# Patient Record
Sex: MALE | Race: WHITE | NOT HISPANIC OR LATINO
[De-identification: names, ages, dates, MRNs, and addresses within clinical notes are randomized per-mention and may not be internally consistent; named-entity substitution may affect disease eponyms.]

---

## 2017-04-28 ENCOUNTER — TRANSCRIPTION ENCOUNTER (OUTPATIENT)
Age: 77
End: 2017-04-28

## 2017-04-28 PROBLEM — Z00.00 ENCOUNTER FOR PREVENTIVE HEALTH EXAMINATION: Status: ACTIVE | Noted: 2017-04-28

## 2017-05-02 ENCOUNTER — APPOINTMENT (OUTPATIENT)
Dept: HEART AND VASCULAR | Facility: CLINIC | Age: 77
End: 2017-05-02

## 2017-05-02 VITALS
WEIGHT: 170 LBS | OXYGEN SATURATION: 94 % | TEMPERATURE: 98.6 F | SYSTOLIC BLOOD PRESSURE: 128 MMHG | RESPIRATION RATE: 15 BRPM | HEIGHT: 70 IN | BODY MASS INDEX: 24.34 KG/M2 | HEART RATE: 114 BPM | DIASTOLIC BLOOD PRESSURE: 70 MMHG

## 2017-05-02 DIAGNOSIS — I44.60 UNSPECIFIED FASCICULAR BLOCK: ICD-10-CM

## 2017-05-02 DIAGNOSIS — Z86.39 PERSONAL HISTORY OF OTHER ENDOCRINE, NUTRITIONAL AND METABOLIC DISEASE: ICD-10-CM

## 2017-05-02 DIAGNOSIS — N40.0 BENIGN PROSTATIC HYPERPLASIA WITHOUT LOWER URINARY TRACT SYMPMS: ICD-10-CM

## 2017-05-02 DIAGNOSIS — I44.4 LEFT ANTERIOR FASCICULAR BLOCK: ICD-10-CM

## 2017-05-02 DIAGNOSIS — E78.5 HYPERLIPIDEMIA, UNSPECIFIED: ICD-10-CM

## 2017-05-02 DIAGNOSIS — Z82.49 FAMILY HISTORY OF ISCHEMIC HEART DISEASE AND OTHER DISEASES OF THE CIRCULATORY SYSTEM: ICD-10-CM

## 2017-05-02 DIAGNOSIS — R94.31 ABNORMAL ELECTROCARDIOGRAM [ECG] [EKG]: ICD-10-CM

## 2017-05-02 DIAGNOSIS — R60.0 LOCALIZED EDEMA: ICD-10-CM

## 2017-05-02 RX ORDER — TERAZOSIN 2 MG/1
2 CAPSULE ORAL
Refills: 0 | Status: ACTIVE | COMMUNITY

## 2017-05-02 RX ORDER — ZOLPIDEM TARTRATE 10 MG/1
10 TABLET, FILM COATED ORAL
Refills: 0 | Status: DISCONTINUED | COMMUNITY

## 2017-05-02 RX ORDER — TRIAZOLAM 0.12 MG/1
0.12 TABLET ORAL
Qty: 30 | Refills: 0 | Status: ACTIVE | COMMUNITY
Start: 2017-05-02 | End: 1900-01-01

## 2017-05-02 RX ORDER — FLURAZEPAM HYDROCHLORIDE 15 MG/1
15 CAPSULE ORAL
Refills: 0 | Status: DISCONTINUED | COMMUNITY

## 2017-05-07 PROBLEM — Z82.49 FAMILY HISTORY OF PREMATURE CAD: Status: ACTIVE | Noted: 2017-05-07

## 2017-05-07 PROBLEM — Z82.49 FAMILY HISTORY OF EARLY CAD: Status: ACTIVE | Noted: 2017-05-07

## 2017-05-07 PROBLEM — I44.60 BUNDLE BRANCH BLOCK, LEFT HEMIBLOCK: Status: ACTIVE | Noted: 2017-05-07

## 2017-05-07 PROBLEM — Z86.39 HISTORY OF HYPERLIPIDEMIA: Status: RESOLVED | Noted: 2017-05-07 | Resolved: 2017-05-07

## 2017-05-07 PROBLEM — E78.5 DYSLIPIDEMIA: Status: ACTIVE | Noted: 2017-05-07

## 2017-05-07 PROBLEM — N40.0 BPH (BENIGN PROSTATIC HYPERTROPHY): Status: ACTIVE | Noted: 2017-05-07

## 2017-05-07 PROBLEM — R60.0 BILATERAL LEG EDEMA: Status: ACTIVE | Noted: 2017-05-07

## 2017-05-07 RX ORDER — TRIAZOLAM 0.25 MG/1
0.25 TABLET ORAL
Qty: 7 | Refills: 0 | Status: DISCONTINUED | COMMUNITY
Start: 2017-04-04

## 2017-05-07 RX ORDER — NAPROXEN 500 MG/1
500 TABLET ORAL
Qty: 28 | Refills: 0 | Status: DISCONTINUED | COMMUNITY
Start: 2016-12-12

## 2017-05-07 RX ORDER — OXYCODONE AND ACETAMINOPHEN 5; 325 MG/1; MG/1
5-325 TABLET ORAL
Qty: 10 | Refills: 0 | Status: DISCONTINUED | COMMUNITY
Start: 2016-12-12

## 2017-05-07 RX ORDER — ALPRAZOLAM 2 MG/1
2 TABLET ORAL
Refills: 0 | Status: DISCONTINUED | COMMUNITY
End: 2017-05-07

## 2017-05-07 RX ORDER — SULFAMETHOXAZOLE AND TRIMETHOPRIM 800; 160 MG/1; MG/1
800-160 TABLET ORAL
Qty: 10 | Refills: 0 | Status: DISCONTINUED | COMMUNITY
Start: 2017-03-01

## 2017-05-20 ENCOUNTER — INPATIENT (INPATIENT)
Facility: HOSPITAL | Age: 77
LOS: 5 days | Discharge: NURSING, REHAB OR LONG-TERM CARE | DRG: 470 | End: 2017-05-26
Attending: ORTHOPAEDIC SURGERY | Admitting: ORTHOPAEDIC SURGERY
Payer: MEDICARE

## 2017-05-20 VITALS
OXYGEN SATURATION: 91 % | HEART RATE: 81 BPM | SYSTOLIC BLOOD PRESSURE: 196 MMHG | DIASTOLIC BLOOD PRESSURE: 82 MMHG | TEMPERATURE: 98 F | RESPIRATION RATE: 16 BRPM

## 2017-05-20 LAB
ANION GAP SERPL CALC-SCNC: 8 MMOL/L — SIGNIFICANT CHANGE UP (ref 5–17)
APPEARANCE UR: CLEAR — SIGNIFICANT CHANGE UP
APTT BLD: 24.1 SEC — LOW (ref 27.5–37.4)
BASOPHILS NFR BLD AUTO: 0.2 % — SIGNIFICANT CHANGE UP (ref 0–2)
BILIRUB UR-MCNC: NEGATIVE — SIGNIFICANT CHANGE UP
BUN SERPL-MCNC: 23 MG/DL — SIGNIFICANT CHANGE UP (ref 7–23)
CALCIUM SERPL-MCNC: 9 MG/DL — SIGNIFICANT CHANGE UP (ref 8.4–10.5)
CHLORIDE SERPL-SCNC: 97 MMOL/L — SIGNIFICANT CHANGE UP (ref 96–108)
CO2 SERPL-SCNC: 28 MMOL/L — SIGNIFICANT CHANGE UP (ref 22–31)
COLOR SPEC: YELLOW — SIGNIFICANT CHANGE UP
CREAT SERPL-MCNC: 1.1 MG/DL — SIGNIFICANT CHANGE UP (ref 0.5–1.3)
DIFF PNL FLD: NEGATIVE — SIGNIFICANT CHANGE UP
EOSINOPHIL NFR BLD AUTO: 0.4 % — SIGNIFICANT CHANGE UP (ref 0–6)
GLUCOSE SERPL-MCNC: 99 MG/DL — SIGNIFICANT CHANGE UP (ref 70–99)
GLUCOSE UR QL: NEGATIVE — SIGNIFICANT CHANGE UP
HCT VFR BLD CALC: 41 % — SIGNIFICANT CHANGE UP (ref 39–50)
HGB BLD-MCNC: 13.2 G/DL — SIGNIFICANT CHANGE UP (ref 13–17)
INR BLD: 1.03 — SIGNIFICANT CHANGE UP (ref 0.88–1.16)
KETONES UR-MCNC: NEGATIVE — SIGNIFICANT CHANGE UP
LEUKOCYTE ESTERASE UR-ACNC: NEGATIVE — SIGNIFICANT CHANGE UP
LYMPHOCYTES # BLD AUTO: 73.2 % — HIGH (ref 13–44)
MCHC RBC-ENTMCNC: 30.4 PG — SIGNIFICANT CHANGE UP (ref 27–34)
MCHC RBC-ENTMCNC: 32.2 G/DL — SIGNIFICANT CHANGE UP (ref 32–36)
MCV RBC AUTO: 94.5 FL — SIGNIFICANT CHANGE UP (ref 80–100)
MONOCYTES NFR BLD AUTO: 2.4 % — SIGNIFICANT CHANGE UP (ref 2–14)
NEUTROPHILS NFR BLD AUTO: 23.8 % — LOW (ref 43–77)
NITRITE UR-MCNC: NEGATIVE — SIGNIFICANT CHANGE UP
PH UR: 6.5 — SIGNIFICANT CHANGE UP (ref 5–8)
PLATELET # BLD AUTO: 117 K/UL — LOW (ref 150–400)
POTASSIUM SERPL-MCNC: 4.3 MMOL/L — SIGNIFICANT CHANGE UP (ref 3.5–5.3)
POTASSIUM SERPL-SCNC: 4.3 MMOL/L — SIGNIFICANT CHANGE UP (ref 3.5–5.3)
PROT UR-MCNC: NEGATIVE MG/DL — SIGNIFICANT CHANGE UP
PROTHROM AB SERPL-ACNC: 11.4 SEC — SIGNIFICANT CHANGE UP (ref 9.8–12.7)
RBC # BLD: 4.34 M/UL — SIGNIFICANT CHANGE UP (ref 4.2–5.8)
RBC # FLD: 13.5 % — SIGNIFICANT CHANGE UP (ref 10.3–16.9)
SODIUM SERPL-SCNC: 133 MMOL/L — LOW (ref 135–145)
SP GR SPEC: 1.01 — SIGNIFICANT CHANGE UP (ref 1–1.03)
UROBILINOGEN FLD QL: 0.2 E.U./DL — SIGNIFICANT CHANGE UP
WBC # BLD: 24.8 K/UL — HIGH (ref 3.8–10.5)
WBC # FLD AUTO: 24.8 K/UL — HIGH (ref 3.8–10.5)

## 2017-05-20 PROCEDURE — 99285 EMERGENCY DEPT VISIT HI MDM: CPT | Mod: 25

## 2017-05-20 PROCEDURE — 71010: CPT | Mod: 26

## 2017-05-20 PROCEDURE — 73502 X-RAY EXAM HIP UNI 2-3 VIEWS: CPT | Mod: 26,RT

## 2017-05-20 PROCEDURE — 99221 1ST HOSP IP/OBS SF/LOW 40: CPT | Mod: GC

## 2017-05-20 PROCEDURE — 72170 X-RAY EXAM OF PELVIS: CPT | Mod: 26

## 2017-05-20 PROCEDURE — 93010 ELECTROCARDIOGRAM REPORT: CPT

## 2017-05-20 RX ORDER — MORPHINE SULFATE 50 MG/1
4 CAPSULE, EXTENDED RELEASE ORAL ONCE
Qty: 0 | Refills: 0 | Status: DISCONTINUED | OUTPATIENT
Start: 2017-05-20 | End: 2017-05-20

## 2017-05-20 RX ADMIN — MORPHINE SULFATE 4 MILLIGRAM(S): 50 CAPSULE, EXTENDED RELEASE ORAL at 20:39

## 2017-05-20 NOTE — CONSULT NOTE ADULT - PROBLEM SELECTOR RECOMMENDATION 6
Per outside documentation, pt w/ hx of insomnia and takes triazolam 0.125 qhs. Pt however, requesting ambien and xanax for sleep. Would advice against these medications as both sedating and pt is >73 yoa.  --can c/w triazolam PRN Per outside documentation, pt w/ hx of insomnia and takes triazolam 0.125 qhs. However other records show pt recieves 2 mg xanax (120 per month since september 2016) and zolpidem 10 mg for the past 2 months. Pt, requesting ambien and xanax for sleep ("I need 2 of each at 3 am for sleep"). Would advise against these medications as both sedating and pt is >73 yoa. However, as is home medication, can give xanax 1 mg for sleep tonight. If pt showing any signs of withdrawal likely 2/2 to benzodiazepine withdrawal  --xanax 1 mg tonight for sleep  --monitor for any signs of withdrawal (likely 2/2 to benzos)
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
## 2017-05-20 NOTE — ED PROVIDER NOTE - MEDICAL DECISION MAKING DETAILS
Pt presents s/p mechanical fall w/ hip pain. Deformity c/w hip fx. No other signs of trauma or pain. Analgesia, XR, pre-op w/u. Anticipate ortho consult and admission
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
## 2017-05-20 NOTE — ED PROVIDER NOTE - MUSCULOSKELETAL, MLM
No c-spine ttp. FROM w/o midline pain. Pelvis stable. + ttp R hip. R leg externally rotated and shortened. Unable to range the leg 2/2 significant pain. NVI distally.
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
## 2017-05-20 NOTE — CONSULT NOTE ADULT - PROBLEM SELECTOR RECOMMENDATION 9
Pt w/ intermediate risk orthopedic surgery w/ 1 cardiac risk factor equivalent. Echocardiogram done at Dr. Herber Goldberg's office earlier this month w/ normal LVEF (75%), n/l wall motion, but abnormal LV diastolic dysfxn, as well as trace MR and TR. Pt exercise tolerance <4 METS. Pt not complaining of any current CP or cardiac sx. No arrythmias, signs of active CHF, or murmurs on exam. Pt low risk for intermediate risk surgery (RCRI score of 1). As surgery is emergent, pt is medically optimized and sx should not be postponed for any further testing. Pt w/ intermediate risk orthopedic surgery w/ 1 cardiac risk factor equivalent. Echocardiogram done at Dr. Herber Goldberg's office earlier this month w/ normal LVEF (75%), n/l wall motion, but abnormal LV diastolic dysfxn, as well as trace MR and TR. Pt exercise tolerance METS >4. Pt not complaining of any current CP or cardiac sx. No arrythmias, signs of active CHF, or murmurs on exam. Pt low risk for intermediate risk surgery (RCRI score of 1). As surgery is emergent, pt is medically optimized and sx should not be postponed for any further testing. Pt w/ intermediate risk orthopedic surgery w/ 1 cardiac risk factor equivalent. Echocardiogram done at Dr. Herber Goldberg's office earlier this month w/ normal LVEF (75%), n/l wall motion, but abnormal LV diastolic dysfxn, as well as trace MR and TR. Pt exercise tolerance METS >4. Pt not complaining of any current CP or cardiac sx. No arrythmias, signs of active CHF, or murmurs on exam. Pt low risk for intermediate risk surgery (RCRI score of 1). As surgery is emergent, pt is medically optimized and sx should not be postponed for any further testing.    ATTENDING ADDENDUM: see note below, pt febrile after transfer to [facility] , given tylenol, monitoring for further fevers, if persisting then hold off surgery.

## 2017-05-20 NOTE — CONSULT NOTE ADULT - ATTENDING COMMENTS
pt seen and examined; reviewed vs, labs, ekg, cxr; pt a/f right femoral neck fracture after mechanical fall; pt at bedside insisting of getting xanax pills which he takes 2 tabs of 2mg per night along w/ 2 tabs of ambien. Pt stated that he has been taking xanax for the last 6 months. Pt given sonata by ortho team.   pt denies any smoking or drinking  pt denies cp/ dyspnea/ cough/ urinary sxs / abdominal pain/ n/v/anxiety/ tremors/ hallucinlations/ anxiety    Agree w/ above PE findings ; limited ROM of RLExt 2/2 right hip pain on raising the right leg; in addition, no hand tremors noted, no tongue fasciculations    a/p:   1. right hip fx/ preop evaluation: pt had a fever Tmax of 101.9  no obvious source of infection; possible combination from BZD withdrawl and/or recent fracture; given IV tylenol;  recommend 2mg of xanax tonight ; recheck Vitals, obtain blood ctxs ; if continues to be febrile will need to hold surgery pending further evalutaion. If afebrile then medically maximized for orthopedic procedure.    2. BZD use: possible withdrawl , pt reports taking 4mg of xanax at night for the last 6 months, will give 2mg to avoid over-sedation; monitor for worsening sxs ; c/w xanax 2mg post op unless withdrawl sxs require higher dosing.    3. CLL: monitor WBC    4. Hyponatremia: trend BMP if worsening, check serum osm and urine sodium, urine osm. pt seen and examined; reviewed vs, labs, ekg, cxr; pt a/f right femoral neck fracture after mechanical fall; pt at bedside insisting of getting xanax pills which he takes 2 tabs of 2mg per night along w/ 2 tabs of ambien. Pt stated that he has been taking xanax for the last 6 months. Pt given sonata by ortho team.   pt denies any smoking or drinking  pt denies cp/ dyspnea/ cough/ urinary sxs / abdominal pain/ n/v/anxiety/ tremors/ hallucinlations/ anxiety    Agree w/ above PE findings ; limited ROM of RLExt 2/2 right hip pain on raising the right leg; in addition, no hand tremors noted, no tongue fasciculations    a/p:   1. right hip fx/ preop evaluation: pt had a fever Tmax of 101.9  no obvious source of infection; possible combination from BZD withdrawl and/or recent fracture; given IV tylenol;  recommend 2mg of xanax tonight ; recheck Vitals, obtain blood ctxs ; if continues to be febrile will need to hold surgery pending further evaluation. If afebrile then medically maximized for orthopedic procedure.    2. BZD use: possible withdrawl , pt reports taking 4mg of xanax at night for the last 6 months, will give 2mg to avoid over-sedation; monitor for worsening sxs ; c/w xanax 2mg post op unless withdrawl sxs require higher dosing.    3. CLL: monitor WBC    4. Hyponatremia: trend BMP if worsening, check serum osm and urine sodium, urine osm.    5. elevated BP: improved on repeat vitals, occurring in setting of pain; monitor overnight , if persisting elevation would start low dose norvasc and adjust accordingly pt seen and examined; reviewed vs, labs, ekg, cxr; pt a/f right femoral neck fracture after mechanical fall; pt at bedside insisting on getting xanax pills which he takes 2 tabs of 2mg per night along w/ 2 tabs of ambien. Pt stated that he has been taking xanax for the last 6 months. Pt given sonata by ortho team.   pt denies any smoking or drinking  pt denies cp/ dyspnea/ cough/ urinary sxs / abdominal pain/ n/v/anxiety/ tremors/ hallucinlations/ anxiety    Agree w/ above PE findings ; limited ROM of RLExt 2/2 right hip pain on raising the right leg; in addition, no hand tremors noted, no tongue fasciculations    a/p:   1. right hip fx/ preop evaluation: pt had a fever Tmax of 101.9  no obvious source of infection; possible combination from BZD withdrawl and/or recent fracture; given IV tylenol;  recommend 2mg of xanax tonight ; recheck Vitals, obtain blood ctxs ; if continues to be febrile will need to hold surgery pending further evaluation. If afebrile then medically maximized for orthopedic procedure.    2. BZD use: possible withdrawl , pt reports taking 4mg of xanax at night for the last 6 months, will give 2mg to avoid over-sedation; monitor for worsening sxs ; c/w xanax 2mg post op unless withdrawl sxs require higher dosing.    3. CLL: monitor WBC    4. Hyponatremia: trend BMP if worsening, check serum osm and urine sodium, urine osm.    5. elevated BP: improved on repeat vitals, occurring in setting of pain; monitor overnight , if persisting elevation would start low dose norvasc and adjust accordingly

## 2017-05-20 NOTE — CONSULT NOTE ADULT - ASSESSMENT
Pt is a 77 y/o M w/ above pmh who presents to [facility] w/ R femoral neck fracture in need of R hip igor-arthroplasty pending medical clearance for OR.

## 2017-05-20 NOTE — ED PROVIDER NOTE - DIAGNOSTIC INTERPRETATION
ER Physician: Anel Sanders DO  CHEST XRAY INTERPRETATION: lungs clear, heart shadow normal, bony structures intact   ER Physician: Anel Sanders DO  Pelvis / hip INTERPRETATION:  + acute fracture R hip; no soft tissue swelling noted; normal bony alignment.

## 2017-05-20 NOTE — ED PROVIDER NOTE - OBJECTIVE STATEMENT
Pt with PMHx insomnia presents s/p mechanical fall. Pt reports children were playing in the hallway of his building and he tripped, falling onto his R side. No head injury or LOC. Pt was unable to get up. Pt BIBA. No hx hip fx / surgery. Pt c/o pain in the R hip only. Denies syncope, near syncope, CP, SOB, palpitations, diaphoresis, dizziness preceding. Also denies HA, blurred vision, dizziness, n/v since falling Pt with PMHx CLL (chronic WBC low 20s, not getting tx), insomnia presents s/p mechanical fall. Pt reports children were playing in the hallway of his building and he tripped, falling onto his R side. No head injury or LOC. Pt was unable to get up. Pt BIBA. No hx hip fx / surgery. Pt c/o pain in the R hip only. Denies syncope, near syncope, CP, SOB, palpitations, diaphoresis, dizziness preceding. Also denies HA, blurred vision, dizziness, n/v since falling. Pt denies other injuries or complaints.

## 2017-05-21 DIAGNOSIS — S72.001A FRACTURE OF UNSPECIFIED PART OF NECK OF RIGHT FEMUR, INITIAL ENCOUNTER FOR CLOSED FRACTURE: ICD-10-CM

## 2017-05-21 DIAGNOSIS — C91.10 CHRONIC LYMPHOCYTIC LEUKEMIA OF B-CELL TYPE NOT HAVING ACHIEVED REMISSION: ICD-10-CM

## 2017-05-21 DIAGNOSIS — G47.00 INSOMNIA, UNSPECIFIED: ICD-10-CM

## 2017-05-21 DIAGNOSIS — F13.10 SEDATIVE, HYPNOTIC OR ANXIOLYTIC ABUSE, UNCOMPLICATED: ICD-10-CM

## 2017-05-21 DIAGNOSIS — E87.1 HYPO-OSMOLALITY AND HYPONATREMIA: ICD-10-CM

## 2017-05-21 DIAGNOSIS — E78.5 HYPERLIPIDEMIA, UNSPECIFIED: ICD-10-CM

## 2017-05-21 DIAGNOSIS — N40.0 BENIGN PROSTATIC HYPERPLASIA WITHOUT LOWER URINARY TRACT SYMPTOMS: ICD-10-CM

## 2017-05-21 LAB
ANION GAP SERPL CALC-SCNC: 8 MMOL/L — SIGNIFICANT CHANGE UP (ref 5–17)
BASOPHILS NFR BLD AUTO: 0.1 % — SIGNIFICANT CHANGE UP (ref 0–2)
BUN SERPL-MCNC: 22 MG/DL — SIGNIFICANT CHANGE UP (ref 7–23)
CALCIUM SERPL-MCNC: 8.5 MG/DL — SIGNIFICANT CHANGE UP (ref 8.4–10.5)
CHLORIDE SERPL-SCNC: 97 MMOL/L — SIGNIFICANT CHANGE UP (ref 96–108)
CO2 SERPL-SCNC: 28 MMOL/L — SIGNIFICANT CHANGE UP (ref 22–31)
CREAT SERPL-MCNC: 1.1 MG/DL — SIGNIFICANT CHANGE UP (ref 0.5–1.3)
EOSINOPHIL NFR BLD AUTO: 0.5 % — SIGNIFICANT CHANGE UP (ref 0–6)
GLUCOSE SERPL-MCNC: 93 MG/DL — SIGNIFICANT CHANGE UP (ref 70–99)
HCT VFR BLD CALC: 37.9 % — LOW (ref 39–50)
HGB BLD-MCNC: 12.4 G/DL — LOW (ref 13–17)
LYMPHOCYTES # BLD AUTO: 64 % — HIGH (ref 13–44)
MCHC RBC-ENTMCNC: 30.8 PG — SIGNIFICANT CHANGE UP (ref 27–34)
MCHC RBC-ENTMCNC: 32.7 G/DL — SIGNIFICANT CHANGE UP (ref 32–36)
MCV RBC AUTO: 94 FL — SIGNIFICANT CHANGE UP (ref 80–100)
MONOCYTES NFR BLD AUTO: 3.4 % — SIGNIFICANT CHANGE UP (ref 2–14)
MRSA PCR RESULT.: NEGATIVE — SIGNIFICANT CHANGE UP
NEUTROPHILS NFR BLD AUTO: 32 % — LOW (ref 43–77)
PLATELET # BLD AUTO: 121 K/UL — LOW (ref 150–400)
POTASSIUM SERPL-MCNC: 4.5 MMOL/L — SIGNIFICANT CHANGE UP (ref 3.5–5.3)
POTASSIUM SERPL-SCNC: 4.5 MMOL/L — SIGNIFICANT CHANGE UP (ref 3.5–5.3)
RBC # BLD: 4.03 M/UL — LOW (ref 4.2–5.8)
RBC # FLD: 13.8 % — SIGNIFICANT CHANGE UP (ref 10.3–16.9)
S AUREUS DNA NOSE QL NAA+PROBE: NEGATIVE — SIGNIFICANT CHANGE UP
SODIUM SERPL-SCNC: 133 MMOL/L — LOW (ref 135–145)
WBC # BLD: 27.5 K/UL — HIGH (ref 3.8–10.5)
WBC # FLD AUTO: 27.5 K/UL — HIGH (ref 3.8–10.5)

## 2017-05-21 PROCEDURE — 93010 ELECTROCARDIOGRAM REPORT: CPT

## 2017-05-21 PROCEDURE — 72170 X-RAY EXAM OF PELVIS: CPT | Mod: 26

## 2017-05-21 PROCEDURE — 99233 SBSQ HOSP IP/OBS HIGH 50: CPT

## 2017-05-21 PROCEDURE — 90791 PSYCH DIAGNOSTIC EVALUATION: CPT

## 2017-05-21 RX ORDER — MORPHINE SULFATE 50 MG/1
4 CAPSULE, EXTENDED RELEASE ORAL EVERY 4 HOURS
Qty: 0 | Refills: 0 | Status: DISCONTINUED | OUTPATIENT
Start: 2017-05-20 | End: 2017-05-22

## 2017-05-21 RX ORDER — SODIUM CHLORIDE 9 MG/ML
1000 INJECTION, SOLUTION INTRAVENOUS
Qty: 0 | Refills: 0 | Status: DISCONTINUED | OUTPATIENT
Start: 2017-05-20 | End: 2017-05-22

## 2017-05-21 RX ORDER — DOXAZOSIN MESYLATE 4 MG
2 TABLET ORAL AT BEDTIME
Qty: 0 | Refills: 0 | Status: DISCONTINUED | OUTPATIENT
Start: 2017-05-21 | End: 2017-05-22

## 2017-05-21 RX ORDER — ALPRAZOLAM 0.25 MG
2 TABLET ORAL ONCE
Qty: 0 | Refills: 0 | Status: DISCONTINUED | OUTPATIENT
Start: 2017-05-21 | End: 2017-05-21

## 2017-05-21 RX ORDER — OXYCODONE HYDROCHLORIDE 5 MG/1
5 TABLET ORAL EVERY 4 HOURS
Qty: 0 | Refills: 0 | Status: DISCONTINUED | OUTPATIENT
Start: 2017-05-20 | End: 2017-05-22

## 2017-05-21 RX ORDER — ACETAMINOPHEN 500 MG
650 TABLET ORAL EVERY 6 HOURS
Qty: 0 | Refills: 0 | Status: DISCONTINUED | OUTPATIENT
Start: 2017-05-20 | End: 2017-05-22

## 2017-05-21 RX ORDER — ALPRAZOLAM 0.25 MG
2 TABLET ORAL AT BEDTIME
Qty: 0 | Refills: 0 | Status: DISCONTINUED | OUTPATIENT
Start: 2017-05-21 | End: 2017-05-22

## 2017-05-21 RX ORDER — ZALEPLON 10 MG
5 CAPSULE ORAL AT BEDTIME
Qty: 0 | Refills: 0 | Status: DISCONTINUED | OUTPATIENT
Start: 2017-05-21 | End: 2017-05-22

## 2017-05-21 RX ORDER — OXYCODONE HYDROCHLORIDE 5 MG/1
10 TABLET ORAL EVERY 4 HOURS
Qty: 0 | Refills: 0 | Status: DISCONTINUED | OUTPATIENT
Start: 2017-05-20 | End: 2017-05-22

## 2017-05-21 RX ORDER — ACETAMINOPHEN 500 MG
1000 TABLET ORAL ONCE
Qty: 0 | Refills: 0 | Status: COMPLETED | OUTPATIENT
Start: 2017-05-21 | End: 2017-05-21

## 2017-05-21 RX ADMIN — MORPHINE SULFATE 4 MILLIGRAM(S): 50 CAPSULE, EXTENDED RELEASE ORAL at 03:25

## 2017-05-21 RX ADMIN — OXYCODONE HYDROCHLORIDE 10 MILLIGRAM(S): 5 TABLET ORAL at 07:58

## 2017-05-21 RX ADMIN — Medication 2 MILLIGRAM(S): at 02:08

## 2017-05-21 RX ADMIN — OXYCODONE HYDROCHLORIDE 10 MILLIGRAM(S): 5 TABLET ORAL at 08:30

## 2017-05-21 RX ADMIN — OXYCODONE HYDROCHLORIDE 10 MILLIGRAM(S): 5 TABLET ORAL at 04:25

## 2017-05-21 RX ADMIN — OXYCODONE HYDROCHLORIDE 10 MILLIGRAM(S): 5 TABLET ORAL at 05:00

## 2017-05-21 RX ADMIN — MORPHINE SULFATE 4 MILLIGRAM(S): 50 CAPSULE, EXTENDED RELEASE ORAL at 04:00

## 2017-05-21 RX ADMIN — Medication 5 MILLIGRAM(S): at 01:24

## 2017-05-21 RX ADMIN — Medication 400 MILLIGRAM(S): at 02:15

## 2017-05-21 NOTE — PROGRESS NOTE ADULT - ASSESSMENT
Pt is a 75 y/o M w/ pmh CLL (chronic WBC low 20s, not getting tx), insomnia who presents to [facility] w/ R femoral neck fracture in need of R hip igor-arthroplasty with Ortho

## 2017-05-21 NOTE — PROGRESS NOTE ADULT - SUBJECTIVE AND OBJECTIVE BOX
INTERVAL HPI/OVERNIGHT EVENTS: No fever or tachycardia after receiving Xanax. Looks well, not toxic appearing. Says he has pain in his right hip, no other complaints. Has not had a BM.    ROS: no CP, SOB, Cough, palpitations, constipation, diarrhea, nausea    MEDICATIONS  (STANDING):  lactated ringers. 1000milliLiter(s) IV Continuous <Continuous>  ALPRAZolam 2milliGRAM(s) Oral at bedtime    MEDICATIONS  (PRN):  acetaminophen   Tablet 650milliGRAM(s) Oral every 6 hours PRN For Temp greater than 38 C (100.4 F)  oxyCODONE IR 10milliGRAM(s) Oral every 4 hours PRN Moderate Pain  oxyCODONE IR 5milliGRAM(s) Oral every 4 hours PRN Mild Pain  morphine  - Injectable 4milliGRAM(s) IV Push every 4 hours PRN Severe Pain  zaleplon 5milliGRAM(s) Oral at bedtime PRN Insomnia      Allergies    No Known Allergies    Intolerances            Vital Signs Last 24 Hrs  T(F): 97.7, Max: 101.9 ( @ 02:00)  HR: 76 (70 - 105)  BP: 129/67 (129/67 - 196/82)  RR: 16 (16 - 18)  SpO2: 93% (91% - 96%)    Physical Exam  GEN: NAD, AAOx3  HEENT:   Normal oral mucosa, PERRL, EOMI	  Neck: Supple,  - JVD   Cardiovascular: Normal S1 S2,  No murmurs,   Respiratory: Lungs clear to auscultation/No Rales, Rhonchi, Wheezing	  Gastrointestinal:  Soft,obese,  Non-tender, + BS	  Skin: No rashes, No ecchymoses, No cyanosis  Extremities: No clubbing, cyanosis, 1+ edema b/l  Neurologic: Non-focal  Psychiatry: A & O x 3, Mood & affect appropriate      LABS:                        12.4   27.5  )-----------( 121      ( 21 May 2017 05:33 )             37.9         133<L>  |  97  |  22  ----------------------------<  93  4.5   |  28  |  1.10    Ca    8.5      21 May 2017 05:34      PT/INR - ( 20 May 2017 20:54 )   PT: 11.4 sec;   INR: 1.03          PTT - ( 20 May 2017 20:54 )  PTT:24.1 sec  Urinalysis Basic - ( 20 May 2017 20:54 )    Color: Yellow / Appearance: Clear / S.015 / pH: x  Gluc: x / Ketone: NEGATIVE  / Bili: NEGATIVE / Urobili: 0.2 E.U./dL   Blood: x / Protein: NEGATIVE mg/dL / Nitrite: NEGATIVE   Leuk Esterase: NEGATIVE / RBC: x / WBC x   Sq Epi: x / Non Sq Epi: x / Bacteria: x        RADIOLOGY & ADDITIONAL TESTS:  EXAM:  XR PELVIS-1 OR 2 VIEWS                          PROCEDURE DATE:  2017  IMPRESSION:  Acute minimally displaced fracture of the right femoral neck.

## 2017-05-21 NOTE — PROGRESS NOTE ADULT - SUBJECTIVE AND OBJECTIVE BOX
SUBJECTIVE: Patient seen and examined. Pain controlled.  Pt did well o/n  No c/n/v/cp/sob.  fever possibly from withdrawal of benzos    OBJECTIVE:  NAD  Vital Signs Last 24 Hrs  T(C): 36.9, Max: 38.8 (05-21 @ 02:00)  T(F): 98.4, Max: 101.9 (05-21 @ 02:00)  HR: 85 (70 - 105)  BP: 130/63 (130/63 - 196/82)  BP(mean): --  RR: 16 (16 - 18)  SpO2: 96% (91% - 96%)    Affected extremity:          externally rotated         Sensation: SILT         Motor exam: 5/5 TA/GS/EHL         warm well perfused; capillary refill <3 seconds                         12.4   27.5  )-----------( 121      ( 21 May 2017 05:33 )             37.9     05-21    133<L>  |  97  |  22  ----------------------------<  93  4.5   |  28  |  1.10    Ca    8.5      21 May 2017 05:34      A/P :  Pt is a 76yo Male s/p fall with R FNF  -    Pain control  -    DVT ppx: Held    -    medicine cleared  -    touch base with PCP  -    Weight bearing status: NWB  -   OR this morning

## 2017-05-21 NOTE — PRE-OP CHECKLIST - 1.
Patient has hx of Chronic Lymphocytic Leukemia for the past 20 years as per pt  WBC count is always elevated

## 2017-05-21 NOTE — BEHAVIORAL HEALTH ASSESSMENT NOTE - NSBHCONSULTRECOMMENDOTHER_PSY_A_CORE FT
SW consult to obtain recommended outpt mental health services to continue treatment of subjective insomnia  possible inpatient rehabilitation for BZD  consider possible dementia work up versus BZD abuse 2/2 patient's perseveration, circumstantial process and forgetfulness

## 2017-05-21 NOTE — H&P ADULT - NSHPPHYSICALEXAM_GEN_ALL_CORE
Affected extremity:          ROM  limited at hip to due pain         Sensation: SILT         Motor exam: 5/5 TA/GS/EHL         warm well perfused; capillary refill <3 seconds

## 2017-05-21 NOTE — H&P ADULT - HISTORY OF PRESENT ILLNESS
Pt is 76 yo Mwith PMHx CLL (chronic WBC low 20s, not getting tx), insomnia presents s/p mechanical fall. Pt reports children were playing in the hallway of his building and he tripped, falling onto his R side. No head injury or LOC. Pt was unable to get up.Pt c/o pain in the R hip only. Denies CP, SOB, palpitations, diaphoresis, dizziness preceding. Also denies HA, blurred vision, dizziness, n/v since falling.  Ortho consulted for suspected hip fx.

## 2017-05-21 NOTE — H&P ADULT - ASSESSMENT
A/P  Pt 77yMale  s/p fall with R FNF   Admit to Orthopaedics  NPO at midnight  IVF  Pain control  Hold anticoagulation for OR  Labs  UA  type and screen  CXR   EKG  Medical Clearance with Med consult  MSRA nasal swab  Consented for R igor arthroplasty  d/w attending Dr Moon

## 2017-05-21 NOTE — CONSULT NOTE ADULT - SUBJECTIVE AND OBJECTIVE BOX
CHIEF COMPLAINT:  Fell and broke his hip and now needs medical clearance for surgery    HISTORY OF PRESENT ILLNESS:    PAST MEDICAL & SURGICAL HISTORY:  CLL (chronic lymphocytic leukemia)      [ ] Diabetes   [ ] Hypertension  [ ] Hyperlipidemia  [ ] CAD  [ ] PCI  [ ] CABG    PREVIOUS DIAGNOSTIC TESTING:    [ ] Echocardiogram:  [ ]  Catheterization:  [ ] Stress Test:  	    MEDICATIONS:        acetaminophen   Tablet 650milliGRAM(s) Oral every 6 hours PRN  oxyCODONE IR 10milliGRAM(s) Oral every 4 hours PRN  oxyCODONE IR 5milliGRAM(s) Oral every 4 hours PRN  morphine  - Injectable 4milliGRAM(s) IV Push every 4 hours PRN  zaleplon 5milliGRAM(s) Oral at bedtime PRN  ALPRAZolam 2milliGRAM(s) Oral at bedtime        lactated ringers. 1000milliLiter(s) IV Continuous <Continuous>      FAMILY HISTORY:  Positive for premature CAD      SOCIAL HISTORY:    [ ] Non-smoker  [ ] Smoker  [ ] Alcohol    Allergies    No Known Allergies    Intolerances    	    REVIEW OF SYSTEMS:  CONSTITUTIONAL: No fever, weight loss, or fatigue  EYES: No eye pain, visual disturbances, or discharge  ENMT:  No difficulty hearing, tinnitus, vertigo; No sinus or throat pain  NECK: No pain or stiffness  RESPIRATORY: No cough, wheezing, chills or hemoptysis; No Shortness of Breath  CARDIOVASCULAR: No chest pain, palpitations, passing out, dizziness, or leg swelling  GASTROINTESTINAL: No abdominal or epigastric pain. No nausea, vomiting, or hematemesis; No diarrhea or constipation. No melena or hematochezia.  GENITOURINARY: No dysuria, frequency, hematuria, or incontinence  NEUROLOGICAL: No headaches, memory loss, loss of strength, numbness, or tremors  SKIN: No itching, burning, rashes, or lesions   LYMPH Nodes: No enlarged glands  ENDOCRINE: No heat or cold intolerance; No hair loss  MUSCULOSKELETAL: right hip pain  PSYCHIATRIC: No depression, anxiety, mood swings, or difficulty sleeping  HEME/LYMPH: No easy bruising, or bleeding gums  ALLERY AND IMMUNOLOGIC: No hives or eczema	    [ ] All others negative	  [ ] Unable to obtain    PHYSICAL EXAM:  T(C): 36.5, Max: 38.8 (05-21 @ 02:00)  HR: 76 (70 - 105)  BP: 129/67 (129/67 - 196/82)  RR: 16 (16 - 18)  SpO2: 93% (91% - 96%)  Wt(kg): --  I&O's Summary    I & Os for current day (as of 21 May 2017 10:31)  =============================================  IN: 940 ml / OUT: 600 ml / NET: 340 ml      Appearance: Normal	  HEENT:   Normal oral mucosa, PERRL, EOMI	  Lymphatic: No lymphadenopathy  Cardiovascular: Normal S1 S2, No JVD, No murmurs, No edema  Respiratory: Lungs clear to auscultation	  Psychiatry: A & O x 3, Mood & affect appropriate  Gastrointestinal:  Soft, Non-tender, + BS	  Skin: No rashes, No ecchymoses, No cyanosis	  Neurologic: Non-focal  Extremities: Limited ROM of right hip  Vascular: Peripheral pulses palpable 2+ bilaterally    TELEMETRY: 	    ECG:  	Ordered  RADIOLOGY:  OTHER: 	  	  LABS:	 	    CARDIAC MARKERS:                                  12.4   27.5  )-----------( 121      ( 21 May 2017 05:33 )             37.9     05-21    133<L>  |  97  |  22  ----------------------------<  93  4.5   |  28  |  1.10    Ca    8.5      21 May 2017 05:34      proBNP:   Lipid Profile:   HgA1c:   TSH:     ASSESSMENT/PLAN:

## 2017-05-21 NOTE — BEHAVIORAL HEALTH ASSESSMENT NOTE - SUMMARY
77 year old male s/p fall using both xanax and ambien per self report for the treatment of insomnia however reported different amounts and determined to be an unreliable source of personal information.

## 2017-05-21 NOTE — BEHAVIORAL HEALTH ASSESSMENT NOTE - HPI (INCLUDE ILLNESS QUALITY, SEVERITY, DURATION, TIMING, CONTEXT, MODIFYING FACTORS, ASSOCIATED SIGNS AND SYMPTOMS)
77 year old single never , retired  and domiciled alone male (poor historian, changes answers upon questioning) with no apparent psychiatric history except subjective insomnia receiving Xanax 2mg (reported somewhere between 60 and 120 pills per month) from a PCP and not having contact with a psychiatrist.  He told Dr. Moon he is using Ambien from a friend for sleep disturbance.  [place] is s/p fall reportedly secondary to children in the hallway however it may be related to xanax/ambien misuse.  he sustained a R femoral neck fracture and is scheduled for surgery today.  The ortho team is concerned about withdrawal sxs related to xanax use post surgery.  This writer is recommending Ativan 1mg Q4H standing with frequent vital signs in order to titrate to effective dose and frequency for amount of withdrawal (unknown amount of benzos regularly taken).    Per ortho resident, patient was also taking Ativan as outpt however this may have been misinterpreted for Ambien, nonetheless the patient is unreliable and at risk for withdrawal post surgery.  C/L psych will follow in order to help Ortho treat this patient safely and effectively.

## 2017-05-21 NOTE — CONSULT NOTE ADULT - ATTENDING COMMENTS
71 year old active male with longstanding CLL and hyperlipidemia  with family history of CAD.  He is medically cleared to procced with surgery and general anesthesia pending EKG and Echo (if can be done urgently)

## 2017-05-22 ENCOUNTER — RESULT REVIEW (OUTPATIENT)
Age: 77
End: 2017-05-22

## 2017-05-22 DIAGNOSIS — D72.829 ELEVATED WHITE BLOOD CELL COUNT, UNSPECIFIED: ICD-10-CM

## 2017-05-22 LAB
ANION GAP SERPL CALC-SCNC: 10 MMOL/L — SIGNIFICANT CHANGE UP (ref 5–17)
BASE EXCESS BLDA CALC-SCNC: 0.2 MMOL/L — SIGNIFICANT CHANGE UP (ref -2–3)
BUN SERPL-MCNC: 17 MG/DL — SIGNIFICANT CHANGE UP (ref 7–23)
CA-I BLDA-SCNC: 0.89 MMOL/L — LOW (ref 1.1–1.3)
CALCIUM SERPL-MCNC: 8.1 MG/DL — LOW (ref 8.4–10.5)
CHLORIDE SERPL-SCNC: 96 MMOL/L — SIGNIFICANT CHANGE UP (ref 96–108)
CO2 SERPL-SCNC: 26 MMOL/L — SIGNIFICANT CHANGE UP (ref 22–31)
COHGB MFR BLDA: 0.2 % — SIGNIFICANT CHANGE UP
CREAT SERPL-MCNC: 0.9 MG/DL — SIGNIFICANT CHANGE UP (ref 0.5–1.3)
GAS PNL BLDA: SIGNIFICANT CHANGE UP
GLUCOSE SERPL-MCNC: 96 MG/DL — SIGNIFICANT CHANGE UP (ref 70–99)
HCO3 BLDA-SCNC: 23 MMOL/L — SIGNIFICANT CHANGE UP (ref 21–28)
HCT VFR BLD CALC: 36.7 % — LOW (ref 39–50)
HGB BLD-MCNC: 12 G/DL — LOW (ref 13–17)
HGB BLDA-MCNC: 11.9 G/DL — LOW (ref 13–17)
MCHC RBC-ENTMCNC: 30.2 PG — SIGNIFICANT CHANGE UP (ref 27–34)
MCHC RBC-ENTMCNC: 32.7 G/DL — SIGNIFICANT CHANGE UP (ref 32–36)
MCV RBC AUTO: 92.2 FL — SIGNIFICANT CHANGE UP (ref 80–100)
METHGB MFR BLDA: 0.3 % — SIGNIFICANT CHANGE UP
O2 CT VFR BLDA CALC: SIGNIFICANT CHANGE UP (ref 15–23)
OXYHGB MFR BLDA: 99 % — SIGNIFICANT CHANGE UP (ref 94–100)
PCO2 BLDA: 31 MMHG — LOW (ref 35–48)
PH BLDA: 7.49 — HIGH (ref 7.35–7.45)
PLATELET # BLD AUTO: 101 K/UL — LOW (ref 150–400)
PO2 BLDA: 284 MMHG — HIGH (ref 83–108)
POTASSIUM BLDA-SCNC: 3.6 MMOL/L — SIGNIFICANT CHANGE UP (ref 3.5–4.9)
POTASSIUM SERPL-MCNC: 4.1 MMOL/L — SIGNIFICANT CHANGE UP (ref 3.5–5.3)
POTASSIUM SERPL-SCNC: 4.1 MMOL/L — SIGNIFICANT CHANGE UP (ref 3.5–5.3)
RBC # BLD: 3.98 M/UL — LOW (ref 4.2–5.8)
RBC # FLD: 13.3 % — SIGNIFICANT CHANGE UP (ref 10.3–16.9)
SAO2 % BLDA: 100 % — SIGNIFICANT CHANGE UP (ref 95–100)
SODIUM BLDA-SCNC: 133 MMOL/L — LOW (ref 138–146)
SODIUM SERPL-SCNC: 132 MMOL/L — LOW (ref 135–145)
WBC # BLD: 22.5 K/UL — HIGH (ref 3.8–10.5)
WBC # FLD AUTO: 22.5 K/UL — HIGH (ref 3.8–10.5)

## 2017-05-22 PROCEDURE — 72170 X-RAY EXAM OF PELVIS: CPT | Mod: 26

## 2017-05-22 PROCEDURE — 99233 SBSQ HOSP IP/OBS HIGH 50: CPT

## 2017-05-22 RX ORDER — ASPIRIN/CALCIUM CARB/MAGNESIUM 324 MG
325 TABLET ORAL
Qty: 0 | Refills: 0 | Status: DISCONTINUED | OUTPATIENT
Start: 2017-05-22 | End: 2017-05-24

## 2017-05-22 RX ORDER — ZALEPLON 10 MG
5 CAPSULE ORAL AT BEDTIME
Qty: 0 | Refills: 0 | Status: DISCONTINUED | OUTPATIENT
Start: 2017-05-22 | End: 2017-05-26

## 2017-05-22 RX ORDER — DOCUSATE SODIUM 100 MG
100 CAPSULE ORAL THREE TIMES A DAY
Qty: 0 | Refills: 0 | Status: DISCONTINUED | OUTPATIENT
Start: 2017-05-22 | End: 2017-05-26

## 2017-05-22 RX ORDER — SENNA PLUS 8.6 MG/1
2 TABLET ORAL AT BEDTIME
Qty: 0 | Refills: 0 | Status: DISCONTINUED | OUTPATIENT
Start: 2017-05-22 | End: 2017-05-26

## 2017-05-22 RX ORDER — DOXAZOSIN MESYLATE 4 MG
2 TABLET ORAL AT BEDTIME
Qty: 0 | Refills: 0 | Status: DISCONTINUED | OUTPATIENT
Start: 2017-05-22 | End: 2017-05-26

## 2017-05-22 RX ORDER — HYDROMORPHONE HYDROCHLORIDE 2 MG/ML
0.5 INJECTION INTRAMUSCULAR; INTRAVENOUS; SUBCUTANEOUS
Qty: 0 | Refills: 0 | Status: DISCONTINUED | OUTPATIENT
Start: 2017-05-22 | End: 2017-05-22

## 2017-05-22 RX ORDER — PANTOPRAZOLE SODIUM 20 MG/1
40 TABLET, DELAYED RELEASE ORAL DAILY
Qty: 0 | Refills: 0 | Status: DISCONTINUED | OUTPATIENT
Start: 2017-05-22 | End: 2017-05-26

## 2017-05-22 RX ORDER — ONDANSETRON 8 MG/1
4 TABLET, FILM COATED ORAL EVERY 6 HOURS
Qty: 0 | Refills: 0 | Status: DISCONTINUED | OUTPATIENT
Start: 2017-05-22 | End: 2017-05-26

## 2017-05-22 RX ORDER — BUPIVACAINE 13.3 MG/ML
20 INJECTION, SUSPENSION, LIPOSOMAL INFILTRATION ONCE
Qty: 0 | Refills: 0 | Status: DISCONTINUED | OUTPATIENT
Start: 2017-05-22 | End: 2017-05-26

## 2017-05-22 RX ORDER — ALPRAZOLAM 0.25 MG
2 TABLET ORAL AT BEDTIME
Qty: 0 | Refills: 0 | Status: DISCONTINUED | OUTPATIENT
Start: 2017-05-22 | End: 2017-05-26

## 2017-05-22 RX ORDER — TRAMADOL HYDROCHLORIDE 50 MG/1
50 TABLET ORAL EVERY 4 HOURS
Qty: 0 | Refills: 0 | Status: DISCONTINUED | OUTPATIENT
Start: 2017-05-22 | End: 2017-05-26

## 2017-05-22 RX ORDER — MAGNESIUM HYDROXIDE 400 MG/1
30 TABLET, CHEWABLE ORAL
Qty: 0 | Refills: 0 | Status: DISCONTINUED | OUTPATIENT
Start: 2017-05-22 | End: 2017-05-26

## 2017-05-22 RX ORDER — SODIUM CHLORIDE 9 MG/ML
1000 INJECTION, SOLUTION INTRAVENOUS
Qty: 0 | Refills: 0 | Status: DISCONTINUED | OUTPATIENT
Start: 2017-05-22 | End: 2017-05-22

## 2017-05-22 RX ORDER — CEFAZOLIN SODIUM 1 G
2000 VIAL (EA) INJECTION EVERY 8 HOURS
Qty: 0 | Refills: 0 | Status: COMPLETED | OUTPATIENT
Start: 2017-05-22 | End: 2017-05-22

## 2017-05-22 RX ORDER — ACETAMINOPHEN 500 MG
975 TABLET ORAL EVERY 8 HOURS
Qty: 0 | Refills: 0 | Status: DISCONTINUED | OUTPATIENT
Start: 2017-05-22 | End: 2017-05-26

## 2017-05-22 RX ORDER — SENNA PLUS 8.6 MG/1
2 TABLET ORAL AT BEDTIME
Qty: 0 | Refills: 0 | Status: DISCONTINUED | OUTPATIENT
Start: 2017-05-22 | End: 2017-05-22

## 2017-05-22 RX ORDER — TRAMADOL HYDROCHLORIDE 50 MG/1
25 TABLET ORAL EVERY 4 HOURS
Qty: 0 | Refills: 0 | Status: DISCONTINUED | OUTPATIENT
Start: 2017-05-22 | End: 2017-05-26

## 2017-05-22 RX ORDER — POLYETHYLENE GLYCOL 3350 17 G/17G
17 POWDER, FOR SOLUTION ORAL DAILY
Qty: 0 | Refills: 0 | Status: DISCONTINUED | OUTPATIENT
Start: 2017-05-22 | End: 2017-05-26

## 2017-05-22 RX ORDER — TRAMADOL HYDROCHLORIDE 50 MG/1
50 TABLET ORAL EVERY 4 HOURS
Qty: 0 | Refills: 0 | Status: DISCONTINUED | OUTPATIENT
Start: 2017-05-22 | End: 2017-05-22

## 2017-05-22 RX ORDER — ONDANSETRON 8 MG/1
4 TABLET, FILM COATED ORAL EVERY 4 HOURS
Qty: 0 | Refills: 0 | Status: DISCONTINUED | OUTPATIENT
Start: 2017-05-22 | End: 2017-05-22

## 2017-05-22 RX ORDER — ACETAMINOPHEN 500 MG
650 TABLET ORAL EVERY 6 HOURS
Qty: 0 | Refills: 0 | Status: DISCONTINUED | OUTPATIENT
Start: 2017-05-22 | End: 2017-05-26

## 2017-05-22 RX ADMIN — Medication 100 MILLIGRAM(S): at 21:27

## 2017-05-22 RX ADMIN — Medication 975 MILLIGRAM(S): at 15:08

## 2017-05-22 RX ADMIN — PANTOPRAZOLE SODIUM 40 MILLIGRAM(S): 20 TABLET, DELAYED RELEASE ORAL at 15:08

## 2017-05-22 RX ADMIN — Medication 2 MILLIGRAM(S): at 00:59

## 2017-05-22 RX ADMIN — TRAMADOL HYDROCHLORIDE 50 MILLIGRAM(S): 50 TABLET ORAL at 13:00

## 2017-05-22 RX ADMIN — Medication 975 MILLIGRAM(S): at 16:00

## 2017-05-22 RX ADMIN — OXYCODONE HYDROCHLORIDE 10 MILLIGRAM(S): 5 TABLET ORAL at 01:00

## 2017-05-22 RX ADMIN — Medication 975 MILLIGRAM(S): at 22:17

## 2017-05-22 RX ADMIN — Medication 325 MILLIGRAM(S): at 17:21

## 2017-05-22 RX ADMIN — OXYCODONE HYDROCHLORIDE 10 MILLIGRAM(S): 5 TABLET ORAL at 00:12

## 2017-05-22 RX ADMIN — Medication 100 MILLIGRAM(S): at 15:07

## 2017-05-22 RX ADMIN — POLYETHYLENE GLYCOL 3350 17 GRAM(S): 17 POWDER, FOR SOLUTION ORAL at 15:07

## 2017-05-22 RX ADMIN — Medication 1 MILLIGRAM(S): at 13:30

## 2017-05-22 RX ADMIN — Medication 100 MILLIGRAM(S): at 15:08

## 2017-05-22 RX ADMIN — TRAMADOL HYDROCHLORIDE 50 MILLIGRAM(S): 50 TABLET ORAL at 23:56

## 2017-05-22 RX ADMIN — Medication 975 MILLIGRAM(S): at 21:27

## 2017-05-22 RX ADMIN — Medication 2 MILLIGRAM(S): at 21:33

## 2017-05-22 RX ADMIN — TRAMADOL HYDROCHLORIDE 50 MILLIGRAM(S): 50 TABLET ORAL at 12:13

## 2017-05-22 RX ADMIN — SENNA PLUS 2 TABLET(S): 8.6 TABLET ORAL at 21:27

## 2017-05-22 NOTE — PROGRESS NOTE ADULT - SUBJECTIVE AND OBJECTIVE BOX
ORTHO NOTE    [X ] Pt seen/examined.  [ ] Pt without any complaints/in NAD.    [X ] Pt complains of:  incisional pain is well controlled.  Hasn't done PT yet.          ROS: [ ] Fever  [ ] Chills  [ ] CP [ ] SOB [ ] Dysnea  [ ] Palpitations [ ] Cough [ ] N/V/C/D [ ] Paresthia [ ] Other     [X ] ROS  otherwise negative    .    PHYSICAL EXAM:    Vital Signs Last 24 Hrs  T(C): 36.7, Max: 37.7 (05-21 @ 16:35)  T(F): 98, Max: 99.8 (05-21 @ 16:35)  HR: 59 (59 - 85)  BP: 159/73 (122/67 - 171/74)  BP(mean): --  RR: 16 (16 - 18)  SpO2: 97% (95% - 100%)    I&O's Detail  I & Os for 24h ending 22 May 2017 07:00  =============================================  IN:    lactated ringers.: 1320 ml    lactated ringers.: 140 ml    Total IN: 1460 ml  ---------------------------------------------  OUT:    Indwelling Catheter - Urethral: 2650 ml    Total OUT: 2650 ml  ---------------------------------------------  Total NET: -1190 ml    I & Os for current day (as of 22 May 2017 11:45)  =============================================  IN:    lactated ringers.: 140 ml    Total IN: 140 ml  ---------------------------------------------  OUT:    Indwelling Catheter - Urethral: 100 ml    Total OUT: 100 ml  ---------------------------------------------  Total NET: 40 ml       CAPILLARY BLOOD GLUCOSE                  Neuro:  NAD A and O x 3    Lungs:    CV:    ABD: softly distended non tender +BS    Ext:  R hip dsg c/d/i strength 5/5 silt    Rodriguez    LABS                        12.0   22.5  )-----------( 101      ( 22 May 2017 07:46 )             36.7                              PT/INR - ( 20 May 2017 20:54 )   PT: 11.4 sec;   INR: 1.03          PTT - ( 20 May 2017 20:54 )  PTT:24.1 sec  05-22    132<L>  |  96  |  17  ----------------------------<  96  4.1   |  26  |  0.90    Ca    8.1<L>      22 May 2017 07:46        [ ] Other Labs  [ ] None ordered            Please check or [place] when present:  •  Heart Failure:    [ ] Acute        [ ]  Acute on Chronic        [ ] Chronic         [ ] Diastolic     [ ]  Combined    •  JARED:     [ ] ATN        [ ]  Renal medullary necrosis       [ ]  Renal cortical necrosis                  [ ] Other pathological Lesion:  •  CKD:  [ ] Stage I   [ ] Stage II  [ ] Stage III    [ ]Stage IV   [ ]  CKD V   [ ]  Other/Unspecified:    •  Abdominal Nutritional Status:   [ ] Malnutrition-See Nutrition note    [ ] Cachexia   [ ]  Other        [ ] Supplement ordered:            [ ] Morbid Obesity: BMI >=40         ASSESSMENT/PLAN:      STATUS POST: R hip igor pod 0    CONTINUE:          [ ] PT Wbat    [ ] DVT PPX- ASA BID    [ ] Pain Mgt con't current regimen    [ ] Dispo plan- pending PT    D/c'ed his standing Ativan changed to po Xanax 2 mg po qhs.  Monitor na level, d/c'ed IVF.  Void trial.  Dr Mccray for med.  No need for repeat echo.  Is use.  Bowel regimen.

## 2017-05-22 NOTE — PHYSICAL THERAPY INITIAL EVALUATION ADULT - ADDITIONAL COMMENTS
Pt lives alone in elevator access apartment. Previously independent in all functional mobility and ADLs without use of AD. Does not own a RW or cane.

## 2017-05-22 NOTE — PROGRESS NOTE ADULT - ASSESSMENT
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 Admitted for R femoral neck fracture, now POD-0 of R hip igor-arthroplasty.

## 2017-05-22 NOTE — PHYSICAL THERAPY INITIAL EVALUATION ADULT - ACTIVE RANGE OF MOTION EXAMINATION, REHAB EVAL
RLE AROM NT-limited due to pain and surgical precautions/bilateral upper extremity Active ROM was WFL (within functional limits)/Left LE Active ROM was WFL (within functional limits)

## 2017-05-22 NOTE — DISCHARGE NOTE ADULT - CARE PLAN
Principal Discharge DX:	Closed fracture of right hip, initial encounter  Goal:	improvement after surgery  Instructions for follow-up, activity and diet:	see below

## 2017-05-22 NOTE — PHYSICAL THERAPY INITIAL EVALUATION ADULT - CRITERIA FOR SKILLED THERAPEUTIC INTERVENTIONS
anticipated discharge recommendation/functional limitations in following categories/rehab potential/impairments found

## 2017-05-22 NOTE — PROGRESS NOTE ADULT - SUBJECTIVE AND OBJECTIVE BOX
Ortho Post Op Check    Procedure: R NEHEMIAH  Surgeon: Sarai  Laterality: R     Pt comfortable without complaints, pain controlled  Denies CP, SOB, N/V, numbness/tingling     Vital Signs Last 24 Hrs  T(C): 36.7, Max: 37 (05-22 @ 07:15)  T(F): 98, Max: 98.6 (05-22 @ 07:15)  HR: 59 (59 - 80)  BP: 159/73 (122/67 - 169/65)  BP(mean): --  RR: 16 (16 - 18)  SpO2: 97% (95% - 100%)  Wt(kg): --  AVSS    General: Pt Alert and oriented, NAD  DSG C/D/I  Pulses:  Sensation: SILT   Motor: EHL/FHL/TA/GS  AIN/ PIN/ U / R / A                          12.0   22.5  )-----------( 101      ( 22 May 2017 07:46 )             36.7   22 May 2017 07:46    132    |  96     |  17     ----------------------------<  96     4.1     |  26     |  0.90     Ca    8.1        22 May 2017 07:46        Post-op X-Ray: Implant in good position    A/P: 77yMale POD#0 s/p R NEHEMIAH  - Stable  - Pain Control  - DVT ppx: ASA  - Post op abx: Ancef  - PT, WBS: WBAT    Ortho Pager [phone] Ortho Post Op Check    Procedure: R  Surgeon: Sarai  Laterality: R     Pt comfortable without complaints, pain controlled  Denies CP, SOB, N/V, numbness/tingling     Vital Signs Last 24 Hrs  T(C): 36.7, Max: 37 (05-22 @ 07:15)  T(F): 98, Max: 98.6 (05-22 @ 07:15)  HR: 59 (59 - 80)  BP: 159/73 (122/67 - 169/65)  BP(mean): --  RR: 16 (16 - 18)  SpO2: 97% (95% - 100%)  Wt(kg): --  AVSS    General: Pt Alert and oriented, NAD  DSG C/D/I  Pulses:  Sensation: SILT   Motor: EHL/FHL/TA/GS  AIN/ PIN/ U / R / A                          12.0   22.5  )-----------( 101      ( 22 May 2017 07:46 )             36.7   22 May 2017 07:46    132    |  96     |  17     ----------------------------<  96     4.1     |  26     |  0.90     Ca    8.1        22 May 2017 07:46        Post-op X-Ray: Implant in good position    A/P: 77yMale POD#0 s/p R Matthieu with a jumbo head  - Stable  - Pain Control  - DVT ppx: ASA  - Post op abx: Ancef  - PT, WBS: WBAT    Ortho Pager [phone]

## 2017-05-22 NOTE — DISCHARGE NOTE ADULT - MEDICATION SUMMARY - MEDICATIONS TO TAKE
I will START or STAY ON the medications listed below when I get home from the hospital:    traMADol 50 mg oral tablet  -- 0.5 tab(s) by mouth every 4 hours, As needed, Moderate Pain (4 - 6)  -- Indication: For Pain    traMADol 50 mg oral tablet  -- 1 tab(s) by mouth every 4 hours, As needed, Severe Pain (7 - 10)  -- Indication: For Pain    acetaminophen 325 mg oral tablet  -- 3 tab(s) by mouth every 8 hours  -- Indication: For Pain    doxazosin 2 mg oral tablet  -- 1 tab(s) by mouth once a day (at bedtime)  -- Indication: For Home med    rivaroxaban 15 mg oral tablet  -- 15 mg twice a day for 21 days (started 5/26/17).  Then change to 20 mg daily   for a total of 3 months of treatment with Xarelto.  -- Indication: For Pulmonary emboli    LORazepam 1 mg oral tablet  -- 1 tab(s) by mouth every 6 hours, As needed, Anxiety  -- Indication: For Anxiety    zaleplon 5 mg oral capsule  -- 1 cap(s) by mouth once a day (at bedtime), As needed, Insomnia  -- Indication: For Insomnia    ALPRAZolam 2 mg oral tablet  -- 1 tab(s) by mouth once a day (at bedtime)  -- Indication: For Anxiety    docusate sodium 100 mg oral capsule  -- 1 cap(s) by mouth 3 times a day  -- Indication: For Constipation    senna oral tablet  -- 2 tab(s) by mouth once a day (at bedtime)  -- Indication: For Constipation    polyethylene glycol 3350 oral powder for reconstitution  -- 17 gram(s) by mouth once a day  -- Indication: For Constipation    bisacodyl 10 mg rectal suppository  -- 1 suppository(ies) rectally once a day, As needed  -- Indication: For Constipation    pantoprazole 40 mg oral delayed release tablet  -- 1 tab(s) by mouth once a day  -- Indication: For GERD

## 2017-05-22 NOTE — PHYSICAL THERAPY INITIAL EVALUATION ADULT - DIAGNOSIS, PT EVAL
Impaired Joint Mobility, Motor Function, Muscle Performance, and Range of Motion Associated with Joint Arthroplasty.

## 2017-05-22 NOTE — PHYSICAL THERAPY INITIAL EVALUATION ADULT - PERTINENT HX OF CURRENT PROBLEM, REHAB EVAL
78yo M presenting to [facility] s/p R femoral neck fracture after falling in apartment hallway.

## 2017-05-22 NOTE — DISCHARGE NOTE ADULT - PATIENT PORTAL LINK FT
“You can access the Follow[employer] Patient Portal, offered by [facility], by registering with the following website: [email or web address]”

## 2017-05-22 NOTE — PROGRESS NOTE ADULT - SUBJECTIVE AND OBJECTIVE BOX
CC: No complaints. Pain is under control. No anxiety.    Vital Signs Last 24 Hrs  T(C): 36.7, Max: 37.7 (05-21 @ 16:35)  T(F): 98, Max: 99.8 (05-21 @ 16:35)  HR: 59 (59 - 85)  BP: 159/73 (122/67 - 171/74)  BP(mean): --  RR: 16 (16 - 18)  SpO2: 97% (95% - 100%)    PHYSICAL EXAMINATION  * General: Not in acute distress. Awake and alert. Lying comfortably in bed.  * Head: Normocephalic, atraumatic.  * HEENT: ears no discharge, eyes PERRLA, nose no discharge, throat no exudates, normal tonsils.  * Neck: no JVD, supple.  * Lungs: Clear to auscultation, no rales, no wheezes.  * Cardio: Regular rate and rhythm, no murmurs, no rubs, no gallops. Good peripheral pulses.  * Abdomen: Soft, non-tender, non-distended, tympanic to percussion, no rebound, no guarding, no rigidity. Bowel sounds present. No suprapubic or CVA tenderness.  * : Deferred.  * Extremities: Acyanotic, no edema.  * Skin: Warm and dry.  * Neuro: Alert and oriented x 3. No focal deficits. Motor strength is 5/5 throughout. Sensation intact. Cranial nerves II-XII grossly intact.                           12.0   22.5  )-----------( 101      ( 22 May 2017 07:46 )             36.7   05-22    132<L>  |  96  |  17  ----------------------------<  96  4.1   |  26  |  0.90    Ca    8.1<L>      22 May 2017 07:46    MEDICATIONS  (STANDING):  BUpivacaine liposome 1.3% Injectable (no eMAR) 20milliLiter(s) Local Injection once  aspirin enteric coated 325milliGRAM(s) Oral two times a day  ceFAZolin   IVPB 2000milliGRAM(s) IV Intermittent every 8 hours  pantoprazole    Tablet 40milliGRAM(s) Oral daily  polyethylene glycol 3350 17Gram(s) Oral daily  docusate sodium 100milliGRAM(s) Oral three times a day  doxazosin 2milliGRAM(s) Oral at bedtime  senna 2Tablet(s) Oral at bedtime  acetaminophen   Tablet. 975milliGRAM(s) Oral every 8 hours  ALPRAZolam 2milliGRAM(s) Oral at bedtime  LORazepam     Tablet 1milliGRAM(s) Oral once    MEDICATIONS  (PRN):  acetaminophen   Tablet 650milliGRAM(s) Oral every 6 hours PRN For Temp over 38.3 C (100.94 F)  aluminum hydroxide/magnesium hydroxide/simethicone Suspension 30milliLiter(s) Oral four times a day PRN Indigestion  ondansetron Injectable 4milliGRAM(s) IV Push every 6 hours PRN Nausea and/or Vomiting  zaleplon 5milliGRAM(s) Oral at bedtime PRN Insomnia  magnesium hydroxide Suspension 30milliLiter(s) Oral two times a day PRN Constipation  ondansetron Injectable 4milliGRAM(s) IV Push every 4 hours PRN Nausea and/or Vomiting  traMADol 25milliGRAM(s) Oral every 4 hours PRN Moderate Pain (4 - 6)  traMADol 50milliGRAM(s) Oral every 4 hours PRN Severe Pain (7 - 10)

## 2017-05-22 NOTE — DISCHARGE NOTE ADULT - HOSPITAL COURSE
Admitted  Surgery - R post NEHEMIAH 5/22/17  Perioperative abx  Pain control  DVT ppx  PT consult  Med and cardio consult Admitted  Surgery - R hip igor 5/22/17  Perioperative abx  Pain control  DVT ppx  PT consult  Med and cardio consult Admitted  Surgery - R hip igor 5/22/17  Perioperative abx  Pain control  DVT ppx  PT consult  Med and pulm consult  + PE on CTA 5/24/17

## 2017-05-22 NOTE — PHYSICAL THERAPY INITIAL EVALUATION ADULT - GENERAL OBSERVATIONS, REHAB EVAL
Pt received supine, +R post hip incision bandage C/D/I, +hip abduction pillow, +2L NC O2, Pt received supine, +R post hip incision bandage C/D/I, +hip abduction pillow, +2L NC O2, +IV, +rodriguez catheter, NAD, agreeable to PT.

## 2017-05-22 NOTE — OCCUPATIONAL THERAPY INITIAL EVALUATION ADULT - GENERAL OBSERVATIONS, REHAB EVAL
right hand dominant, chart reviewed, cleared for OT eval by GENI Ross. Received seated at SOB with PT present, NAD, +IV, +Rodriguez

## 2017-05-22 NOTE — OCCUPATIONAL THERAPY INITIAL EVALUATION ADULT - PLANNED THERAPY INTERVENTIONS, OT EVAL
ROM/transfer training/parent/caregiver training.../balance training/strengthening/ADL retraining/bed mobility training/IADL retraining

## 2017-05-22 NOTE — DISCHARGE NOTE ADULT - ADDITIONAL INSTRUCTIONS
No strenuous activity, heavy lifting, driving, tub bathing, or returning to work until cleared by MD.  You may shower--dressing is waterproof.  Remove dressing after post op day 7, then leave incision open to air.  Follow up with Dr. Moon in his office in 2 weeks from surgery.  Any staples/sutures will be removed in his office.  If you don't have a bowel movement by post op day 3, then take Milk of Magnesia (over the counter).  If no bowel movement by at least post op day 5, then use a Dulcolax suppository (over the counter) and/or a Fleets enema--if still no bowel movement, call your MD.  Contact your doctor if you experience: fever greater than 101.5, chills, chest pain, difficulty breathing, bleeding, redness or heat around the incision.    Please follow up with your primary care provider. No strenuous activity, heavy lifting, driving, tub bathing, or returning to work until cleared by MD.  You may shower--dressing is waterproof.  Remove dressing after post op day 7, then leave incision open to air.  Follow up with Dr. Moon in his office in 2 weeks from surgery.  Any staples/sutures will be removed in his office.  If you don't have a bowel movement by post op day 3, then take Milk of Magnesia (over the counter).  If no bowel movement by at least post op day 5, then use a Dulcolax suppository (over the counter) and/or a Fleets enema--if still no bowel movement, call your MD.  Contact your doctor if you experience: fever greater than 101.5, chills, chest pain, difficulty breathing, bleeding, redness or heat around the incision.    Please follow up with your primary care provider.  Please follow up with Dr Messina, pulmonology.  You will need a repeat CT scan of the chest in 6 to 8 weeks.  You will be on anticoagulation for 3 months to treat your pulmonary embolism.  2 to 4 L O2 nasal cannula as needed in rehab.

## 2017-05-22 NOTE — PHYSICAL THERAPY INITIAL EVALUATION ADULT - MANUAL MUSCLE TESTING RESULTS, REHAB EVAL
not tested due to/surgical precautions; demonstrated 3/5 throughout due to functional mobility assessment

## 2017-05-22 NOTE — DISCHARGE NOTE ADULT - CARE PROVIDER_API CALL
Jovanni Moon), Orthopaedic Surgery  [address] [place] [address]  Phone: [phone]  Fax: [phone] Jovanni Moon), Orthopaedic Surgery  [address] [place] [address]  Phone: [phone]  Fax: [phone]    Meli Messina), Critical Care Medicine; Pulmonary Disease  [address] [address]  Phone: [phone]  Fax: [phone]

## 2017-05-22 NOTE — OCCUPATIONAL THERAPY INITIAL EVALUATION ADULT - MD ORDER
Pt is 76 yo M with PMHx CLL (chronic WBC low 20s, not getting tx), insomnia presents s/p mechanical fall. Pt reports children were playing in the hallway of his building and he tripped, falling onto his R side. No head injury or LOC. Pt was unable to get up. Pt c/o pain in the R hip only. Denies CP, SOB, palpitations, diaphoresis, dizziness preceding. Also denies HA, blurred vision, dizziness, n/v since falling.  Ortho consulted for suspected hip fx.

## 2017-05-23 DIAGNOSIS — R33.9 RETENTION OF URINE, UNSPECIFIED: ICD-10-CM

## 2017-05-23 DIAGNOSIS — I95.81 POSTPROCEDURAL HYPOTENSION: ICD-10-CM

## 2017-05-23 LAB
ANION GAP SERPL CALC-SCNC: 7 MMOL/L — SIGNIFICANT CHANGE UP (ref 5–17)
BUN SERPL-MCNC: 21 MG/DL — SIGNIFICANT CHANGE UP (ref 7–23)
CALCIUM SERPL-MCNC: 8.1 MG/DL — LOW (ref 8.4–10.5)
CHLORIDE SERPL-SCNC: 99 MMOL/L — SIGNIFICANT CHANGE UP (ref 96–108)
CO2 SERPL-SCNC: 28 MMOL/L — SIGNIFICANT CHANGE UP (ref 22–31)
CREAT SERPL-MCNC: 1 MG/DL — SIGNIFICANT CHANGE UP (ref 0.5–1.3)
GLUCOSE SERPL-MCNC: 105 MG/DL — HIGH (ref 70–99)
HCT VFR BLD CALC: 34.1 % — LOW (ref 39–50)
HGB BLD-MCNC: 11.4 G/DL — LOW (ref 13–17)
MCHC RBC-ENTMCNC: 31 PG — SIGNIFICANT CHANGE UP (ref 27–34)
MCHC RBC-ENTMCNC: 33.4 G/DL — SIGNIFICANT CHANGE UP (ref 32–36)
MCV RBC AUTO: 92.7 FL — SIGNIFICANT CHANGE UP (ref 80–100)
PLATELET # BLD AUTO: 81 K/UL — LOW (ref 150–400)
POTASSIUM SERPL-MCNC: 4.2 MMOL/L — SIGNIFICANT CHANGE UP (ref 3.5–5.3)
POTASSIUM SERPL-SCNC: 4.2 MMOL/L — SIGNIFICANT CHANGE UP (ref 3.5–5.3)
RBC # BLD: 3.68 M/UL — LOW (ref 4.2–5.8)
RBC # FLD: 13.8 % — SIGNIFICANT CHANGE UP (ref 10.3–16.9)
SODIUM SERPL-SCNC: 134 MMOL/L — LOW (ref 135–145)
WBC # BLD: 20.7 K/UL — HIGH (ref 3.8–10.5)
WBC # FLD AUTO: 20.7 K/UL — HIGH (ref 3.8–10.5)

## 2017-05-23 PROCEDURE — 99233 SBSQ HOSP IP/OBS HIGH 50: CPT

## 2017-05-23 RX ORDER — SODIUM CHLORIDE 9 MG/ML
1000 INJECTION INTRAMUSCULAR; INTRAVENOUS; SUBCUTANEOUS
Qty: 0 | Refills: 0 | Status: DISCONTINUED | OUTPATIENT
Start: 2017-05-23 | End: 2017-05-26

## 2017-05-23 RX ORDER — LACTULOSE 10 G/15ML
20 SOLUTION ORAL ONCE
Qty: 0 | Refills: 0 | Status: COMPLETED | OUTPATIENT
Start: 2017-05-23 | End: 2017-05-23

## 2017-05-23 RX ORDER — SODIUM CHLORIDE 9 MG/ML
250 INJECTION INTRAMUSCULAR; INTRAVENOUS; SUBCUTANEOUS ONCE
Qty: 0 | Refills: 0 | Status: COMPLETED | OUTPATIENT
Start: 2017-05-23 | End: 2017-05-23

## 2017-05-23 RX ORDER — SODIUM CHLORIDE 9 MG/ML
1000 INJECTION INTRAMUSCULAR; INTRAVENOUS; SUBCUTANEOUS
Qty: 0 | Refills: 0 | Status: DISCONTINUED | OUTPATIENT
Start: 2017-05-23 | End: 2017-05-23

## 2017-05-23 RX ADMIN — Medication 975 MILLIGRAM(S): at 14:33

## 2017-05-23 RX ADMIN — Medication 325 MILLIGRAM(S): at 05:35

## 2017-05-23 RX ADMIN — Medication 975 MILLIGRAM(S): at 23:15

## 2017-05-23 RX ADMIN — SODIUM CHLORIDE 250 MILLILITER(S): 9 INJECTION INTRAMUSCULAR; INTRAVENOUS; SUBCUTANEOUS at 13:07

## 2017-05-23 RX ADMIN — Medication 975 MILLIGRAM(S): at 05:35

## 2017-05-23 RX ADMIN — Medication 2 MILLIGRAM(S): at 00:56

## 2017-05-23 RX ADMIN — Medication 100 MILLIGRAM(S): at 05:35

## 2017-05-23 RX ADMIN — TRAMADOL HYDROCHLORIDE 50 MILLIGRAM(S): 50 TABLET ORAL at 12:47

## 2017-05-23 RX ADMIN — PANTOPRAZOLE SODIUM 40 MILLIGRAM(S): 20 TABLET, DELAYED RELEASE ORAL at 11:47

## 2017-05-23 RX ADMIN — Medication 2 MILLIGRAM(S): at 22:15

## 2017-05-23 RX ADMIN — Medication 30 MILLILITER(S): at 18:04

## 2017-05-23 RX ADMIN — Medication 100 MILLIGRAM(S): at 22:15

## 2017-05-23 RX ADMIN — Medication 975 MILLIGRAM(S): at 13:32

## 2017-05-23 RX ADMIN — Medication 975 MILLIGRAM(S): at 06:30

## 2017-05-23 RX ADMIN — Medication 325 MILLIGRAM(S): at 18:04

## 2017-05-23 RX ADMIN — Medication 975 MILLIGRAM(S): at 22:15

## 2017-05-23 RX ADMIN — TRAMADOL HYDROCHLORIDE 50 MILLIGRAM(S): 50 TABLET ORAL at 05:35

## 2017-05-23 RX ADMIN — LACTULOSE 20 GRAM(S): 10 SOLUTION ORAL at 11:47

## 2017-05-23 RX ADMIN — SENNA PLUS 2 TABLET(S): 8.6 TABLET ORAL at 22:15

## 2017-05-23 RX ADMIN — TRAMADOL HYDROCHLORIDE 50 MILLIGRAM(S): 50 TABLET ORAL at 06:30

## 2017-05-23 RX ADMIN — POLYETHYLENE GLYCOL 3350 17 GRAM(S): 17 POWDER, FOR SOLUTION ORAL at 11:47

## 2017-05-23 RX ADMIN — SODIUM CHLORIDE 100 MILLILITER(S): 9 INJECTION INTRAMUSCULAR; INTRAVENOUS; SUBCUTANEOUS at 13:07

## 2017-05-23 RX ADMIN — TRAMADOL HYDROCHLORIDE 50 MILLIGRAM(S): 50 TABLET ORAL at 00:56

## 2017-05-23 RX ADMIN — Medication 100 MILLIGRAM(S): at 13:32

## 2017-05-23 RX ADMIN — TRAMADOL HYDROCHLORIDE 50 MILLIGRAM(S): 50 TABLET ORAL at 11:47

## 2017-05-23 NOTE — PROGRESS NOTE ADULT - SUBJECTIVE AND OBJECTIVE BOX
ORTHO NOTE    [ ] Pt seen/examined.  [ ] Pt without any complaints/in NAD.    [ ] Pt complains of:      ROS: [ ] Fever  [ ] Chills  [ ] CP [ ] SOB [ ] Dysnea  [ ] Palpitations [ ] Cough [ ] N/V/C/D [ ] Paresthia [ ] Other     [ ] ROS  otherwise negative    .    PHYSICAL EXAM:    Vital Signs Last 24 Hrs  T(C): 36.1, Max: 37.6 (05-23 @ 00:53)  T(F): 96.9, Max: 99.7 (05-23 @ 00:53)  HR: 92 (77 - 92)  BP: 93/65 (93/58 - 126/72)  BP(mean): --  RR: 16 (16 - 16)  SpO2: 93% (93% - 95%)    I&O's Detail  I & Os for 24h ending 23 May 2017 07:00  =============================================  IN:    lactated ringers.: 140 ml    Total IN: 140 ml  ---------------------------------------------  OUT:    Indwelling Catheter - Urethral: 1400 ml    Total OUT: 1400 ml  ---------------------------------------------  Total NET: -1260 ml    I & Os for current day (as of 23 May 2017 12:53)  =============================================  IN:    Oral Fluid: 300 ml    Total IN: 300 ml  ---------------------------------------------  OUT:    Total OUT: 0 ml  ---------------------------------------------  Total NET: 300 ml       CAPILLARY BLOOD GLUCOSE                  Neuro:    Lungs:    CV:    ABD:     Ext:    LABS                        11.4   20.7  )-----------( 81       ( 23 May 2017 08:39 )             34.1                                05-23    134<L>  |  99  |  21  ----------------------------<  105<H>  4.2   |  28  |  1.00    Ca    8.1<L>      23 May 2017 08:39        [ ] Other Labs  [ ] None ordered            Please check or [place] when present:  •  Heart Failure:    [ ] Acute        [ ]  Acute on Chronic        [ ] Chronic         [ ] Diastolic     [ ]  Combined    •  JARED:     [ ] ATN        [ ]  Renal medullary necrosis       [ ]  Renal cortical necrosis                  [ ] Other pathological Lesion:  •  CKD:  [ ] Stage I   [ ] Stage II  [ ] Stage III    [ ]Stage IV   [ ]  CKD V   [ ]  Other/Unspecified:    •  Abdominal Nutritional Status:   [ ] Malnutrition-See Nutrition note    [ ] Cachexia   [ ]  Other        [ ] Supplement ordered:            [ ] Morbid Obesity: BMI >=40         ASSESSMENT/PLAN:      STATUS POST:     CONTINUE:          [ ] PT    [ ] DVT PPX-    [ ] Pain Mgt    [ ] Dispo plan- ORTHO NOTE    [X] Pt seen/examined.  [ ] Pt without any complaints/in NAD.    [X ] Pt complains of:  insomnia - the xanax didn't help him sleep. BP has been running on lower end, asymptomatic. He failed his 1st void trial last night.  No BM for a few days, minimal flatus      ROS: [ ] Fever  [ ] Chills  [ ] CP [ ] SOB [ ] Dysnea  [ ] Palpitations [ ] Cough [ ] N/V/C/D [ ] Paresthia [ ] Other     [X ] ROS  otherwise negative    .    PHYSICAL EXAM:    Vital Signs Last 24 Hrs  T(C): 36.1, Max: 37.6 (05-23 @ 00:53)  T(F): 96.9, Max: 99.7 (05-23 @ 00:53)  HR: 92 (77 - 92)  BP: 93/65 (93/58 - 126/72)  BP(mean): --  RR: 16 (16 - 16)  SpO2: 93% (93% - 95%)    I&O's Detail  I & Os for 24h ending 23 May 2017 07:00  =============================================  IN:    lactated ringers.: 140 ml    Total IN: 140 ml  ---------------------------------------------  OUT:    Indwelling Catheter - Urethral: 1400 ml    Total OUT: 1400 ml  ---------------------------------------------  Total NET: -1260 ml    I & Os for current day (as of 23 May 2017 12:53)  =============================================  IN:    Oral Fluid: 300 ml    Total IN: 300 ml  ---------------------------------------------  OUT:    Total OUT: 0 ml  ---------------------------------------------  Total NET: 300 ml       CAPILLARY BLOOD GLUCOSE                  Neuro:  NAD A and O x 3    Lungs:    CV:    ABD: softly distended + BS non tender    Ext:  R hip dsg c/d/i,  TA 5/5 EHL 5/5 GSC 5/5 silt    Rodriguez with dark urine    LABS                        11.4   20.7  )-----------( 81       ( 23 May 2017 08:39 )             34.1                                05-23    134<L>  |  99  |  21  ----------------------------<  105<H>  4.2   |  28  |  1.00    Ca    8.1<L>      23 May 2017 08:39        [ ] Other Labs  [ ] None ordered            Please check or [place] when present:  •  Heart Failure:    [ ] Acute        [ ]  Acute on Chronic        [ ] Chronic         [ ] Diastolic     [ ]  Combined    •  JARED:     [ ] ATN        [ ]  Renal medullary necrosis       [ ]  Renal cortical necrosis                  [ ] Other pathological Lesion:  •  CKD:  [ ] Stage I   [ ] Stage II  [ ] Stage III    [ ]Stage IV   [ ]  CKD V   [ ]  Other/Unspecified:    •  Abdominal Nutritional Status:   [ ] Malnutrition-See Nutrition note    [ ] Cachexia   [ ]  Other        [ ] Supplement ordered:            [ ] Morbid Obesity: BMI >=40         ASSESSMENT/PLAN:      STATUS POST: R hip igor pod 1    CONTINUE:          [ ] PT wbat    [ ] DVT PPX- ASA BID    [ ] Pain Mgmt - con't current regimen    [ ] Dispo plan- VIVI    Bp running low and UO low and concentrated-  cc bolus x 1 and then run  cc/hour until tomorrow AM.  Increased bowel regimen.  Will attempt void trial after he has BM.  Con't benzos.  Dr Mccray for medicine.  IS use.

## 2017-05-23 NOTE — PROGRESS NOTE ADULT - SUBJECTIVE AND OBJECTIVE BOX
SUBJECTIVE: Patient seen and examined.  No issues overnight. Pain well controlled. Denies CP/SOB    OBJECTIVE:     Vital Signs Last 24 Hrs  T(C): 36.1, Max: 37.6 (05-23 @ 00:53)  T(F): 96.9, Max: 99.7 (05-23 @ 00:53)  HR: 77 (77 - 98)  BP: 93/58 (93/58 - 152/68)  BP(mean): --  RR: 16 (16 - 16)  SpO2: 93% (92% - 95%)       RLE         Dressing: clean/dry/intact            Motor exam:  TA 5/5 EHL 5/5 GSC 5/5          Sensation:   T SILT SPH SILT DP SILT         Vascular:  Warm/pink/well perfused             LABS:                        11.4   20.7  )-----------( 81       ( 23 May 2017 08:39 )             34.1     05-23    134<L>  |  99  |  21  ----------------------------<  105<H>  4.2   |  28  |  1.00    Ca    8.1<L>      23 May 2017 08:39            MEDICATIONS:  MEDICATIONS  (STANDING):  BUpivacaine liposome 1.3% Injectable (no eMAR) 20milliLiter(s) Local Injection once  aspirin enteric coated 325milliGRAM(s) Oral two times a day  pantoprazole    Tablet 40milliGRAM(s) Oral daily  polyethylene glycol 3350 17Gram(s) Oral daily  docusate sodium 100milliGRAM(s) Oral three times a day  doxazosin 2milliGRAM(s) Oral at bedtime  senna 2Tablet(s) Oral at bedtime  acetaminophen   Tablet. 975milliGRAM(s) Oral every 8 hours  ALPRAZolam 2milliGRAM(s) Oral at bedtime  lactulose Syrup 20Gram(s) Oral once    MEDICATIONS  (PRN):  acetaminophen   Tablet 650milliGRAM(s) Oral every 6 hours PRN For Temp over 38.3 C (100.94 F)  aluminum hydroxide/magnesium hydroxide/simethicone Suspension 30milliLiter(s) Oral four times a day PRN Indigestion  ondansetron Injectable 4milliGRAM(s) IV Push every 6 hours PRN Nausea and/or Vomiting  zaleplon 5milliGRAM(s) Oral at bedtime PRN Insomnia  magnesium hydroxide Suspension 30milliLiter(s) Oral two times a day PRN Constipation  traMADol 25milliGRAM(s) Oral every 4 hours PRN Moderate Pain (4 - 6)  traMADol 50milliGRAM(s) Oral every 4 hours PRN Severe Pain (7 - 10)  LORazepam     Tablet 1milliGRAM(s) Oral every 6 hours PRN Anxiety      Anticoagulation:  aspirin enteric coated 325milliGRAM(s) Oral two times a day      Antibiotics:       Pain medications:   acetaminophen   Tablet 650milliGRAM(s) Oral every 6 hours PRN  ondansetron Injectable 4milliGRAM(s) IV Push every 6 hours PRN  zaleplon 5milliGRAM(s) Oral at bedtime PRN  acetaminophen   Tablet. 975milliGRAM(s) Oral every 8 hours  traMADol 25milliGRAM(s) Oral every 4 hours PRN  traMADol 50milliGRAM(s) Oral every 4 hours PRN  ALPRAZolam 2milliGRAM(s) Oral at bedtime  LORazepam     Tablet 1milliGRAM(s) Oral every 6 hours PRN        A/P :   s/p   R hip igor for FNF POD #1  -    Pain control  -    DVT ppx: aspirin enteric coated 325milliGRAM(s) Oral two times a day  -    Check AM labs  -    Weight bearing status:  WBAT w posterior hip precautions  -    Resume home meds  -    Physical Therapy  -    Dispo: Rehab

## 2017-05-23 NOTE — PROGRESS NOTE ADULT - ASSESSMENT
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 Admitted for R femoral neck fracture, now POD-1 of R hip igor-arthroplasty.

## 2017-05-23 NOTE — PROGRESS NOTE ADULT - SUBJECTIVE AND OBJECTIVE BOX
CC: Complaining about insomnia. Requesting Oxycodone as sleeping aid. Pain is under control. No anxiety.     Vital Signs Last 24 Hrs  T(C): 36.1, Max: 37.6 (05-23 @ 00:53)  T(F): 96.9, Max: 99.7 (05-23 @ 00:53)  HR: 92 (77 - 92)  BP: 93/65 (93/58 - 126/72)  BP(mean): --  RR: 16 (16 - 16)  SpO2: 93% (93% - 95%)    PHYSICAL EXAMINATION  * General: Not in acute distress. Awake and alert. Lying comfortably in bed.  * Head: Normocephalic, atraumatic.  * HEENT: ears no discharge, eyes PERRLA, nose no discharge, throat no exudates, normal tonsils.  * Neck: no JVD, supple.  * Lungs: Clear to auscultation, no rales, no wheezes.  * Cardio: Regular rate and rhythm, no murmurs, no rubs, no gallops. Good peripheral pulses.  * Abdomen: Soft, non-tender, non-distended, tympanic to percussion, no rebound, no guarding, no rigidity. Bowel sounds present. No suprapubic or CVA tenderness.  * : Deferred.  * Extremities: Acyanotic, no edema.  * Skin: Warm and dry.  * Neuro: Alert and oriented x 3. No focal deficits. Motor strength is 5/5 throughout. Sensation intact. Cranial nerves II-XII grossly intact.                             11.4   20.7  )-----------( 81       ( 23 May 2017 08:39 )             34.1   05-23    134<L>  |  99  |  21  ----------------------------<  105<H>  4.2   |  28  |  1.00    Ca    8.1<L>      23 May 2017 08:39      MEDICATIONS  (STANDING):  BUpivacaine liposome 1.3% Injectable (no eMAR) 20milliLiter(s) Local Injection once  aspirin enteric coated 325milliGRAM(s) Oral two times a day  pantoprazole    Tablet 40milliGRAM(s) Oral daily  polyethylene glycol 3350 17Gram(s) Oral daily  docusate sodium 100milliGRAM(s) Oral three times a day  doxazosin 2milliGRAM(s) Oral at bedtime  senna 2Tablet(s) Oral at bedtime  acetaminophen   Tablet. 975milliGRAM(s) Oral every 8 hours  ALPRAZolam 2milliGRAM(s) Oral at bedtime  sodium chloride 0.9%. 1000milliLiter(s) IV Continuous <Continuous>    MEDICATIONS  (PRN):  acetaminophen   Tablet 650milliGRAM(s) Oral every 6 hours PRN For Temp over 38.3 C (100.94 F)  aluminum hydroxide/magnesium hydroxide/simethicone Suspension 30milliLiter(s) Oral four times a day PRN Indigestion  ondansetron Injectable 4milliGRAM(s) IV Push every 6 hours PRN Nausea and/or Vomiting  zaleplon 5milliGRAM(s) Oral at bedtime PRN Insomnia  magnesium hydroxide Suspension 30milliLiter(s) Oral two times a day PRN Constipation  traMADol 25milliGRAM(s) Oral every 4 hours PRN Moderate Pain (4 - 6)  traMADol 50milliGRAM(s) Oral every 4 hours PRN Severe Pain (7 - 10)  LORazepam     Tablet 1milliGRAM(s) Oral every 6 hours PRN Anxiety

## 2017-05-24 ENCOUNTER — APPOINTMENT (OUTPATIENT)
Dept: INTERNAL MEDICINE | Facility: CLINIC | Age: 77
End: 2017-05-24

## 2017-05-24 DIAGNOSIS — I26.99 OTHER PULMONARY EMBOLISM WITHOUT ACUTE COR PULMONALE: ICD-10-CM

## 2017-05-24 DIAGNOSIS — R09.02 HYPOXEMIA: ICD-10-CM

## 2017-05-24 DIAGNOSIS — J98.11 ATELECTASIS: ICD-10-CM

## 2017-05-24 DIAGNOSIS — J18.9 PNEUMONIA, UNSPECIFIED ORGANISM: ICD-10-CM

## 2017-05-24 LAB
ANION GAP SERPL CALC-SCNC: 8 MMOL/L — SIGNIFICANT CHANGE UP (ref 5–17)
APTT BLD: 28.8 SEC — SIGNIFICANT CHANGE UP (ref 27.5–37.4)
BUN SERPL-MCNC: 22 MG/DL — SIGNIFICANT CHANGE UP (ref 7–23)
CALCIUM SERPL-MCNC: 7.9 MG/DL — LOW (ref 8.4–10.5)
CHLORIDE SERPL-SCNC: 102 MMOL/L — SIGNIFICANT CHANGE UP (ref 96–108)
CK MB CFR SERPL CALC: 2.8 NG/ML — SIGNIFICANT CHANGE UP (ref 0–6.7)
CK SERPL-CCNC: 294 U/L — HIGH (ref 30–200)
CO2 SERPL-SCNC: 28 MMOL/L — SIGNIFICANT CHANGE UP (ref 22–31)
CREAT SERPL-MCNC: 0.9 MG/DL — SIGNIFICANT CHANGE UP (ref 0.5–1.3)
GLUCOSE SERPL-MCNC: 105 MG/DL — HIGH (ref 70–99)
HCT VFR BLD CALC: 30.5 % — LOW (ref 39–50)
HGB BLD-MCNC: 10 G/DL — LOW (ref 13–17)
INR BLD: 1.22 — HIGH (ref 0.88–1.16)
MCHC RBC-ENTMCNC: 30.7 PG — SIGNIFICANT CHANGE UP (ref 27–34)
MCHC RBC-ENTMCNC: 32.8 G/DL — SIGNIFICANT CHANGE UP (ref 32–36)
MCV RBC AUTO: 93.6 FL — SIGNIFICANT CHANGE UP (ref 80–100)
NT-PROBNP SERPL-SCNC: 152 PG/ML — SIGNIFICANT CHANGE UP (ref 0–300)
PLATELET # BLD AUTO: 79 K/UL — LOW (ref 150–400)
POTASSIUM SERPL-MCNC: 4.3 MMOL/L — SIGNIFICANT CHANGE UP (ref 3.5–5.3)
POTASSIUM SERPL-SCNC: 4.3 MMOL/L — SIGNIFICANT CHANGE UP (ref 3.5–5.3)
PROTHROM AB SERPL-ACNC: 13.6 SEC — HIGH (ref 9.8–12.7)
RBC # BLD: 3.26 M/UL — LOW (ref 4.2–5.8)
RBC # FLD: 13.8 % — SIGNIFICANT CHANGE UP (ref 10.3–16.9)
SODIUM SERPL-SCNC: 138 MMOL/L — SIGNIFICANT CHANGE UP (ref 135–145)
TROPONIN T SERPL-MCNC: <0.01 NG/ML — SIGNIFICANT CHANGE UP (ref 0–0.01)
TROPONIN T SERPL-MCNC: <0.01 NG/ML — SIGNIFICANT CHANGE UP (ref 0–0.01)
WBC # BLD: 18.3 K/UL — HIGH (ref 3.8–10.5)
WBC # FLD AUTO: 18.3 K/UL — HIGH (ref 3.8–10.5)

## 2017-05-24 PROCEDURE — 93010 ELECTROCARDIOGRAM REPORT: CPT

## 2017-05-24 PROCEDURE — 71275 CT ANGIOGRAPHY CHEST: CPT | Mod: 26

## 2017-05-24 PROCEDURE — 71010: CPT | Mod: 26

## 2017-05-24 PROCEDURE — 99233 SBSQ HOSP IP/OBS HIGH 50: CPT

## 2017-05-24 PROCEDURE — 73502 X-RAY EXAM HIP UNI 2-3 VIEWS: CPT | Mod: 26,RT

## 2017-05-24 PROCEDURE — 93306 TTE W/DOPPLER COMPLETE: CPT | Mod: 26

## 2017-05-24 PROCEDURE — 99223 1ST HOSP IP/OBS HIGH 75: CPT | Mod: GC

## 2017-05-24 PROCEDURE — 99497 ADVNCD CARE PLAN 30 MIN: CPT

## 2017-05-24 RX ORDER — ENOXAPARIN SODIUM 100 MG/ML
80 INJECTION SUBCUTANEOUS EVERY 12 HOURS
Qty: 0 | Refills: 0 | Status: DISCONTINUED | OUTPATIENT
Start: 2017-05-24 | End: 2017-05-26

## 2017-05-24 RX ORDER — PIPERACILLIN AND TAZOBACTAM 4; .5 G/20ML; G/20ML
3.38 INJECTION, POWDER, LYOPHILIZED, FOR SOLUTION INTRAVENOUS EVERY 8 HOURS
Qty: 0 | Refills: 0 | Status: DISCONTINUED | OUTPATIENT
Start: 2017-05-24 | End: 2017-05-24

## 2017-05-24 RX ORDER — OXYCODONE HYDROCHLORIDE 5 MG/1
5 TABLET ORAL EVERY 4 HOURS
Qty: 0 | Refills: 0 | Status: DISCONTINUED | OUTPATIENT
Start: 2017-05-24 | End: 2017-05-26

## 2017-05-24 RX ORDER — PIPERACILLIN AND TAZOBACTAM 4; .5 G/20ML; G/20ML
3.38 INJECTION, POWDER, LYOPHILIZED, FOR SOLUTION INTRAVENOUS ONCE
Qty: 0 | Refills: 0 | Status: COMPLETED | OUTPATIENT
Start: 2017-05-24 | End: 2017-05-24

## 2017-05-24 RX ORDER — VANCOMYCIN HCL 1 G
VIAL (EA) INTRAVENOUS
Qty: 0 | Refills: 0 | Status: DISCONTINUED | OUTPATIENT
Start: 2017-05-24 | End: 2017-05-24

## 2017-05-24 RX ORDER — VANCOMYCIN HCL 1 G
1500 VIAL (EA) INTRAVENOUS ONCE
Qty: 0 | Refills: 0 | Status: COMPLETED | OUTPATIENT
Start: 2017-05-24 | End: 2017-05-24

## 2017-05-24 RX ORDER — VANCOMYCIN HCL 1 G
1500 VIAL (EA) INTRAVENOUS EVERY 12 HOURS
Qty: 0 | Refills: 0 | Status: DISCONTINUED | OUTPATIENT
Start: 2017-05-24 | End: 2017-05-24

## 2017-05-24 RX ADMIN — MAGNESIUM HYDROXIDE 30 MILLILITER(S): 400 TABLET, CHEWABLE ORAL at 06:24

## 2017-05-24 RX ADMIN — Medication 975 MILLIGRAM(S): at 07:23

## 2017-05-24 RX ADMIN — SENNA PLUS 2 TABLET(S): 8.6 TABLET ORAL at 21:20

## 2017-05-24 RX ADMIN — ENOXAPARIN SODIUM 80 MILLIGRAM(S): 100 INJECTION SUBCUTANEOUS at 16:29

## 2017-05-24 RX ADMIN — Medication 975 MILLIGRAM(S): at 15:00

## 2017-05-24 RX ADMIN — Medication 5 MILLIGRAM(S): at 00:01

## 2017-05-24 RX ADMIN — Medication 975 MILLIGRAM(S): at 06:23

## 2017-05-24 RX ADMIN — Medication 100 MILLIGRAM(S): at 14:59

## 2017-05-24 RX ADMIN — OXYCODONE HYDROCHLORIDE 5 MILLIGRAM(S): 5 TABLET ORAL at 23:44

## 2017-05-24 RX ADMIN — Medication 100 MILLIGRAM(S): at 06:24

## 2017-05-24 RX ADMIN — Medication 100 MILLIGRAM(S): at 21:20

## 2017-05-24 RX ADMIN — Medication 325 MILLIGRAM(S): at 06:24

## 2017-05-24 RX ADMIN — Medication 975 MILLIGRAM(S): at 22:00

## 2017-05-24 RX ADMIN — TRAMADOL HYDROCHLORIDE 50 MILLIGRAM(S): 50 TABLET ORAL at 06:23

## 2017-05-24 RX ADMIN — Medication 2 MILLIGRAM(S): at 01:04

## 2017-05-24 RX ADMIN — PANTOPRAZOLE SODIUM 40 MILLIGRAM(S): 20 TABLET, DELAYED RELEASE ORAL at 14:59

## 2017-05-24 RX ADMIN — Medication 975 MILLIGRAM(S): at 16:00

## 2017-05-24 RX ADMIN — TRAMADOL HYDROCHLORIDE 50 MILLIGRAM(S): 50 TABLET ORAL at 07:23

## 2017-05-24 RX ADMIN — PIPERACILLIN AND TAZOBACTAM 200 GRAM(S): 4; .5 INJECTION, POWDER, LYOPHILIZED, FOR SOLUTION INTRAVENOUS at 11:00

## 2017-05-24 RX ADMIN — TRAMADOL HYDROCHLORIDE 50 MILLIGRAM(S): 50 TABLET ORAL at 22:00

## 2017-05-24 RX ADMIN — Medication 300 MILLIGRAM(S): at 11:34

## 2017-05-24 RX ADMIN — Medication 975 MILLIGRAM(S): at 21:20

## 2017-05-24 RX ADMIN — Medication 2 MILLIGRAM(S): at 23:43

## 2017-05-24 RX ADMIN — POLYETHYLENE GLYCOL 3350 17 GRAM(S): 17 POWDER, FOR SOLUTION ORAL at 15:07

## 2017-05-24 RX ADMIN — TRAMADOL HYDROCHLORIDE 50 MILLIGRAM(S): 50 TABLET ORAL at 21:20

## 2017-05-24 NOTE — CONSULT NOTE ADULT - CONSULT REASON
Medical Clearance for R femoral neck arthroplasty
Medical clearance prior to hip surgery
desaturation

## 2017-05-24 NOTE — CONSULT NOTE ADULT - SUBJECTIVE AND OBJECTIVE BOX
Patient is a 77y old  Male who presents with a chief complaint of right hip pain (22 May 2017 09:56)      HPI:  Pt is 78 yo Mwith PMHx CLL (chronic WBC low 20s, not getting tx), insomnia presents s/p mechanical fall. Pt reports children were playing in the hallway of his building and he tripped, falling onto his R side. No head injury or LOC. Pt was unable to get up.Pt c/o pain in the R hip only. Denies CP, SOB, palpitations, diaphoresis, dizziness preceding. Also denies HA, blurred vision, dizziness, n/v since falling.  Ortho consulted for suspected hip fx. (21 May 2017 00:49)      PAST MEDICAL & SURGICAL HISTORY:  CLL (chronic lymphocytic leukemia)      FAMILY HISTORY:      SOCIAL HISTORY:  Smoking Status: [ ] Current, [ ] Former, [x ] Never  Pack Years:    MEDICATIONS:  Pulmonary:    Antimicrobials:    Anticoagulants:  enoxaparin Injectable 80milliGRAM(s) SubCutaneous every 12 hours    Onc:    GI/:  aluminum hydroxide/magnesium hydroxide/simethicone Suspension 30milliLiter(s) Oral four times a day PRN  pantoprazole    Tablet 40milliGRAM(s) Oral daily  polyethylene glycol 3350 17Gram(s) Oral daily  bisacodyl Suppository 10milliGRAM(s) Rectal daily PRN  magnesium hydroxide Suspension 30milliLiter(s) Oral two times a day PRN  docusate sodium 100milliGRAM(s) Oral three times a day  senna 2Tablet(s) Oral at bedtime    Endocrine:    Cardiac:  doxazosin 2milliGRAM(s) Oral at bedtime    Other Medications:  BUpivacaine liposome 1.3% Injectable (no eMAR) 20milliLiter(s) Local Injection once  acetaminophen   Tablet 650milliGRAM(s) Oral every 6 hours PRN  ondansetron Injectable 4milliGRAM(s) IV Push every 6 hours PRN  zaleplon 5milliGRAM(s) Oral at bedtime PRN  acetaminophen   Tablet. 975milliGRAM(s) Oral every 8 hours  traMADol 25milliGRAM(s) Oral every 4 hours PRN  traMADol 50milliGRAM(s) Oral every 4 hours PRN  ALPRAZolam 2milliGRAM(s) Oral at bedtime  LORazepam     Tablet 1milliGRAM(s) Oral every 6 hours PRN  sodium chloride 0.9%. 1000milliLiter(s) IV Continuous <Continuous>      Allergies    No Known Allergies    Intolerances        Vital Signs Last 24 Hrs  T(C): 36.6, Max: 36.7 (05-24 @ 11:05)  T(F): 97.8, Max: 98.1 (05-24 @ 11:05)  HR: 71 (71 - 108)  BP: 102/57 (102/57 - 146/676)  BP(mean): --  RR: 18 (16 - 24)  SpO2: 95% (55% - 97%)  I & Os for 24h ending 05-24 @ 07:00  =============================================  IN: 2690 ml / OUT: 1350 ml / NET: 1340 ml    I & Os for current day (as of 05-24 @ 17:19)  =============================================  IN: 1900 ml / OUT: 400 ml / NET: 1500 ml        LABS:      CBC Full  -  ( 24 May 2017 06:38 )  WBC Count : 18.3 K/uL  Hemoglobin : 10.0 g/dL  Hematocrit : 30.5 %  Platelet Count - Automated : 79 K/uL  Mean Cell Volume : 93.6 fL  Mean Cell Hemoglobin : 30.7 pg  Mean Cell Hemoglobin Concentration : 32.8 g/dL  Auto Neutrophil # : x  Auto Lymphocyte # : x  Auto Monocyte # : x  Auto Eosinophil # : x  Auto Basophil # : x  Auto Neutrophil % : x  Auto Lymphocyte % : x  Auto Monocyte % : x  Auto Eosinophil % : x  Auto Basophil % : x    05-24    138  |  102  |  22  ----------------------------<  105<H>  4.3   |  28  |  0.90    Ca    7.9<L>      24 May 2017 06:38      PT/INR - ( 24 May 2017 11:02 )   PT: 13.6 sec;   INR: 1.22          PTT - ( 24 May 2017 11:02 )  PTT:28.8 sec          EXAM:  CT ANGIO CHEST PE PROTOCOL IC                          PROCEDURE DATE:  05/24/2017    Quantity of Contrast in Vial in ml: 125 Contrast Used: Optiray 350  Quantity of Contrast Wasted in ml: 10           INTERPRETATION:  CTA (CT angiography) of the CHEST dated 5/24/2017 12:31   PM    INDICATION: /o PE, tachy and desat postop ;   Assess for pulmonary embolism.    TECHNIQUE: CT angiography of the chest was performed during bolus   injection of intravenous contrast.  Post-processing includingthe   production of axial, coronal and sagittal multiplanar reformatted images   and axial and coronal maximum intensity projections (MIPs) was performed.    PRIOR STUDY: None.    FINDINGS: Acute PE seen lobar and segmental branches right middle lobe,   segmental and subsegmental branches right lower lobe and possibly the   lobar branch right upper lobe. The thoracic aorta is normal in   appearance. The heart is normal in size. There appears to be some right   ventricular strain. RV  to  LV ratiomeasures 1.15. No pericardial   effusion is seen. No mediastinal, hilar or axillary lymphadenopathy is   seen. Bovine aortic arch. There appears to be occlusion versus high-grade   stenosis of the distal right subclavian  vein. Extensive venous   collaterals seen around the right shoulder and scapula.    Trace bilateral basal pleural fluid is seen. Consolidation versus   atelectasis bilateral lower lobes basally left greater than right.    Limited evaluation of the upper abdomen demonstrates punctate   calcifications overlying the inferior pancreatic head. Spleen borderline   enlarged.    Evaluation of the osseous structures demonstrates mild degenerative   changes.      IMPRESSION:   1. Acute PE.      EXAM:  ECHOCARDIOGRAM (CARDIOL)                          PROCEDURE DATE:  05/24/2017                        INTERPRETATION:  Patient Height: 178.0 cm  Patient Weight: 80.0 kg  Systolic Pressure: 102 mmHg  Diastolic Pressure: 57 mmHg  BSA: 2.0 m^2  Interpretation Summary  Normal left ventricular size and wall thickness. The left ventricular wall   motion is normal.  The left ventricular ejection fraction is normal. The   left   ventricular ejection fraction is 55%.  The left atrial size is normal.   Right   atrial size is normal.The right ventricle is normal in size and   function.No   evidence for any hemodynamically significant valvular disease.There is no   echocardiographic evidence for pulmonary hypertension. The pulmonary   artery   systolic pressure is estimated to be 25-30 mmHg.  The inferior vena cava   is   normal in size (<2.1 cm) with normal inspiratory collapse (>50%)   consistent   with normal right atrial pressure.  No aortic root dilatation. Normal   size   aortic arch with no hemodynamic evidence for coarctation  There is no   pericardial effusion.      RADIOLOGY & ADDITIONAL STUDIES (The following images were personally reviewed):

## 2017-05-24 NOTE — PROGRESS NOTE ADULT - ASSESSMENT
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 Admitted for R femoral neck fracture, now POD-2 of R hip igor-arthroplasty. Complicated by hypoxia most likely from HAP.

## 2017-05-24 NOTE — PROGRESS NOTE ADULT - SUBJECTIVE AND OBJECTIVE BOX
Patient seen at ~10am.  Per nurse and PA, found to be asymptomatic but hypoxic to ~50s, improved to 80s on NC. 95% on venturi mask.  CC: patient denies cp, sob, dizziness. "I didn't even notice anything wrong".  Asking for food.    Vital Signs Last 24 Hrs  T(C): 36.7, Max: 36.7 (05-24 @ 11:05)  T(F): 98.1, Max: 98.1 (05-24 @ 11:05)  HR: 98 (77 - 108)  BP: 117/58 (93/65 - 146/676)  BP(mean): --  RR: 22 (16 - 24)  SpO2: 97% (55% - 97%)    At bedside, recorded mid to high 80s o2 sat on RA, 91-92% on 4L NC and 95-97% on venturi mask    PHYSICAL EXAMINATION  * General: Not in acute distress. Awake and alert. Lying comfortably in bed. Asymptomatic.  * Head: Normocephalic, atraumatic.  * HEENT: ears no discharge, eyes PERRLA, nose no discharge, throat no exudates, normal tonsils.  * Neck: no JVD, supple.  * Lungs: Minimal crackles on left base. Otherwise no rales, no wheezes.  * Cardio: Regular rate and rhythm, no murmurs, no rubs, no gallops. Good peripheral pulses.  * Abdomen: Soft, non-tender, non-distended, tympanic to percussion, no rebound, no guarding, no rigidity. Bowel sounds present. No suprapubic or CVA tenderness.  * : Deferred.  * Extremities: Acyanotic, no edema.  * Skin: Warm and dry.  * Neuro: Alert and oriented x 3. No focal deficits. Motor strength is 5/5 throughout. Sensation intact. Cranial nerves II-XII grossly intact.                         10.0   18.3  )-----------( 79       ( 24 May 2017 06:38 )             30.5   05-24    138  |  102  |  22  ----------------------------<  105<H>  4.3   |  28  |  0.90    Ca    7.9<L>      24 May 2017 06:38    MEDICATIONS  (STANDING):  BUpivacaine liposome 1.3% Injectable (no eMAR) 20milliLiter(s) Local Injection once  aspirin enteric coated 325milliGRAM(s) Oral two times a day  pantoprazole    Tablet 40milliGRAM(s) Oral daily  polyethylene glycol 3350 17Gram(s) Oral daily  docusate sodium 100milliGRAM(s) Oral three times a day  doxazosin 2milliGRAM(s) Oral at bedtime  senna 2Tablet(s) Oral at bedtime  acetaminophen   Tablet. 975milliGRAM(s) Oral every 8 hours  ALPRAZolam 2milliGRAM(s) Oral at bedtime  sodium chloride 0.9%. 1000milliLiter(s) IV Continuous <Continuous>  vancomycin  IVPB 1500milliGRAM(s) IV Intermittent every 12 hours  piperacillin/tazobactam IVPB. 3.375Gram(s) IV Intermittent every 8 hours  vancomycin  IVPB  IV Intermittent     MEDICATIONS  (PRN):  acetaminophen   Tablet 650milliGRAM(s) Oral every 6 hours PRN For Temp over 38.3 C (100.94 F)  aluminum hydroxide/magnesium hydroxide/simethicone Suspension 30milliLiter(s) Oral four times a day PRN Indigestion  ondansetron Injectable 4milliGRAM(s) IV Push every 6 hours PRN Nausea and/or Vomiting  zaleplon 5milliGRAM(s) Oral at bedtime PRN Insomnia  bisacodyl Suppository 10milliGRAM(s) Rectal daily PRN If no bowel movement by POD#2  magnesium hydroxide Suspension 30milliLiter(s) Oral two times a day PRN Constipation  traMADol 25milliGRAM(s) Oral every 4 hours PRN Moderate Pain (4 - 6)  traMADol 50milliGRAM(s) Oral every 4 hours PRN Severe Pain (7 - 10)  LORazepam     Tablet 1milliGRAM(s) Oral every 6 hours PRN Anxiety

## 2017-05-24 NOTE — PROGRESS NOTE ADULT - SUBJECTIVE AND OBJECTIVE BOX
ORTHO NOTE    [ ] Pt seen/examined.  [ ] Pt without any complaints/in NAD.    [ ] Pt complains of:      ROS: [ ] Fever  [ ] Chills  [ ] CP [ ] SOB [ ] Dysnea  [ ] Palpitations [ ] Cough [ ] N/V/C/D [ ] Paresthia [ ] Other     [ ] ROS  otherwise negative    .    PHYSICAL EXAM:    Vital Signs Last 24 Hrs  T(C): 36.7, Max: 36.7 (05-24 @ 11:05)  T(F): 98.1, Max: 98.1 (05-24 @ 11:05)  HR: 98 (77 - 108)  BP: 117/58 (93/65 - 146/676)  BP(mean): --  RR: 22 (16 - 24)  SpO2: 97% (55% - 97%)    I&O's Detail  I & Os for 24h ending 24 May 2017 07:00  =============================================  IN:    sodium chloride 0.9%.: 1900 ml    Oral Fluid: 540 ml    Sodium Chloride 0.9% IV Bolus: 250 ml    Total IN: 2690 ml  ---------------------------------------------  OUT:    Indwelling Catheter - Urethral: 1350 ml    Total OUT: 1350 ml  ---------------------------------------------  Total NET: 1340 ml    I & Os for current day (as of 24 May 2017 12:38)  =============================================  IN:    IV PiggyBack: 600 ml    sodium chloride 0.9%.: 500 ml    Total IN: 1100 ml  ---------------------------------------------  OUT:    Indwelling Catheter - Urethral: 150 ml    Total OUT: 150 ml  ---------------------------------------------  Total NET: 950 ml       CAPILLARY BLOOD GLUCOSE                  Neuro:    Lungs:    CV:    ABD:     Ext:    LABS                        10.0   18.3  )-----------( 79       ( 24 May 2017 06:38 )             30.5                              PT/INR - ( 24 May 2017 11:02 )   PT: 13.6 sec;   INR: 1.22          PTT - ( 24 May 2017 11:02 )  PTT:28.8 sec  05-24    138  |  102  |  22  ----------------------------<  105<H>  4.3   |  28  |  0.90    Ca    7.9<L>      24 May 2017 06:38        [ ] Other Labs  [ ] None ordered            Please check or [place] when present:  •  Heart Failure:    [ ] Acute        [ ]  Acute on Chronic        [ ] Chronic         [ ] Diastolic     [ ]  Combined    •  JARED:     [ ] ATN        [ ]  Renal medullary necrosis       [ ]  Renal cortical necrosis                  [ ] Other pathological Lesion:  •  CKD:  [ ] Stage I   [ ] Stage II  [ ] Stage III    [ ]Stage IV   [ ]  CKD V   [ ]  Other/Unspecified:    •  Abdominal Nutritional Status:   [ ] Malnutrition-See Nutrition note    [ ] Cachexia   [ ]  Other        [ ] Supplement ordered:            [ ] Morbid Obesity: BMI >=40         ASSESSMENT/PLAN:      STATUS POST: R hip igor pod 2    CONTINUE:          [ ] PT wbat    [ ] DVT PPX- ASA BID    [ ] Pain Mgmt con't current regimen    [ ] Dispo plan- rehab ORTHO NOTE    [X ] Pt seen/examined.  [ ] Pt without any complaints/in NAD.    [X ] Pt complains of:  Was notifed that pt was satting 50% O2 on RA, 80s on n/c, 90s on RA. Pt was completely asymptomatic at the time.  STAT cxr and CT done - + PE.  Still no BM      ROS: [ ] Fever  [ ] Chills  [ ] CP [ ] SOB [ ] Dysnea  [ ] Palpitations [ ] Cough [ ] N/V/C/D [ ] Paresthia [ ] Other     [X ] ROS  otherwise negative    .    PHYSICAL EXAM:    Vital Signs Last 24 Hrs  T(C): 36.7, Max: 36.7 (05-24 @ 11:05)  T(F): 98.1, Max: 98.1 (05-24 @ 11:05)  HR: 98 (77 - 108)  BP: 117/58 (93/65 - 146/676)  BP(mean): --  RR: 22 (16 - 24)  SpO2: 97% (55% - 97%)    I&O's Detail  I & Os for 24h ending 24 May 2017 07:00  =============================================  IN:    sodium chloride 0.9%.: 1900 ml    Oral Fluid: 540 ml    Sodium Chloride 0.9% IV Bolus: 250 ml    Total IN: 2690 ml  ---------------------------------------------  OUT:    Indwelling Catheter - Urethral: 1350 ml    Total OUT: 1350 ml  ---------------------------------------------  Total NET: 1340 ml    I & Os for current day (as of 24 May 2017 12:38)  =============================================  IN:    IV PiggyBack: 600 ml    sodium chloride 0.9%.: 500 ml    Total IN: 1100 ml  ---------------------------------------------  OUT:    Indwelling Catheter - Urethral: 150 ml    Total OUT: 150 ml  ---------------------------------------------  Total NET: 950 ml       CAPILLARY BLOOD GLUCOSE                  Neuro:  NAD A and O x 3    Lungs:    CV:    ABD:     Ext:  R hip dsg c/d/i strength 5/5 silt    LABS                        10.0   18.3  )-----------( 79       ( 24 May 2017 06:38 )             30.5                              PT/INR - ( 24 May 2017 11:02 )   PT: 13.6 sec;   INR: 1.22          PTT - ( 24 May 2017 11:02 )  PTT:28.8 sec  05-24    138  |  102  |  22  ----------------------------<  105<H>  4.3   |  28  |  0.90    Ca    7.9<L>      24 May 2017 06:38        [ ] Other Labs  [ ] None ordered            Please check or [place] when present:  •  Heart Failure:    [ ] Acute        [ ]  Acute on Chronic        [ ] Chronic         [ ] Diastolic     [ ]  Combined    •  JARED:     [ ] ATN        [ ]  Renal medullary necrosis       [ ]  Renal cortical necrosis                  [ ] Other pathological Lesion:  •  CKD:  [ ] Stage I   [ ] Stage II  [ ] Stage III    [ ]Stage IV   [ ]  CKD V   [ ]  Other/Unspecified:    •  Abdominal Nutritional Status:   [ ] Malnutrition-See Nutrition note    [ ] Cachexia   [ ]  Other        [ ] Supplement ordered:            [ ] Morbid Obesity: BMI >=40         ASSESSMENT/PLAN:      STATUS POST: R hip igor pod 2    CONTINUE:          [ ] PT wbat    [ ] DVT PPX- weight based lovenox    [ ] Pain Mgmt con't current regimen    [ ] Dispo plan- rehab    Originally started on IV abx for PNA? on cxr, but no evidence of PNA on CTA.  D/c'ed IV abx.  Dr Messina was consulted for pulm.  Started weight based lovenox.  F/u EKG, cardiac enzymes, BNP, and echo.  Con't tele.  2nd Void trial  F/u R hip XR that was ordered by resident for thigh pain.  IS use.  Bowel regimen.

## 2017-05-24 NOTE — CONSULT NOTE ADULT - PROBLEM SELECTOR RECOMMENDATION 2
For urgent surgery for arthropolasty
Dx 25 years ago per pt. Follows w/ Dr. Chester. Likely cause of leukocytosis (lymphocyte predominate) as well as thrombocytopenia. Not currently receiving any treatment.
a CT scan of the chest revealed left lower lobe consolidation there is no clinical evidence of underlying infectious process and the radiological abnormality most likely presents atelectasis rather than instructions process,  the patient will require. The chest and 6-8 weeks to confirm the resolution of the abnormality. Another possibility is that could be pulmonary infarct though there is no thromboembolic disease in the distribution of the pulmonary arteries supplying this area

## 2017-05-24 NOTE — PROGRESS NOTE ADULT - SUBJECTIVE AND OBJECTIVE BOX
SUBJECTIVE: Patient seen and examined.  No issues overnight. Pain well controlled ib bed. Denies CP/SOB. Unable to walk due to thigh pain    OBJECTIVE:     Vital Signs Last 24 Hrs  T(C): 36.3, Max: 36.3 (05-24 @ 06:19)  T(F): 97.4, Max: 97.4 (05-24 @ 06:19)  HR: 80 (77 - 92)  BP: 118/71 (93/58 - 146/676)  BP(mean): --  RR: 16 (16 - 17)  SpO2: 94% (93% - 95%)       RLE           Dressing: clean/dry/intact            Motor exam:  TA 5/5 EHL 5/5 GSC 5/5          Sensation:   T SILT SPH SILT DP SILT         Vascular:  Warm/pink/well perfused             LABS:                        10.0   18.3  )-----------( 79       ( 24 May 2017 06:38 )             30.5     05-24    138  |  102  |  22  ----------------------------<  105<H>  4.3   |  28  |  0.90    Ca    7.9<L>      24 May 2017 06:38            MEDICATIONS:  MEDICATIONS  (STANDING):  BUpivacaine liposome 1.3% Injectable (no eMAR) 20milliLiter(s) Local Injection once  aspirin enteric coated 325milliGRAM(s) Oral two times a day  pantoprazole    Tablet 40milliGRAM(s) Oral daily  polyethylene glycol 3350 17Gram(s) Oral daily  docusate sodium 100milliGRAM(s) Oral three times a day  doxazosin 2milliGRAM(s) Oral at bedtime  senna 2Tablet(s) Oral at bedtime  acetaminophen   Tablet. 975milliGRAM(s) Oral every 8 hours  ALPRAZolam 2milliGRAM(s) Oral at bedtime  sodium chloride 0.9%. 1000milliLiter(s) IV Continuous <Continuous>    MEDICATIONS  (PRN):  acetaminophen   Tablet 650milliGRAM(s) Oral every 6 hours PRN For Temp over 38.3 C (100.94 F)  aluminum hydroxide/magnesium hydroxide/simethicone Suspension 30milliLiter(s) Oral four times a day PRN Indigestion  ondansetron Injectable 4milliGRAM(s) IV Push every 6 hours PRN Nausea and/or Vomiting  zaleplon 5milliGRAM(s) Oral at bedtime PRN Insomnia  bisacodyl Suppository 10milliGRAM(s) Rectal daily PRN If no bowel movement by POD#2  magnesium hydroxide Suspension 30milliLiter(s) Oral two times a day PRN Constipation  traMADol 25milliGRAM(s) Oral every 4 hours PRN Moderate Pain (4 - 6)  traMADol 50milliGRAM(s) Oral every 4 hours PRN Severe Pain (7 - 10)  LORazepam     Tablet 1milliGRAM(s) Oral every 6 hours PRN Anxiety      Anticoagulation:  aspirin enteric coated 325milliGRAM(s) Oral two times a day      Antibiotics:       Pain medications:   acetaminophen   Tablet 650milliGRAM(s) Oral every 6 hours PRN  ondansetron Injectable 4milliGRAM(s) IV Push every 6 hours PRN  zaleplon 5milliGRAM(s) Oral at bedtime PRN  acetaminophen   Tablet. 975milliGRAM(s) Oral every 8 hours  traMADol 25milliGRAM(s) Oral every 4 hours PRN  traMADol 50milliGRAM(s) Oral every 4 hours PRN  ALPRAZolam 2milliGRAM(s) Oral at bedtime  LORazepam     Tablet 1milliGRAM(s) Oral every 6 hours PRN        A/P :   s/p   R hip hemiarthroplasty for FNF  -    Pain control  -    DVT ppx: aspirin enteric coated 325milliGRAM(s) Oral two times a day  -    Weight bearing status:  WBAT w posterior hip precautions  -    Physical Therapy  -    Dispo: Rehab  -    Thigh pain: Check Xray hip ap and lateral

## 2017-05-24 NOTE — CONSULT NOTE ADULT - PROBLEM SELECTOR RECOMMENDATION 9
the patient has sinus tachycardia with physical therapy. A CT scan of the chest revealed bilateral subsegmental pulmonary emboli. There is no evidence of right ventricular strain on the echocardiogram nor pulmonary hypertension. Good pulmonary emboli is provoked due to his orthopedic surgery. Patient is to be started on anticoagulation. Patient was started on Lovenox therapeutic dose. If the patient is stable within the next 24 hours he can be changed to oral therapy. The duration of therapy is 3 months. There is no evidence of right ventricular strain and no dysfunction.

## 2017-05-25 DIAGNOSIS — I26.99 OTHER PULMONARY EMBOLISM WITHOUT ACUTE COR PULMONALE: ICD-10-CM

## 2017-05-25 LAB
ANION GAP SERPL CALC-SCNC: 9 MMOL/L — SIGNIFICANT CHANGE UP (ref 5–17)
APTT BLD: 31.2 SEC — SIGNIFICANT CHANGE UP (ref 27.5–37.4)
BUN SERPL-MCNC: 21 MG/DL — SIGNIFICANT CHANGE UP (ref 7–23)
CALCIUM SERPL-MCNC: 7.7 MG/DL — LOW (ref 8.4–10.5)
CHLORIDE SERPL-SCNC: 102 MMOL/L — SIGNIFICANT CHANGE UP (ref 96–108)
CO2 SERPL-SCNC: 26 MMOL/L — SIGNIFICANT CHANGE UP (ref 22–31)
CREAT SERPL-MCNC: 1 MG/DL — SIGNIFICANT CHANGE UP (ref 0.5–1.3)
GLUCOSE SERPL-MCNC: 93 MG/DL — SIGNIFICANT CHANGE UP (ref 70–99)
HCT VFR BLD CALC: 29.8 % — LOW (ref 39–50)
HGB BLD-MCNC: 9.7 G/DL — LOW (ref 13–17)
INR BLD: 1.34 — HIGH (ref 0.88–1.16)
MCHC RBC-ENTMCNC: 30.5 PG — SIGNIFICANT CHANGE UP (ref 27–34)
MCHC RBC-ENTMCNC: 32.6 G/DL — SIGNIFICANT CHANGE UP (ref 32–36)
MCV RBC AUTO: 93.7 FL — SIGNIFICANT CHANGE UP (ref 80–100)
PLATELET # BLD AUTO: 100 K/UL — LOW (ref 150–400)
POTASSIUM SERPL-MCNC: 4.7 MMOL/L — SIGNIFICANT CHANGE UP (ref 3.5–5.3)
POTASSIUM SERPL-SCNC: 4.7 MMOL/L — SIGNIFICANT CHANGE UP (ref 3.5–5.3)
PROTHROM AB SERPL-ACNC: 15 SEC — HIGH (ref 9.8–12.7)
RBC # BLD: 3.18 M/UL — LOW (ref 4.2–5.8)
RBC # FLD: 13.8 % — SIGNIFICANT CHANGE UP (ref 10.3–16.9)
SODIUM SERPL-SCNC: 137 MMOL/L — SIGNIFICANT CHANGE UP (ref 135–145)
SURGICAL PATHOLOGY STUDY: SIGNIFICANT CHANGE UP
WBC # BLD: 15.9 K/UL — HIGH (ref 3.8–10.5)
WBC # FLD AUTO: 15.9 K/UL — HIGH (ref 3.8–10.5)

## 2017-05-25 PROCEDURE — 99233 SBSQ HOSP IP/OBS HIGH 50: CPT | Mod: GC

## 2017-05-25 PROCEDURE — 99233 SBSQ HOSP IP/OBS HIGH 50: CPT

## 2017-05-25 RX ADMIN — Medication 975 MILLIGRAM(S): at 22:29

## 2017-05-25 RX ADMIN — TRAMADOL HYDROCHLORIDE 50 MILLIGRAM(S): 50 TABLET ORAL at 05:34

## 2017-05-25 RX ADMIN — Medication 100 MILLIGRAM(S): at 05:34

## 2017-05-25 RX ADMIN — SENNA PLUS 2 TABLET(S): 8.6 TABLET ORAL at 21:35

## 2017-05-25 RX ADMIN — POLYETHYLENE GLYCOL 3350 17 GRAM(S): 17 POWDER, FOR SOLUTION ORAL at 11:43

## 2017-05-25 RX ADMIN — Medication 975 MILLIGRAM(S): at 21:35

## 2017-05-25 RX ADMIN — Medication 100 MILLIGRAM(S): at 13:41

## 2017-05-25 RX ADMIN — OXYCODONE HYDROCHLORIDE 5 MILLIGRAM(S): 5 TABLET ORAL at 00:45

## 2017-05-25 RX ADMIN — ENOXAPARIN SODIUM 80 MILLIGRAM(S): 100 INJECTION SUBCUTANEOUS at 05:34

## 2017-05-25 RX ADMIN — Medication 975 MILLIGRAM(S): at 14:30

## 2017-05-25 RX ADMIN — OXYCODONE HYDROCHLORIDE 5 MILLIGRAM(S): 5 TABLET ORAL at 11:46

## 2017-05-25 RX ADMIN — MAGNESIUM HYDROXIDE 30 MILLILITER(S): 400 TABLET, CHEWABLE ORAL at 21:34

## 2017-05-25 RX ADMIN — TRAMADOL HYDROCHLORIDE 50 MILLIGRAM(S): 50 TABLET ORAL at 06:30

## 2017-05-25 RX ADMIN — OXYCODONE HYDROCHLORIDE 5 MILLIGRAM(S): 5 TABLET ORAL at 12:46

## 2017-05-25 RX ADMIN — TRAMADOL HYDROCHLORIDE 50 MILLIGRAM(S): 50 TABLET ORAL at 22:29

## 2017-05-25 RX ADMIN — ENOXAPARIN SODIUM 80 MILLIGRAM(S): 100 INJECTION SUBCUTANEOUS at 17:30

## 2017-05-25 RX ADMIN — Medication 100 MILLIGRAM(S): at 21:34

## 2017-05-25 RX ADMIN — Medication 975 MILLIGRAM(S): at 05:34

## 2017-05-25 RX ADMIN — TRAMADOL HYDROCHLORIDE 50 MILLIGRAM(S): 50 TABLET ORAL at 21:35

## 2017-05-25 RX ADMIN — Medication 2 MILLIGRAM(S): at 21:35

## 2017-05-25 RX ADMIN — Medication 2 MILLIGRAM(S): at 01:29

## 2017-05-25 RX ADMIN — Medication 975 MILLIGRAM(S): at 06:30

## 2017-05-25 RX ADMIN — PANTOPRAZOLE SODIUM 40 MILLIGRAM(S): 20 TABLET, DELAYED RELEASE ORAL at 11:43

## 2017-05-25 RX ADMIN — Medication 975 MILLIGRAM(S): at 13:41

## 2017-05-25 NOTE — PROGRESS NOTE ADULT - SUBJECTIVE AND OBJECTIVE BOX
Interval Events: reviewed  Patient seen and examined at bedside.    Patient is a 77y old  Male who presents with a chief complaint of right hip pain (22 May 2017 09:56)  Patient is stable. He's not bleeding from the nose nor rounds. Patient in physical therapy. The breathing is okay    PAST MEDICAL & SURGICAL HISTORY:  CLL (chronic lymphocytic leukemia)      MEDICATIONS:  Pulmonary:    Antimicrobials:    Anticoagulants:  enoxaparin Injectable 80milliGRAM(s) SubCutaneous every 12 hours    Cardiac:  doxazosin 2milliGRAM(s) Oral at bedtime      Allergies    No Known Allergies    Intolerances        Vital Signs Last 24 Hrs  T(C): 37.7, Max: 37.7 (05-25 @ 17:29)  T(F): 99.9, Max: 99.9 (05-25 @ 17:29)  HR: 87 (82 - 104)  BP: 149/70 (113/58 - 153/69)  BP(mean): --  RR: 16 (16 - 18)  SpO2: 95% (93% - 96%)  I & Os for 24h ending 05-25 @ 07:00  =============================================  IN: 2440 ml / OUT: 800 ml / NET: 1640 ml    I & Os for current day (as of 05-25 @ 22:37)  =============================================  IN: 0 ml / OUT: 500 ml / NET: -500 ml        LABS:      CBC Full  -  ( 25 May 2017 06:35 )  WBC Count : 15.9 K/uL  Hemoglobin : 9.7 g/dL  Hematocrit : 29.8 %  Platelet Count - Automated : 100 K/uL  Mean Cell Volume : 93.7 fL  Mean Cell Hemoglobin : 30.5 pg  Mean Cell Hemoglobin Concentration : 32.6 g/dL  Auto Neutrophil # : x  Auto Lymphocyte # : x  Auto Monocyte # : x  Auto Eosinophil # : x  Auto Basophil # : x  Auto Neutrophil % : x  Auto Lymphocyte % : x  Auto Monocyte % : x  Auto Eosinophil % : x  Auto Basophil % : x    05-25    137  |  102  |  21  ----------------------------<  93  4.7   |  26  |  1.00    Ca    7.7<L>      25 May 2017 06:35      PT/INR - ( 25 May 2017 06:35 )   PT: 15.0 sec;   INR: 1.34          PTT - ( 25 May 2017 06:35 )  PTT:31.2 sec                    RADIOLOGY & ADDITIONAL STUDIES (The following images were personally reviewed):  Rodriguez:                                  No  Urine output:               Yes          DVT prophylaxis:         Yes        Flattus:                          Yes         Bowel movement:       Yes

## 2017-05-25 NOTE — DIETITIAN INITIAL EVALUATION ADULT. - ENERGY NEEDS
77 kg ABW; 75 kg IBW; 103% IBW; BMI 24. ABW used due to pt between %. Needs assessed secondary to advanced age & s/p surgery.

## 2017-05-25 NOTE — PROGRESS NOTE ADULT - SUBJECTIVE AND OBJECTIVE BOX
ORTHO NOTE    [ ] Pt seen/examined.  [ ] Pt without any complaints/in NAD.    [ ] Pt complains of:      ROS: [ ] Fever  [ ] Chills  [ ] CP [ ] SOB [ ] Dysnea  [ ] Palpitations [ ] Cough [ ] N/V/C/D [ ] Paresthia [ ] Other     [ ] ROS  otherwise negative    .    PHYSICAL EXAM:    Vital Signs Last 24 Hrs  T(C): 36.6, Max: 37.5 (05-25 @ 00:16)  T(F): 97.9, Max: 99.5 (05-25 @ 00:16)  HR: 88 (71 - 94)  BP: 113/58 (102/57 - 136/64)  BP(mean): --  RR: 18 (16 - 20)  SpO2: 96% (93% - 99%)    I&O's Detail    I & Os for current day (as of 25 May 2017 12:17)  =============================================  IN:    sodium chloride 0.9%.: 1300 ml    IV PiggyBack: 600 ml    Oral Fluid: 540 ml    Total IN: 2440 ml  ---------------------------------------------  OUT:    Indwelling Catheter - Urethral: 400 ml    Voided: 400 ml    Total OUT: 800 ml  ---------------------------------------------  Total NET: 1640 ml       CAPILLARY BLOOD GLUCOSE                  Neuro:    Lungs:    CV:    ABD:     Ext:    LABS                        9.7    15.9  )-----------( 100      ( 25 May 2017 06:35 )             29.8                              PT/INR - ( 25 May 2017 06:35 )   PT: 15.0 sec;   INR: 1.34          PTT - ( 25 May 2017 06:35 )  PTT:31.2 sec  05-25    137  |  102  |  21  ----------------------------<  93  4.7   |  26  |  1.00    Ca    7.7<L>      25 May 2017 06:35        [ ] Other Labs  [ ] None ordered            Please check or [place] when present:  •  Heart Failure:    [ ] Acute        [ ]  Acute on Chronic        [ ] Chronic         [ ] Diastolic     [ ]  Combined    •  JARED:     [ ] ATN        [ ]  Renal medullary necrosis       [ ]  Renal cortical necrosis                  [ ] Other pathological Lesion:  •  CKD:  [ ] Stage I   [ ] Stage II  [ ] Stage III    [ ]Stage IV   [ ]  CKD V   [ ]  Other/Unspecified:    •  Abdominal Nutritional Status:   [ ] Malnutrition-See Nutrition note    [ ] Cachexia   [ ]  Other        [ ] Supplement ordered:            [ ] Morbid Obesity: BMI >=40         ASSESSMENT/PLAN:      STATUS POST:     CONTINUE:          [ ] PT    [ ] DVT PPX-    [ ] Pain Mgt    [ ] Dispo plan- ORTHO NOTE    [X ] Pt seen/examined at 9:50 AM  [X ] Pt without any complaints/in NAD.  No respiratory complaints.  Low 90s on 4 L O2 n/c while lying in bed.    [ ] Pt complains of:        ROS: [ ] Fever  [ ] Chills  [ ] CP [ ] SOB [ ] Dysnea  [ ] Palpitations [ ] Cough [ ] N/V/C/D [ ] Paresthia [ ] Other     [X ] ROS  otherwise negative    .    PHYSICAL EXAM:    Vital Signs Last 24 Hrs  T(C): 36.6, Max: 37.5 (05-25 @ 00:16)  T(F): 97.9, Max: 99.5 (05-25 @ 00:16)  HR: 88 (71 - 94)  BP: 113/58 (102/57 - 136/64)  BP(mean): --  RR: 18 (16 - 20)  SpO2: 96% (93% - 99%)    I&O's Detail    I & Os for current day (as of 25 May 2017 12:17)  =============================================  IN:    sodium chloride 0.9%.: 1300 ml    IV PiggyBack: 600 ml    Oral Fluid: 540 ml    Total IN: 2440 ml  ---------------------------------------------  OUT:    Indwelling Catheter - Urethral: 400 ml    Voided: 400 ml    Total OUT: 800 ml  ---------------------------------------------  Total NET: 1640 ml       CAPILLARY BLOOD GLUCOSE                  Neuro:  NAD A and O x 3    Lungs:    CV:    ABD:     Ext: R hip dsg c/d/i strength 5/5 silt     LABS                        9.7    15.9  )-----------( 100      ( 25 May 2017 06:35 )             29.8                              PT/INR - ( 25 May 2017 06:35 )   PT: 15.0 sec;   INR: 1.34          PTT - ( 25 May 2017 06:35 )  PTT:31.2 sec  05-25    137  |  102  |  21  ----------------------------<  93  4.7   |  26  |  1.00    Ca    7.7<L>      25 May 2017 06:35        [ ] Other Labs  [ ] None ordered            Please check or [place] when present:  •  Heart Failure:    [ ] Acute        [ ]  Acute on Chronic        [ ] Chronic         [ ] Diastolic     [ ]  Combined    •  JARED:     [ ] ATN        [ ]  Renal medullary necrosis       [ ]  Renal cortical necrosis                  [ ] Other pathological Lesion:  •  CKD:  [ ] Stage I   [ ] Stage II  [ ] Stage III    [ ]Stage IV   [ ]  CKD V   [ ]  Other/Unspecified:    •  Abdominal Nutritional Status:   [ ] Malnutrition-See Nutrition note    [ ] Cachexia   [ ]  Other        [ ] Supplement ordered:            [ ] Morbid Obesity: BMI >=40         ASSESSMENT/PLAN:      STATUS POST: R hip igor pod 3    CONTINUE:          [ ] PT wbat    [ ] DVT PPX- weight based lovenox    [ ] Pain Mgt con't current regimen    [ ] Dispo plan- rehab    Con't high flow O2 or nc prn.  Encouraged more IS use.  Bowel regimen.  Will assess if we can change him to po a/c tomorrow.  Dr Mccray and Dr Messina for medicine. ORTHO NOTE    [X ] Pt seen/examined at 9:50 AM  [X ] Pt without any complaints/in NAD.  No respiratory complaints.  Low 90s on 4 L O2 n/c while lying in bed.    [ ] Pt complains of:        ROS: [ ] Fever  [ ] Chills  [ ] CP [ ] SOB [ ] Dysnea  [ ] Palpitations [ ] Cough [ ] N/V/C/D [ ] Paresthia [ ] Other     [X ] ROS  otherwise negative    .    PHYSICAL EXAM:    Vital Signs Last 24 Hrs  T(C): 36.6, Max: 37.5 (05-25 @ 00:16)  T(F): 97.9, Max: 99.5 (05-25 @ 00:16)  HR: 88 (71 - 94)  BP: 113/58 (102/57 - 136/64)  BP(mean): --  RR: 18 (16 - 20)  SpO2: 96% (93% - 99%)    I&O's Detail    I & Os for current day (as of 25 May 2017 12:17)  =============================================  IN:    sodium chloride 0.9%.: 1300 ml    IV PiggyBack: 600 ml    Oral Fluid: 540 ml    Total IN: 2440 ml  ---------------------------------------------  OUT:    Indwelling Catheter - Urethral: 400 ml    Voided: 400 ml    Total OUT: 800 ml  ---------------------------------------------  Total NET: 1640 ml       CAPILLARY BLOOD GLUCOSE                  Neuro:  NAD A and O x 3    Lungs:    CV:    ABD:     Ext: R hip dsg c/d/i strength 5/5 silt     LABS                        9.7    15.9  )-----------( 100      ( 25 May 2017 06:35 )             29.8                              PT/INR - ( 25 May 2017 06:35 )   PT: 15.0 sec;   INR: 1.34          PTT - ( 25 May 2017 06:35 )  PTT:31.2 sec  05-25    137  |  102  |  21  ----------------------------<  93  4.7   |  26  |  1.00    Ca    7.7<L>      25 May 2017 06:35        [ ] Other Labs  [ ] None ordered            Please check or [place] when present:  •  Heart Failure:    [ ] Acute        [ ]  Acute on Chronic        [ ] Chronic         [ ] Diastolic     [ ]  Combined    •  JARED:     [ ] ATN        [ ]  Renal medullary necrosis       [ ]  Renal cortical necrosis                  [ ] Other pathological Lesion:  •  CKD:  [ ] Stage I   [ ] Stage II  [ ] Stage III    [ ]Stage IV   [ ]  CKD V   [ ]  Other/Unspecified:    •  Abdominal Nutritional Status:   [ ] Malnutrition-See Nutrition note    [ ] Cachexia   [ ]  Other        [ ] Supplement ordered:            [ ] Morbid Obesity: BMI >=40         ASSESSMENT/PLAN:      STATUS POST: R hip igor pod 3    CONTINUE:          [ ] PT wbat    [ ] DVT PPX- weight based lovenox    [ ] Pain Mgt con't current regimen    [ ] Dispo plan- rehab    Con't high flow O2 or nc prn.  Encouraged more IS use.  Bowel regimen.  Will assess if we can change him to po a/c tomorrow.  Dr Mccray and Dr Messina for medicine.      3 PM - While doing PT his O2 on RA is 88-89%, 2 L n/c in low 90s.  HR around 130s.  Was in a lot of pain and felt a little SOB.  Dr Mccray made aware.  Continue current plan.

## 2017-05-25 NOTE — DIETITIAN INITIAL EVALUATION ADULT. - OTHER INFO
Admitted for R hip fracture. Regular diet appropriate & well tolerated. Reports a good appetite, writer observed pt ate 100% of breakfast this AM. Reports stable wt. States last BM 4 days ago, encouraged fruit/veggie intake to promote BM.

## 2017-05-25 NOTE — PROGRESS NOTE ADULT - SUBJECTIVE AND OBJECTIVE BOX
CC: Currently asymptomatic.  Denies sob, cp, dizziness.   Lying comfortably in bed, reading newspaper.  No overnight events.    Vital Signs Last 24 Hrs  T(C): 36.6, Max: 37.5 (05-25 @ 00:16)  T(F): 97.9, Max: 99.5 (05-25 @ 00:16)  HR: 88 (71 - 94)  BP: 113/58 (102/57 - 136/64)  BP(mean): --  RR: 18 (16 - 20)  SpO2: 96% (93% - 99%)    PHYSICAL EXAMINATION  * General: Not in acute distress. Awake and alert. Lying comfortably in bed.  * Head: Normocephalic, atraumatic.  * HEENT: ears no discharge, eyes PERRLA, nose no discharge, throat no exudates, normal tonsils.  * Neck: no JVD, supple.  * Lungs: Clear to auscultation, no rales, no wheezes.  * Cardio: Regular rate and rhythm, no murmurs, no rubs, no gallops. Good peripheral pulses.  * Abdomen: Soft, non-tender, non-distended, tympanic to percussion, no rebound, no guarding, no rigidity. Bowel sounds present. No suprapubic or CVA tenderness.  * : Deferred.  * Extremities: Acyanotic, no edema.  * Skin: Warm and dry.  * Neuro: Alert and oriented x 3. No focal deficits. Motor strength is 5/5 throughout. Sensation intact. Cranial nerves II-XII grossly intact.                           9.7    15.9  )-----------( 100      ( 25 May 2017 06:35 )             29.8   05-25    137  |  102  |  21  ----------------------------<  93  4.7   |  26  |  1.00    Ca    7.7<L>      25 May 2017 06:35    PT/INR - ( 25 May 2017 06:35 )   PT: 15.0 sec;   INR: 1.34          PTT - ( 25 May 2017 06:35 )  PTT:31.2 sec    Echocardiogram:    Left Ventricle  Normal left ventricular size and wall thickness.  The left ventricular wall motion is normal.  The left ventricular ejection fraction is normal.  The left ventricular ejection fraction is 55%.  Left Atrium  The left atrial size is normal.  RightAtrium  Right atrial size is normal.  Right Ventricle  The right ventricle is normal in size and function.  Aortic Valve  No aortic regurgitation noted.  Mitral Valve  No mitral regurgitation noted.  Tricuspid Valve  There is trace tricuspid regurgitation.  There is no echocardiographic evidence for pulmonary hypertension.  The pulmonary artery systolic pressure is estimated to be 25-30 mmHg.  Pulmonic Valve  No pulmonic regurgitation noted.  Arteries and Venous System  No aortic root dilatation.Normal size aortic arch with no hemodynamic evidence for coarctation  The inferior vena cava is normal in size (<2.1 cm) with normal inspiratory  collapse (>50%) consistent with normal right atrial pressure.  Pericardium / Pleura  There is no pericardial effusion.  Additional Comments  No evidence for any hemodynamically significant valvular disease.    MEDICATIONS  (STANDING):  BUpivacaine liposome 1.3% Injectable (no eMAR) 20milliLiter(s) Local Injection once  pantoprazole    Tablet 40milliGRAM(s) Oral daily  polyethylene glycol 3350 17Gram(s) Oral daily  docusate sodium 100milliGRAM(s) Oral three times a day  doxazosin 2milliGRAM(s) Oral at bedtime  senna 2Tablet(s) Oral at bedtime  acetaminophen   Tablet. 975milliGRAM(s) Oral every 8 hours  ALPRAZolam 2milliGRAM(s) Oral at bedtime  sodium chloride 0.9%. 1000milliLiter(s) IV Continuous <Continuous>  enoxaparin Injectable 80milliGRAM(s) SubCutaneous every 12 hours    MEDICATIONS  (PRN):  acetaminophen   Tablet 650milliGRAM(s) Oral every 6 hours PRN For Temp over 38.3 C (100.94 F)  aluminum hydroxide/magnesium hydroxide/simethicone Suspension 30milliLiter(s) Oral four times a day PRN Indigestion  ondansetron Injectable 4milliGRAM(s) IV Push every 6 hours PRN Nausea and/or Vomiting  zaleplon 5milliGRAM(s) Oral at bedtime PRN Insomnia  bisacodyl Suppository 10milliGRAM(s) Rectal daily PRN If no bowel movement by POD#2  magnesium hydroxide Suspension 30milliLiter(s) Oral two times a day PRN Constipation  traMADol 25milliGRAM(s) Oral every 4 hours PRN Moderate Pain (4 - 6)  traMADol 50milliGRAM(s) Oral every 4 hours PRN Severe Pain (7 - 10)  LORazepam     Tablet 1milliGRAM(s) Oral every 6 hours PRN Anxiety  oxyCODONE IR 5milliGRAM(s) Oral every 4 hours PRN Mild Pain

## 2017-05-25 NOTE — PROGRESS NOTE ADULT - SUBJECTIVE AND OBJECTIVE BOX
SUBJECTIVE: Patient seen and examined.  Events yesterday noted. Pain well controlled. Denies CP/SOB    OBJECTIVE:     Vital Signs Last 24 Hrs  T(C): 36.6, Max: 37.5 (05-25 @ 00:16)  T(F): 97.9, Max: 99.5 (05-25 @ 00:16)  HR: 88 (71 - 108)  BP: 113/58 (102/57 - 136/64)  BP(mean): --  RR: 18 (16 - 24)  SpO2: 96% (55% - 99%)       RLE             Dressing: clean/dry/intact            Motor exam:  TA 5/5 EHL 5/5 GSC 5/5          Sensation:   T SILT SPH SILT DP SILT         Vascular:  Warm/pink/well perfused             LABS:                        9.7    15.9  )-----------( 100      ( 25 May 2017 06:35 )             29.8     05-25    137  |  102  |  21  ----------------------------<  93  4.7   |  26  |  1.00    Ca    7.7<L>      25 May 2017 06:35      PT/INR - ( 25 May 2017 06:35 )   PT: 15.0 sec;   INR: 1.34          PTT - ( 25 May 2017 06:35 )  PTT:31.2 sec      MEDICATIONS:  MEDICATIONS  (STANDING):  BUpivacaine liposome 1.3% Injectable (no eMAR) 20milliLiter(s) Local Injection once  pantoprazole    Tablet 40milliGRAM(s) Oral daily  polyethylene glycol 3350 17Gram(s) Oral daily  docusate sodium 100milliGRAM(s) Oral three times a day  doxazosin 2milliGRAM(s) Oral at bedtime  senna 2Tablet(s) Oral at bedtime  acetaminophen   Tablet. 975milliGRAM(s) Oral every 8 hours  ALPRAZolam 2milliGRAM(s) Oral at bedtime  sodium chloride 0.9%. 1000milliLiter(s) IV Continuous <Continuous>  enoxaparin Injectable 80milliGRAM(s) SubCutaneous every 12 hours    MEDICATIONS  (PRN):  acetaminophen   Tablet 650milliGRAM(s) Oral every 6 hours PRN For Temp over 38.3 C (100.94 F)  aluminum hydroxide/magnesium hydroxide/simethicone Suspension 30milliLiter(s) Oral four times a day PRN Indigestion  ondansetron Injectable 4milliGRAM(s) IV Push every 6 hours PRN Nausea and/or Vomiting  zaleplon 5milliGRAM(s) Oral at bedtime PRN Insomnia  bisacodyl Suppository 10milliGRAM(s) Rectal daily PRN If no bowel movement by POD#2  magnesium hydroxide Suspension 30milliLiter(s) Oral two times a day PRN Constipation  traMADol 25milliGRAM(s) Oral every 4 hours PRN Moderate Pain (4 - 6)  traMADol 50milliGRAM(s) Oral every 4 hours PRN Severe Pain (7 - 10)  LORazepam     Tablet 1milliGRAM(s) Oral every 6 hours PRN Anxiety  oxyCODONE IR 5milliGRAM(s) Oral every 4 hours PRN Mild Pain      Anticoagulation:  enoxaparin Injectable 80milliGRAM(s) SubCutaneous every 12 hours      Antibiotics:       Pain medications:   acetaminophen   Tablet 650milliGRAM(s) Oral every 6 hours PRN  ondansetron Injectable 4milliGRAM(s) IV Push every 6 hours PRN  zaleplon 5milliGRAM(s) Oral at bedtime PRN  acetaminophen   Tablet. 975milliGRAM(s) Oral every 8 hours  traMADol 25milliGRAM(s) Oral every 4 hours PRN  traMADol 50milliGRAM(s) Oral every 4 hours PRN  ALPRAZolam 2milliGRAM(s) Oral at bedtime  LORazepam     Tablet 1milliGRAM(s) Oral every 6 hours PRN  oxyCODONE IR 5milliGRAM(s) Oral every 4 hours PRN        A/P :   s/p   R hip hemiarthroplasty POD #3 complicated by PE  -    Pain control - XR R hip demonstrates intact hardware, no fracture. Continue PT  -    PE: on theraputic lovenox for treatment of PE  -    Check AM labs  -    Weight bearing status:  WBAT  -    Physical Therapy  -    Dispo: TBD

## 2017-05-26 VITALS — DIASTOLIC BLOOD PRESSURE: 70 MMHG | HEART RATE: 104 BPM | SYSTOLIC BLOOD PRESSURE: 154 MMHG

## 2017-05-26 LAB
ANION GAP SERPL CALC-SCNC: 8 MMOL/L — SIGNIFICANT CHANGE UP (ref 5–17)
BUN SERPL-MCNC: 22 MG/DL — SIGNIFICANT CHANGE UP (ref 7–23)
CALCIUM SERPL-MCNC: 8.3 MG/DL — LOW (ref 8.4–10.5)
CHLORIDE SERPL-SCNC: 101 MMOL/L — SIGNIFICANT CHANGE UP (ref 96–108)
CO2 SERPL-SCNC: 31 MMOL/L — SIGNIFICANT CHANGE UP (ref 22–31)
CREAT SERPL-MCNC: 1 MG/DL — SIGNIFICANT CHANGE UP (ref 0.5–1.3)
GLUCOSE SERPL-MCNC: 124 MG/DL — HIGH (ref 70–99)
HCT VFR BLD CALC: 33 % — LOW (ref 39–50)
HGB BLD-MCNC: 10.6 G/DL — LOW (ref 13–17)
MCHC RBC-ENTMCNC: 30.3 PG — SIGNIFICANT CHANGE UP (ref 27–34)
MCHC RBC-ENTMCNC: 32.1 G/DL — SIGNIFICANT CHANGE UP (ref 32–36)
MCV RBC AUTO: 94.3 FL — SIGNIFICANT CHANGE UP (ref 80–100)
PLATELET # BLD AUTO: 125 K/UL — LOW (ref 150–400)
POTASSIUM SERPL-MCNC: 4.3 MMOL/L — SIGNIFICANT CHANGE UP (ref 3.5–5.3)
POTASSIUM SERPL-SCNC: 4.3 MMOL/L — SIGNIFICANT CHANGE UP (ref 3.5–5.3)
RBC # BLD: 3.5 M/UL — LOW (ref 4.2–5.8)
RBC # FLD: 13.8 % — SIGNIFICANT CHANGE UP (ref 10.3–16.9)
SODIUM SERPL-SCNC: 140 MMOL/L — SIGNIFICANT CHANGE UP (ref 135–145)
WBC # BLD: 16.7 K/UL — HIGH (ref 3.8–10.5)
WBC # FLD AUTO: 16.7 K/UL — HIGH (ref 3.8–10.5)

## 2017-05-26 PROCEDURE — 86921 COMPATIBILITY TEST INCUBATE: CPT

## 2017-05-26 PROCEDURE — 85027 COMPLETE CBC AUTOMATED: CPT

## 2017-05-26 PROCEDURE — 97161 PT EVAL LOW COMPLEX 20 MIN: CPT

## 2017-05-26 PROCEDURE — 93005 ELECTROCARDIOGRAM TRACING: CPT

## 2017-05-26 PROCEDURE — 71045 X-RAY EXAM CHEST 1 VIEW: CPT

## 2017-05-26 PROCEDURE — 88305 TISSUE EXAM BY PATHOLOGIST: CPT

## 2017-05-26 PROCEDURE — 80048 BASIC METABOLIC PNL TOTAL CA: CPT

## 2017-05-26 PROCEDURE — 99233 SBSQ HOSP IP/OBS HIGH 50: CPT | Mod: GC

## 2017-05-26 PROCEDURE — 86850 RBC ANTIBODY SCREEN: CPT

## 2017-05-26 PROCEDURE — 85730 THROMBOPLASTIN TIME PARTIAL: CPT

## 2017-05-26 PROCEDURE — 85025 COMPLETE CBC W/AUTO DIFF WBC: CPT

## 2017-05-26 PROCEDURE — 96374 THER/PROPH/DIAG INJ IV PUSH: CPT

## 2017-05-26 PROCEDURE — 99285 EMERGENCY DEPT VISIT HI MDM: CPT | Mod: 25

## 2017-05-26 PROCEDURE — 85610 PROTHROMBIN TIME: CPT

## 2017-05-26 PROCEDURE — 86900 BLOOD TYPING SEROLOGIC ABO: CPT

## 2017-05-26 PROCEDURE — 84132 ASSAY OF SERUM POTASSIUM: CPT

## 2017-05-26 PROCEDURE — 82553 CREATINE MB FRACTION: CPT

## 2017-05-26 PROCEDURE — 83880 ASSAY OF NATRIURETIC PEPTIDE: CPT

## 2017-05-26 PROCEDURE — 93306 TTE W/DOPPLER COMPLETE: CPT

## 2017-05-26 PROCEDURE — 36415 COLL VENOUS BLD VENIPUNCTURE: CPT

## 2017-05-26 PROCEDURE — 73502 X-RAY EXAM HIP UNI 2-3 VIEWS: CPT

## 2017-05-26 PROCEDURE — 87641 MR-STAPH DNA AMP PROBE: CPT

## 2017-05-26 PROCEDURE — 81003 URINALYSIS AUTO W/O SCOPE: CPT

## 2017-05-26 PROCEDURE — 97116 GAIT TRAINING THERAPY: CPT

## 2017-05-26 PROCEDURE — 84295 ASSAY OF SERUM SODIUM: CPT

## 2017-05-26 PROCEDURE — 71275 CT ANGIOGRAPHY CHEST: CPT

## 2017-05-26 PROCEDURE — 82330 ASSAY OF CALCIUM: CPT

## 2017-05-26 PROCEDURE — C1776: CPT

## 2017-05-26 PROCEDURE — 86901 BLOOD TYPING SEROLOGIC RH(D): CPT

## 2017-05-26 PROCEDURE — 72170 X-RAY EXAM OF PELVIS: CPT

## 2017-05-26 PROCEDURE — 82550 ASSAY OF CK (CPK): CPT

## 2017-05-26 PROCEDURE — 99233 SBSQ HOSP IP/OBS HIGH 50: CPT

## 2017-05-26 PROCEDURE — 86922 COMPATIBILITY TEST ANTIGLOB: CPT

## 2017-05-26 PROCEDURE — 85018 HEMOGLOBIN: CPT

## 2017-05-26 PROCEDURE — 88311 DECALCIFY TISSUE: CPT

## 2017-05-26 PROCEDURE — 84484 ASSAY OF TROPONIN QUANT: CPT

## 2017-05-26 RX ORDER — ALPRAZOLAM 0.25 MG
1 TABLET ORAL
Qty: 0 | Refills: 0 | COMMUNITY
Start: 2017-05-26

## 2017-05-26 RX ORDER — POLYETHYLENE GLYCOL 3350 17 G/17G
17 POWDER, FOR SOLUTION ORAL
Qty: 0 | Refills: 0 | COMMUNITY
Start: 2017-05-26

## 2017-05-26 RX ORDER — TRAMADOL HYDROCHLORIDE 50 MG/1
1 TABLET ORAL
Qty: 0 | Refills: 0 | COMMUNITY
Start: 2017-05-26

## 2017-05-26 RX ORDER — RIVAROXABAN 15 MG-20MG
1 KIT ORAL
Qty: 0 | Refills: 0 | COMMUNITY
Start: 2017-05-26

## 2017-05-26 RX ORDER — ACETAMINOPHEN 500 MG
3 TABLET ORAL
Qty: 0 | Refills: 0 | COMMUNITY
Start: 2017-05-26

## 2017-05-26 RX ORDER — PANTOPRAZOLE SODIUM 20 MG/1
1 TABLET, DELAYED RELEASE ORAL
Qty: 0 | Refills: 0 | COMMUNITY
Start: 2017-05-26

## 2017-05-26 RX ORDER — ZALEPLON 10 MG
1 CAPSULE ORAL
Qty: 0 | Refills: 0 | COMMUNITY
Start: 2017-05-26

## 2017-05-26 RX ORDER — TRAMADOL HYDROCHLORIDE 50 MG/1
0.5 TABLET ORAL
Qty: 0 | Refills: 0 | COMMUNITY
Start: 2017-05-26

## 2017-05-26 RX ORDER — SENNA PLUS 8.6 MG/1
2 TABLET ORAL
Qty: 0 | Refills: 0 | COMMUNITY
Start: 2017-05-26

## 2017-05-26 RX ORDER — DOCUSATE SODIUM 100 MG
1 CAPSULE ORAL
Qty: 0 | Refills: 0 | COMMUNITY
Start: 2017-05-26

## 2017-05-26 RX ORDER — DOXAZOSIN MESYLATE 4 MG
1 TABLET ORAL
Qty: 0 | Refills: 0 | COMMUNITY
Start: 2017-05-26

## 2017-05-26 RX ORDER — RIVAROXABAN 15 MG-20MG
15 KIT ORAL
Qty: 0 | Refills: 0 | Status: DISCONTINUED | OUTPATIENT
Start: 2017-05-26 | End: 2017-05-26

## 2017-05-26 RX ADMIN — OXYCODONE HYDROCHLORIDE 5 MILLIGRAM(S): 5 TABLET ORAL at 10:30

## 2017-05-26 RX ADMIN — OXYCODONE HYDROCHLORIDE 5 MILLIGRAM(S): 5 TABLET ORAL at 13:45

## 2017-05-26 RX ADMIN — Medication 2 MILLIGRAM(S): at 01:12

## 2017-05-26 RX ADMIN — Medication 10 MILLIGRAM(S): at 06:25

## 2017-05-26 RX ADMIN — TRAMADOL HYDROCHLORIDE 50 MILLIGRAM(S): 50 TABLET ORAL at 05:15

## 2017-05-26 RX ADMIN — Medication 100 MILLIGRAM(S): at 05:14

## 2017-05-26 RX ADMIN — Medication 975 MILLIGRAM(S): at 05:15

## 2017-05-26 RX ADMIN — TRAMADOL HYDROCHLORIDE 50 MILLIGRAM(S): 50 TABLET ORAL at 06:33

## 2017-05-26 RX ADMIN — Medication 975 MILLIGRAM(S): at 13:45

## 2017-05-26 RX ADMIN — OXYCODONE HYDROCHLORIDE 5 MILLIGRAM(S): 5 TABLET ORAL at 01:14

## 2017-05-26 RX ADMIN — ENOXAPARIN SODIUM 80 MILLIGRAM(S): 100 INJECTION SUBCUTANEOUS at 05:14

## 2017-05-26 RX ADMIN — Medication 975 MILLIGRAM(S): at 06:33

## 2017-05-26 RX ADMIN — OXYCODONE HYDROCHLORIDE 5 MILLIGRAM(S): 5 TABLET ORAL at 09:32

## 2017-05-26 RX ADMIN — OXYCODONE HYDROCHLORIDE 5 MILLIGRAM(S): 5 TABLET ORAL at 14:45

## 2017-05-26 RX ADMIN — Medication 100 MILLIGRAM(S): at 13:45

## 2017-05-26 RX ADMIN — OXYCODONE HYDROCHLORIDE 5 MILLIGRAM(S): 5 TABLET ORAL at 00:24

## 2017-05-26 RX ADMIN — POLYETHYLENE GLYCOL 3350 17 GRAM(S): 17 POWDER, FOR SOLUTION ORAL at 13:45

## 2017-05-26 RX ADMIN — PANTOPRAZOLE SODIUM 40 MILLIGRAM(S): 20 TABLET, DELAYED RELEASE ORAL at 13:45

## 2017-05-26 NOTE — PROGRESS NOTE ADULT - PROBLEM SELECTOR PLAN 2
2/2 acute PE and bilateral atelectasis L>R.  * c/w aggressive IS.  * Oxygen through NC   * PRN high flow vs venti mask.
2/2 acute PE and bilateral atelectasis L>R. Improved.  * c/w aggressive IS.  * Oxygen through NC prn.
New LLL infiltrate.  * Will start on Zosyn and Vancomycin for HAP.  * CTA chest.  * c/w venti mask.
Reports taking 4mg Xanax nightly.   * Please, c/w 2mg of xanax qhs  * Start 1mg ativan po q6h prn for anxiety.
Rodriguez in place now. Likely related to constipation, poor mobility, BPH  * c/w doxazosin.
stable, chronic, not on tx. Cont to monitor CBC
Incentive spirometry an increase activity. Patient will require key CT scan of the chest to confirm the emotional to left lower lobe the abnormality in 6 to 8 weeks
Incentive spirometry an increase activity. Patient will require key CT scan of the chest to confirm the emotional to left lower lobe the abnormality in 6 to 8 weeks

## 2017-05-26 NOTE — PROGRESS NOTE ADULT - SUBJECTIVE AND OBJECTIVE BOX
Interval Events:reviewed  Patient seen and examined at bedside.    Patient is a 77y old  Male who presents with a chief complaint of right hip pain (22 May 2017 09:56)    is doing very well. He did physical therapy. he Is not short of breath  PAST MEDICAL & SURGICAL HISTORY:  CLL (chronic lymphocytic leukemia)      MEDICATIONS:  Pulmonary:    Antimicrobials:    Anticoagulants:  rivaroxaban 15milliGRAM(s) Oral two times a day    Cardiac:  doxazosin 2milliGRAM(s) Oral at bedtime      Allergies    No Known Allergies    Intolerances        Vital Signs Last 24 Hrs  T(C): 37.4, Max: 37.9 (05-26 @ 00:23)  T(F): 99.3, Max: 100.2 (05-26 @ 00:23)  HR: 104 (83 - 113)  BP: 154/70 (149/70 - 171/77)  BP(mean): --  RR: 17 (16 - 17)  SpO2: 99% (93% - 99%)  I & Os for 24h ending 05-26 @ 07:00  =============================================  IN: 320 ml / OUT: 600 ml / NET: -280 ml    I & Os for current day (as of 05-26 @ 18:57)  =============================================  IN: 220 ml / OUT: 1 ml / NET: 219 ml        LABS:      CBC Full  -  ( 26 May 2017 06:22 )  WBC Count : 16.7 K/uL  Hemoglobin : 10.6 g/dL  Hematocrit : 33.0 %  Platelet Count - Automated : 125 K/uL  Mean Cell Volume : 94.3 fL  Mean Cell Hemoglobin : 30.3 pg  Mean Cell Hemoglobin Concentration : 32.1 g/dL  Auto Neutrophil # : x  Auto Lymphocyte # : x  Auto Monocyte # : x  Auto Eosinophil # : x  Auto Basophil # : x  Auto Neutrophil % : x  Auto Lymphocyte % : x  Auto Monocyte % : x  Auto Eosinophil % : x  Auto Basophil % : x    05-26    140  |  101  |  22  ----------------------------<  124<H>  4.3   |  31  |  1.00    Ca    8.3<L>      26 May 2017 06:22      PT/INR - ( 25 May 2017 06:35 )   PT: 15.0 sec;   INR: 1.34          PTT - ( 25 May 2017 06:35 )  PTT:31.2 sec                    RADIOLOGY & ADDITIONAL STUDIES (The following images were personally reviewed):  Rodriguez:                                No  Urine output:               Yes        DVT prophylaxis:         Yes          Flattus:                          Yes          Bowel movement:       Yes

## 2017-05-26 NOTE — PROGRESS NOTE ADULT - PROBLEM SELECTOR PLAN 8
POD-2 R hip igor-arthroplasty  * Care by Ortho.
stable,  * c/w home terazosin
stable,  * c/w home terazosin
cont statin

## 2017-05-26 NOTE — PROGRESS NOTE ADULT - PROBLEM SELECTOR PROBLEM 2
CLL (chronic lymphocytic leukemia)
HAP (hospital-acquired pneumonia)
Hypoxia
Hypoxia
Insomnia
Urinary retention
Atelectasis
Atelectasis

## 2017-05-26 NOTE — PROGRESS NOTE ADULT - SUBJECTIVE AND OBJECTIVE BOX
CC: Currently asymptomatic.  Worked with PT. No complaints.  Denies dizziness, sob, cp, n/v/d/c.    Vital Signs Last 24 Hrs  T(C): 36.3, Max: 37.9 (05-26 @ 00:23)  T(F): 97.4, Max: 100.2 (05-26 @ 00:23)  HR: 112 (83 - 112)  BP: 171/77 (139/60 - 171/77)  BP(mean): --  RR: 17 (16 - 18)  SpO2: 97% (93% - 97%)    PHYSICAL EXAMINATION  * General: Not in acute distress. Awake and alert. Lying comfortably in bed.  * Head: Normocephalic, atraumatic.  * HEENT: ears no discharge, eyes PERRLA, nose no discharge, throat no exudates, normal tonsils.  * Neck: no JVD, supple.  * Lungs: Clear to auscultation, no rales, no wheezes.  * Cardio: Regular rate and rhythm, no murmurs, no rubs, no gallops. Good peripheral pulses.  * Abdomen: Soft, non-tender, non-distended, tympanic to percussion, no rebound, no guarding, no rigidity. Bowel sounds present. No suprapubic or CVA tenderness.  * : Deferred.  * Extremities: Acyanotic, no edema.  * Skin: Warm and dry.  * Neuro: Alert and oriented x 3. No focal deficits. Motor strength is 5/5 throughout. Sensation intact. Cranial nerves II-XII grossly intact.                                      10.6   16.7  )-----------( 125      ( 26 May 2017 06:22 )             33.0   05-26    140  |  101  |  22  ----------------------------<  124<H>  4.3   |  31  |  1.00    Ca    8.3<L>      26 May 2017 06:22    MEDICATIONS  (STANDING):  BUpivacaine liposome 1.3% Injectable (no eMAR) 20milliLiter(s) Local Injection once  pantoprazole    Tablet 40milliGRAM(s) Oral daily  polyethylene glycol 3350 17Gram(s) Oral daily  docusate sodium 100milliGRAM(s) Oral three times a day  doxazosin 2milliGRAM(s) Oral at bedtime  senna 2Tablet(s) Oral at bedtime  acetaminophen   Tablet. 975milliGRAM(s) Oral every 8 hours  ALPRAZolam 2milliGRAM(s) Oral at bedtime  sodium chloride 0.9%. 1000milliLiter(s) IV Continuous <Continuous>  rivaroxaban 15milliGRAM(s) Oral two times a day    MEDICATIONS  (PRN):  acetaminophen   Tablet 650milliGRAM(s) Oral every 6 hours PRN For Temp over 38.3 C (100.94 F)  aluminum hydroxide/magnesium hydroxide/simethicone Suspension 30milliLiter(s) Oral four times a day PRN Indigestion  ondansetron Injectable 4milliGRAM(s) IV Push every 6 hours PRN Nausea and/or Vomiting  zaleplon 5milliGRAM(s) Oral at bedtime PRN Insomnia  bisacodyl Suppository 10milliGRAM(s) Rectal daily PRN If no bowel movement by POD#2  magnesium hydroxide Suspension 30milliLiter(s) Oral two times a day PRN Constipation  traMADol 25milliGRAM(s) Oral every 4 hours PRN Moderate Pain (4 - 6)  traMADol 50milliGRAM(s) Oral every 4 hours PRN Severe Pain (7 - 10)  LORazepam     Tablet 1milliGRAM(s) Oral every 6 hours PRN Anxiety  oxyCODONE IR 5milliGRAM(s) Oral every 4 hours PRN Mild Pain

## 2017-05-26 NOTE — PROGRESS NOTE ADULT - PROBLEM SELECTOR PLAN 4
No SIRS, no signs of infection. Likely related to CLL. Improved WBC.  * Monitor.
No SIRS, no signs of infection. Likely related to CLL. Improved WBC.  * Monitor.
POD-0 R hip igor-arthroplasty  * Care by Ortho.
Reports taking 4mg Xanax nightly.   * Please, c/w 2mg of xanax qhs  * Start 1mg ativan po q6h prn for anxiety.
Rodriguez in place now. Likely related to constipation, poor mobility, BPH  * c/w doxazosin.  * Will do TOV today.
cont statin

## 2017-05-26 NOTE — PROGRESS NOTE ADULT - PROVIDER SPECIALTY LIST ADULT
Hospitalist
Internal Medicine
Orthopedics
Pulmonology
Pulmonology
Internal Medicine

## 2017-05-26 NOTE — PROGRESS NOTE ADULT - PROBLEM SELECTOR PLAN 7
POD-2 R hip igor-arthroplasty  * Care by Ortho.
POD-4 R hip igor-arthroplasty  * Care by Ortho.
stable, chronic.   * Monitor daily BMPs.
stable, chronic.  * Monitor.
stable,  * c/w home terazosin

## 2017-05-26 NOTE — PROGRESS NOTE ADULT - ASSESSMENT
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 Admitted for R femoral neck fracture, now POD-4 of R hip igor-arthroplasty. Complicated by hypoxia 2/2 provoked acute PE.

## 2017-05-26 NOTE — PROGRESS NOTE ADULT - ATTENDING COMMENTS
I discussed the case in detail with orthopedic, hospitalist, and the family
I discussed the case in detail with orthopedic, hospitalist, and the family
Patient is medically stable to go to [facility] with prn oxygen through [place].
ADDENDUM 1.45pm    CTA back: Acute segmental and sub-segmental PE on RML, RLL.  Plan:  * Will start on Lovenox 1mg/kg bid.  * ECHO  * Pulmonology, Dr. Messina consulted.
For VIVI.

## 2017-05-26 NOTE — PROGRESS NOTE ADULT - PROBLEM SELECTOR PROBLEM 6
Insomnia
CLL (chronic lymphocytic leukemia)
CLL (chronic lymphocytic leukemia)
Closed fracture of right hip, initial encounter
HLD (hyperlipidemia)
Insomnia

## 2017-05-26 NOTE — PROGRESS NOTE ADULT - PROBLEM SELECTOR PLAN 3
* c/w doxazosin.  * Monitor for signs of urinary retention.
2/2 hypovolemia and dehydration -improved/resolved after IVF  * Monitor.
No SIRS, no signs of infection. Likely related to CLL.  * Monitor.
Rodriguez removed yesterday  * c/w doxazosin.  * Monitor for signs of urinary retention.
stable, chronic.  * Monitor.
stable, chronic. Trend Na

## 2017-05-26 NOTE — PROGRESS NOTE ADULT - PROBLEM SELECTOR PROBLEM 1
Closed fracture of right hip, initial encounter
Hypoxia
Leukocytosis, unspecified type
Other acute pulmonary embolism without acute cor pulmonale
Other acute pulmonary embolism without acute cor pulmonale
Postprocedural hypotension
Pulmonary emboli
Pulmonary emboli

## 2017-05-26 NOTE — PROGRESS NOTE ADULT - PROBLEM SELECTOR PROBLEM 5
BPH (benign prostatic hyperplasia)
CLL (chronic lymphocytic leukemia)
Hyponatremia
Insomnia
Insomnia
Leukocytosis, unspecified type

## 2017-05-26 NOTE — PROGRESS NOTE ADULT - SUBJECTIVE AND OBJECTIVE BOX
SUBJECTIVE: Patient seen and examined.  No issues overnight. Pain well controlled. Denies CP/SOB    OBJECTIVE:     Vital Signs Last 24 Hrs  T(C): 37, Max: 37.9 (05-26 @ 00:23)  T(F): 98.6, Max: 100.2 (05-26 @ 00:23)  HR: 83 (83 - 104)  BP: 154/67 (113/58 - 167/72)  BP(mean): --  RR: 16 (16 - 18)  SpO2: 93% (93% - 96%)       RLE             Dressing: clean/dry/intact            Motor exam:  TA 5/5 EHL 5/5 GSC 5/5          Sensation:   T SILT SPH SILT DP SILT         Vascular:  Warm/pink/well perfused             LABS:                        10.6   16.7  )-----------( 125      ( 26 May 2017 06:22 )             33.0     05-26    140  |  101  |  22  ----------------------------<  124<H>  4.3   |  31  |  1.00    Ca    8.3<L>      26 May 2017 06:22      PT/INR - ( 25 May 2017 06:35 )   PT: 15.0 sec;   INR: 1.34          PTT - ( 25 May 2017 06:35 )  PTT:31.2 sec      MEDICATIONS:  MEDICATIONS  (STANDING):  BUpivacaine liposome 1.3% Injectable (no eMAR) 20milliLiter(s) Local Injection once  pantoprazole    Tablet 40milliGRAM(s) Oral daily  polyethylene glycol 3350 17Gram(s) Oral daily  docusate sodium 100milliGRAM(s) Oral three times a day  doxazosin 2milliGRAM(s) Oral at bedtime  senna 2Tablet(s) Oral at bedtime  acetaminophen   Tablet. 975milliGRAM(s) Oral every 8 hours  ALPRAZolam 2milliGRAM(s) Oral at bedtime  sodium chloride 0.9%. 1000milliLiter(s) IV Continuous <Continuous>  enoxaparin Injectable 80milliGRAM(s) SubCutaneous every 12 hours    MEDICATIONS  (PRN):  acetaminophen   Tablet 650milliGRAM(s) Oral every 6 hours PRN For Temp over 38.3 C (100.94 F)  aluminum hydroxide/magnesium hydroxide/simethicone Suspension 30milliLiter(s) Oral four times a day PRN Indigestion  ondansetron Injectable 4milliGRAM(s) IV Push every 6 hours PRN Nausea and/or Vomiting  zaleplon 5milliGRAM(s) Oral at bedtime PRN Insomnia  bisacodyl Suppository 10milliGRAM(s) Rectal daily PRN If no bowel movement by POD#2  magnesium hydroxide Suspension 30milliLiter(s) Oral two times a day PRN Constipation  traMADol 25milliGRAM(s) Oral every 4 hours PRN Moderate Pain (4 - 6)  traMADol 50milliGRAM(s) Oral every 4 hours PRN Severe Pain (7 - 10)  LORazepam     Tablet 1milliGRAM(s) Oral every 6 hours PRN Anxiety  oxyCODONE IR 5milliGRAM(s) Oral every 4 hours PRN Mild Pain      Anticoagulation:  enoxaparin Injectable 80milliGRAM(s) SubCutaneous every 12 hours      Antibiotics:       Pain medications:   acetaminophen   Tablet 650milliGRAM(s) Oral every 6 hours PRN  ondansetron Injectable 4milliGRAM(s) IV Push every 6 hours PRN  zaleplon 5milliGRAM(s) Oral at bedtime PRN  acetaminophen   Tablet. 975milliGRAM(s) Oral every 8 hours  traMADol 25milliGRAM(s) Oral every 4 hours PRN  traMADol 50milliGRAM(s) Oral every 4 hours PRN  ALPRAZolam 2milliGRAM(s) Oral at bedtime  LORazepam     Tablet 1milliGRAM(s) Oral every 6 hours PRN  oxyCODONE IR 5milliGRAM(s) Oral every 4 hours PRN        A/P :   s/p   R hip hemiarthoplasty for FNF POD #4 complicated by PE  -    PE treatment: enoxaparin Injectable 80milliGRAM(s) SubCutaneous every 12 hours  -    Weight bearing status:  WBAT w posterior hip precautions  -    Physical Therapy  -    Dispo: rehab

## 2017-05-26 NOTE — PROGRESS NOTE ADULT - PROBLEM SELECTOR PROBLEM 3
Hyponatremia
CLL (chronic lymphocytic leukemia)
Leukocytosis, unspecified type
Postprocedural hypotension
Urinary retention
Urinary retention

## 2017-05-26 NOTE — PROGRESS NOTE ADULT - PROBLEM SELECTOR PLAN 5
No SIRS, no signs of infection. Likely related to CLL. Improved WBC.  * Monitor.
Reports taking 4mg Xanax nightly.   * Please, c/w 2mg of xanax qhs
Reports taking 4mg Xanax nightly.   * Please, c/w 2mg of xanax qhs  * Start 1mg ativan po q6h prn for anxiety.
stable, chronic.   * Monitor daily BMPs.
stable, chronic.  * Monitor.
stable, home med terazosin 2 mg qdaily, would cont so as not to exacerbate when rodriguez removed post procedure

## 2017-05-26 NOTE — PROGRESS NOTE ADULT - PROBLEM SELECTOR PLAN 6
POD-1 R hip igor-arthroplasty  * Care by Ortho.
Reports taking 4mg Xanax nightly.   * Please, c/w 2mg of xanax qhs  * Start 1mg ativan po q6h prn for anxiety.
cont statin
stable, chronic.  * Monitor.
stable, chronic.  * Monitor.
Reports taking 4mg Xanax nightly. Given 2mg Xanax last night, says he slept. No signs of withdrawal. Would cont with 2mg Xanax qhs for now, f/u psych recs

## 2017-05-26 NOTE — PROGRESS NOTE ADULT - PROBLEM SELECTOR PLAN 1
No SIRS, no signs of infection. Likely related to CLL.  * Monitor.
Patient seems "dry" on exam. Poor urine output.  Asymptomatic hypotension, relative to baseline BP.  * Please, give 250 cc NS bolus, and c/w 100cc/h 1 L.  * Reassess BP q4-6h.
Provoked PE. No RHS on Echo.  Echo wnl.  * Pulm recs apprciated.  * c/w Lovenox 1mg/kg bid.  * Will switch to NOACs tomorrow if continues to improve.  * Can to PT with O2.
Provoked PE. No RHS on Echo.  Echo wnl.  * Pulm recs appreciated.  * Can switch to Xarelto for 3 months. Start 15mg bid for 21 days and then 20mg daily for remaining of 3 months.  * Needs outpatient f/u and repeat CT chest in 4-6 weeks, per Pulm recs.
Unclear etiology. Most likely PNA.  * CXR at bedside with new LLL infiltrate.  * Will treat for PNA.  * c/w Venti mask for now.  * Will get a CTA to r/o for PE.  * Transfer to telemetry
Medically cleared for procedure(in medicine consult note). Cardiology request EKG and echo but state not to delay procedure.  NPO for procedure. pain control. bowel regimen
The patient is clinically stable anticoagulation will change to  Apixaban there at 10 mg twice a day for the first 7 days then 5 mg twice a day for a total of 3 months. The duration for the treatment involved pulmonary emboli is three month.  The patient is to continue on current regimen. The baseline oxygen saturation is stable. Patient is allowed to participate in physical therapy with full precautions
The patient is clinically stable. In the Morning change the regimen to oral anticoagulation. The duration for the treatment involved pulmonary emboli is three month.  The patient is to continue on current regimen. The baseline oxygen saturation is stable. Patient is allowed to participate in physical therapy with full precautions

## 2017-05-26 NOTE — PROGRESS NOTE ADULT - PROBLEM SELECTOR PROBLEM 4
HLD (hyperlipidemia)
Closed fracture of right hip, initial encounter
Insomnia
Leukocytosis, unspecified type
Leukocytosis, unspecified type
Urinary retention

## 2017-05-30 DIAGNOSIS — R09.02 HYPOXEMIA: ICD-10-CM

## 2017-05-30 DIAGNOSIS — E86.0 DEHYDRATION: ICD-10-CM

## 2017-05-30 DIAGNOSIS — N40.0 BENIGN PROSTATIC HYPERPLASIA WITHOUT LOWER URINARY TRACT SYMPTOMS: ICD-10-CM

## 2017-05-30 DIAGNOSIS — E78.5 HYPERLIPIDEMIA, UNSPECIFIED: ICD-10-CM

## 2017-05-30 DIAGNOSIS — C91.10 CHRONIC LYMPHOCYTIC LEUKEMIA OF B-CELL TYPE NOT HAVING ACHIEVED REMISSION: ICD-10-CM
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
## 2017-05-30 DIAGNOSIS — J98.11 ATELECTASIS: ICD-10-CM

## 2017-05-30 DIAGNOSIS — S72.001A FRACTURE OF UNSPECIFIED PART OF NECK OF RIGHT FEMUR, INITIAL ENCOUNTER FOR CLOSED FRACTURE: ICD-10-CM
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
## 2017-05-30 DIAGNOSIS — R33.9 RETENTION OF URINE, UNSPECIFIED: ICD-10-CM

## 2017-05-30 DIAGNOSIS — I26.99 OTHER PULMONARY EMBOLISM WITHOUT ACUTE COR PULMONALE: ICD-10-CM

## 2017-05-30 DIAGNOSIS — G47.00 INSOMNIA, UNSPECIFIED: ICD-10-CM

## 2017-05-30 DIAGNOSIS — I95.81 POSTPROCEDURAL HYPOTENSION: ICD-10-CM

## 2017-06-05 ENCOUNTER — INPATIENT (INPATIENT)
Facility: HOSPITAL | Age: 77
LOS: 7 days | Discharge: NURSING, REHAB OR LONG-TERM CARE | DRG: 871 | End: 2017-06-13
Attending: INTERNAL MEDICINE | Admitting: INTERNAL MEDICINE
Payer: MEDICARE

## 2017-06-05 VITALS
RESPIRATION RATE: 20 BRPM | HEART RATE: 130 BPM | OXYGEN SATURATION: 84 % | SYSTOLIC BLOOD PRESSURE: 158 MMHG | TEMPERATURE: 98 F | DIASTOLIC BLOOD PRESSURE: 82 MMHG

## 2017-06-05 DIAGNOSIS — J96.01 ACUTE RESPIRATORY FAILURE WITH HYPOXIA: ICD-10-CM

## 2017-06-05 DIAGNOSIS — J18.9 PNEUMONIA, UNSPECIFIED ORGANISM: ICD-10-CM

## 2017-06-05 DIAGNOSIS — Z96.649 PRESENCE OF UNSPECIFIED ARTIFICIAL HIP JOINT: Chronic | ICD-10-CM

## 2017-06-05 DIAGNOSIS — E87.5 HYPERKALEMIA: ICD-10-CM

## 2017-06-05 DIAGNOSIS — N17.9 ACUTE KIDNEY FAILURE, UNSPECIFIED: ICD-10-CM

## 2017-06-05 DIAGNOSIS — I26.99 OTHER PULMONARY EMBOLISM WITHOUT ACUTE COR PULMONALE: ICD-10-CM

## 2017-06-05 LAB
ALBUMIN SERPL ELPH-MCNC: 3 G/DL — LOW (ref 3.3–5)
ALBUMIN SERPL ELPH-MCNC: 3.1 G/DL — LOW (ref 3.3–5)
ALP SERPL-CCNC: 160 U/L — HIGH (ref 40–120)
ALP SERPL-CCNC: 163 U/L — HIGH (ref 40–120)
ALT FLD-CCNC: 107 U/L — HIGH (ref 10–45)
ALT FLD-CCNC: 110 U/L — HIGH (ref 10–45)
AMPHET UR-MCNC: NEGATIVE — SIGNIFICANT CHANGE UP
ANION GAP SERPL CALC-SCNC: 14 MMOL/L — SIGNIFICANT CHANGE UP (ref 5–17)
ANION GAP SERPL CALC-SCNC: 19 MMOL/L — HIGH (ref 5–17)
ANION GAP SERPL CALC-SCNC: 19 MMOL/L — HIGH (ref 5–17)
APPEARANCE UR: (no result)
APPEARANCE UR: CLEAR — SIGNIFICANT CHANGE UP
APTT BLD: 24.7 SEC — LOW (ref 27.5–37.4)
APTT BLD: 40.7 SEC — HIGH (ref 27.5–37.4)
AST SERPL-CCNC: 36 U/L — SIGNIFICANT CHANGE UP (ref 10–40)
AST SERPL-CCNC: 38 U/L — SIGNIFICANT CHANGE UP (ref 10–40)
BARBITURATES UR SCN-MCNC: NEGATIVE — SIGNIFICANT CHANGE UP
BASE EXCESS BLDV CALC-SCNC: -2.8 MMOL/L — SIGNIFICANT CHANGE UP
BENZODIAZ UR-MCNC: POSITIVE
BILIRUB SERPL-MCNC: 0.7 MG/DL — SIGNIFICANT CHANGE UP (ref 0.2–1.2)
BILIRUB SERPL-MCNC: 0.7 MG/DL — SIGNIFICANT CHANGE UP (ref 0.2–1.2)
BILIRUB UR-MCNC: NEGATIVE — SIGNIFICANT CHANGE UP
BILIRUB UR-MCNC: NEGATIVE — SIGNIFICANT CHANGE UP
BUN SERPL-MCNC: 102 MG/DL — HIGH (ref 7–23)
BUN SERPL-MCNC: 135 MG/DL — HIGH (ref 7–23)
BUN SERPL-MCNC: 161 MG/DL — HIGH (ref 7–23)
CALCIUM SERPL-MCNC: 8.4 MG/DL — SIGNIFICANT CHANGE UP (ref 8.4–10.5)
CALCIUM SERPL-MCNC: 8.7 MG/DL — SIGNIFICANT CHANGE UP (ref 8.4–10.5)
CALCIUM SERPL-MCNC: 9.2 MG/DL — SIGNIFICANT CHANGE UP (ref 8.4–10.5)
CHLORIDE SERPL-SCNC: 100 MMOL/L — SIGNIFICANT CHANGE UP (ref 96–108)
CHLORIDE SERPL-SCNC: 92 MMOL/L — LOW (ref 96–108)
CHLORIDE SERPL-SCNC: 95 MMOL/L — LOW (ref 96–108)
CHLORIDE UR-SCNC: 22 MMOL/L — SIGNIFICANT CHANGE UP
CK MB CFR SERPL CALC: 3.1 NG/ML — SIGNIFICANT CHANGE UP (ref 0–6.7)
CK SERPL-CCNC: 42 U/L — SIGNIFICANT CHANGE UP (ref 30–200)
CO2 SERPL-SCNC: 20 MMOL/L — LOW (ref 22–31)
CO2 SERPL-SCNC: 22 MMOL/L — SIGNIFICANT CHANGE UP (ref 22–31)
CO2 SERPL-SCNC: 23 MMOL/L — SIGNIFICANT CHANGE UP (ref 22–31)
COCAINE METAB.OTHER UR-MCNC: NEGATIVE — SIGNIFICANT CHANGE UP
COLOR SPEC: SIGNIFICANT CHANGE UP
COLOR SPEC: YELLOW — SIGNIFICANT CHANGE UP
CREAT ?TM UR-MCNC: 75 MG/DL — SIGNIFICANT CHANGE UP
CREAT SERPL-MCNC: 3.6 MG/DL — HIGH (ref 0.5–1.3)
CREAT SERPL-MCNC: 6.7 MG/DL — HIGH (ref 0.5–1.3)
CREAT SERPL-MCNC: 8.8 MG/DL — HIGH (ref 0.5–1.3)
DIFF PNL FLD: (no result)
DIFF PNL FLD: (no result)
GAS PNL BLDA: SIGNIFICANT CHANGE UP
GAS PNL BLDV: SIGNIFICANT CHANGE UP
GAS PNL BLDV: SIGNIFICANT CHANGE UP
GLUCOSE SERPL-MCNC: 127 MG/DL — HIGH (ref 70–99)
GLUCOSE SERPL-MCNC: 140 MG/DL — HIGH (ref 70–99)
GLUCOSE SERPL-MCNC: 184 MG/DL — HIGH (ref 70–99)
GLUCOSE UR QL: NEGATIVE — SIGNIFICANT CHANGE UP
GLUCOSE UR QL: NEGATIVE — SIGNIFICANT CHANGE UP
HCO3 BLDV-SCNC: 24 MMOL/L — SIGNIFICANT CHANGE UP (ref 20–27)
HCT VFR BLD CALC: 33.1 % — LOW (ref 39–50)
HCT VFR BLD CALC: 35.5 % — LOW (ref 39–50)
HGB BLD-MCNC: 11.1 G/DL — LOW (ref 13–17)
HGB BLD-MCNC: 11.5 G/DL — LOW (ref 13–17)
INR BLD: 1.41 — HIGH (ref 0.88–1.16)
KETONES UR-MCNC: NEGATIVE — SIGNIFICANT CHANGE UP
KETONES UR-MCNC: NEGATIVE — SIGNIFICANT CHANGE UP
LACTATE SERPL-SCNC: 1.6 MMOL/L — SIGNIFICANT CHANGE UP (ref 0.5–2)
LACTATE SERPL-SCNC: 1.9 MMOL/L — SIGNIFICANT CHANGE UP (ref 0.5–2)
LACTATE SERPL-SCNC: 2.4 MMOL/L — HIGH (ref 0.5–2)
LEUKOCYTE ESTERASE UR-ACNC: (no result)
LEUKOCYTE ESTERASE UR-ACNC: NEGATIVE — SIGNIFICANT CHANGE UP
LYMPHOCYTES # BLD AUTO: 75 % — HIGH (ref 13–44)
MAGNESIUM SERPL-MCNC: 2.7 MG/DL — HIGH (ref 1.6–2.6)
MAGNESIUM SERPL-MCNC: 3.3 MG/DL — HIGH (ref 1.6–2.6)
MCHC RBC-ENTMCNC: 29.8 PG — SIGNIFICANT CHANGE UP (ref 27–34)
MCHC RBC-ENTMCNC: 30.8 PG — SIGNIFICANT CHANGE UP (ref 27–34)
MCHC RBC-ENTMCNC: 32.4 G/DL — SIGNIFICANT CHANGE UP (ref 32–36)
MCHC RBC-ENTMCNC: 33.5 G/DL — SIGNIFICANT CHANGE UP (ref 32–36)
MCV RBC AUTO: 91.9 FL — SIGNIFICANT CHANGE UP (ref 80–100)
MCV RBC AUTO: 92 FL — SIGNIFICANT CHANGE UP (ref 80–100)
METHADONE UR-MCNC: NEGATIVE — SIGNIFICANT CHANGE UP
NEUTROPHILS NFR BLD AUTO: 23 % — LOW (ref 43–77)
NITRITE UR-MCNC: NEGATIVE — SIGNIFICANT CHANGE UP
NITRITE UR-MCNC: NEGATIVE — SIGNIFICANT CHANGE UP
OPIATES UR-MCNC: NEGATIVE — SIGNIFICANT CHANGE UP
OSMOLALITY UR: 532 MOSMOL/KG — SIGNIFICANT CHANGE UP (ref 100–650)
PCO2 BLDV: 49 MMHG — SIGNIFICANT CHANGE UP (ref 41–51)
PCP SPEC-MCNC: SIGNIFICANT CHANGE UP
PCP UR-MCNC: NEGATIVE — SIGNIFICANT CHANGE UP
PH BLDV: 7.3 — LOW (ref 7.32–7.43)
PH UR: 5 — SIGNIFICANT CHANGE UP (ref 5–8)
PH UR: 5.5 — SIGNIFICANT CHANGE UP (ref 5–8)
PHOSPHATE SERPL-MCNC: 4.9 MG/DL — HIGH (ref 2.5–4.5)
PHOSPHATE SERPL-MCNC: 6.4 MG/DL — HIGH (ref 2.5–4.5)
PLATELET # BLD AUTO: 477 K/UL — HIGH (ref 150–400)
PLATELET # BLD AUTO: 586 K/UL — HIGH (ref 150–400)
PO2 BLDV: 25 MMHG — SIGNIFICANT CHANGE UP
POTASSIUM SERPL-MCNC: 4.6 MMOL/L — SIGNIFICANT CHANGE UP (ref 3.5–5.3)
POTASSIUM SERPL-MCNC: 6.4 MMOL/L — CRITICAL HIGH (ref 3.5–5.3)
POTASSIUM SERPL-MCNC: 9 MMOL/L — CRITICAL HIGH (ref 3.5–5.3)
POTASSIUM SERPL-SCNC: 4.6 MMOL/L — SIGNIFICANT CHANGE UP (ref 3.5–5.3)
POTASSIUM SERPL-SCNC: 6.4 MMOL/L — CRITICAL HIGH (ref 3.5–5.3)
POTASSIUM SERPL-SCNC: 9 MMOL/L — CRITICAL HIGH (ref 3.5–5.3)
POTASSIUM UR-SCNC: 57 MMOL/L — SIGNIFICANT CHANGE UP
PROT SERPL-MCNC: 6.4 G/DL — SIGNIFICANT CHANGE UP (ref 6–8.3)
PROT SERPL-MCNC: 6.4 G/DL — SIGNIFICANT CHANGE UP (ref 6–8.3)
PROT UR-MCNC: 100 MG/DL
PROT UR-MCNC: NEGATIVE MG/DL — SIGNIFICANT CHANGE UP
PROTHROM AB SERPL-ACNC: 15.8 SEC — HIGH (ref 9.8–12.7)
RAPID RVP RESULT: SIGNIFICANT CHANGE UP
RBC # BLD: 3.6 M/UL — LOW (ref 4.2–5.8)
RBC # BLD: 3.86 M/UL — LOW (ref 4.2–5.8)
RBC # FLD: 14.3 % — SIGNIFICANT CHANGE UP (ref 10.3–16.9)
RBC # FLD: 14.5 % — SIGNIFICANT CHANGE UP (ref 10.3–16.9)
SAO2 % BLDV: 27 % — SIGNIFICANT CHANGE UP
SODIUM SERPL-SCNC: 131 MMOL/L — LOW (ref 135–145)
SODIUM SERPL-SCNC: 136 MMOL/L — SIGNIFICANT CHANGE UP (ref 135–145)
SODIUM SERPL-SCNC: 137 MMOL/L — SIGNIFICANT CHANGE UP (ref 135–145)
SODIUM UR-SCNC: 32 MMOL/L — SIGNIFICANT CHANGE UP
SP GR SPEC: 1.01 — SIGNIFICANT CHANGE UP (ref 1–1.03)
SP GR SPEC: 1.02 — SIGNIFICANT CHANGE UP (ref 1–1.03)
THC UR QL: NEGATIVE — SIGNIFICANT CHANGE UP
TROPONIN T SERPL-MCNC: 0.02 NG/ML — HIGH (ref 0–0.01)
TROPONIN T SERPL-MCNC: 0.04 NG/ML — HIGH (ref 0–0.01)
UROBILINOGEN FLD QL: 0.2 E.U./DL — SIGNIFICANT CHANGE UP
UROBILINOGEN FLD QL: 0.2 E.U./DL — SIGNIFICANT CHANGE UP
UUN UR-MCNC: 964 MG/DL — SIGNIFICANT CHANGE UP
WBC # BLD: 67.4 K/UL — CRITICAL HIGH (ref 3.8–10.5)
WBC # BLD: 87.8 K/UL — CRITICAL HIGH (ref 3.8–10.5)
WBC # FLD AUTO: 67.4 K/UL — CRITICAL HIGH (ref 3.8–10.5)
WBC # FLD AUTO: 87.8 K/UL — CRITICAL HIGH (ref 3.8–10.5)

## 2017-06-05 PROCEDURE — 71010: CPT | Mod: 26,76

## 2017-06-05 PROCEDURE — 99291 CRITICAL CARE FIRST HOUR: CPT | Mod: 25,GC

## 2017-06-05 PROCEDURE — 71250 CT THORAX DX C-: CPT | Mod: 26

## 2017-06-05 PROCEDURE — 99291 CRITICAL CARE FIRST HOUR: CPT

## 2017-06-05 PROCEDURE — 74176 CT ABD & PELVIS W/O CONTRAST: CPT | Mod: 26

## 2017-06-05 PROCEDURE — 31500 INSERT EMERGENCY AIRWAY: CPT

## 2017-06-05 PROCEDURE — 93970 EXTREMITY STUDY: CPT | Mod: 26

## 2017-06-05 PROCEDURE — 93306 TTE W/DOPPLER COMPLETE: CPT | Mod: 26

## 2017-06-05 PROCEDURE — 93010 ELECTROCARDIOGRAM REPORT: CPT | Mod: 59

## 2017-06-05 PROCEDURE — 76770 US EXAM ABDO BACK WALL COMP: CPT | Mod: 26

## 2017-06-05 PROCEDURE — 99292 CRITICAL CARE ADDL 30 MIN: CPT | Mod: 25,GC

## 2017-06-05 RX ORDER — SODIUM CHLORIDE 9 MG/ML
1000 INJECTION INTRAMUSCULAR; INTRAVENOUS; SUBCUTANEOUS ONCE
Qty: 0 | Refills: 0 | Status: COMPLETED | OUTPATIENT
Start: 2017-06-05 | End: 2017-06-05

## 2017-06-05 RX ORDER — ALBUTEROL 90 UG/1
2.5 AEROSOL, METERED ORAL ONCE
Qty: 0 | Refills: 0 | Status: COMPLETED | OUTPATIENT
Start: 2017-06-05 | End: 2017-06-05

## 2017-06-05 RX ORDER — SODIUM BICARBONATE 1 MEQ/ML
50 SYRINGE (ML) INTRAVENOUS ONCE
Qty: 0 | Refills: 0 | Status: COMPLETED | OUTPATIENT
Start: 2017-06-05 | End: 2017-06-05

## 2017-06-05 RX ORDER — SODIUM POLYSTYRENE SULFONATE 4.1 MEQ/G
30 POWDER, FOR SUSPENSION ORAL ONCE
Qty: 0 | Refills: 0 | Status: COMPLETED | OUTPATIENT
Start: 2017-06-05 | End: 2017-06-05

## 2017-06-05 RX ORDER — HEPARIN SODIUM 5000 [USP'U]/ML
1600 INJECTION INTRAVENOUS; SUBCUTANEOUS
Qty: 25000 | Refills: 0 | Status: DISCONTINUED | OUTPATIENT
Start: 2017-06-05 | End: 2017-06-06

## 2017-06-05 RX ORDER — ETOMIDATE 2 MG/ML
20 INJECTION INTRAVENOUS ONCE
Qty: 0 | Refills: 0 | Status: COMPLETED | OUTPATIENT
Start: 2017-06-05 | End: 2017-06-05

## 2017-06-05 RX ORDER — PANTOPRAZOLE SODIUM 20 MG/1
40 TABLET, DELAYED RELEASE ORAL DAILY
Qty: 0 | Refills: 0 | Status: DISCONTINUED | OUTPATIENT
Start: 2017-06-05 | End: 2017-06-08

## 2017-06-05 RX ORDER — ROCURONIUM BROMIDE 10 MG/ML
100 VIAL (ML) INTRAVENOUS ONCE
Qty: 0 | Refills: 0 | Status: COMPLETED | OUTPATIENT
Start: 2017-06-05 | End: 2017-06-05

## 2017-06-05 RX ORDER — PIPERACILLIN AND TAZOBACTAM 4; .5 G/20ML; G/20ML
2.25 INJECTION, POWDER, LYOPHILIZED, FOR SOLUTION INTRAVENOUS EVERY 6 HOURS
Qty: 0 | Refills: 0 | Status: DISCONTINUED | OUTPATIENT
Start: 2017-06-05 | End: 2017-06-07

## 2017-06-05 RX ORDER — CALCIUM GLUCONATE 100 MG/ML
1 VIAL (ML) INTRAVENOUS ONCE
Qty: 0 | Refills: 0 | Status: COMPLETED | OUTPATIENT
Start: 2017-06-05 | End: 2017-06-05

## 2017-06-05 RX ORDER — HEPARIN SODIUM 5000 [USP'U]/ML
1500 INJECTION INTRAVENOUS; SUBCUTANEOUS
Qty: 25000 | Refills: 0 | Status: DISCONTINUED | OUTPATIENT
Start: 2017-06-05 | End: 2017-06-05

## 2017-06-05 RX ORDER — DEXTROSE 50 % IN WATER 50 %
50 SYRINGE (ML) INTRAVENOUS ONCE
Qty: 0 | Refills: 0 | Status: COMPLETED | OUTPATIENT
Start: 2017-06-05 | End: 2017-06-05

## 2017-06-05 RX ORDER — CHLORHEXIDINE GLUCONATE 213 G/1000ML
15 SOLUTION TOPICAL
Qty: 0 | Refills: 0 | Status: DISCONTINUED | OUTPATIENT
Start: 2017-06-05 | End: 2017-06-08

## 2017-06-05 RX ORDER — MIDAZOLAM HYDROCHLORIDE 1 MG/ML
2 INJECTION, SOLUTION INTRAMUSCULAR; INTRAVENOUS
Qty: 100 | Refills: 0 | Status: DISCONTINUED | OUTPATIENT
Start: 2017-06-05 | End: 2017-06-06

## 2017-06-05 RX ORDER — DOXAZOSIN MESYLATE 4 MG
8 TABLET ORAL AT BEDTIME
Qty: 0 | Refills: 0 | Status: DISCONTINUED | OUTPATIENT
Start: 2017-06-05 | End: 2017-06-07

## 2017-06-05 RX ORDER — SODIUM CHLORIDE 9 MG/ML
1000 INJECTION, SOLUTION INTRAVENOUS
Qty: 0 | Refills: 0 | Status: DISCONTINUED | OUTPATIENT
Start: 2017-06-05 | End: 2017-06-06

## 2017-06-05 RX ORDER — PIPERACILLIN AND TAZOBACTAM 4; .5 G/20ML; G/20ML
3.38 INJECTION, POWDER, LYOPHILIZED, FOR SOLUTION INTRAVENOUS EVERY 6 HOURS
Qty: 0 | Refills: 0 | Status: DISCONTINUED | OUTPATIENT
Start: 2017-06-05 | End: 2017-06-05

## 2017-06-05 RX ORDER — PIPERACILLIN AND TAZOBACTAM 4; .5 G/20ML; G/20ML
3.38 INJECTION, POWDER, LYOPHILIZED, FOR SOLUTION INTRAVENOUS ONCE
Qty: 0 | Refills: 0 | Status: COMPLETED | OUTPATIENT
Start: 2017-06-05 | End: 2017-06-05

## 2017-06-05 RX ORDER — VANCOMYCIN HCL 1 G
1000 VIAL (EA) INTRAVENOUS ONCE
Qty: 0 | Refills: 0 | Status: COMPLETED | OUTPATIENT
Start: 2017-06-05 | End: 2017-06-05

## 2017-06-05 RX ORDER — INSULIN HUMAN 100 [IU]/ML
10 INJECTION, SOLUTION SUBCUTANEOUS ONCE
Qty: 0 | Refills: 0 | Status: COMPLETED | OUTPATIENT
Start: 2017-06-05 | End: 2017-06-05

## 2017-06-05 RX ORDER — PROPOFOL 10 MG/ML
5 INJECTION, EMULSION INTRAVENOUS
Qty: 1000 | Refills: 0 | Status: DISCONTINUED | OUTPATIENT
Start: 2017-06-05 | End: 2017-06-06

## 2017-06-05 RX ADMIN — SODIUM CHLORIDE 100 MILLILITER(S): 9 INJECTION, SOLUTION INTRAVENOUS at 14:36

## 2017-06-05 RX ADMIN — Medication 50 MILLILITER(S): at 13:15

## 2017-06-05 RX ADMIN — Medication 200 GRAM(S): at 13:13

## 2017-06-05 RX ADMIN — ETOMIDATE 20 MILLIGRAM(S): 2 INJECTION INTRAVENOUS at 13:14

## 2017-06-05 RX ADMIN — Medication 50 MILLIEQUIVALENT(S): at 13:15

## 2017-06-05 RX ADMIN — Medication 8 MILLIGRAM(S): at 23:31

## 2017-06-05 RX ADMIN — PANTOPRAZOLE SODIUM 40 MILLIGRAM(S): 20 TABLET, DELAYED RELEASE ORAL at 23:30

## 2017-06-05 RX ADMIN — PIPERACILLIN AND TAZOBACTAM 200 GRAM(S): 4; .5 INJECTION, POWDER, LYOPHILIZED, FOR SOLUTION INTRAVENOUS at 11:04

## 2017-06-05 RX ADMIN — HEPARIN SODIUM 15 UNIT(S)/HR: 5000 INJECTION INTRAVENOUS; SUBCUTANEOUS at 16:19

## 2017-06-05 RX ADMIN — SODIUM CHLORIDE 100 MILLILITER(S): 9 INJECTION, SOLUTION INTRAVENOUS at 18:46

## 2017-06-05 RX ADMIN — INSULIN HUMAN 10 UNIT(S): 100 INJECTION, SOLUTION SUBCUTANEOUS at 12:25

## 2017-06-05 RX ADMIN — CHLORHEXIDINE GLUCONATE 15 MILLILITER(S): 213 SOLUTION TOPICAL at 18:45

## 2017-06-05 RX ADMIN — ALBUTEROL 2.5 MILLIGRAM(S): 90 AEROSOL, METERED ORAL at 12:29

## 2017-06-05 RX ADMIN — Medication 250 MILLIGRAM(S): at 11:51

## 2017-06-05 RX ADMIN — MIDAZOLAM HYDROCHLORIDE 2 MG/HR: 1 INJECTION, SOLUTION INTRAMUSCULAR; INTRAVENOUS at 14:35

## 2017-06-05 RX ADMIN — SODIUM POLYSTYRENE SULFONATE 30 GRAM(S): 4.1 POWDER, FOR SUSPENSION ORAL at 14:43

## 2017-06-05 RX ADMIN — PIPERACILLIN AND TAZOBACTAM 200 GRAM(S): 4; .5 INJECTION, POWDER, LYOPHILIZED, FOR SOLUTION INTRAVENOUS at 23:30

## 2017-06-05 RX ADMIN — Medication 100 MILLIGRAM(S): at 13:14

## 2017-06-05 RX ADMIN — PIPERACILLIN AND TAZOBACTAM 200 GRAM(S): 4; .5 INJECTION, POWDER, LYOPHILIZED, FOR SOLUTION INTRAVENOUS at 18:45

## 2017-06-05 RX ADMIN — PROPOFOL 2.7 MICROGRAM(S)/KG/MIN: 10 INJECTION, EMULSION INTRAVENOUS at 13:15

## 2017-06-05 RX ADMIN — SODIUM CHLORIDE 1000 MILLILITER(S): 9 INJECTION INTRAMUSCULAR; INTRAVENOUS; SUBCUTANEOUS at 11:16

## 2017-06-05 NOTE — PROCEDURE NOTE - NSPROCDETAILS_GEN_ALL_CORE
sterile dressing applied/lumen(s) aspirated and flushed/sterile technique, catheter placed/guidewire recovered/ultrasound guidance

## 2017-06-05 NOTE — CONSULT NOTE ADULT - PROBLEM SELECTOR RECOMMENDATION 5
Dx on recent admission, provoked after ortho R hip arthroplasty. Was on xaralto 15 bid outpt.  - fu CT chest non con final read for evidence new PE  - would start hep gtt now, abd bruising apparent unclear age however initial CT A/P does not evidence bleed, hgb currently at BL level  - fu Dr. Siddharth ambrocio  - fu cardiac echo, BL LE dopplers Dx on recent admission, provoked after ortho R hip arthroplasty. Was on xaralto 15 bid outpt.  - fu CT chest non con final read for evidence new PE  - await starting hep gtt for final read of CT imaging. +abd bruising apparent unclear age, hgb currently at baseline level  - fu Dr. Siddharth ambrocio  - fu cardiac echo, BL LE dopplers, final read CT chest/A/P Dx on recent admission, provoked after ortho R hip arthroplasty. Was on xaralto 15 bid outpt.  - fu CT chest non con final read for evidence new PE  - await starting hep gtt for final read of CT imaging. +abd bruising apparent unclear age, hgb currently at baseline level  - fu Dr. Siddharth ambrocio  - fu cardiac echo, BL LE dopplers, final read CT chest/A/P    # Dispo. Admit to MICU. Discussed goals of care and code status with Gifty , who is patient's nephew's wife (shares decision making with her , the patient's nephew - Jonathan . Full code at present. Will discuss further once Jonathan arrives from [facility] in [place].

## 2017-06-05 NOTE — ED PROVIDER NOTE - MEDICAL DECISION MAKING DETAILS
77yoM 77yoM here with fever, cough and SOB from NH, was hypoxic today, in ED, sats initially mid-90s on NRB with tachypnea and decreased breath sounds, L>R, confusion, covered empirically for HCAP, pt slowly decompensated with increased somnolence and worsening hypoxia, intubated for airway protection/resp failure, seen by ICU and accepted for admission, pt also with ARF and hyperkalemia, treated medically, renal consulted.

## 2017-06-05 NOTE — CONSULT NOTE ADULT - SUBJECTIVE AND OBJECTIVE BOX
HPI:  78 yo M PMH CLL (baseline WBC 19 range), benzo abuse 2/2 insomnia,  H DC after R femoral neck fracture (s/p R hip igro-arthroplasty, course c/b hypoxia 2/2 provoked acute PE, DC on Xarelto fu Dr. Messina) p/w respiratory distress, hypoxia to O2 sat 70% range, AMS, labs notable for Cr 8 range up from 1.00 after last DC, K 9, s/p intubation in ED. Pt unable to provide Hx at this time. Daughter in law at bedside (shares decision making responsibility with her  - the patient's son) unable to provide additional Hx at this time. From [facility], unclear if receiving xeralto as prescribed after last DC. LLQ bruising on exam, unclear age. Per Hx, pt recently spiking F to 101, +cough, CXR in outpt setting: no new infiltrate, bl cx performed outpt.    In ED:  Vital Signs Last 24 Hrs  T(C): 37.7, Max: 37.7 (-05 @ 11:04)  T(F): 99.8, Max: 99.8 (-05 @ 11:04)  HR: 120 (112 - 132)  BP: 203/88 (149/79 - 203/88)  BP(mean): --  RR: 22 (20 - 22)  SpO2: 100% (84% - 100%)    In ED: labs notable for Cr 8 range, K 9, WBC 80s range. CXR: poor inspiratory effort, new LLL opacification, RLL opacification. intubated with rocuronium, propofol. Received: insulin/D50, kayexalate rectal, sodium bicarb, duonebs, 1L IV NS, vanc/zosyn. Spoke with Dr. Pérez who provides Hx as above, contacted Dr. Messina, renal cx called and evaluated in ED.      VITAL SIGNS:  T(F): 99.8  HR: 120  BP: 203/88  RR: 22  SpO2: 100%  Wt(kg): --    PHYSICAL EXAM:    Constitutional:  Eyes:  ENMT:  Neck:  Respiratory:  Cardiovascular:  Gastrointestinal:  Extremities:  Vascular:  Neurological:  Musculoskeletal:    MEDICATIONS  (STANDING):    MEDICATIONS  (PRN):      Allergies    No Known Allergies    Intolerances        LABS:                        11.5   87.8  )-----------( 586      ( 2017 10:55 )             35.5         131<L>  |  92<L>  |  161<H>  ----------------------------<  140<H>  9.0<HH>   |  20<L>  |  8.80<H>    Ca    9.2      2017 10:55    TPro  6.4  /  Alb  3.0<L>  /  TBili  0.7  /  DBili  x   /  AST  36  /  ALT  110<H>  /  AlkPhos  163<H>      PT/INR - ( 2017 10:55 )   PT: 15.8 sec;   INR: 1.41          PTT - ( 2017 10:55 )  PTT:24.7 sec  Urinalysis Basic - ( 2017 11:28 )    Color: Yellow / Appearance: Clear / S.015 / pH: x  Gluc: x / Ketone: NEGATIVE  / Bili: NEGATIVE / Urobili: 0.2 E.U./dL   Blood: x / Protein: NEGATIVE mg/dL / Nitrite: NEGATIVE   Leuk Esterase: NEGATIVE / RBC: < 5 /HPF / WBC < 5 /HPF   Sq Epi: x / Non Sq Epi: Rare /HPF / Bacteria: Present /HPF        RADIOLOGY & ADDITIONAL TESTS: HPI:  76 yo M PMH CLL (baseline WBC 19 range), benzo abuse 2/2 insomnia,  [facility] DC after R femoral neck fracture (s/p R hip igor-arthroplasty, course c/b hypoxia 2/2 provoked acute PE, DC on Xarelto fu Dr. Messina) p/w respiratory distress, hypoxia to O2 sat 70% range, AMS, labs notable for Cr 8 range up from 1.00 after last DC, K 9, s/p intubation in ED. Pt unable to provide Hx at this time. Daughter in law at bedside (shares decision making responsibility with her  - the patient's son) unable to provide additional Hx at this time. From [facility], unclear if receiving xeralto as prescribed after last DC. LLQ bruising on exam, unclear age. Per Hx, pt recently spiking F to 101, +cough, CXR in outpt setting: no new infiltrate, bl cx performed outpt.    In ED:  Vital Signs Last 24 Hrs  T(C): 37.7, Max: 37.7 (06-05 @ 11:04)  T(F): 99.8, Max: 99.8 (06-05 @ 11:04)  HR: 120 (112 - 132)  BP: 203/88 (149/79 - 203/88)  BP(mean): --  RR: 22 (20 - 22)  SpO2: 100% (84% - 100%)    In ED: labs notable for Cr 8 range, K 9, WBC 80s range. CXR: poor inspiratory effort, new LLL opacification, RLL opacification. intubated with rocuronium, propofol. Received: insulin/D50, kayexalate rectal, sodium bicarb, duonebs, 1L IV NS, vanc/zosyn. Spoke with Dr. Pérez who provides Hx as above, contacted Dr. Messina, renal cx called and evaluated in ED. CT chest/A/P non con ordered.    PMH: as above    Allergies: NKA    Medications: xaralto 15 bid, senna/colace, tramadol 25 q4h prn, xanax 2 qd, zaleplon 5 qhs, ativan 1 q6h, pantoprazole 40 qd, doxazosin 2 qd    PSH; as above    FH: pt cannot provide at this time    SH: pt cannot provide at this time    .  VITAL SIGNS:  T(C): 37.7, Max: 37.7 ( @ 11:04)  T(F): 99.8, Max: 99.8 ( @ 11:04)  HR: 119 (112 - 132)  BP: 163/79 (149/79 - 203/88)  BP(mean): --  RR: 16 (16 - 22)  SpO2: 100% (84% - 100%)  Wt(kg): --    PHYSICAL EXAM:    Constitutional: initially speaking, incoherent answers, tracks with eyes, odonnell snot follow commands, now intubated/sedated  Head: NC/AT  Eyes: PERRL  ENT: intubated MMM  Neck: supple  Respiratory: decreased breath sounds BL bases on tidal breaths, no wheezes  Cardiac: +S1/S2; regular rhythm, tachy, no murmur  Gastrointestinal: soft, NT/ND; no rebound or guarding; cannot appreciate bowel sounds, +10cm purple ecchymosis on LLL  Genitourinary: rodriguez in place, initially draining clear urine with sediment, initially UO 1700cc on rodriguez insertion, total UO 2500 cc so far  Back: no CVAT B/L  Extremities: WWP, trace LE edema  Vascular: 2+ DPP BL  Dermatologic: skin warm, dry and intact; surgical wound R hip - no erythema/discharge/swelling/site appears clean  Neurologic: sedated      LABS:                        11.5   87.8  )-----------( 586      ( 2017 10:55 )             35.5     -    131<L>  |  92<L>  |  161<H>  ----------------------------<  140<H>  9.0<HH>   |  20<L>  |  8.80<H>    Ca    9.2      2017 10:55    TPro  6.4  /  Alb  3.0<L>  /  TBili  0.7  /  DBili  x   /  AST  36  /  ALT  110<H>  /  AlkPhos  163<H>  06-    PT/INR - ( 2017 10:55 )   PT: 15.8 sec;   INR: 1.41       PTT - ( 2017 10:55 )  PTT:24.7 sec  Urinalysis Basic - ( 2017 11:28 )    Lactate 1.9    Color: Yellow / Appearance: Clear / S.015 / pH: x  Gluc: x / Ketone: NEGATIVE  / Bili: NEGATIVE / Urobili: 0.2 E.U./dL   Blood: x / Protein: NEGATIVE mg/dL / Nitrite: NEGATIVE   Leuk Esterase: NEGATIVE / RBC: < 5 /HPF / WBC < 5 /HPF   Sq Epi: x / Non Sq Epi: Rare /HPF / Bacteria: Present /HPF        RADIOLOGY & ADDITIONAL TESTS:    CXR: poor inspiratory effort, new LLL opacification, RLL opacification.    ECG: sinus tach, LAD, no acute ischemic STT changes, inferior and lateral Q waves, ECG without overt changes from prior HPI:  76 yo M PMH CLL (baseline WBC 19 range), benzo abuse 2/2 insomnia,  [facility] DC after R femoral neck fracture (s/p R hip igor-arthroplasty, course c/b hypoxia 2/2 provoked acute PE, DC on Xarelto fu Dr. Messina) p/w respiratory distress, hypoxia to O2 sat 70% range, AMS, labs notable for Cr 8 range up from 1.00 after last DC, K 9, s/p intubation in ED. Pt unable to provide Hx at this time. Gifty, the he wife of patient's nephew (Jonathan) at bedside (they share decision making responsibility at present) unable to provide additional Hx at this time. From [facility], unclear if receiving xeralto as prescribed after last DC. LLQ bruising on exam, unclear age. Per Hx, pt recently spiking F to 101, +cough, CXR in outpt setting: no new infiltrate, bl cx performed outpt.    In ED:  Vital Signs Last 24 Hrs  T(C): 37.7, Max: 37.7 (06-05 @ 11:04)  T(F): 99.8, Max: 99.8 (06-05 @ 11:04)  HR: 120 (112 - 132)  BP: 203/88 (149/79 - 203/88)  BP(mean): --  RR: 22 (20 - 22)  SpO2: 100% (84% - 100%)    In ED: labs notable for Cr 8 range, K 9, WBC 80s range. CXR: poor inspiratory effort, new LLL opacification, RLL opacification. intubated with rocuronium, propofol. Received: calcium gluconate/insulin/D50, kayexalate rectal, sodium bicarb, duonebs, 1L IV NS, vanc/zosyn. Spoke with Dr. Pérez who provides Hx as above, contacted Dr. Messina, renal cx called and evaluated in ED. CT chest/A/P non con ordered.    PMH: as above    Allergies: NKA    Medications: xaralto 15 bid, senna/colace, tramadol 25 q4h prn, xanax 2 qd, zaleplon 5 qhs, ativan 1 q6h, pantoprazole 40 qd, doxazosin 2 qd    PSH; as above    FH: pt cannot provide at this time    SH: pt cannot provide at this time    .  VITAL SIGNS:  T(C): 37.7, Max: 37.7 ( @ 11:04)  T(F): 99.8, Max: 99.8 ( @ 11:04)  HR: 119 (112 - 132)  BP: 163/79 (149/79 - 203/88)  BP(mean): --  RR: 16 (16 - 22)  SpO2: 100% (84% - 100%)  Wt(kg): --    PHYSICAL EXAM:    Constitutional: initially speaking, incoherent answers, tracks with eyes, odonnell snot follow commands, now intubated/sedated  Head: NC/AT  Eyes: PERRL  ENT: intubated MMM  Neck: supple  Respiratory: decreased breath sounds BL bases on tidal breaths, no wheezes  Cardiac: +S1/S2; regular rhythm, tachy, no murmur  Gastrointestinal: soft, NT/ND; no rebound or guarding; cannot appreciate bowel sounds, +10cm purple ecchymosis on LLL  Genitourinary: rodriguez in place, initially draining clear urine with sediment, initially UO 1700cc on rodriguez insertion, total UO 2500 cc so far  Back: no CVAT B/L  Extremities: WWP, trace LE edema  Vascular: 2+ DPP BL  Dermatologic: skin warm, dry and intact; surgical wound R hip - no erythema/discharge/swelling/site appears clean  Neurologic: sedated      LABS:                        11.5   87.8  )-----------( 586      ( 2017 10:55 )             35.5         131<L>  |  92<L>  |  161<H>  ----------------------------<  140<H>  9.0<HH>   |  20<L>  |  8.80<H>    Ca    9.2      2017 10:55    TPro  6.4  /  Alb  3.0<L>  /  TBili  0.7  /  DBili  x   /  AST  36  /  ALT  110<H>  /  AlkPhos  163<H>      PT/INR - ( 2017 10:55 )   PT: 15.8 sec;   INR: 1.41       PTT - ( 2017 10:55 )  PTT:24.7 sec  Urinalysis Basic - ( 2017 11:28 )    Lactate 1.9    Color: Yellow / Appearance: Clear / S.015 / pH: x  Gluc: x / Ketone: NEGATIVE  / Bili: NEGATIVE / Urobili: 0.2 E.U./dL   Blood: x / Protein: NEGATIVE mg/dL / Nitrite: NEGATIVE   Leuk Esterase: NEGATIVE / RBC: < 5 /HPF / WBC < 5 /HPF   Sq Epi: x / Non Sq Epi: Rare /HPF / Bacteria: Present /HPF        RADIOLOGY & ADDITIONAL TESTS:    CXR: poor inspiratory effort, new LLL opacification, RLL opacification.    ECG: sinus tach, LAD, no acute ischemic STT changes, inferior and lateral Q waves, ECG without overt changes from prior HPI:  76 yo M PMH CLL (baseline WBC 19 range), benzo abuse 2/2 insomnia,  [facility] DC after R femoral neck fracture (s/p R hip igor-arthroplasty, course c/b hypoxia 2/2 provoked acute PE, DC on Xarelto fu Dr. Messina) p/w respiratory distress, hypoxia to O2 sat 70% range, AMS, labs notable for Cr 8 range up from 1.00 after last DC, K 9, s/p intubation in ED. Pt unable to provide Hx at this time. Gifty, the he wife of patient's nephew (Jonathan) at bedside (they share decision making responsibility at present) unable to provide additional Hx at this time. From [facility], unclear if receiving xeralto as prescribed after last DC. LLQ bruising on exam, unclear age. Per Hx, pt recently spiking F to 101, +cough, CXR in outpt setting: no new infiltrate, bl cx performed outpt.    In ED:  Vital Signs Last 24 Hrs  T(C): 37.7, Max: 37.7 (06-05 @ 11:04)  T(F): 99.8, Max: 99.8 (06-05 @ 11:04)  HR: 120 (112 - 132)  BP: 203/88 (149/79 - 203/88)  BP(mean): --  RR: 22 (20 - 22)  SpO2: 100% (84% - 100%)    In ED: labs notable for Cr 8 range, K 9, WBC 80s range. CXR: poor inspiratory effort, new LLL opacification, RLL opacification. intubated with rocuronium, propofol. OG tube placed. Received: calcium gluconate/insulin/D50, kayexalate rectal, sodium bicarb, duonebs, 1L IV NS, vanc/zosyn. Spoke with Dr. Pérez who provides Hx as above, contacted Dr. Messina, renal cx called and evaluated in ED. CT chest/A/P non con ordered.    PMH: as above    Allergies: NKA    Medications: xaralto 15 bid, senna/colace, tramadol 25 q4h prn, xanax 2 qd, zaleplon 5 qhs, ativan 1 q6h, pantoprazole 40 qd, doxazosin 2 qd    PSH; as above    FH: pt cannot provide at this time    SH: pt cannot provide at this time    .  VITAL SIGNS:  T(C): 37.7, Max: 37.7 ( @ 11:04)  T(F): 99.8, Max: 99.8 ( @ 11:04)  HR: 119 (112 - 132)  BP: 163/79 (149/79 - 203/88)  BP(mean): --  RR: 16 (16 - 22)  SpO2: 100% (84% - 100%)  Wt(kg): --    PHYSICAL EXAM:    Constitutional: initially speaking, incoherent answers, tracks with eyes, odonnell snot follow commands, now intubated/sedated/OG tube in place  Head: NC/AT  Eyes: PERRL  ENT: intubated MMM  Neck: supple  Respiratory: decreased breath sounds BL bases on tidal breaths, no wheezes  Cardiac: +S1/S2; regular rhythm, tachy, no murmur  Gastrointestinal: soft, NT/ND; no rebound or guarding; cannot appreciate bowel sounds, +10cm purple ecchymosis on LLL  Genitourinary: rodriguez in place, initially draining clear urine with sediment, initially UO 1700cc on rodriguez insertion, total UO 2500 cc so far  Back: no CVAT B/L  Extremities: WWP, trace LE edema  Vascular: 2+ DPP BL  Dermatologic: skin warm, dry and intact; surgical wound R hip - no erythema/discharge/swelling/site appears clean  Neurologic: sedated      LABS:                        11.5   87.8  )-----------( 586      ( 2017 10:55 )             35.5         131<L>  |  92<L>  |  161<H>  ----------------------------<  140<H>  9.0<HH>   |  20<L>  |  8.80<H>    Ca    9.2      2017 10:55    TPro  6.4  /  Alb  3.0<L>  /  TBili  0.7  /  DBili  x   /  AST  36  /  ALT  110<H>  /  AlkPhos  163<H>      PT/INR - ( 2017 10:55 )   PT: 15.8 sec;   INR: 1.41       PTT - ( 2017 10:55 )  PTT:24.7 sec  Urinalysis Basic - ( 2017 11:28 )    Lactate 1.9    Color: Yellow / Appearance: Clear / S.015 / pH: x  Gluc: x / Ketone: NEGATIVE  / Bili: NEGATIVE / Urobili: 0.2 E.U./dL   Blood: x / Protein: NEGATIVE mg/dL / Nitrite: NEGATIVE   Leuk Esterase: NEGATIVE / RBC: < 5 /HPF / WBC < 5 /HPF   Sq Epi: x / Non Sq Epi: Rare /HPF / Bacteria: Present /HPF        RADIOLOGY & ADDITIONAL TESTS:    CXR: poor inspiratory effort, new LLL opacification, RLL opacification.    ECG: sinus tach, LAD, no acute ischemic STT changes, inferior and lateral Q waves, ECG without overt changes from prior

## 2017-06-05 NOTE — ED ADULT NURSE NOTE - OBJECTIVE STATEMENT
Patient [facility] via EMS today alert and oriented x3, complaining of cough and fever for the past week. Patient noted to diaphoretic, tachypneic, upgraded to the resus room, MD Eric at bedside. As per EMS, patient was hypoxic to 80% on room air, placed on a non-rebreather. Staples noted to the right hip, clean, no signs of infection noted. Bluish ecchymosis noted to the left side of the abdomen, patient reports that he fell but does not recall when and how. Sepsis protocol initiated, patient continues to be on a non-rebreather.

## 2017-06-05 NOTE — CONSULT NOTE ADULT - SUBJECTIVE AND OBJECTIVE BOX
Pt is a 77 year old male with baseline creatine of 0.9 on may in may 2017l, presents respiratory distress, hypoxia to O2 sat 70% range, AMS, labs notable for Cr 8 and k of 9.0. pt was emergently intubated. Rodriguez placed which had 1.7 liters immediately placed.  Pt received one liter iv fluid, albuterol, calcium gluconate,  insulin, dextrose, kayexalate.  Pt currently intubated and sedated on 100% fio2.  d/w neice –in law – hcp – no new abx, no recent nsaid use.       PAST MEDICAL & SURGICAL HISTORY:  Hip fracture requiring operative repair, right, sequela  PE (pulmonary thromboembolism)  CLL (chronic lymphocytic leukemia)      MEDICATIONS  (STANDING):  etomidate Injectable 20milliGRAM(s) IV Push Once  rocuronium Injectable 100milliGRAM(s) IV Push Once  propofol Infusion 5MICROgram(s)/kG/Min IV Continuous <Continuous>  calcium gluconate IVPB 1Gram(s) IV Intermittent Once  insulin regular  human recombinant. 10Unit(s) IV Push once  dextrose 50% Injectable 50milliLiter(s) IV Push Once  sodium bicarbonate  Injectable 50milliEquivalent(s) IV Push Once  ALBUTerol    0.083%. 2.5milliGRAM(s) Nebulizer once  sodium polystyrene sulfonate Suspension 30Gram(s) Enteral Tube once    MEDICATIONS  (PRN):      Allergies    No Known Allergies    Intolerances        SOCIAL HISTORY:    FAMILY HISTORY:  No pertinent family history in first degree relatives      T(C): , Max: 37.7 ( @ 11:04)  T(F): , Max: 99.8 ( @ 11:04)  HR: 120  BP: 203/88  BP(mean): --  RR: 22  SpO2: 100%  Wt(kg): --    I & Os for current day (as of  @ 12:59)  =============================================  IN: 0 ml / OUT: 1600 ml / NET: -1600 ml      Weight (kg): 90 ( @ 10:59)    PHYSICAL EXAM:  Constitutional: intubated and sedated   ENMT: Moist mucous membrane.  No cyanosis.  Neck: Supple. No JVD.  Back: No CVA tenderness  Respiratory: Clear to auscultation   Cardiovascular: S1, S2.  Regular rate and rhythm.    Gastrointestinal: soft, non-tender, non-distended, normal bowel sounds  Extremities: Warm.  No lower extremity edema.    .        LABS:                        11.5   87.8  )-----------( 586      ( 2017 10:55 )             35.5         131<L>  |  92<L>  |  161<H>  ----------------------------<  140<H>  9.0<HH>   |  20<L>  |  8.80<H>    Ca    9.2      2017 10:55    TPro  6.4  /  Alb  3.0<L>  /  TBili  0.7  /  DBili  x   /  AST  36  /  ALT  110<H>  /  AlkPhos  163<H>      Creatine Kinase, Serum: 42 U/L [30 - 200] ( @ 10:55)    PT/INR - ( 2017 10:55 )   PT: 15.8 sec;   INR: 1.41          PTT - ( 2017 10:55 )  PTT:24.7 sec  Urinalysis Basic - ( 2017 11:28 )    Color: Yellow / Appearance: Clear / S.015 / pH: x  Gluc: x / Ketone: NEGATIVE  / Bili: NEGATIVE / Urobili: 0.2 E.U./dL   Blood: x / Protein: NEGATIVE mg/dL / Nitrite: NEGATIVE   Leuk Esterase: NEGATIVE / RBC: < 5 /HPF / WBC < 5 /HPF   Sq Epi: x / Non Sq Epi: Rare /HPF / Bacteria: Present /HPF            RADIOLOGY & ADDITIONAL STUDIES: Pt is a 77 year old male with baseline creatine of 0.9 on may in may 2017l, presents respiratory distress, hypoxia to O2 sat 70% range, AMS, labs notable for Cr 8 and k of 9.0. pt was emergently intubated. Rodriguez placed which had 1.7 liters immediately placed vand currently 2.6 liter out.  Pt received one liter iv fluid, albuterol, calcium gluconate,  insulin, dextrose, kayexalate.  Pt currently intubated and sedated on 100% fio2.  d/w neice –in law – hcp – no new abx, no recent nsaid use.       PAST MEDICAL & SURGICAL HISTORY:  Hip fracture requiring operative repair, right, sequela  PE (pulmonary thromboembolism)  CLL (chronic lymphocytic leukemia)      MEDICATIONS  (STANDING):  etomidate Injectable 20milliGRAM(s) IV Push Once  rocuronium Injectable 100milliGRAM(s) IV Push Once  propofol Infusion 5MICROgram(s)/kG/Min IV Continuous <Continuous>  calcium gluconate IVPB 1Gram(s) IV Intermittent Once  insulin regular  human recombinant. 10Unit(s) IV Push once  dextrose 50% Injectable 50milliLiter(s) IV Push Once  sodium bicarbonate  Injectable 50milliEquivalent(s) IV Push Once  ALBUTerol    0.083%. 2.5milliGRAM(s) Nebulizer once  sodium polystyrene sulfonate Suspension 30Gram(s) Enteral Tube once    MEDICATIONS  (PRN):      Allergies    No Known Allergies    Intolerances        SOCIAL HISTORY:    FAMILY HISTORY:  No pertinent family history in first degree relatives      T(C): , Max: 37.7 ( @ 11:04)  T(F): , Max: 99.8 ( @ 11:04)  HR: 120  BP: 203/88  BP(mean): --  RR: 22  SpO2: 100%  Wt(kg): --    I & Os for current day (as of  @ 12:59)  =============================================  IN: 0 ml / OUT: 1600 ml / NET: -1600 ml      Weight (kg): 90 ( @ 10:59)    PHYSICAL EXAM:  Constitutional: intubated and sedated   ENMT: Moist mucous membrane.  No cyanosis.  Neck: Supple. No JVD.  Back: No CVA tenderness  Respiratory: Clear to auscultation   Cardiovascular: S1, S2.  Regular rate and rhythm.    Gastrointestinal: soft, non-tender, non-distended, normal bowel sounds  Extremities: Warm.  No lower extremity edema.    .        LABS:                        11.5   87.8  )-----------( 586      ( 2017 10:55 )             35.5         131<L>  |  92<L>  |  161<H>  ----------------------------<  140<H>  9.0<HH>   |  20<L>  |  8.80<H>    Ca    9.2      2017 10:55    TPro  6.4  /  Alb  3.0<L>  /  TBili  0.7  /  DBili  x   /  AST  36  /  ALT  110<H>  /  AlkPhos  163<H>      Creatine Kinase, Serum: 42 U/L [30 - 200] ( @ 10:55)    PT/INR - ( 2017 10:55 )   PT: 15.8 sec;   INR: 1.41          PTT - ( 2017 10:55 )  PTT:24.7 sec  Urinalysis Basic - ( 2017 11:28 )    Color: Yellow / Appearance: Clear / S.015 / pH: x  Gluc: x / Ketone: NEGATIVE  / Bili: NEGATIVE / Urobili: 0.2 E.U./dL   Blood: x / Protein: NEGATIVE mg/dL / Nitrite: NEGATIVE   Leuk Esterase: NEGATIVE / RBC: < 5 /HPF / WBC < 5 /HPF   Sq Epi: x / Non Sq Epi: Rare /HPF / Bacteria: Present /HPF            RADIOLOGY & ADDITIONAL STUDIES:

## 2017-06-05 NOTE — ED PROVIDER NOTE - ATTENDING CONTRIBUTION TO CARE
77yoM here with fever, cough and SOB from NH, was hypoxic today, in ED, sats initially mid-90s on NRB with tachypnea and decreased breath sounds, L>R, confusion, covered empirically for HCAP, pt slowly decompensated with increased somnolence and worsening hypoxia, intubated for airway protection/resp failure, seen by ICU and accepted for admission, pt also with ARF and hyperkalemia, treated medically, renal consulted.

## 2017-06-05 NOTE — H&P ADULT - HISTORY OF PRESENT ILLNESS
77M with PMHx of CLL (baseline WBC 19 range), benzo abuse 2/2 insomnia, recent admission for R femoral neck fracture after fall (s/p R hip igor-arthroplasty, course c/b hypoxia 2/2 provoked acute PE, DC on Xarelto on 5/24) presents with respiratory distress, hypoxia with O2 sat 70% range, AMS, labs notable for Cr 8 range up from 1.00 after last DC, K 9, s/p intubation in ED. Patient unable to provide history at this time. Per history of [facility] where patient was discharged to after last admission, patient recently spiking fevers to 101F, +cough. CXR in outpatient setting with no new infiltrate.    Per note, daughter-in-law at bedside (shares decision making responsibility with her  - the patient's son) unable to provide additional Hx at this time. From [facility], unclear if receiving xeralto as prescribed after last DC. LLQ bruising on exam, unclear age.     In the ED, patient afebrile (although receiving antipyretics at [facility]), tachycardic with HRs 112-132, BPs 149-203/79-88, RR 20s, SpO2 80s on RA. Per ED note, patient with labored breathing, remained hypoxic to the 90s on 100% NRB. Patient initially described as alert and awake, but intermittently confused and rambling. Exam notable for decreased breath sounds on the L lung and paradoxical breathing. Blood gas revealing of hypoxemia. Decision made to intubate with Rocuronium and Propofol for hypoxemic respiratory failure. Labs notable for Cr 8 range, K 9, WBC 80s range. CXR with poor inspiratory effort, new LLL opacification and RLL opacification (compared to CXR from 5/24). EKG Received: insulin/D50, kayexalate rectal, sodium bicarb, duonebs, 1L IV NS, vanc/zosyn. Spoke with Dr. Pérez who provides Hx as above, contacted Dr. Messina, renal cx called and evaluated in ED. 77M with PMHx of CLL (baseline WBC 19 range), benzo abuse 2/2 insomnia, recent admission for R femoral neck fracture after fall (s/p R hip igor-arthroplasty, course c/b hypoxia 2/2 provoked acute PE, discharged on Xarelto on 5/24) presents with respiratory distress, hypoxia with O2 sat 70% range, AMS, labs notable for Cr 8 range up from 1.00 after last DC, K 9, s/p intubation in ED. Patient unable to provide history at this time. Per history of [facility], patient recently spiking fevers to 101F, +cough. CXR in outpatient setting with no new infiltrate. Unable to clarify at this time whether patient was receiving/refusing Xarelto at rehab.     In the ED, patient afebrile (although receiving antipyretics at [facility]), tachycardic with HRs 112-132, BPs 149-203/79-88, RR 20s, SpO2 80s on RA. Per ED note, patient with labored breathing, remained hypoxic to the 90s on 100% NRB. Patient initially described as alert and awake, but intermittently confused and rambling. Exam notable for decreased breath sounds on the L lung and paradoxical breathing. Blood gas revealing of hypoxemia. Decision made to intubate with Rocuronium and Propofol for hypoxemic respiratory failure. Labs notable for Cr 8 range, K 9, WBC 80s range. CXR with poor inspiratory effort, new LLL opacification and RLL opacification (compared to CXR from 5/24). EKG with new RBBB (not previously seen on EKG). Patient administered Insulin/D50, Kayexalate rectal, sodium bicarb, Duonebs, 1L IV NS, Vanc 1g x1, Zosyn 3.375g x1. ICU consulted and patient admitted to ICU for monitoring and management. 77M with PMHx of CLL (baseline WBC 19 range), benzo abuse 2/2 insomnia, recent admission for R femoral neck fracture after fall (s/p R hip igor-arthroplasty, course c/b hypoxia 2/2 provoked acute PE, discharged on Xarelto on 5/24) presents with respiratory distress, hypoxia with O2 sat 70% range, AMS, labs notable for Cr 8 range up from 1.00 after last DC, K 9, s/p intubation in ED. Patient unable to provide history at this time. Per history of [facility], patient recently spiking fevers to 101F, +cough. CXR in outpatient setting with no new infiltrate. Unable to clarify at this time whether patient was receiving/refusing Xarelto at rehab.     In the ED, patient afebrile (although receiving antipyretics at [facility]), tachycardic with HRs 112-132, BPs 149-203/79-88, RR 20s, SpO2 80s on RA. Per ED note, patient with labored breathing, remained hypoxic to the 90s on 100% NRB. Patient initially described as alert and awake, but intermittently confused and rambling. Exam notable for decreased breath sounds on the L lung and paradoxical breathing. Blood gas revealing of hypoxemia. Decision made to intubate with Rocuronium and Propofol for hypoxemic respiratory failure. Labs notable for Cr 8 range, K 9, WBC 80s range. CXR with poor inspiratory effort, new LLL opacification and RLL opacification (compared to CXR from 5/24). EKG with sinus tachycardia and new RBBB (not previously seen on EKG). Bladder scan performed and patient found to be retaining. Rodriguez placed with initial removal of 1.7L of urine. Patient administered Insulin/D50, Kayexalate rectal, sodium bicarb, Duonebs, 1L IV NS, Vanc 1g x1, Zosyn 3.375g x1. ICU consulted and patient admitted to ICU for monitoring and management.

## 2017-06-05 NOTE — H&P ADULT - NSHPPHYSICALEXAM_GEN_ALL_CORE
ICU Vital Signs Last 24 Hrs  T(C): 37.7, Max: 37.7 (06-05 @ 11:04)  T(F): 99.8, Max: 99.8 (06-05 @ 11:04)  HR: 119 (112 - 132)  BP: 163/79 (149/79 - 203/88)  RR: 16 (16 - 22)  SpO2: 100% (84% - 100%)    Constitutional: elderly male, sedated and intubated   HEENT: NCAT, PERRL, dry mucous membranes   Neck: supple, no JVD   Pulm: decreased breath sounds on the L, no wheezing  CV: RRR, +S1S2, no murmurs appreciated  GI: soft, nondistended, +BS throughout  Ext: WWP, RLE 3+ pitting edema, LLE 1+ pitting edema  Vasc: DP and radial pulses 2+ bilaterally   Skin: 3x8in ecchymotic patch on the L abdomen; multiple scaly, waxy plaques on legs consistent with possible SKs. 3 linear skin tears on posterior R thigh.   Neuro: sedated

## 2017-06-05 NOTE — CONSULT NOTE ADULT - PROBLEM SELECTOR RECOMMENDATION 9
pt with arf likely 2/2 to obstruction as 2.6 liter output immediately s.p rodriguez placement with continued urine output.   obtain ulytes, uosm, renal ultrasound.   pre-renal - unlikely given elevated bp  intra -renal- unlikely given  - no recent abx, or nsaid use. glomerular unlikely given   ua no proteinuria  plan - follow up urine output s/p rodriguez placement pt with arf likely 2/2 to obstruction as 2.6 liter output s.p rodriguez placement with continued urine output.   obtain ulytes, uosm, renal ultrasound.   repeat bmp @ 3pm    plan - follow up urine output s/p rodriguez placement  give ivns  1/2 ns  to match 50% of urine output.

## 2017-06-05 NOTE — CONSULT NOTE ADULT - ASSESSMENT
78 yo M PMH CLL (baseline WBC 19 range), benzo abuse 2/2 insomnia, 5/24 [facility] DC after R femoral neck fracture (s/p R hip igor-arthroplasty, course c/b hypoxia 2/2 provoked acute PE, DC on Xarelto fu Dr. Messina) p/w respiratory distress, hypoxia to O2 sat 70% range, AMS, labs notable for Cr 8 range, K 9, WBC 80s range. Intubated in ED. Received: insulin/D50, kayexalate rectal, sodium bicarb, duonebs. Spoke with Dr. Pérez who provides Hx as above, contacted Dr. Messina, renal cx called and evaluated in ED. CT chest/A/P non con ordered. Admit to MICU for further management.

## 2017-06-05 NOTE — CONSULT NOTE ADULT - PROBLEM SELECTOR RECOMMENDATION 4
Meets severe sepsis criteria (tachycardia/tachypnea/elevated WBC/AMS) with likely new L lung HAP POA and possible RLL infiltrate with aspiration risk. WBC 80s range up from BL 16 level ast recent discharge. Bands 2, lactate wnl.  - c/w vanc/zosyn/IVF as noted above  - fu bl cx, RVP done in ED  - no Fever this admission thus far, recent F 101 outpt, tylenol if spikes  - initial CT chest evidenced possible mucous plug L bronchus, fu final read, continue evaluation for possible bronchoscopy Meets severe sepsis criteria (tachycardia/tachypnea/elevated WBC/AMS) with likely new L lung HAP POA and possible RLL infiltrate with aspiration risk. Also concern for atelectasis given recent surgery and imaging findings thus far. WBC 80s range up from BL 16 level ast recent discharge. Bands 2, lactate wnl.  - c/w vanc/zosyn/IVF as noted above  - fu bl cx, RVP done in ED  - no Fever this admission thus far, recent F 101 outpt, tylenol if spikes  - initial CT chest evidenced possible mucous plug L bronchus, fu final read, continue evaluation for possible bronchoscopy

## 2017-06-05 NOTE — ED PROVIDER NOTE - PHYSICAL EXAMINATION
VITAL SIGNS:  T(F): 99.8, Max: 99.8 (06-05 @ 11:04)  HR: 120 (112 - 132)  BP: 203/88 (149/79 - 203/88)  RR: 22 (20 - 22)  SpO2: 100% (84% - 100%)  PHYSICAL EXAM:  Constitutional: Pt alert x3 but intermittently confused and somnolent. In NAD, uncomfortable, with labored breathing  Head: NC/AT  Eyes: PERRL, EOMI, anicteric sclera  ENT: Dry MM  Neck: supple; no JVD or thyromegaly  Respiratory: CTA in the apices, paradoxical breathing, decrease BS at the base, fine crackles at the Left base  Cardiac: +S1/S2; Tachy, RR,  Gastrointestinal: soft, Distended, Suprapubic discomfort on mild palpation: Large ecchymosis across the left lower abdomen, extending laterally, non tender  Genitourinary: Rodriguez inserted  Extremities: WWP, no clubbing or cyanosis; 2+ Edema: Right Hip with well healed sutures/Surgical clips  Vascular: 2+ radial, femoral, DP/PT pulses B/L  Dermatologic: skin warm, dry and intact;   Neurologic: AAOx3; progressively more somnolent, tiring out, rambling VITAL SIGNS:  T(F): 99.8, Max: 99.8 (06-05 @ 11:04)  HR: 120 (112 - 132)  BP: 203/88 (149/79 - 203/88)  RR: 22 (20 - 22)  SpO2: 100% (84% - 100%)  PHYSICAL EXAM:  Constitutional: Pt alert x3 but intermittently confused and somnolent. In NAD, uncomfortable, with labored breathing  Head: NC/AT  Eyes: PERRL, EOMI, anicteric sclera  ENT: Dry MM  Neck: supple; no JVD or thyromegaly  Respiratory: CTA in the apices, paradoxical breathing, decrease BS at the left base up to the mid fields, fine crackles-  Cardiac: +S1/S2; Tachy, RR,  Gastrointestinal: soft, Distended, Suprapubic discomfort on mild palpation: Large ecchymosis across the left lower abdomen, extending laterally, non tender  Genitourinary: Rodriguez inserted  Extremities: WWP, no clubbing or cyanosis; 2+ Edema: Right Hip with well healed sutures/Surgical clips  Vascular: 2+ radial, femoral, DP/PT pulses B/L  Dermatologic: skin warm, dry and intact;   Neurologic: AAOx3; progressively more somnolent, tiring out, rambling

## 2017-06-05 NOTE — CONSULT NOTE ADULT - SUBJECTIVE AND OBJECTIVE BOX
SNOW GUARDADO      Patient is a 77y old  Male who presents with a chief complaint of cough (2017 13:48)      HPI:  77M with PMHx of CLL (baseline WBC 19 range), benzo abuse 2/2 insomnia, recent admission for R femoral neck fracture after fall (s/p R hip igor-arthroplasty, course c/b hypoxia 2/2 provoked acute PE, discharged on Xarelto on ) presents with respiratory distress, hypoxia with O2 sat 70% range, AMS, labs notable for Cr 8 range up from 1.00 after last DC, K 9, s/p intubation in ED. Patient unable to provide history at this time. Per history of [facility], patient recently spiking fevers to 101F, +cough. CXR in outpatient setting with no new infiltrate. Unable to clarify at this time whether patient was receiving/refusing Xarelto at rehab.     In the ED, [facility] tachycardic with HRs 112-132, BPs 149-203/79-88, RR 20s, SpO2 80s on RA. Per ED note, patient with labored breathing, remained hypoxic to the 90s on 100% NRB. Patient initially described as alert and awake, but intermittently confused and rambling. Exam notable for decreased breath sounds on the L lung and paradoxical breathing. Blood gas revealing of hypoxemia. Decision made to intubate with Rocuronium and Propofol for hypoxemic respiratory failure. Labs notable for Cr 8 range, K 9, WBC 80s range. CXR with poor inspiratory effort, new LLL opacification and RLL opacification (compared to CXR from ). EKG with sinus tachycardia and new RBBB (not previously seen on EKG). Bladder scan performed and patient found to be retaining. Rodriguez placed with initial removal of 1.7L of urine. Patient administered Insulin/D50, Kayexalate rectal, sodium bicarb, Duonebs, 1L IV NS, Vanc 1g x1, Zosyn 3.375g x1. ICU consulted and patient admitted to ICU for monitoring and management. (2017 13:48)      Addl  Medical issues:       HEALTH ISSUES - PROBLEM Dx:  PE (pulmonary thromboembolism): PE (pulmonary thromboembolism)  PNA (pneumonia): PNA (pneumonia)  Acute renal failure, unspecified acute renal failure type: Acute renal failure, unspecified acute renal failure type  Hyperkalemia: Hyperkalemia  Acute respiratory failure with hypoxia: Acute respiratory failure with hypoxia            MEDICATIONS  (STANDING):  propofol Infusion 5MICROgram(s)/kG/Min IV Continuous <Continuous>  pantoprazole  Injectable 40milliGRAM(s) IV Push daily  sodium chloride 0.45%. 1000milliLiter(s) IV Continuous <Continuous>  heparin  Infusion 1500Unit(s)/Hr IV Continuous <Continuous>  chlorhexidine 0.12% Liquid 15milliLiter(s) Swish and Spit two times a day  midazolam Infusion 2mG/Hr IV Continuous <Continuous>  doxazosin 8milliGRAM(s) Oral at bedtime  piperacillin/tazobactam IVPB. 2.25Gram(s) IV Intermittent every 6 hours    MEDICATIONS  (PRN):          PAST MEDICAL & SURGICAL HISTORY:  Hip fracture requiring operative repair, right, sequela  PE (pulmonary thromboembolism)  CLL (chronic lymphocytic leukemia)  S/P hip hemiarthroplasty      REVIEW OF SYSTEMS:  [x] As per HPI          Reviewed   no change                            Changes noted  CONSTITUTIONAL: No fever, weight loss, or fatigue  RESPIRATORY: No cough, wheezing, chills or hemoptysis; No Shortness of Breath  CARDIOVASCULAR: No chest pain, palpitations, dizziness, or leg swelling  GASTROINTESTINAL: No abdominal or epigastric pain. No nausea, vomiting, or hematemesis; No diarrhea or constipation. No melena or hematochezia.  MUSCULOSKELETAL: No joint pain or swelling; No muscle, back, or extremity pain  Neuro:   Grossly  Negative  Psych        Awake  alert  [x] All others negative	  [ ] Unable to obtain      Vital Signs Last 24 Hrs  T(C): 36.6, Max: 37.7 (-05 @ 11:04)  T(F): 97.9, Max: 99.8 (06-05 @ 11:04)  HR: 100 (99 - 132)  BP: 116/62 (105/67 - 203/88)  BP(mean): 88 (77 - 101)  RR: 12 (12 - 22)  SpO2: 99% (84% - 100%)    PHYSICAL EXAM:      Constitutional:    Eyes:    ENMT:    Neck:    Breasts:    Back:    Respiratory:    Cardiovascular:    Gastrointestinal:    Genitourinary:    Rectal:    Extremities:    Vascular:    Neurological:    Skin:    Lymph Nodes:    Musculoskeletal:    Psychiatric:                              11.1   67.4  )-----------( 477      ( 2017 15:01 )             33.1     06-05    136  |  95<L>  |  135<H>  ----------------------------<  184<H>  6.4<HH>   |  22  |  6.70<H>    Ca    8.7      2017 15:01  Phos  6.4     06-05  Mg     3.3     06-05    TPro  6.4  /  Alb  3.1<L>  /  TBili  0.7  /  DBili  x   /  AST  38  /  ALT  107<H>  /  AlkPhos  160<H>  06-05    CARDIAC MARKERS ( 2017 15:01 )  x     / 0.02 ng/mL / x     / x     / x      CARDIAC MARKERS ( 2017 10:55 )  x     / 0.04 ng/mL / 42 U/L / x     / 3.1 ng/mL      PT/INR - ( 2017 10:55 )   PT: 15.8 sec;   INR: 1.41          PTT - ( 2017 10:55 )  PTT:24.7 sec  CAPILLARY BLOOD GLUCOSE  149 (2017 10:54)    Urinalysis Basic - ( 2017 14:20 )    Color: Pink / Appearance: Cloudy / S.020 / pH: x  Gluc: x / Ketone: NEGATIVE  / Bili: NEGATIVE / Urobili: 0.2 E.U./dL   Blood: x / Protein: 100 mg/dL / Nitrite: NEGATIVE   Leuk Esterase: Trace / RBC: See Note /HPF / WBC 5-10 /HPF   Sq Epi: x / Non Sq Epi: Rare /HPF / Bacteria: Present /HPF      I&O's Summary    I & Os for current day (as of 2017 17:22)  =============================================  IN: 1163 ml / OUT: 3650 ml / NET: -2487 ml      Mode: AC/ CMV (Assist Control/ Continuous Mandatory Ventilation)  RR (machine): 12  TV (machine): 600  FiO2: 60  PEEP: 5  ITime: 1  MAP: 8  PC: 5  PIP: 20      ASSESSMENT/PLAN/RECOMMENDATIONS SNOW GUARDADO      Patient is a 77y old  Male who presents with a chief complaint of cough (2017 13:48)      HPI:  77M with PMHx of CLL (baseline WBC 19 range), benzo abuse 2/2 insomnia, recent admission for R femoral neck fracture after fall (s/p R hip igor-arthroplasty, course c/b hypoxia 2/2 provoked acute PE, discharged on Xarelto on ) presents with respiratory distress, hypoxia with O2 sat 70% range, AMS, labs notable for Cr 8 range up from 1.00 after last DC, K 9, s/p intubation in ED. Patient unable to provide history at this time. Per history of [facility], patient recently spiking fevers to 101F, +cough. CXR in outpatient setting with no new infiltrate. Unable to clarify at this time whether patient was receiving/refusing Xarelto at rehab.     In the ED, patient afebrile (although receiving antipyretics at [facility]), tachycardic with HRs 112-132, BPs 149-203/79-88, RR 20s, SpO2 80s on RA. Per ED note, patient with labored breathing, remained hypoxic to the 90s on 100% NRB. Patient initially described as alert and awake, but intermittently confused and rambling. Exam notable for decreased breath sounds on the L lung and paradoxical breathing. Blood gas revealing of hypoxemia. Decision made to intubate with Rocuronium and Propofol for hypoxemic respiratory failure. Labs notable for Cr 8 range, K 9, WBC 80s range. CXR with poor inspiratory effort, new LLL opacification and RLL opacification (compared to CXR from ). EKG with sinus tachycardia and new RBBB (not previously seen on EKG). Bladder scan performed and patient found to be retaining. Rodriguez placed with initial removal of 1.7L of urine. Patient administered Insulin/D50, Kayexalate rectal, sodium bicarb, Duonebs, 1L IV NS, Vanc 1g x1, Zosyn 3.375g x1. ICU consulted and patient admitted to ICU for monitoring and management. (2017 13:48)      Addl  Medical issues:       HEALTH ISSUES - PROBLEM Dx:  PE (pulmonary thromboembolism): PE (pulmonary thromboembolism)  PNA (pneumonia): PNA (pneumonia)  Acute renal failure, unspecified acute renal failure type: Acute renal failure, unspecified acute renal failure type  Hyperkalemia: Hyperkalemia  Acute respiratory failure with hypoxia: Acute respiratory failure with hypoxia            MEDICATIONS  (STANDING):  propofol Infusion 5MICROgram(s)/kG/Min IV Continuous <Continuous>  pantoprazole  Injectable 40milliGRAM(s) IV Push daily  sodium chloride 0.45%. 1000milliLiter(s) IV Continuous <Continuous>  heparin  Infusion 1500Unit(s)/Hr IV Continuous <Continuous>  chlorhexidine 0.12% Liquid 15milliLiter(s) Swish and Spit two times a day  midazolam Infusion 2mG/Hr IV Continuous <Continuous>  doxazosin 8milliGRAM(s) Oral at bedtime  piperacillin/tazobactam IVPB. 2.25Gram(s) IV Intermittent every 6 hours    MEDICATIONS  (PRN):          PAST MEDICAL & SURGICAL HISTORY:  Hip fracture requiring operative repair, right, sequela  PE (pulmonary thromboembolism)  CLL (chronic lymphocytic leukemia)  S/P hip hemiarthroplasty      REVIEW OF SYSTEMS:  [x] As per HPI          Reviewed   no change                            Changes noted    	  [ ]++ Unable to obtain      Vital Signs Last 24 Hrs  T(C): 36.6, Max: 37.7 (- @ 11:04)  T(F): 97.9, Max: 99.8 (-05 @ 11:04)  HR: 100 (99 - 132)  BP: 116/62 (105/67 - 203/88)  BP(mean): 88 (77 - 101)  RR: 12 (12 - 22)  SpO2: 99% (84% - 100%)    PHYSICAL EXAM:      Constitutional:Intubated sedated     Eyes:    ENMT:    Neck:supple    Breasts:    Back:neg    Respiratory:bilat rhonchi    Cardiovascular:s1s2    Gastrointestinal:soft distended    Genitourinary:rodriguez    Rectal:    Extremities:tr edema    Vascular:    Neurological:    Skin:    Lymph Nodes:    Musculoskeletal:unable    Psychiatric:unable                              11.1   67.4  )-----------( 477      ( 2017 15:01 )             33.1     06-05    136  |  95<L>  |  135<H>  ----------------------------<  184<H>  6.4<HH>   |  22  |  6.70<H>    Ca    8.7      2017 15:01  Phos  6.4     06-05  Mg     3.3     06-05    TPro  6.4  /  Alb  3.1<L>  /  TBili  0.7  /  DBili  x   /  AST  38  /  ALT  107<H>  /  AlkPhos  160<H>  06-05    CARDIAC MARKERS ( 2017 15:01 )  x     / 0.02 ng/mL / x     / x     / x      CARDIAC MARKERS ( 2017 10:55 )  x     / 0.04 ng/mL / 42 U/L / x     / 3.1 ng/mL      PT/INR - ( 2017 10:55 )   PT: 15.8 sec;   INR: 1.41          PTT - ( 2017 10:55 )  PTT:24.7 sec  CAPILLARY BLOOD GLUCOSE  149 (2017 10:54)    Urinalysis Basic - ( 2017 14:20 )    Color: Pink / Appearance: Cloudy / S.020 / pH: x  Gluc: x / Ketone: NEGATIVE  / Bili: NEGATIVE / Urobili: 0.2 E.U./dL   Blood: x / Protein: 100 mg/dL / Nitrite: NEGATIVE   Leuk Esterase: Trace / RBC: See Note /HPF / WBC 5-10 /HPF   Sq Epi: x / Non Sq Epi: Rare /HPF / Bacteria: Present /HPF      I&O's Summary    I & Os for current day (as of 2017 17:22)  =============================================  IN: 1163 ml / OUT: 3650 ml / NET: -2487 ml      Mode: AC/ CMV (Assist Control/ Continuous Mandatory Ventilation)  RR (machine): 12  TV (machine): 600  FiO2: 60  PEEP: 5  ITime: 1  MAP: 8  PC: 5  PIP: 20    IMPRESSION:  1. Bilateral mild hydronephrosis and hydroureter left greater than right.   No definite obstructing ureteric calculus. Left perinephric fat   infiltration and fluid. Findings could  have  resulted from a prior   passage of a left ureteric calculus or spontaneous left perinephric   hemorrhage..  2. Near total atelectasis left lung. Small bilateral pleural effusion      ral effusion. Small  left pleural effusion.   Evaluation of the pulmonary parenchyma demonstrates near total   atelectasis left lung with mediastinal shift to the left.  Passive   atelectasis right lower lobe. 0.4 cm calcified granuloma right lower lobe   image 171 series 5.

## 2017-06-05 NOTE — ED PROVIDER NOTE - OBJECTIVE STATEMENT
Patient is a 78 yo M with Pmhx of CLL (Not on Tx), S/p Recent Right Hip Hemiarthroplasty due to Mechanical fall with Post op course complicated by PE treated with Xarelto and discharged to [facility]. who is BIBEMS for Hypoxia, SOB and productive Cough. Per [facility] documentation and conversation with Dr Pérez patient has been coughing intermittently this past week and bringing up Phlegm. He has been having low grade fevers with T max reported to 101, treated with Tylenol. During This time Bcx were obtained as well as a CXR (June 3rd) that was reported as negative. This morning Dr Pérez examined him and noted his sats to be in the high 70's, at which point he send him to the ED for further evaluation. He is unable to confirm if the patient has been taking/refusing his xarelto. Upon arrival to ED, patient hypoxic to 85% on 80% NRB and immediately upgraded to [facility] room for further evaluation. Patient remained with Sats in the low 90's on 100% NRB. Pt was AOX3 though intermittently confused and rambling. Family member at bedside (Niece) reported patient had advanced directive but was unclear as to wether he would like to be intubated.

## 2017-06-05 NOTE — ED PROVIDER NOTE - PROGRESS NOTE DETAILS
Upon initial presentation patient with labored breathing, Hypoxic in the high to mid 90's on 100% NRB, Hypertesnive, Tachcardic to the 130's (Sinus), Rectal Temp of 99.8. AOX3 but intermittently confused, rambling. PE as above.   -CXR poor quality film but showing bibasilar atelectasis. LL PNA cannot be excluded  -Though patient is afebrile here he has been receiving Tylenol for fevers at NH  -Rodriguez placed with immediate 1700 cc of cloudy urine output  -Family at bedside (Niece), initially hesitant about advances directives however given clinical deterioration decision made to intubate patient . ICU team consulted. Labs notable for Leukocytosis to 80's up from 18 a week prior, new onset renal failure with Hyperkalemia  -Hyperkalemia cocktail given : Insulin, D50%, Calcium Gluconate, Kayexalate, Bicarb Upon initial presentation patient with labored breathing, Hypoxic in the high to mid 90's on 100% NRB, Hypertesnive, Tachycardic to the 130's (Sinus), Rectal Temp of 99.8. AOX3 but intermittently confused, rambling. PE as above.   -CXR poor quality film but showing bibasilar atelectasis. LL PNA cannot be excluded  -Though patient is afebrile here he has been receiving Tylenol for fevers at NH  -Rodriguez placed with immediate 1700 cc of cloudy urine output  -Family at bedside (Niece), initially hesitant about advances directives however given clinical deterioration decision made to intubate patient . ICU team consulted. Labs notable for Leukocytosis to 80's up from 18 a week prior, new onset renal failure with Hyperkalemia  -Hyperkalemia cocktail given : Insulin, D50%, Calcium Gluconate, Kayexalate, Bicarb  -Vanc/Zosyn  given for PNA (LLL)  -Renal consulted Labs notable for Leukocytosis to 80's up from 18 a week prior, new onset renal failure with Hyperkalemia  -Hyperkalemia cocktail given : Insulin, D50%, Calcium Gluconate, Kayexalate, Bicarb  -Vanc/Zosyn  given for PNA (LLL)  -Renal consulted, though ARF likely from obstruction given 1700 output with rodriguez placement Upon initial presentation patient with labored breathing, Hypoxic in the high to mid 90's on 100% NRB, Hypertensive, Tachycardic to the 130's (Sinus), Rectal Temp of 99.8. AOX3 but intermittently confused, rambling. PE as above.   -CXR poor quality film but showing bibasilar atelectasis. LL PNA cannot be excluded  -Though patient is afebrile here he has been receiving Tylenol for fevers at NH  -Rodriguez placed with immediate 1700 cc of cloudy urine output  -Family at bedside (Niece), initially hesitant about advances directives however given clinical deterioration decision made to intubate patient . ICU team consulted. Labs notable for Leukocytosis to 80's up from 18 a week prior, new onset renal failure with Hyperkalemia  -Hyperkalemia cocktail given : Insulin, D50%, Calcium Gluconate, Kayexalate, Bicarb  -Vanc/Zosyn  given for PNA (LLL). Though effusion vs atelectasis also possible  -Renal consulted, though ARF likely from obstruction given 1700 output with rodriguez placement

## 2017-06-05 NOTE — CONSULT NOTE ADULT - PROBLEM SELECTOR RECOMMENDATION 2
BUN/Cr 161/8.8 up from wnl at recent DC. Etiology may be obstruction, pt with known BPH, Rx includes doxazosin 2 qd, UO 1700 cc immediately after rodriguez placement in ED. Uremia 2/2 ARF likely causing increased anion gap acidosis, hyponatremia to Na 131, K to 9. No hyperkalemic ECG changes.  - fu renal recs - give IVF as noted above, no plan for emergent HD now  - s/p insulin/glucose/duonebs/1 amp sodium bicarb/kayexalate rectal  - trend labs and ECG as noted above  - fu renal US, U lytes, final read on CT chest/A/P non con  - strict IOs  - avoid nephrotoxic agents, renally dose medications BUN/Cr 161/8.8 up from wnl at recent DC. Etiology may be obstruction, pt with known BPH, Rx includes doxazosin 2 qd, UO 1700 cc immediately after rodriguez placement in ED, total 2500 cc thus far. Uremia 2/2 ARF likely causing increased anion gap acidosis, hyponatremia to Na 131, K to 9. No hyperkalemic ECG changes. Also concern for post obstructive diuresis.  - fu renal recs - give IVF as noted above for management of post obstructive diuresis, no plan for emergent HD now  - s/p insulin/glucose/Ca gluconate/duonebs/1 amp sodium bicarb/kayexalate rectal  - trend labs and ECG as noted above  - fu renal US, U lytes, final read on CT chest/A/P non con  - strict IOs  - avoid nephrotoxic agents, renally dose medications

## 2017-06-05 NOTE — PROGRESS NOTE ADULT - ASSESSMENT
ASSESSMENT/PLAN 78y/o male pt c respiratory failure, acute renal failure, pneumonia, lung atelectases, CLL, PE, sepsis    1. O2 Continue CMV mode today  2. Bronchodilators:  Atrovent/ albuterol q 4 – 6 hours as needed  3. Corticosteroids:off  4. ID/Antibiotics:Zosyn IV pending cultures  5. Cardiac/HTN: Fluid resuscitation/ ECHO  6. GI: Rx/ prophylaxis c PPI/H2B  7. Heme: Rx/VT prophylaxis c SQH/SCD/AC  now on Heparin IV  8. Aspiration precautions at all times  9 Correct Michael Zazueta  10 Risk of bronchoscopy at this time cannot be justified  Discussed with managing team ICU

## 2017-06-05 NOTE — CONSULT NOTE ADULT - PROBLEM SELECTOR RECOMMENDATION 9
Now intubated/sedated. Etiology includes L lung PNA with possible effusion seen on CXR vs RLL PNA (possible aspiration given AMS of unclear duration vs less likely new PE vs mucous plug vs lower suspicion MI.  - admit to MICU, intubated, c/w propofol gtt  - c/w vanc renally dosed, zosyn 2.25 q6h renally dosed, pt meets severe sepsis criteria with likely source HAP POA, vs aspiration as noted above. UA appears non infected  - repeat CBC/BMP/cardiac enzymes upon incpetion in MICU, fu U lytes, bl cx, RVP  - fu final read CT chest/A/P non con, cardiac echo, renal US  - per renal recs: c/w 1/2 NS at 1/2 rate of UO - 80 cc/hr now in setting of possible post obstructive diuresis Now intubated/sedated. Etiology includes L lung PNA with possible effusion seen on CXR vs RLL PNA (possible aspiration given AMS of unclear duration vs atelectasis vs pulmonary hemorrhage vs less likely new PE vs mucous plug vs lower suspicion MI.  - admit to MICU, intubated, c/w propofol gtt  - c/w vanc renally dosed, zosyn 2.25 q6h renally dosed, pt meets severe sepsis criteria with likely source HAP POA, vs aspiration as noted above. UA appears non infected.  - repeat CBC/BMP/cardiac enzymes upon inception in MICU, fu U lytes, bl cx, RVP  - fu final read CT chest/A/P non con, cardiac echo, renal US  - per renal recs: c/w 1/2 NS at 1/2 rate of UO - 80 cc/hr now in setting of possible post obstructive diuresis Now intubated/sedated/OG tube placed. Etiology includes L lung PNA with possible effusion seen on CXR vs RLL PNA (possible aspiration given AMS of unclear duration vs atelectasis vs pulmonary hemorrhage vs less likely new PE vs mucous plug vs lower suspicion MI.  - admit to MICU, intubated, c/w propofol gtt  - c/w vanc renally dosed, zosyn 2.25 q6h renally dosed, pt meets severe sepsis criteria with likely source HAP POA, vs aspiration as noted above. UA appears non infected.  - repeat CBC/BMP/cardiac enzymes upon inception in MICU, fu U lytes, bl cx, RVP  - fu final read CT chest/A/P non con, cardiac echo, renal US  - per renal recs: c/w 1/2 NS at 1/2 rate of UO - 80 cc/hr now in setting of possible post obstructive diuresis

## 2017-06-05 NOTE — H&P ADULT - PMH
CLL (chronic lymphocytic leukemia)    Hip fracture requiring operative repair, right, sequela    PE (pulmonary thromboembolism)

## 2017-06-05 NOTE — ED PROVIDER NOTE - CARE PLAN
Principal Discharge DX:	Acute respiratory failure with hypoxia  Secondary Diagnosis:	Acute renal failure, unspecified acute renal failure type

## 2017-06-05 NOTE — ED PROVIDER NOTE - PMH
CLL (chronic lymphocytic leukemia) CLL (chronic lymphocytic leukemia)    Hip fracture requiring operative repair, right, sequela    PE (pulmonary thromboembolism)

## 2017-06-05 NOTE — H&P ADULT - ASSESSMENT
ASSESSMENT/PLAN:   77M with PMHx of CLL (baseline WBC 19 range), benzo abuse 2/2 insomnia, recent admission for R femoral neck fracture after fall (s/p R hip igor-arthroplasty, course c/b hypoxia 2/2 provoked acute PE, discharged on Xarelto on 5/24) presents with respiratory distress and AMS s/p intubation for hypoxemic respiratory failure in the setting of LLL mucous plug with atelectasis and consolidation. Patient also found to have JARED 2/2 obstructive uropathy with associated hyperkalemia.    PULM:  #Acute respiratory failure: Patient presented with respiratory distress and hypoxia to the 70s on RA. Placed on NRB and still saturating only to the 90s. Blood gas revealing of hypoxemia. Decision made to intubate 2/2 hypoxemia. Lung exam with decreased breath sounds on left. CT scan significant for LLL mucous plug with associated atelectasis, consolidation and effusion.   - c/w mechanical vent; wean as tolerated   - c/w sedation with Propofol and Versed while patient intubated     #Pneumonia: On presentation, patient tachycardic and tachypneic. Not febrile, but unclear whether patient received antipyretic prior to presentation. Labs significant for leukocytosis to 80s (hx of CLL with baseline WBCs in the 20s). CT chest with LLL mucous plug and associated effusion and atelectasis. Unable to r/o pneumonia at this time.   - s/p Vanc and Zosyn in the ED; will continue Vanc by level (f/u AM level) and Zosyn 2.25g Q6hrs (renally dosed) for management of HAP     ID: ASSESSMENT/PLAN:   77M with PMHx of CLL (baseline WBC 19 range), benzo abuse 2/2 insomnia, recent admission for R femoral neck fracture after fall (s/p R hip igor-arthroplasty, course c/b hypoxia 2/2 provoked acute PE, discharged on Xarelto on 5/24) presents with respiratory distress and AMS s/p intubation for hypoxemic respiratory failure in the setting of LLL mucous plug with atelectasis and consolidation. Patient also found to have JARED 2/2 obstructive uropathy with associated hyperkalemia.    PULM:  #Acute respiratory failure: Patient presented with respiratory distress and hypoxia to the 70s on RA. Placed on NRB and still saturating only to the 90s. Blood gas revealing of hypoxemia. Decision made to intubate 2/2 hypoxemia. Lung exam with decreased breath sounds on left. Etiology includes L lung PNA with possible effusion seen on CXR vs RLL PNA (possible aspiration given AMS of unclear duration vs atelectasis vs pulmonary hemorrhage vs less likely new PE vs mucous plug vs lower suspicion MI. CT scan significant for LLL mucous plug with associated atelectasis, consolidation and effusion.   - c/w mechanical vent; wean as tolerated   - c/w sedation with Propofol gtt and Versed gtt while patient intubated     #Pneumonia: On presentation, patient tachycardic and tachypneic. Not febrile, but unclear whether patient received antipyretic prior to presentation. Labs significant for leukocytosis to 80s (hx of CLL with baseline WBCs in the 20s). CT chest with LLL mucous plug and associated effusion and atelectasis. Unable to r/o pneumonia at this time.   - s/p Vanc and Zosyn in the ED; will continue Vanc by level (f/u AM level) and Zosyn 2.25g Q6hrs (renally dosed) for management of HAP     ID: ASSESSMENT/PLAN:   77M with PMHx of CLL (baseline WBC 19 range), benzo abuse 2/2 insomnia, recent admission for R femoral neck fracture after fall (s/p R hip igor-arthroplasty, course c/b hypoxia 2/2 provoked acute PE, discharged on Xarelto on 5/24) presents with respiratory distress and AMS s/p intubation for hypoxemic respiratory failure in the setting of LLL mucous plug with atelectasis and consolidation. Patient also found to have JARED 2/2 obstructive uropathy with associated hyperkalemia.    PULM:  #Acute respiratory failure: Patient presented with respiratory distress and hypoxia to the 70s on RA. Placed on NRB and still saturating only to the 90s. Blood gas revealing of hypoxemia. Decision made to intubate 2/2 hypoxemia. Lung exam with decreased breath sounds on left. Etiology includes L lung PNA with possible effusion seen on CXR vs RLL PNA (possible aspiration given AMS of unclear duration vs atelectasis vs pulmonary hemorrhage vs less likely new PE vs mucous plug vs lower suspicion MI. CT scan significant for LLL mucous plug with associated atelectasis, consolidation and effusion.   - c/w mechanical vent; wean as tolerated   - c/w sedation with Propofol gtt and Versed gtt while patient intubated   - f/u trops, trend to peak   - f/u BCx     #Pneumonia: On presentation, patient tachycardic and tachypneic. Not febrile, but unclear whether patient received antipyretic prior to presentation. Labs significant for leukocytosis to 80s (hx of CLL with baseline WBCs in the 20s). CT chest with LLL mucous plug and associated effusion and atelectasis. Unable to r/o pneumonia at this time.   - s/p Vanc and Zosyn in the ED; will continue Vanc by level (f/u AM level) and Zosyn 2.25g Q6hrs (renally dosed) for management of HAP     ID:   #Severe sepsis: POA, patient tachycardic, tachypneic, leukocytosis. Etioloy likely pneumonia vs aspiration pneumonia as patient altered on presentation.  - c/w management of PNA as above     RENAL:  #Acute renal failure: likely 2/2 obstructive uropathy 2/2 underlying BPH (patient started on Cardura on previous admission). On presentation, creatinine elevated to 8.80 (baseline creatinine 1.0 as seen on previous labs). Bladder scan revealing of retained urine. Rodriguez catheter placed and patient with initial U/O 1.7L, total 2.5L. CT scan significant for bilateral hydronephrosis.    - c/w Cardura 8mg QHS   - monitor strict I/Os   - per renal recs: c/w 1/2 NS at 1/2 rate of UO - 80 cc/hr now in setting of possible post obstructive diuresisNow intubated/sedated. Etiology includes L lung PNA with possible effusion seen on CXR vs RLL PNA (possible aspiration given AMS of unclear duration vs atelectasis vs pulmonary hemorrhage vs less likely new PE vs mucous plug vs lower suspicion MI. ASSESSMENT/PLAN:   77M with PMHx of CLL (baseline WBC 19 range), benzo abuse 2/2 insomnia, recent admission for R femoral neck fracture after fall (s/p R hip igor-arthroplasty, course c/b hypoxia 2/2 provoked acute PE, discharged on Xarelto on 5/24) presents with respiratory distress and AMS s/p intubation for hypoxemic respiratory failure in the setting of LLL mucous plug with atelectasis and consolidation. Patient also found to have JARED 2/2 obstructive uropathy with associated hyperkalemia.    PULM:  #Acute respiratory failure: Patient presented with respiratory distress and hypoxia to the 70s on RA. Placed on NRB and still saturating only to the 90s. Blood gas revealing of hypoxemia. Decision made to intubate 2/2 hypoxemia. Lung exam with decreased breath sounds on left. Etiology includes L lung PNA with possible effusion seen on CXR vs RLL PNA (possible aspiration given AMS of unclear duration vs atelectasis vs pulmonary hemorrhage vs less likely new PE vs mucous plug vs lower suspicion MI. CT scan significant for LLL mucous plug with associated atelectasis, consolidation and effusion.   - c/w mechanical vent; wean as tolerated   - c/w sedation with Propofol gtt and Versed gtt while patient intubated   - f/u trops, trend to peak   - f/u BCx     #Pneumonia: On presentation, patient tachycardic and tachypneic. Not febrile, but unclear whether patient received antipyretic prior to presentation. Labs significant for leukocytosis to 80s (hx of CLL with baseline WBCs in the 20s). CT chest with LLL mucous plug and associated effusion and atelectasis. Unable to r/o pneumonia at this time.   - s/p Vanc and Zosyn in the ED; will continue Vanc by level (f/u AM level) and Zosyn 2.25g Q6hrs (renally dosed) for management of HAP     #PE: History of provoked PE on last admission after R hip arthroplasty, discharged on Xarelto but unclear if patient taking.   - c/w Hep gtt, goal ptt 60-80    ID:   #Severe sepsis: POA, patient tachycardic, tachypneic, leukocytosis. Etioloy likely pneumonia vs aspiration pneumonia as patient altered on presentation.  - c/w management of PNA as above     RENAL:  #Acute renal failure: likely 2/2 obstructive uropathy 2/2 underlying BPH (patient started on Cardura on previous admission). On presentation, creatinine elevated to 8.80 (baseline creatinine 1.0 as seen on previous labs). Bladder scan revealing of retained urine. Rodriguez catheter placed and patient with initial U/O 1.7L, total 2.5L. CT scan significant for bilateral hydronephrosis.    - c/w Cardura 8mg QHS   - monitor strict I/Os   - per renal recs: c/w 1/2 NS at 1/2 rate of UO in setting of possible post obstructive diuresisNow intubated/sedated. Etiology includes L lung PNA with possible effusion seen on CXR vs RLL PNA (possible aspiration given AMS of unclear duration vs atelectasis vs pulmonary hemorrhage vs less likely new PE vs mucous plug vs lower suspicion MI.  - f/u UA    #Hyperkalemia: On presentation, patient with potassium 9. No peaked T waves on EKG. Likely 2/2 ARF.   - will continue to monitor BMP Q4hrs; if improving with U/O, will just monitor    #Electrolyte disorder: likely all 2/2 ARF  - will continue to monitor    HEME:  #CLL: Baseline WBCs 20s. No active treatment.     #Insomnia: On benzos at home  - will hold benzos for now and continue sedation with Versed gtt    FEN:  #Fluids: NS@100cc/hr  #Electrolytes: replete PRN  #Nutrition: NPO for now     #Lines: L IJ triple lumen catheter  #Rodriguez: yes (placed 6/5)    PPx: Hep gtt  Dispo: MICU  FULL CODE

## 2017-06-05 NOTE — H&P ADULT - NSHPLABSRESULTS_GEN_ALL_CORE
11.5   87.8  )-----------( 586      ( 05 Jun 2017 10:55 )             35.5     131<L>  |  92<L>  |  161<H>  ----------------------------<  140<H>  9.0<HH>   |  20<L>  |  8.80<H>    Ca    9.2       TPro  6.4  /  Alb  3.0<L>  /  TBili  0.7  /  DBili  x   /  AST  36  /  ALT  110<H>  /  AlkPhos  163<H>    ABG - ( 05 Jun 2017 12:04 )  pH: 7.39  /  pCO2: 34    /  pO2: 77    / HCO3: 20    / Base Excess: -4.0  /  SaO2: 93        CT chest/abdomen/pelvis: Bilateral mild hydronephrosis and hydroureter left greater than right. No definite obstructing ureteric calculus. Left perinephric fat infiltration and fluid. Findings could  have  resulted from a prior passage of a left ureteric calculus or spontaneous left perinephric hemorrhage. Near total atelectasis left lung. Small bilateral pleural effusions. Endotracheal tube in situ.    TTE (5/24): Normal left ventricular size and wall thickness. The left ventricular wall motion is normal. The left ventricular ejection fraction is normal. The left ventricular ejection fraction is 55%.  The left atrial size is normal. Right atrial size is normal. The right ventricle is normal in size and function. No evidence for any hemodynamically significant valvular disease. There is no echocardiographic evidence for pulmonary hypertension. The pulmonary artery systolic pressure is estimated to be 25-30 mmHg.

## 2017-06-05 NOTE — ED ADULT NURSE REASSESSMENT NOTE - NS ED NURSE REASSESS COMMENT FT1
patient in the process of being intubated for airway protection, saturations of 88% on non-rebreather.

## 2017-06-05 NOTE — PROGRESS NOTE ADULT - SUBJECTIVE AND OBJECTIVE BOX
Interventional, Pulmonary, Critical, Chest Special Procedures for /[facility]    Pt was seen and fully examined by myself.     Time spent with patient in minutes:77    Patient is a 77y old  Male who presents with a chief complaint of cough (05 Jun 2017 13:48)Events leading to this admission reviewed and discussed c ICU team. The patient now intubated, sedated, better L air entry on serial exam.    HPI:  77M with PMHx of CLL (baseline WBC 19 range), benzo abuse 2/2 insomnia, recent admission for R femoral neck fracture after fall (s/p R hip igor-arthroplasty, course c/b hypoxia 2/2 provoked acute PE, discharged on Xarelto on 5/24) presents with respiratory distress, hypoxia with O2 sat 70% range, AMS, labs notable for Cr 8 range up from 1.00 after last DC, K 9, s/p intubation in ED. Patient unable to provide history at this time. Per history of [facility], patient recently spiking fevers to 101F, +cough. CXR in outpatient setting with no new infiltrate. Unable to clarify at this time whether patient was receiving/refusing Xarelto at rehab.     In the ED, patient afebrile (although receiving antipyretics at [facility]), tachycardic with HRs 112-132, BPs 149-203/79-88, RR 20s, SpO2 80s on RA. Per ED note, patient with labored breathing, remained hypoxic to the 90s on 100% NRB. Patient initially described as alert and awake, but intermittently confused and rambling. Exam notable for decreased breath sounds on the L lung and paradoxical breathing. Blood gas revealing of hypoxemia. Decision made to intubate with Rocuronium and Propofol for hypoxemic respiratory failure. Labs notable for Cr 8 range, K 9, WBC 80s range. CXR with poor inspiratory effort, new LLL opacification and RLL opacification (compared to CXR from 5/24). EKG with new RBBB (not previously seen on EKG). Patient administered Insulin/D50, Kayexalate rectal, sodium bicarb, Duonebs, 1L IV NS, Vanc 1g x1, Zosyn 3.375g x1. ICU consulted and patient admitted to ICU for monitoring and management. (05 Jun 2017 13:48)    REVIEW OF SYSTEMS:  Constitutional: + fever, weight loss, chills + fatigue  Eyes: No eye pain, visual disturbances, or discharge  ENMT:  + difficulty hearing, tinnitus, vertigo; No sinus or throat pain. No epistaxis, dysphagia, +dysphonia, +hoarseness no odynophagia  Neck: No pain, stiffness or neck swelling.  No masses or deformities  Respiratory: + cough, wheezing, chills no hemoptysis  - COPD  - ILD   +PE   - ASTHMA     + PNEUMONIA  Cardiovascular: No chest pain, dysrhythmia, palpitations, dizziness or edema   - COPD     - CAD   - CHF   - HTN  Gastrointestinal: No abdominal or epigastric pain. No nausea, vomiting or hematemesis; No diarrhea or constipation. No melena or hematochezia. No dysphagia or Icterus.          Genitourinary: No dysuria, frequency, hematuria or incontinence   + CKD/JARED      - ESRD  Neurological: No headaches, memory loss, loss of strength, numbness or tremors      +DEMENTIA     - STROKE    - SEIZURE  Skin: No itching, burning, rashes or lesions   Lymph Nodes: No enlarged glands  Endocrine: No heat or cold intolerance; No hair loss       - DM     - THYROID DISORDER  Musculoskeletal: No joint pain or swelling; No muscle, back or extremity pain  Psychiatric: No depression, anxiety, mood swings or difficulty sleeping  Heme/Lymph: No easy bruising or bleeding gums         + ANEMIA      + CLL  CANCER   +COAGULOPATHY  Allergy and Immunologic: No hives or eczema    PAST MEDICAL & SURGICAL HISTORY:  Hip fracture requiring operative repair, right, sequela  PE (pulmonary thromboembolism)  CLL (chronic lymphocytic leukemia)    FAMILY HISTORY:  No pertinent family history in first degree relatives    SOCIAL HISTORY:      - Tobacco     - ETOH    Allergies    No Known Allergies    Intolerances      Vital Signs Last 24 Hrs  T(C): 37.7, Max: 37.7 (06-05 @ 11:04)  T(F): 99.8, Max: 99.8 (06-05 @ 11:04)  HR: 119 (112 - 132)  BP: 163/79 (149/79 - 203/88)  BP(mean): --  RR: 16 (16 - 22)  SpO2: 100% (84% - 100%)    I & Os for current day (as of 06-05 @ 14:27)  =============================================  IN: 0 ml / OUT: 2400 ml / NET: -2400 ml    Mode: AC/ CMV (Assist Control/ Continuous Mandatory Ventilation)  RR (machine): 14  TV (machine): 600  FiO2: 100  PEEP: 5  ITime: 1  MAP: 13  PC: 5  PIP: 29    PHYSICAL EXAM:  Un Comfortable, no distress, now in synchrony c CMV mode  Eyes: PERRL, EOM intact; conjunctiva and sclera clear  Head: Normocephalic;  No Trauma  ENMT: No nasal discharge, hoarseness, cough or hemoptysis.    Neck: Supple; non tender; no masses or deformities.    No JVD  Respiratory:  - WHEEZING   - RHONCHI  L  RALES  + CRACKLES.  Diminished breath sounds  BILATERAL  RIGHT  LEFT 1/4 caudad  Cardiovascular: Regular rate and rhythm. S1 and S2 Normal; No murmurs, gallops or rubs     - PPM/AICD  Gastrointestinal: Soft non-tender, non-distended; Normal bowel sounds; No hepatosplenomegaly.     -PEG    +O GT   + GÓMEZ  Genitourinary: No costovertebral angle tenderness. No dysuria  Extremities: Pt sedated no AROM, No clubbing, cyanosis or edema    Vascular: Peripheral pulses palpable 2+ bilaterally  Neurological: intubated, sedated  Skin: Warm and dry. No obvious rash  Lymph Nodes: No acute cervical or supraclavicular adenopathy  Psychiatric:unresponsive to verbal stimuli    DEVICES:  - DENTURES   +IV R / L     7.5 ETUBE   -TRACH   -CTUBE  R / L      LABS:  ABG - ( 05 Jun 2017 12:04 )  pH: 7.39  /  pCO2: 34    /  pO2: 77    / HCO3: 20    / Base Excess: -4.0  /  SaO2: 93                                      11.5   87.8  )-----------( 586      ( 05 Jun 2017 10:55 )             35.5     06-05    131<L>  |  92<L>  |  161<H>  ----------------------------<  140<H>  9.0<HH>   |  20<L>  |  8.80<H>    Ca    9.2      05 Jun 2017 10:55    TPro  6.4  /  Alb  3.0<L>  /  TBili  0.7  /  DBili  x   /  AST  36  /  ALT  110<H>  /  AlkPhos  163<H>  06-05    PT/INR - ( 05 Jun 2017 10:55 )   PT: 15.8 sec;   INR: 1.41          PTT - ( 05 Jun 2017 10:55 )  PTT:24.7 sec    EXAM:  CT CHEST                          EXAM:  CT ABDOMEN AND PELVIS                          PROCEDURE DATE:  06/05/2017                     INTERPRETATION:  CT of the CHEST, ABDOMEN and PELVIS without intravenous   contrast dated 6/5/2017 1:02 PM    INDICATION: ARF     TECHNIQUE: CT of the chest, abdomen and pelvis was performed .   Intravenous contrast was not used as requested.  Oral contrast was not   used. Axial and coronal and sagittal images were produced and reviewed.    PRIOR STUDIES: CT angiogram chest 5/24/2017.    FINDINGS: The heart is normal in size.  No pericardial effusion is seen.   Evaluation for adenopathy is limited without intravenous contrast. Within   that limitation, no mediastinal, hilar or axillary lymphadenopathy is   seen. Evaluation of the vasculature is limited without IV contrast.   Within that limitation, no gross vascular abnormalities are noted.   Endotracheal tube noted with tip just proximal to the tim. Possible   mucus plug distal left main stembronchus.    Trace right basal pleural effusion. Small  left pleural effusion.   Evaluation of the pulmonary parenchyma demonstrates near total   atelectasis left lung with mediastinal shift to the left.  Passive   atelectasis right lower lobe. 0.4 cm calcified granuloma right lower lobe   image 171 series 5.    Images of the upper abdomen demonstrate no focal hepatic abnormalities.   The liver is nonenlarged.  No radiopaque stones are seen in the   gallbladder.  The pancreas shows fatty infiltration of the head. No   splenic abnormalities are seen.    The adrenal glands are unremarkable. Mild right hydronephrosis and right   hydroureter. Mild-to-moderate left hydronephrosis and left hydroureter.   Left perinephric fat infiltration and fluid. Two 0.1 cm nonobstructing   calculi lower pole left kidney.. Bilateral hydroureter. A ureteric   calculus is not definitively identified.     No abdominal aortic aneurysm is seen. No lymphadenopathy is seen.     Evaluation of the bowel demonstrates NG tube with tip overlying the   gastric antrum. Normal appendix.. No ascites is seen. Colonic   interposition between right lobe of liver and right hemidiaphragm.    Images of the pelvis demonstrate a Gómez catheter in a partially   decompressed bladder. 1.6 cm dependent radiopaque  in the posterior   bladder could represent contrast material or calculus. There are a few   other sub 0.5 cm probable bladder calculi. The prostate is enlarged   measuring 6.0 x 4.8 x 4.6 cm. Hardware in the right proximal femur is   causing extensive artifact projected over the pelvis. Bilateral   fat-containing direct inguinal hernias.    Evaluation of the osseous structures demonstrates right hip   hemiarthroplasty. Osteopenia.      IMPRESSION:  1. Bilateral mild hydronephrosis and hydroureter left greater than right.   No definite obstructing ureteric calculus. Left perinephric fat   infiltration and fluid. Findings could  have  resulted from a prior   passage of a left ureteric calculus or spontaneous left perinephric   hemorrhage..  2. Near total atelectasis left lung. Small bilateral pleural effusions.  3. Endotracheal tube in situ.RADIOLOGY & ADDITIONAL STUDIES (The following images were personally reviewed):

## 2017-06-05 NOTE — ED PROVIDER NOTE - CRITICAL CARE PROVIDED
consult w/ pt's family directly relating to pts condition/conducted a detailed discussion of DNR status/direct patient care (not related to procedure)/documentation/interpretation of diagnostic studies/telephone consultation with the patient's family/additional history taking/consultation with other physicians

## 2017-06-05 NOTE — ED ADULT TRIAGE NOTE - CHIEF COMPLAINT QUOTE
patient referred from rehab for fever and cough x1 week, and to r/o PE. Patient denies any chest pain or shortness of breath. Patient awake and alert.

## 2017-06-06 LAB
ANION GAP SERPL CALC-SCNC: 14 MMOL/L — SIGNIFICANT CHANGE UP (ref 5–17)
APPEARANCE UR: CLEAR — SIGNIFICANT CHANGE UP
APTT BLD: 45.3 SEC — HIGH (ref 27.5–37.4)
APTT BLD: 47.1 SEC — HIGH (ref 27.5–37.4)
BILIRUB UR-MCNC: NEGATIVE — SIGNIFICANT CHANGE UP
BUN SERPL-MCNC: 73 MG/DL — HIGH (ref 7–23)
CALCIUM SERPL-MCNC: 7.9 MG/DL — LOW (ref 8.4–10.5)
CHLORIDE SERPL-SCNC: 100 MMOL/L — SIGNIFICANT CHANGE UP (ref 96–108)
CHLORIDE UR-SCNC: 27 MMOL/L — SIGNIFICANT CHANGE UP
CHLORIDE UR-SCNC: <20 MMOL/L — SIGNIFICANT CHANGE UP
CO2 SERPL-SCNC: 24 MMOL/L — SIGNIFICANT CHANGE UP (ref 22–31)
COLOR SPEC: YELLOW — SIGNIFICANT CHANGE UP
CREAT ?TM UR-MCNC: 52 MG/DL — SIGNIFICANT CHANGE UP
CREAT ?TM UR-MCNC: 60 MG/DL — SIGNIFICANT CHANGE UP
CREAT SERPL-MCNC: 2.1 MG/DL — HIGH (ref 0.5–1.3)
CULTURE RESULTS: SIGNIFICANT CHANGE UP
DIFF PNL FLD: (no result)
GLUCOSE SERPL-MCNC: 112 MG/DL — HIGH (ref 70–99)
GLUCOSE UR QL: NEGATIVE — SIGNIFICANT CHANGE UP
GRAM STN FLD: SIGNIFICANT CHANGE UP
HCT VFR BLD CALC: 26.1 % — LOW (ref 39–50)
HGB BLD-MCNC: 8.6 G/DL — LOW (ref 13–17)
KETONES UR-MCNC: NEGATIVE — SIGNIFICANT CHANGE UP
LEUKOCYTE ESTERASE UR-ACNC: (no result)
MAGNESIUM SERPL-MCNC: 2.7 MG/DL — HIGH (ref 1.6–2.6)
MCHC RBC-ENTMCNC: 30 PG — SIGNIFICANT CHANGE UP (ref 27–34)
MCHC RBC-ENTMCNC: 33 G/DL — SIGNIFICANT CHANGE UP (ref 32–36)
MCV RBC AUTO: 90.9 FL — SIGNIFICANT CHANGE UP (ref 80–100)
NITRITE UR-MCNC: NEGATIVE — SIGNIFICANT CHANGE UP
OSMOLALITY UR: 607 MOSMOL/KG — SIGNIFICANT CHANGE UP (ref 100–650)
PH UR: 5.5 — SIGNIFICANT CHANGE UP (ref 5–8)
PHOSPHATE SERPL-MCNC: 4.2 MG/DL — SIGNIFICANT CHANGE UP (ref 2.5–4.5)
PLATELET # BLD AUTO: 451 K/UL — HIGH (ref 150–400)
POTASSIUM SERPL-MCNC: 4.1 MMOL/L — SIGNIFICANT CHANGE UP (ref 3.5–5.3)
POTASSIUM SERPL-SCNC: 4.1 MMOL/L — SIGNIFICANT CHANGE UP (ref 3.5–5.3)
POTASSIUM UR-SCNC: 37 MMOL/L — SIGNIFICANT CHANGE UP
POTASSIUM UR-SCNC: 46 MMOL/L — SIGNIFICANT CHANGE UP
PROT UR-MCNC: 30 MG/DL
RBC # BLD: 2.87 M/UL — LOW (ref 4.2–5.8)
RBC # FLD: 14.7 % — SIGNIFICANT CHANGE UP (ref 10.3–16.9)
SODIUM SERPL-SCNC: 138 MMOL/L — SIGNIFICANT CHANGE UP (ref 135–145)
SODIUM UR-SCNC: 23 MMOL/L — SIGNIFICANT CHANGE UP
SODIUM UR-SCNC: 29 MMOL/L — SIGNIFICANT CHANGE UP
SP GR SPEC: 1.02 — SIGNIFICANT CHANGE UP (ref 1–1.03)
SPECIMEN SOURCE: SIGNIFICANT CHANGE UP
UROBILINOGEN FLD QL: 0.2 E.U./DL — SIGNIFICANT CHANGE UP
VANCOMYCIN TROUGH SERPL-MCNC: 5.3 UG/ML — LOW (ref 10–20)
WBC # BLD: 44.9 K/UL — CRITICAL HIGH (ref 3.8–10.5)
WBC # FLD AUTO: 44.9 K/UL — CRITICAL HIGH (ref 3.8–10.5)

## 2017-06-06 PROCEDURE — 71010: CPT | Mod: 26

## 2017-06-06 PROCEDURE — 99233 SBSQ HOSP IP/OBS HIGH 50: CPT | Mod: 25,GC

## 2017-06-06 PROCEDURE — 31622 DX BRONCHOSCOPE/WASH: CPT | Mod: GC

## 2017-06-06 RX ORDER — SODIUM CHLORIDE 9 MG/ML
1000 INJECTION INTRAMUSCULAR; INTRAVENOUS; SUBCUTANEOUS
Qty: 0 | Refills: 0 | Status: DISCONTINUED | OUTPATIENT
Start: 2017-06-06 | End: 2017-06-07

## 2017-06-06 RX ORDER — HEPARIN SODIUM 5000 [USP'U]/ML
1700 INJECTION INTRAVENOUS; SUBCUTANEOUS
Qty: 25000 | Refills: 0 | Status: DISCONTINUED | OUTPATIENT
Start: 2017-06-06 | End: 2017-06-06

## 2017-06-06 RX ORDER — VANCOMYCIN HCL 1 G
1000 VIAL (EA) INTRAVENOUS ONCE
Qty: 0 | Refills: 0 | Status: COMPLETED | OUTPATIENT
Start: 2017-06-06 | End: 2017-06-06

## 2017-06-06 RX ORDER — HEPARIN SODIUM 5000 [USP'U]/ML
1800 INJECTION INTRAVENOUS; SUBCUTANEOUS
Qty: 25000 | Refills: 0 | Status: DISCONTINUED | OUTPATIENT
Start: 2017-06-06 | End: 2017-06-07

## 2017-06-06 RX ADMIN — PIPERACILLIN AND TAZOBACTAM 200 GRAM(S): 4; .5 INJECTION, POWDER, LYOPHILIZED, FOR SOLUTION INTRAVENOUS at 23:10

## 2017-06-06 RX ADMIN — Medication 8 MILLIGRAM(S): at 22:36

## 2017-06-06 RX ADMIN — PIPERACILLIN AND TAZOBACTAM 200 GRAM(S): 4; .5 INJECTION, POWDER, LYOPHILIZED, FOR SOLUTION INTRAVENOUS at 17:09

## 2017-06-06 RX ADMIN — SODIUM CHLORIDE 100 MILLILITER(S): 9 INJECTION INTRAMUSCULAR; INTRAVENOUS; SUBCUTANEOUS at 11:00

## 2017-06-06 RX ADMIN — PIPERACILLIN AND TAZOBACTAM 200 GRAM(S): 4; .5 INJECTION, POWDER, LYOPHILIZED, FOR SOLUTION INTRAVENOUS at 11:28

## 2017-06-06 RX ADMIN — Medication 250 MILLIGRAM(S): at 11:28

## 2017-06-06 RX ADMIN — CHLORHEXIDINE GLUCONATE 15 MILLILITER(S): 213 SOLUTION TOPICAL at 06:13

## 2017-06-06 RX ADMIN — PIPERACILLIN AND TAZOBACTAM 200 GRAM(S): 4; .5 INJECTION, POWDER, LYOPHILIZED, FOR SOLUTION INTRAVENOUS at 06:13

## 2017-06-06 RX ADMIN — SODIUM CHLORIDE 100 MILLILITER(S): 9 INJECTION INTRAMUSCULAR; INTRAVENOUS; SUBCUTANEOUS at 17:13

## 2017-06-06 RX ADMIN — CHLORHEXIDINE GLUCONATE 15 MILLILITER(S): 213 SOLUTION TOPICAL at 17:09

## 2017-06-06 RX ADMIN — PANTOPRAZOLE SODIUM 40 MILLIGRAM(S): 20 TABLET, DELAYED RELEASE ORAL at 11:28

## 2017-06-06 NOTE — PROGRESS NOTE ADULT - PROBLEM SELECTOR PLAN 1
pt with arf resolving 2/2 to resolution of obstruction  creatinine decreased from 9 to 2.1  electrolytes acceptable  na 138- aware team switched to ivns  please obtain repeat ulytes. pt with arf resolving 2/2 to resolution of obstruction  creatinine decreased from 9 to 2.1  electrolytes acceptable  bp acceptable   na 138- aware team switched to ivns  cont to replace ivns @ 50% of urine output   please obtain repeat ulytes.

## 2017-06-06 NOTE — PROGRESS NOTE ADULT - ATTENDING COMMENTS
Patient seen and examined with house-staff during bedside rounds.  Resident note read, including vitals, physical findings, laboratory data, and radiological reports.   Revisions included below.  Direct personal management at bed side and extensive interpretation of the data.  Plan was outlined and discussed in details with the housestaff.  Decision making of high complexity  respiratory failure secondary to JOSE DAVID and LL pneumonia with small pleural effusion and obstructive uropathy with electrolyte abnormalities, and PE, CLL.  I discussed the case with the family.  Bronchoscopy.  I reviewed the CT scan and compared to the previous one.  Continue antibiotics and follow vancomycin level.  Continue sedation . Evaluate for extubation after bronchoscopy.  On Heparin.  Cr improved with rodriguez, K is normal

## 2017-06-06 NOTE — PROGRESS NOTE ADULT - SUBJECTIVE AND OBJECTIVE BOX
Dr. Mark Singh  Renal Fellow  Beeper # [phone]        Patient seen and examined at bedside.   intubated and sedated  pt with robust urine output to 6.675 L since admission.  ins 4500    pantoprazole  Injectable 40milliGRAM(s) daily  chlorhexidine 0.12% Liquid 15milliLiter(s) two times a day  midazolam Infusion 2mG/Hr <Continuous>  doxazosin 8milliGRAM(s) at bedtime  piperacillin/tazobactam IVPB. 2.25Gram(s) every 6 hours  sodium chloride 0.9%. 1000milliLiter(s) <Continuous>      Allergies    No Known Allergies    Intolerances        T(C): , Max: 37.3 ( @ 07:00)  T(F): , Max: 99.2 ( @ 07:00)  HR: 96  BP: 117/56  BP(mean): 77  RR: 23  SpO2: 96%  Wt(kg): --  I & Os for 24h ending  @ 07:00  =============================================  IN:    Solution: 2065 ml    sodium chloride 0.45%: 1800 ml    Solution: 200 ml    heparin Infusion: 152 ml    Enteral Tube Flush: 100 ml    heparin Infusion: 96 ml    propofol Infusion: 42.5 ml    midazolam Infusion: 36 ml    Total IN: 4491.5 ml  ---------------------------------------------  OUT:    Voided: 6675 ml    Total OUT: 6675 ml  ---------------------------------------------  Total NET: -2183.5 ml    I & Os for current day (as of  @ 14:01)  =============================================  IN:    sodium chloride 0.45%: 300 ml    sodium chloride 0.9%.: 300 ml    Solution: 250 ml    Solution: 250 ml    Solution: 230 ml    Solution: 100 ml    heparin Infusion: 51 ml    midazolam Infusion: 9 ml    Total IN: 1490 ml  ---------------------------------------------  OUT:    Voided: 975 ml    Stool: 1 ml    Total OUT: 976 ml  ---------------------------------------------  Total NET: 514 ml    Height (cm): 177.8 ( @ 08:28)    PHYSICAL EXAM:  Constitutional:  intubated and sedated   Neck: Supple. No JVD.  Respiratory: Clear to auscultation   Cardiovascular: S1, S2.  Regular rate and rhythm.    Gastrointestinal: soft, non-tender, non-distended, normal bowel sounds  Extremities: Warm.  No lower extremity edema.      ACCESS:     LABS:                        8.6    44.9  )-----------( 451      ( 2017 06:36 )             26.1     06-    138  |  100  |  73<H>  ----------------------------<  112<H>  4.1   |  24  |  2.10<H>    Ca    7.9<L>      2017 06:36  Phos  4.2     -  Mg     2.7     -06    TPro  6.4  /  Alb  3.1<L>  /  TBili  0.7  /  DBili  x   /  AST  38  /  ALT  107<H>  /  AlkPhos  160<H>  06-05      PT/INR - ( 2017 10:55 )   PT: 15.8 sec;   INR: 1.41          PTT - ( 2017 06:36 )  PTT:47.1 sec  Urinalysis Basic - ( 2017 14:20 )    Color: Pink / Appearance: Cloudy / S.020 / pH: x  Gluc: x / Ketone: NEGATIVE  / Bili: NEGATIVE / Urobili: 0.2 E.U./dL   Blood: x / Protein: 100 mg/dL / Nitrite: NEGATIVE   Leuk Esterase: Trace / RBC: See Note /HPF / WBC 5-10 /HPF   Sq Epi: x / Non Sq Epi: Rare /HPF / Bacteria: Present /HPF      Sodium, Random Urine: 32 mmol/L ( @ 14:17)  Potassium, Random Urine: 57 mmol/L ( @ 14:17)

## 2017-06-06 NOTE — BRIEF OPERATIVE NOTE - POST-OP DX
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

## 2017-06-06 NOTE — PROGRESS NOTE ADULT - SUBJECTIVE AND OBJECTIVE BOX
Patient is a 77y old  Male who presents with a chief complaint of cough (2017 13:48)      HPI:  77M with PMHx of CLL (baseline WBC 19 range), benzo abuse 2/2 insomnia, recent admission for R femoral neck fracture after fall (s/p R hip igor-arthroplasty, course c/b hypoxia 2/2 provoked acute PE, discharged on Xarelto on ) presents with respiratory distress, hypoxia with O2 sat 70% range, AMS, labs notable for Cr 8 range up from 1.00 after last DC, K 9, s/p intubation in ED. Patient unable to provide history at this time. Per history of [facility] NS, patient recently spiking fevers to 101F, +cough. CXR in outpatient setting with no new infiltrate. Unable to clarify at this time whether patient was receiving/refusing Xarelto at rehab.     In the ED, patient afebrile (although receiving antipyretics at [facility]), tachycardic with HRs 112-132, BPs 149-203/79-88, RR 20s, SpO2 80s on RA. Per ED note, patient with labored breathing, remained hypoxic to the 90s on 100% NRB. Patient initially described as alert and awake, but intermittently confused and rambling. Exam notable for decreased breath sounds on the L lung and paradoxical breathing. Blood gas revealing of hypoxemia. Decision made to intubate with Rocuronium and Propofol for hypoxemic respiratory failure. Labs notable for Cr 8 range, K 9, WBC 80s range. CXR with poor inspiratory effort, new LLL opacification and RLL opacification (compared to CXR from ). EKG with sinus tachycardia and new RBBB (not previously seen on EKG). Bladder scan performed and patient found to be retaining. Rodriguez placed with initial removal of 1.7L of urine. Patient administered Insulin/D50, Kayexalate rectal, sodium bicarb, Duonebs, 1L IV NS, Vanc 1g x1, Zosyn 3.375g x1. ICU consulted and patient admitted to ICU for monitoring and management. (2017 13:48)    INTERVAL HPI/OVERNIGHT EVENTS:::    HEALTH ISSUES - PROBLEM Dx:  PE (pulmonary thromboembolism): PE (pulmonary thromboembolism)  PNA (pneumonia): PNA (pneumonia)  Acute renal failure, unspecified acute renal failure type: Acute renal failure, unspecified acute renal failure type  Hyperkalemia: Hyperkalemia  Acute respiratory failure with hypoxia: Acute respiratory failure with hypoxia          PAST MEDICAL & SURGICAL HISTORY:  Hip fracture requiring operative repair, right, sequela  PE (pulmonary thromboembolism)  CLL (chronic lymphocytic leukemia)  S/P hip hemiarthroplasty          Consultant NOTE  REVIEWED  (   )    REVIEW OF SYSTEMS:  [x] As per HPI  CONSTITUTIONAL: No fever, weight loss, or fatigue  RESPIRATORY: No cough, wheezing, chills or hemoptysis; No Shortness of Breath  CARDIOVASCULAR: No chest pain, palpitations, dizziness, or leg swelling  GASTROINTESTINAL: No abdominal or epigastric pain. No nausea, vomiting, or hematemesis; No diarrhea or constipation. No melena or hematochezia.  MUSCULOSKELETAL: No joint pain or swelling; No muscle, back, or extremity pain  PSYCH    awake, alert       [x] All others negative	  [ ] Unable to obtain          Vital Signs Last 24 Hrs  T(C): 37.1, Max: 37.7 ( @ 11:04)  T(F): 98.8, Max: 99.8 ( @ 11:04)  HR: 92 (50 - 120)  BP: 113/55 (104/60 - 203/88)  BP(mean): 76 (66 - 101)  RR: 28 (8 - 28)  SpO2: 96% (90% - 100%)    Mode: CPAP with PS  FiO2: 50  PEEP: 5  MAP: 6.8  PIP: 11    I & Os for 24h ending  @ 07:00  =============================================  IN: 4491.5 ml / OUT: 6675 ml / NET: -2183.5 ml    I & Os for current day (as of  @ 10:52)  =============================================  IN: 468 ml / OUT: 476 ml / NET: -8 ml    PHYSICAL EXAMINATION:                                    (    )  NO CHANGE  Appearance: Normal	  HEENT:   Normal oral mucosa, PERRL, EOMI	  Neck: Supple, + JVD/ - JVD; Carotid Bruit   Cardiovascular: Normal S1 S2, No JVD, No murmurs,   Respiratory: Lungs clear to auscultation/Decreased Breath Sounds/No Rales, Rhonchi, Wheezing	  Gastrointestinal:  Soft, Non-tender, + BS	  Skin: No rashes, No ecchymoses, No cyanosis  Extremities: Normal range of motion, No clubbing, cyanosis or edema  Vascular: Peripheral pulses palpable 2+ bilaterally  Neurologic: Non-focal  Psychiatry: A & O x 3, Mood & affect appropriate    propofol Infusion 5MICROgram(s)/kG/Min IV Continuous <Continuous>  pantoprazole  Injectable 40milliGRAM(s) IV Push daily  sodium chloride 0.45%. 1000milliLiter(s) IV Continuous <Continuous>  chlorhexidine 0.12% Liquid 15milliLiter(s) Swish and Spit two times a day  midazolam Infusion 2mG/Hr IV Continuous <Continuous>  doxazosin 8milliGRAM(s) Oral at bedtime  piperacillin/tazobactam IVPB. 2.25Gram(s) IV Intermittent every 6 hours  heparin  Infusion 1600Unit(s)/Hr IV Continuous <Continuous>        PT/INR - ( 2017 10:55 )   PT: 15.8 sec;   INR: 1.41          PTT - ( 2017 06:36 )  PTT:47.1 sec    CARDIAC MARKERS ( 2017 15:01 )  x     / 0.02 ng/mL / x     / x     / x      CARDIAC MARKERS ( 2017 10:55 )  x     / 0.04 ng/mL / 42 U/L / x     / 3.1 ng/mL                            8.6    44.9  )-----------( 451      ( 2017 06:36 )             26.1     06-06    138  |  100  |  73<H>  ----------------------------<  112<H>  4.1   |  24  |  2.10<H>    Ca    7.9<L>      2017 06:36  Phos  4.2     06-06  Mg     2.7     06-06    TPro  6.4  /  Alb  3.1<L>  /  TBili  0.7  /  DBili  x   /  AST  38  /  ALT  107<H>  /  AlkPhos  160<H>  06-05      CAPILLARY BLOOD GLUCOSE  126 (2017 06:00)  129 (2017 18:00)  149 (2017 10:54)    Urinalysis Basic - ( 2017 14:20 )    Color: Pink / Appearance: Cloudy / S.020 / pH: x  Gluc: x / Ketone: NEGATIVE  / Bili: NEGATIVE / Urobili: 0.2 E.U./dL   Blood: x / Protein: 100 mg/dL / Nitrite: NEGATIVE   Leuk Esterase: Trace / RBC: See Note /HPF / WBC 5-10 /HPF   Sq Epi: x / Non Sq Epi: Rare /HPF / Bacteria: Present /HPF Patient is a 77y old  Male who presents with a chief complaint of cough (2017 13:48)      HPI:  77M with PMHx of CLL (baseline WBC 19 range), benzo abuse 2/2 insomnia, recent admission for R femoral neck fracture after fall (s/p R hip igor-arthroplasty, course c/b hypoxia 2/2 provoked acute PE, discharged on Xarelto on ) presents with respiratory distress, hypoxia with O2 sat 70% range, AMS, labs notable for Cr 8 range up from 1.00 after last DC, K 9, s/p intubation in ED. Patient unable to provide history at this time. Per history of [facility] NS, patient recently spiking fevers to 101F, +cough. CXR in outpatient setting with no new infiltrate. Unable to clarify at this time whether patient was receiving/refusing Xarelto at rehab.     In the ED, [facility]febrile [facility] tachycardic with HRs 112-132, BPs 149-203/79-88, RR 20s, SpO2 80s on RA. Per ED note, patient with labored breathing, remained hypoxic to the 90s on 100% NRB. Patient initially described as alert and awake, but intermittently confused and rambling. Exam notable for decreased breath sounds on the L lung and paradoxical breathing. Blood gas revealing of hypoxemia. Decision made to intubate with Rocuronium and Propofol for hypoxemic respiratory failure. Labs notable for Cr 8 range, K 9, WBC 80s range. CXR with poor inspiratory effort, new LLL opacification and RLL opacification (compared to CXR from ). EKG with sinus tachycardia and new RBBB (not previously seen on EKG). Bladder scan performed and patient found to be retaining. Rodriguez placed with initial removal of 1.7L of urine. Patient administered Insulin/D50, Kayexalate rectal, sodium bicarb, Duonebs, 1L IV NS, Vanc 1g x1, Zosyn 3.375g x1. ICU consulted and patient admitted to ICU for monitoring and management. (2017 13:48)    INTERVAL HPI/OVERNIGHT EVENTS:::on vent, sedated  spoke w family     HEALTH ISSUES - PROBLEM Dx:  PE (pulmonary thromboembolism): PE (pulmonary thromboembolism)  PNA (pneumonia): PNA (pneumonia)  Acute renal failure, unspecified acute renal failure type: Acute renal failure, unspecified acute renal failure type  Hyperkalemia: Hyperkalemia  Acute respiratory failure with hypoxia: Acute respiratory failure with hypoxia          PAST MEDICAL & SURGICAL HISTORY:  Hip fracture requiring operative repair, right, sequela  PE (pulmonary thromboembolism)  CLL (chronic lymphocytic leukemia)  S/P hip hemiarthroplasty          Consultant NOTE  REVIEWED  (   )    REVIEW OF SYSTEMS:    	  [ +++] Unable to obtain          Vital Signs Last 24 Hrs  T(C): 37.1, Max: 37.7 ( @ 11:04)  T(F): 98.8, Max: 99.8 ( @ 11:04)  HR: 92 (50 - 120)  BP: 113/55 (104/60 - 203/88)  BP(mean): 76 (66 - 101)  RR: 28 (8 - 28)  SpO2: 96% (90% - 100%)    Mode: CPAP with PS  FiO2: 50  PEEP: 5  MAP: 6.8  PIP: 11    I & Os for 24h ending  @ 07:00  =============================================  IN: 4491.5 ml / OUT: 6675 ml / NET: -2183.5 ml    I & Os for current day (as of  @ 10:52)  =============================================  IN: 468 ml / OUT: 476 ml / NET: -8 ml    PHYSICAL EXAMINATION:                                    (    )  NO CHANGE  Appearance: Normal  intubated	  HEENT:   Normal oral mucosa, PERRL, EOMI	  Neck: Supple, + JVD/ - JVD; Carotid Bruit   Cardiovascular: Normal S1 S2, No JVD, No murmurs,   Respiratory: bilat rhonchi  Gastrointestinal:  Soft, Non-tender, + BS	  Skin: No rashes, No ecchymoses, No cyanosis  Extremities: Normal range of motion, No clubbing, cyanosis or edema  Vascular: Peripheral pulses palpable 2+ bilaterally  Neurologic: sedated  Psychiatry: sedated    propofol Infusion 5MICROgram(s)/kG/Min IV Continuous <Continuous>  pantoprazole  Injectable 40milliGRAM(s) IV Push daily  sodium chloride 0.45%. 1000milliLiter(s) IV Continuous <Continuous>  chlorhexidine 0.12% Liquid 15milliLiter(s) Swish and Spit two times a day  midazolam Infusion 2mG/Hr IV Continuous <Continuous>  doxazosin 8milliGRAM(s) Oral at bedtime  piperacillin/tazobactam IVPB. 2.25Gram(s) IV Intermittent every 6 hours  heparin  Infusion 1600Unit(s)/Hr IV Continuous <Continuous>        PT/INR - ( 2017 10:55 )   PT: 15.8 sec;   INR: 1.41          PTT - ( 2017 06:36 )  PTT:47.1 sec    CARDIAC MARKERS ( 2017 15:01 )  x     / 0.02 ng/mL / x     / x     / x      CARDIAC MARKERS ( 2017 10:55 )  x     / 0.04 ng/mL / 42 U/L / x     / 3.1 ng/mL                            8.6    44.9  )-----------( 451      ( 2017 06:36 )             26.1     06-06    138  |  100  |  73<H>  ----------------------------<  112<H>  4.1   |  24  |  2.10<H>    Ca    7.9<L>      2017 06:36  Phos  4.2     06-06  Mg     2.7     06-06    TPro  6.4  /  Alb  3.1<L>  /  TBili  0.7  /  DBili  x   /  AST  38  /  ALT  107<H>  /  AlkPhos  160<H>        CAPILLARY BLOOD GLUCOSE  126 (2017 06:00)  129 (2017 18:00)  149 (2017 10:54)    Urinalysis Basic - ( 2017 14:20 )    Color: Pink / Appearance: Cloudy / S.020 / pH: x  Gluc: x / Ketone: NEGATIVE  / Bili: NEGATIVE / Urobili: 0.2 E.U./dL   Blood: x / Protein: 100 mg/dL / Nitrite: NEGATIVE   Leuk Esterase: Trace / RBC: See Note /HPF / WBC 5-10 /HPF   Sq Epi: x / Non Sq Epi: Rare /HPF / Bacteria: Present /HPF      FINDINGS: Central lines and support catheters are reidentified and   unchanged in number and position. Left-sided pleural effusion. Underlying   pneumonia and/or atelectasis cannot be excluded.    IMPRESSION:    No significant interval change in multiple central lines and support   catheters.  Persistent left-sided pleural effusion.     FINDINGS: Central lines and support catheters are reidentified and   unchanged in number and position. Left-sided pleural effusion. Underlying   pneumonia and/or atelectasis cannot be excluded.    IMPRESSION:    No significant interval change in multiple central lines and support   catheters.  Persistent left-sided pleural effusion.

## 2017-06-06 NOTE — PROGRESS NOTE ADULT - SUBJECTIVE AND OBJECTIVE BOX
The patient discussed c   The patiient known to him.   taking over PM/[facility] mngmnt  Discussed c   Discussed with managing team

## 2017-06-06 NOTE — BRIEF OPERATIVE NOTE - OPERATION/FINDINGS
Procedure: Hemodialysis Catheter Placement  Indication: Access for plasmaphoresis  Site: [facility]  Consent: in chart    A time-out was completed verifying correct patient, procedure, site, positioning, and special equipment if applicable. The patient was placed in a dependent position (trendelenberg) appropriate for central line placement based on the vein to be cannulated. The patient’s right  neck was prepped and draped in sterile fashion. 1% Lidocaine was used to anesthetize the surrounding skin area. A 12 Frisian HD catheter (16cm) was introduced into the the right internal jugular  vein using the Seldinger technique and under ultrasound guidance. The catheter was threaded smoothly over the guide wire and appropriate blood return was obtained. The catheter was then sutured in place to the skin and a sterile dressing applied. Chest XR was ordered to confirm placement.  Estimated Blood Loss: Minimal  Lung sliding was present before and after the procedure.    The patient had SVT during guidewire insertion, it did not improved with retraction of the guidewire a few cm. The rest of the procedure with double dilation and catheter placement and removal of the guidewire was done quickly and safely. She remained in SVT while trying vagal maneuvers. CXR confirmed no PTX and appropriate positioning of the catheter. Work up and management for SVT continued.

## 2017-06-06 NOTE — PROGRESS NOTE ADULT - SUBJECTIVE AND OBJECTIVE BOX
INTERVAL HPI/OVERNIGHT EVENTS:    SUBJECTIVE: Patient seen and examined at bedside.     ROS:  CV: Denies chest pain  Resp: Denies SOB  GI: Denies abdominal pain, constipation, diarrhea, nausea, vomiting  : Denies dysuria  ID: Denies fevers, chills  MSK: Denies joint pain     OBJECTIVE:    VITAL SIGNS:  ICU Vital Signs Last 24 Hrs  T(C): 36.6, Max: 37.7 ( @ 11:04)  T(F): 97.9, Max: 99.8 ( @ 11:04)  HR: 96 (96 - 132)  BP: 114/67 (104/60 - 203/88)  BP(mean): 81 (66 - 101)  ABP: --  ABP(mean): --  RR: 13 (8 - 22)  SpO2: 98% (84% - 100%)    Mode: AC/ CMV (Assist Control/ Continuous Mandatory Ventilation), RR (machine): 12, TV (machine): 500, FiO2: 60, PEEP: 5, ITime: 1, MAP: 8.6, PC: 5, PIP: 20    I & Os for current day (as of  @ 07:00)  =============================================  IN: 4491.5 ml / OUT: 6675 ml / NET: -2183.5 ml    CAPILLARY BLOOD GLUCOSE  126 (2017 06:00)      PHYSICAL EXAM:    General: NAD  HEENT: NC/AT; PERRL, clear conjunctiva  Neck: supple  Respiratory: CTA b/l  Cardiovascular: +S1/S2; RRR  Abdomen: soft, NT/ND; +BS x4  Extremities: WWP, 2+ peripheral pulses b/l; no LE edema  Skin: normal color and turgor; no rash  MSK: no joint swelling  Neurological:     MEDICATIONS:  MEDICATIONS  (STANDING):  propofol Infusion 5MICROgram(s)/kG/Min IV Continuous <Continuous>  pantoprazole  Injectable 40milliGRAM(s) IV Push daily  sodium chloride 0.45%. 1000milliLiter(s) IV Continuous <Continuous>  chlorhexidine 0.12% Liquid 15milliLiter(s) Swish and Spit two times a day  midazolam Infusion 2mG/Hr IV Continuous <Continuous>  doxazosin 8milliGRAM(s) Oral at bedtime  piperacillin/tazobactam IVPB. 2.25Gram(s) IV Intermittent every 6 hours  heparin  Infusion 1600Unit(s)/Hr IV Continuous <Continuous>    MEDICATIONS  (PRN):      ALLERGIES:  Allergies    No Known Allergies    Intolerances        LABS:                        11.1   67.4  )-----------( 477      ( 2017 15:01 )             33.1         137  |  100  |  102<H>  ----------------------------<  127<H>  4.6   |  23  |  3.60<H>    Ca    8.4      2017 21:23  Phos  4.9       Mg     2.7         TPro  6.4  /  Alb  3.1<L>  /  TBili  0.7  /  DBili  x   /  AST  38  /  ALT  107<H>  /  AlkPhos  160<H>      PT/INR - ( 2017 10:55 )   PT: 15.8 sec;   INR: 1.41          PTT - ( 2017 06:36 )  PTT:47.1 sec  Urinalysis Basic - ( 2017 14:20 )    Color: Pink / Appearance: Cloudy / S.020 / pH: x  Gluc: x / Ketone: NEGATIVE  / Bili: NEGATIVE / Urobili: 0.2 E.U./dL   Blood: x / Protein: 100 mg/dL / Nitrite: NEGATIVE   Leuk Esterase: Trace / RBC: See Note /HPF / WBC 5-10 /HPF   Sq Epi: x / Non Sq Epi: Rare /HPF / Bacteria: Present /HPF        RADIOLOGY & ADDITIONAL TESTS: Reviewed. INTERVAL HPI/OVERNIGHT EVENTS: ANNETTA    SUBJECTIVE: Patient seen and examined at bedside. Patient no longer sedated but remains intubated and somnolent, unable to assess ROS.     VITAL SIGNS:  ICU Vital Signs Last 24 Hrs  T(C): 36.6, Max: 37.7 (06-05 @ 11:04)  T(F): 97.9, Max: 99.8 (06-05 @ 11:04)  HR: 96 (96 - 132)  BP: 114/67 (104/60 - 203/88)  BP(mean): 81 (66 - 101)  RR: 13 (8 - 22)  SpO2: 98% (84% - 100%)    Mode: AC/ CMV (Assist Control/ Continuous Mandatory Ventilation), RR (machine): 12, TV (machine): 500, FiO2: 60, PEEP: 5, ITime: 1, MAP: 8.6, PC: 5, PIP: 20    I & Os for current day (as of 06-06 @ 07:00)  =============================================  IN: 4491.5 ml / OUT: 6675 ml / NET: -2183.5 ml    CAPILLARY BLOOD GLUCOSE  126 (06 Jun 2017 06:00)    PHYSICAL EXAM:  Constitutional: elderly male, intubated, somnolent, opens eyes with name calling, follows commands  HEENT: NCAT, PERRL, dry mucous membranes   Neck: supple, no JVD   Pulm: decreased breath sounds on the L (improved since previous exam), no wheezing  CV: RRR, +S1S2, no murmurs appreciated  GI: soft, nondistended, +BS throughout  Ext: WWP, RLE 3+ pitting edema, LLE 2+ pitting edema  Vasc: DP and radial pulses 2+ bilaterally   Skin: 3x8in ecchymotic patch on the L abdomen; multiple scaly, waxy plaques on legs consistent with possible SKs. 3 linear skin tears on posterior R thigh.   Neuro: somnolent but opens eyes, follows commands    MEDICATIONS:  MEDICATIONS  (STANDING):  propofol Infusion 5MICROgram(s)/kG/Min IV Continuous <Continuous>  pantoprazole  Injectable 40milliGRAM(s) IV Push daily  sodium chloride 0.45%. 1000milliLiter(s) IV Continuous <Continuous>  chlorhexidine 0.12% Liquid 15milliLiter(s) Swish and Spit two times a day  midazolam Infusion 2mG/Hr IV Continuous <Continuous>  doxazosin 8milliGRAM(s) Oral at bedtime  piperacillin/tazobactam IVPB. 2.25Gram(s) IV Intermittent every 6 hours  heparin  Infusion 1600Unit(s)/Hr IV Continuous <Continuous>    MEDICATIONS  (PRN):    LABS:                        8.6    44.9  )-----------( 451      ( 06 Jun 2017 06:36 )             26.1     138  |  100  |  73<H>  ----------------------------<  112<H>  4.1   |  24  |  2.10<H>    Ca    7.9<L>      Phos  4.2     Mg     2.7         PTT - ( 06 Jun 2017 06:36 )  PTT:47.1 sec    RADIOLOGY & ADDITIONAL TESTS: Reviewed.    ASSESSMENT/PLAN:   77M with PMHx of CLL (baseline WBC 19 range), benzo abuse 2/2 insomnia, recent admission for R femoral neck fracture after fall (s/p R hip igor-arthroplasty, course c/b hypoxia 2/2 provoked acute PE, discharged on Xarelto on 5/24) presents with respiratory distress and AMS s/p intubation for hypoxemic respiratory failure in the setting of LLL mucous plug with atelectasis and consolidation. Patient also found to have JARED 2/2 obstructive uropathy with associated hyperkalemia.    PULM:  #Hypoxic respiratory failure: Patient presented with respiratory distress and hypoxia to the 70s on RA. Placed on NRB and still saturating only to the 90s. Blood gas revealing of hypoxemia. Decision made to intubate 2/2 hypoxemia. Lung exam with decreased breath sounds on left. Etiology includes L lung PNA with possible effusion seen on CXR vs RLL PNA (possible aspiration given AMS of unclear duration vs atelectasis vs pulmonary hemorrhage vs less likely new PE vs mucous plug vs lower suspicion MI (trops peaked). CT scan significant for LLL mucous plug with associated atelectasis, consolidation and effusion.   - c/w mechanical vent; wean as tolerated   - c/w sedation with Propofol gtt and Versed gtt while patient intubated   - plan for bronchoscopy today and possible extubation later in the day  - f/u BCx     #Pneumonia: On presentation, patient tachycardic and tachypneic. Not febrile, but unclear whether patient received antipyretic prior to presentation. Labs significant for leukocytosis to 80s (hx of CLL with baseline WBCs in the 20s). CT chest with LLL mucous plug and associated effusion and atelectasis. Unable to r/o pneumonia at this time.   - s/p Vanc and Zosyn in the ED; will continue Vanc by level (f/u AM level) and Zosyn 2.25g Q6hrs (renally dosed) for management of HAP     #PE: History of provoked PE on last admission after R hip arthroplasty, discharged on Xarelto but unclear if patient taking.   - c/w Hep gtt, goal ptt 60-80    ID:   #Severe sepsis: POA, patient tachycardic, tachypneic, leukocytosis. Etiology likely pneumonia vs aspiration pneumonia as patient altered on presentation.  - c/w management of PNA as above     RENAL:  #Acute renal failure: likely 2/2 obstructive uropathy 2/2 underlying BPH (patient started on Cardura on previous admission). On presentation, creatinine elevated to 8.80 (baseline creatinine 1.0 as seen on previous labs). Bladder scan revealing of retained urine. Rodriguez catheter placed and patient with initial U/O 1.7L, total 2.5L. CT scan significant for bilateral hydronephrosis.    - c/w Cardura 8mg QHS   - monitor strict I/Os   - per renal recs: c/w NS at 1/2 rate of UO in setting of possible post obstructive diuresisNow intubated/sedated. Etiology includes L lung PNA with possible effusion seen on CXR vs RLL PNA (possible aspiration given AMS of unclear duration vs atelectasis vs pulmonary hemorrhage vs less likely new PE vs mucous plug vs lower suspicion MI.    #Hyperkalemia: On presentation, patient with potassium 9. No peaked T waves on EKG. Likely 2/2 ARF. Improving.   - will continue to monitor BMP Q4hrs    #Electrolyte disorder: likely all 2/2 ARF. Improving.  - will continue to monitor    HEME:  #CLL: Baseline WBCs 20s. No active treatment.     #Insomnia: On benzos at home  - will hold benzos for now and continue sedation with Versed gtt    FEN:  #Fluids: NS@100cc/hr  #Electrolytes: replete PRN  #Nutrition: NPO for now     #Lines: L IJ triple lumen catheter  #Rodriguez: yes (placed 6/5)    PPx: Hep gtt  Dispo: MICU  FULL CODE

## 2017-06-07 LAB
ANION GAP SERPL CALC-SCNC: 12 MMOL/L — SIGNIFICANT CHANGE UP (ref 5–17)
APPEARANCE UR: (no result)
APTT BLD: 72.6 SEC — HIGH (ref 27.5–37.4)
APTT BLD: 87.1 SEC — HIGH (ref 27.5–37.4)
APTT BLD: 98.9 SEC — HIGH (ref 27.5–37.4)
BILIRUB UR-MCNC: NEGATIVE — SIGNIFICANT CHANGE UP
BLD GP AB SCN SERPL QL: NEGATIVE — SIGNIFICANT CHANGE UP
BUN SERPL-MCNC: 46 MG/DL — HIGH (ref 7–23)
CALCIUM SERPL-MCNC: 7.8 MG/DL — LOW (ref 8.4–10.5)
CHLORIDE SERPL-SCNC: 106 MMOL/L — SIGNIFICANT CHANGE UP (ref 96–108)
CHLORIDE UR-SCNC: 34 MMOL/L — SIGNIFICANT CHANGE UP
CHOLEST SERPL-MCNC: 111 MG/DL — SIGNIFICANT CHANGE UP (ref 10–199)
CO2 SERPL-SCNC: 25 MMOL/L — SIGNIFICANT CHANGE UP (ref 22–31)
COLOR SPEC: YELLOW — SIGNIFICANT CHANGE UP
CREAT ?TM UR-MCNC: 59 MG/DL — SIGNIFICANT CHANGE UP
CREAT SERPL-MCNC: 1 MG/DL — SIGNIFICANT CHANGE UP (ref 0.5–1.3)
CULTURE RESULTS: NO GROWTH — SIGNIFICANT CHANGE UP
DIFF PNL FLD: (no result)
GLUCOSE SERPL-MCNC: 104 MG/DL — HIGH (ref 70–99)
GLUCOSE UR QL: NEGATIVE — SIGNIFICANT CHANGE UP
GRAM STN FLD: SIGNIFICANT CHANGE UP
HCT VFR BLD CALC: 23.5 % — LOW (ref 39–50)
HCT VFR BLD CALC: 26.6 % — LOW (ref 39–50)
HGB BLD-MCNC: 7.5 G/DL — LOW (ref 13–17)
HGB BLD-MCNC: 8.1 G/DL — LOW (ref 13–17)
KETONES UR-MCNC: NEGATIVE — SIGNIFICANT CHANGE UP
LEUKOCYTE ESTERASE UR-ACNC: (no result)
MAGNESIUM SERPL-MCNC: 2.3 MG/DL — SIGNIFICANT CHANGE UP (ref 1.6–2.6)
MCHC RBC-ENTMCNC: 29.2 PG — SIGNIFICANT CHANGE UP (ref 27–34)
MCHC RBC-ENTMCNC: 29.5 PG — SIGNIFICANT CHANGE UP (ref 27–34)
MCHC RBC-ENTMCNC: 30.5 G/DL — LOW (ref 32–36)
MCHC RBC-ENTMCNC: 31.9 G/DL — LOW (ref 32–36)
MCV RBC AUTO: 92.5 FL — SIGNIFICANT CHANGE UP (ref 80–100)
MCV RBC AUTO: 96 FL — SIGNIFICANT CHANGE UP (ref 80–100)
NITRITE UR-MCNC: NEGATIVE — SIGNIFICANT CHANGE UP
OSMOLALITY UR: 670 MOSMOL/KG — HIGH (ref 100–650)
PH UR: 5.5 — SIGNIFICANT CHANGE UP (ref 5–8)
PHOSPHATE SERPL-MCNC: 3 MG/DL — SIGNIFICANT CHANGE UP (ref 2.5–4.5)
PLATELET # BLD AUTO: 358 K/UL — SIGNIFICANT CHANGE UP (ref 150–400)
PLATELET # BLD AUTO: 384 K/UL — SIGNIFICANT CHANGE UP (ref 150–400)
POTASSIUM SERPL-MCNC: 3.1 MMOL/L — LOW (ref 3.5–5.3)
POTASSIUM SERPL-SCNC: 3.1 MMOL/L — LOW (ref 3.5–5.3)
POTASSIUM UR-SCNC: 53 MMOL/L — SIGNIFICANT CHANGE UP
PROT UR-MCNC: 30 MG/DL
RBC # BLD: 2.54 M/UL — LOW (ref 4.2–5.8)
RBC # BLD: 2.77 M/UL — LOW (ref 4.2–5.8)
RBC # FLD: 14.3 % — SIGNIFICANT CHANGE UP (ref 10.3–16.9)
RBC # FLD: 14.7 % — SIGNIFICANT CHANGE UP (ref 10.3–16.9)
RH IG SCN BLD-IMP: POSITIVE — SIGNIFICANT CHANGE UP
SODIUM SERPL-SCNC: 143 MMOL/L — SIGNIFICANT CHANGE UP (ref 135–145)
SODIUM UR-SCNC: 30 MMOL/L — SIGNIFICANT CHANGE UP
SP GR SPEC: 1.02 — SIGNIFICANT CHANGE UP (ref 1–1.03)
SPECIMEN SOURCE: SIGNIFICANT CHANGE UP
SPECIMEN SOURCE: SIGNIFICANT CHANGE UP
URATE SERPL-MCNC: 3.2 MG/DL — LOW (ref 3.4–8.8)
UROBILINOGEN FLD QL: 1 E.U./DL — SIGNIFICANT CHANGE UP
VANCOMYCIN TROUGH SERPL-MCNC: 5.7 UG/ML — LOW (ref 10–20)
WBC # BLD: 25 K/UL — HIGH (ref 3.8–10.5)
WBC # BLD: 25.2 K/UL — HIGH (ref 3.8–10.5)
WBC # FLD AUTO: 25 K/UL — HIGH (ref 3.8–10.5)
WBC # FLD AUTO: 25.2 K/UL — HIGH (ref 3.8–10.5)

## 2017-06-07 PROCEDURE — 71010: CPT | Mod: 26

## 2017-06-07 PROCEDURE — 99233 SBSQ HOSP IP/OBS HIGH 50: CPT | Mod: GC

## 2017-06-07 RX ORDER — ACETAMINOPHEN 500 MG
650 TABLET ORAL EVERY 6 HOURS
Qty: 0 | Refills: 0 | Status: DISCONTINUED | OUTPATIENT
Start: 2017-06-07 | End: 2017-06-13

## 2017-06-07 RX ORDER — POTASSIUM CHLORIDE 20 MEQ
40 PACKET (EA) ORAL ONCE
Qty: 0 | Refills: 0 | Status: DISCONTINUED | OUTPATIENT
Start: 2017-06-07 | End: 2017-06-07

## 2017-06-07 RX ORDER — ALPRAZOLAM 0.25 MG
1 TABLET ORAL EVERY 12 HOURS
Qty: 0 | Refills: 0 | Status: DISCONTINUED | OUTPATIENT
Start: 2017-06-07 | End: 2017-06-12

## 2017-06-07 RX ORDER — TAMSULOSIN HYDROCHLORIDE 0.4 MG/1
0.4 CAPSULE ORAL AT BEDTIME
Qty: 0 | Refills: 0 | Status: DISCONTINUED | OUTPATIENT
Start: 2017-06-07 | End: 2017-06-07

## 2017-06-07 RX ORDER — POTASSIUM CHLORIDE 20 MEQ
40 PACKET (EA) ORAL EVERY 4 HOURS
Qty: 0 | Refills: 0 | Status: COMPLETED | OUTPATIENT
Start: 2017-06-07 | End: 2017-06-07

## 2017-06-07 RX ORDER — PIPERACILLIN AND TAZOBACTAM 4; .5 G/20ML; G/20ML
3.38 INJECTION, POWDER, LYOPHILIZED, FOR SOLUTION INTRAVENOUS EVERY 6 HOURS
Qty: 0 | Refills: 0 | Status: DISCONTINUED | OUTPATIENT
Start: 2017-06-07 | End: 2017-06-08

## 2017-06-07 RX ORDER — POTASSIUM CHLORIDE 20 MEQ
20 PACKET (EA) ORAL
Qty: 0 | Refills: 0 | Status: COMPLETED | OUTPATIENT
Start: 2017-06-07 | End: 2017-06-07

## 2017-06-07 RX ORDER — CLONAZEPAM 1 MG
0.5 TABLET ORAL EVERY 8 HOURS
Qty: 0 | Refills: 0 | Status: DISCONTINUED | OUTPATIENT
Start: 2017-06-07 | End: 2017-06-07

## 2017-06-07 RX ORDER — APIXABAN 2.5 MG/1
5 TABLET, FILM COATED ORAL
Qty: 0 | Refills: 0 | Status: DISCONTINUED | OUTPATIENT
Start: 2017-06-14 | End: 2017-06-13

## 2017-06-07 RX ORDER — APIXABAN 2.5 MG/1
10 TABLET, FILM COATED ORAL
Qty: 0 | Refills: 0 | Status: DISCONTINUED | OUTPATIENT
Start: 2017-06-07 | End: 2017-06-13

## 2017-06-07 RX ORDER — TAMSULOSIN HYDROCHLORIDE 0.4 MG/1
0.8 CAPSULE ORAL AT BEDTIME
Qty: 0 | Refills: 0 | Status: DISCONTINUED | OUTPATIENT
Start: 2017-06-07 | End: 2017-06-13

## 2017-06-07 RX ADMIN — Medication 50 MILLIEQUIVALENT(S): at 07:44

## 2017-06-07 RX ADMIN — Medication 40 MILLIEQUIVALENT(S): at 17:39

## 2017-06-07 RX ADMIN — Medication 1 MILLIGRAM(S): at 21:51

## 2017-06-07 RX ADMIN — Medication 50 MILLIEQUIVALENT(S): at 11:40

## 2017-06-07 RX ADMIN — HEPARIN SODIUM 16.5 UNIT(S)/HR: 5000 INJECTION INTRAVENOUS; SUBCUTANEOUS at 15:21

## 2017-06-07 RX ADMIN — PIPERACILLIN AND TAZOBACTAM 200 GRAM(S): 4; .5 INJECTION, POWDER, LYOPHILIZED, FOR SOLUTION INTRAVENOUS at 17:39

## 2017-06-07 RX ADMIN — APIXABAN 10 MILLIGRAM(S): 2.5 TABLET, FILM COATED ORAL at 19:27

## 2017-06-07 RX ADMIN — Medication 50 MILLIEQUIVALENT(S): at 10:32

## 2017-06-07 RX ADMIN — CHLORHEXIDINE GLUCONATE 15 MILLILITER(S): 213 SOLUTION TOPICAL at 05:51

## 2017-06-07 RX ADMIN — PIPERACILLIN AND TAZOBACTAM 200 GRAM(S): 4; .5 INJECTION, POWDER, LYOPHILIZED, FOR SOLUTION INTRAVENOUS at 23:49

## 2017-06-07 RX ADMIN — PIPERACILLIN AND TAZOBACTAM 200 GRAM(S): 4; .5 INJECTION, POWDER, LYOPHILIZED, FOR SOLUTION INTRAVENOUS at 11:40

## 2017-06-07 RX ADMIN — TAMSULOSIN HYDROCHLORIDE 0.8 MILLIGRAM(S): 0.4 CAPSULE ORAL at 21:27

## 2017-06-07 RX ADMIN — PANTOPRAZOLE SODIUM 40 MILLIGRAM(S): 20 TABLET, DELAYED RELEASE ORAL at 11:40

## 2017-06-07 RX ADMIN — PIPERACILLIN AND TAZOBACTAM 200 GRAM(S): 4; .5 INJECTION, POWDER, LYOPHILIZED, FOR SOLUTION INTRAVENOUS at 05:51

## 2017-06-07 RX ADMIN — CHLORHEXIDINE GLUCONATE 15 MILLILITER(S): 213 SOLUTION TOPICAL at 17:39

## 2017-06-07 NOTE — PROGRESS NOTE ADULT - PROBLEM SELECTOR PLAN 1
pt with arf resolving 2/2 to resolution of obstruction  creatinine decreased from 9 to 1.0    bp acceptable   electrolytes - hypokalemia - repleace with 20 meq  kcl iv times 2

## 2017-06-07 NOTE — DIETITIAN INITIAL EVALUATION ADULT. - OTHER INFO
Admitted with AMS s/p intubation for hypoxemic respiratory failure in the setting of LLL mucous plug with atelectasis and consolidation. Currently intubated on CPAP trial. MAP 83.

## 2017-06-07 NOTE — PROGRESS NOTE ADULT - ATTENDING COMMENTS
Patient seen and examined with house-staff during bedside rounds.  Resident note read, including vitals, physical findings, laboratory data, and radiological reports.   Revisions included below.  Direct personal management at bed side and extensive interpretation of the data.  Plan was outlined and discussed in details with the housestaff.  Decision making of high complexity  extubated and is doing well.  Change to NC.  Change to apixaban.  Cr improved and is normal.  has adequate OOB and start diet on antibiotics

## 2017-06-07 NOTE — PROGRESS NOTE ADULT - SUBJECTIVE AND OBJECTIVE BOX
Patient is a 77y old  Male who presents with a chief complaint of cough (2017 13:48)      HPI:  77M with PMHx of CLL (baseline WBC 19 range), benzo abuse 2/2 insomnia, recent admission for R femoral neck fracture after fall (s/p R hip igor-arthroplasty, course c/b hypoxia 2/2 provoked acute PE, discharged on Xarelto on ) presents with respiratory distress, hypoxia with O2 sat 70% range, AMS, labs notable for Cr 8 range up from 1.00 after last DC, K 9, s/p intubation in ED. Patient unable to provide history at this time. Per history of [facility], patient recently spiking fevers to 101F, +cough. CXR in outpatient setting with no new infiltrate. Unable to clarify at this time whether patient was receiving/refusing Xarelto at rehab.     In the ED, patient afebrile (although receiving antipyretics at [facility]), tachycardic with HRs 112-132, BPs 149-203/79-88, RR 20s, SpO2 80s on RA. Per ED note, patient with labored breathing, remained hypoxic to the 90s on 100% NRB. Patient initially described as alert and awake, but intermittently confused and rambling. Exam notable for decreased breath sounds on the L lung and paradoxical breathing. Blood gas revealing of hypoxemia. Decision made to intubate with Rocuronium and Propofol for hypoxemic respiratory failure. Labs notable for Cr 8 range, K 9, WBC 80s range. CXR with poor inspiratory effort, new LLL opacification and RLL opacification (compared to CXR from ). EKG with sinus tachycardia and new RBBB (not previously seen on EKG). Bladder scan performed and patient found to be retaining. Rodriguez placed with initial removal of 1.7L of urine. Patient administered Insulin/D50, Kayexalate rectal, sodium bicarb, Duonebs, 1L IV NS, Vanc 1g x1, Zosyn 3.375g x1. ICU consulted and patient admitted to ICU for monitoring and management. (2017 13:48)    INTERVAL HPI/OVERNIGHT EVENTS:::CV stable, on vent. Ab    HEALTH ISSUES - PROBLEM Dx:  PE (pulmonary thromboembolism): PE (pulmonary thromboembolism)  PNA (pneumonia): PNA (pneumonia)  Acute renal failure, unspecified acute renal failure type: Acute renal failure, unspecified acute renal failure type  Hyperkalemia: Hyperkalemia  Acute respiratory failure with hypoxia: Acute respiratory failure with hypoxia          PAST MEDICAL & SURGICAL HISTORY:  Hip fracture requiring operative repair, right, sequela  PE (pulmonary thromboembolism)  CLL (chronic lymphocytic leukemia)  S/P hip hemiarthroplasty          Consultant NOTE  REVIEWED  ( ++  )    REVIEW OF SYSTEMS:  [x] As per HPI vent  CONSTITUTIONAL: No fever, weight loss, or fatigue  RESPIRATORY: occ rhonchi   CARDIOVASCULAR: No chest pain, palpitations, dizziness, or leg swelling  GASTROINTESTINAL: No abdominal or epigastric pain. No nausea, vomiting, or hematemesis; No diarrhea or constipation. No melena or hematochezia.  MUSCULOSKELETAL: No joint pain or swelling; No muscle, back, or extremity pain  PSYCH    awake, sedated  [x] All others negative	  [ ] Unable to obtain          Vital Signs Last 24 Hrs  T(C): 36.7, Max: 38.1 ( @ 06:00)  T(F): 98, Max: 100.5 ( @ 06:00)  HR: 92 (84 - 98)  BP: 145/63 (109/56 - 145/63)  BP(mean): 82 (70 - 103)  RR: 15 (12 - 28)  SpO2: 98% (94% - 100%)    Mode: CPAP with PS  FiO2: 40  PEEP: 5  PS: 8  MAP: 8  PIP: 13    I & Os for 24h ending  @ 07:00  =============================================  IN: 5184.5 ml / OUT: 3196 ml / NET: 1988.5 ml    I & Os for current day (as of  @ 08:07)  =============================================  IN: 181 ml / OUT: 125 ml / NET: 56 ml    PHYSICAL EXAMINATION:                                    (    )  NO CHANGE  Appearance: Normal	VENT  HEENT:   Normal oral mucosa, PERRL, EOMI	  Neck: Supple, + JVD/ - JVD; Carotid Bruit   Cardiovascular: Normal S1 S2, No JVD, No murmurs,   Respiratory: occ rhonchi  Gastrointestinal:  Soft, Non-tender, + BS	  Skin: No rashes, No ecchymoses, No cyanosis  Extremities: Normal range of motion, No clubbing, cyanosis or edema  Vascular: Peripheral pulses palpable 2+ bilaterally  Neurologic: Non-focal  Psychiatry: A & O x 3, Mood & affect appropriate    pantoprazole  Injectable 40milliGRAM(s) IV Push daily  chlorhexidine 0.12% Liquid 15milliLiter(s) Swish and Spit two times a day  doxazosin 8milliGRAM(s) Oral at bedtime  piperacillin/tazobactam IVPB. 2.25Gram(s) IV Intermittent every 6 hours  sodium chloride 0.9%. 1000milliLiter(s) IV Continuous <Continuous>  heparin  Infusion 1800Unit(s)/Hr IV Continuous <Continuous>  potassium chloride  20 mEq/100 mL IVPB 20milliEquivalent(s) IV Intermittent every 2 hours        PT/INR - ( 2017 10:55 )   PT: 15.8 sec;   INR: 1.41          PTT - ( 2017 00:36 )  PTT:72.6 sec    CARDIAC MARKERS ( 2017 15:01 )  x     / 0.02 ng/mL / x     / x     / x      CARDIAC MARKERS ( 2017 10:55 )  x     / 0.04 ng/mL / 42 U/L / x     / 3.1 ng/mL                            7.5    25.2  )-----------( 358      ( 2017 06:17 )             23.5     06-07    143  |  106  |  46<H>  ----------------------------<  104<H>  3.1<L>   |  25  |  1.00    Ca    7.8<L>      2017 06:17  Phos  3.0     06-07  Mg     2.3     06-07    TPro  6.4  /  Alb  3.1<L>  /  TBili  0.7  /  DBili  x   /  AST  38  /  ALT  107<H>  /  AlkPhos  160<H>  06-05      CAPILLARY BLOOD GLUCOSE    Urinalysis Basic - ( 2017 06:55 )    Color: Yellow / Appearance: Cloudy / S.020 / pH: x  Gluc: x / Ketone: NEGATIVE  / Bili: NEGATIVE / Urobili: 1.0 E.U./dL   Blood: x / Protein: 30 mg/dL / Nitrite: NEGATIVE   Leuk Esterase: Trace / RBC: Many /HPF / WBC > 10 /HPF   Sq Epi: x / Non Sq Epi: Moderate /HPF / Bacteria: Present /HPF

## 2017-06-07 NOTE — PROGRESS NOTE ADULT - SUBJECTIVE AND OBJECTIVE BOX
Dr. Mark Singh  Renal Fellow  Beeper # [phone]        Patient seen and examined at bedside.   intubated and sedated      pantoprazole  Injectable 40milliGRAM(s) daily  chlorhexidine 0.12% Liquid 15milliLiter(s) two times a day  doxazosin 8milliGRAM(s) at bedtime  heparin  Infusion 1800Unit(s)/Hr <Continuous>  clonazePAM Tablet 0.5milliGRAM(s) every 8 hours PRN  potassium chloride   Powder 40milliEquivalent(s) every 4 hours  piperacillin/tazobactam IVPB. 3.375Gram(s) every 6 hours  tamsulosin 0.4milliGRAM(s) at bedtime      Allergies    No Known Allergies    Intolerances        T(C): , Max: 38.1 ( @ 06:00)  T(F): , Max: 100.5 ( @ 06:00)  HR: 92  BP: 140/66  BP(mean): 89  RR: 21  SpO2: 97%  Wt(kg): --  I & Os for 24h ending  @ 07:00  =============================================  IN:    sodium chloride 0.9%: 2100 ml    Solution: 1352.5 ml    Solution: 400 ml    sodium chloride 0.45%: 300 ml    heparin Infusion: 251 ml    Solution: 250 ml    Solution: 230 ml    Enteral Tube Flush: 100 ml    Solution: 100 ml    heparin Infusion: 51 ml    heparin  Infusion.: 34 ml    midazolam Infusion: 16 ml    Total IN: 5184.5 ml  ---------------------------------------------  OUT:    Voided: 3195 ml    Stool: 1 ml    Total OUT: 3196 ml  ---------------------------------------------  Total NET: 1988.5 ml    I & Os for current day (as of  @ 13:47)  =============================================  IN:    sodium chloride 0.9%: 200 ml    Solution: 200 ml    heparin Infusion: 105.5 ml    Solution: 100 ml    Solution: 63 ml    Total IN: 668.5 ml  ---------------------------------------------  OUT:    Voided: 650 ml    Total OUT: 650 ml  ---------------------------------------------  Total NET: 18.5 ml        PHYSICAL EXAM:  Constitutional: intuabted and sedated   Neck: Supple. No JVD.  Respiratory: Clear to auscultation   Cardiovascular: S1, S2.  Regular rate and rhythm.    Gastrointestinal: soft, non-tender, non-distended, normal bowel sounds  Extremities: Warm.  No lower extremity edema.           LABS:                        7.5    25.2  )-----------( 358      ( 2017 06:17 )             23.5         143  |  106  |  46<H>  ----------------------------<  104<H>  3.1<L>   |  25  |  1.00    Ca    7.8<L>      2017 06:17  Phos  3.0       Mg     2.3         TPro  6.4  /  Alb  3.1<L>  /  TBili  0.7  /  DBili  x   /  AST  38  /  ALT  107<H>  /  AlkPhos  160<H>  -    Uric Acid, Serum: 3.2 mg/dL <L> [3.4 - 8.8] ( @ 06:17)  Cholesterol, Serum: 111 mg/dL [10 - 199] ( @ 06:17)    PTT - ( 2017 13:16 )  PTT:98.9 sec  Urinalysis Basic - ( 2017 06:55 )    Color: Yellow / Appearance: Cloudy / S.020 / pH: x  Gluc: x / Ketone: NEGATIVE  / Bili: NEGATIVE / Urobili: 1.0 E.U./dL   Blood: x / Protein: 30 mg/dL / Nitrite: NEGATIVE   Leuk Esterase: Trace / RBC: Many /HPF / WBC > 10 /HPF   Sq Epi: x / Non Sq Epi: Moderate /HPF / Bacteria: Present /HPF      Sodium, Random Urine: 30 mmol/L ( @ 11:44)  Chloride, Random Urine: 34 mmol/L ( @ 11:44)

## 2017-06-07 NOTE — PROGRESS NOTE ADULT - SUBJECTIVE AND OBJECTIVE BOX
INTERVAL HPI/OVERNIGHT EVENTS:    SUBJECTIVE: Patient seen and examined at bedside.     ROS:  CV: Denies chest pain  Resp: Denies SOB  GI: Denies abdominal pain, constipation, diarrhea, nausea, vomiting  : Denies dysuria  ID: Denies fevers, chills  MSK: Denies joint pain     OBJECTIVE:    VITAL SIGNS:  ICU Vital Signs Last 24 Hrs  T(C): 38.1, Max: 38.1 ( @ 06:00)  T(F): 100.5, Max: 100.5 ( @ 06:00)  HR: 96 (84 - 98)  BP: 136/61 (109/56 - 138/66)  BP(mean): 83 (70 - 103)  ABP: --  ABP(mean): --  RR: 15 (12 - 28)  SpO2: 96% (94% - 100%)    Mode: CPAP with PS, FiO2: 40, PEEP: 5, PS: 8, MAP: 8, PIP: 13    I & Os for current day (as of  @ 07:37)  =============================================  IN: 5084.5 ml / OUT: 3196 ml / NET: 1888.5 ml    CAPILLARY BLOOD GLUCOSE  126 (2017 06:00)      PHYSICAL EXAM:    General: NAD  HEENT: NC/AT; PERRL, clear conjunctiva  Neck: supple  Respiratory: CTA b/l  Cardiovascular: +S1/S2; RRR  Abdomen: soft, NT/ND; +BS x4  Extremities: WWP, 2+ peripheral pulses b/l; no LE edema  Skin: normal color and turgor; no rash  MSK: no joint swelling  Neurological:     MEDICATIONS:  MEDICATIONS  (STANDING):  pantoprazole  Injectable 40milliGRAM(s) IV Push daily  chlorhexidine 0.12% Liquid 15milliLiter(s) Swish and Spit two times a day  doxazosin 8milliGRAM(s) Oral at bedtime  piperacillin/tazobactam IVPB. 2.25Gram(s) IV Intermittent every 6 hours  sodium chloride 0.9%. 1000milliLiter(s) IV Continuous <Continuous>  heparin  Infusion 1800Unit(s)/Hr IV Continuous <Continuous>  potassium chloride  20 mEq/100 mL IVPB 20milliEquivalent(s) IV Intermittent every 2 hours    MEDICATIONS  (PRN):      ALLERGIES:  Allergies    No Known Allergies    Intolerances        LABS:                        7.5    25.2  )-----------( 358      ( 2017 06:17 )             23.5     06-07    143  |  106  |  46<H>  ----------------------------<  104<H>  3.1<L>   |  25  |  1.00    Ca    7.8<L>      2017 06:17  Phos  3.0       Mg     2.3     -    TPro  6.4  /  Alb  3.1<L>  /  TBili  0.7  /  DBili  x   /  AST  38  /  ALT  107<H>  /  AlkPhos  160<H>  06-05    PT/INR - ( 2017 10:55 )   PT: 15.8 sec;   INR: 1.41          PTT - ( 2017 00:36 )  PTT:72.6 sec  Urinalysis Basic - ( 2017 06:55 )    Color: Yellow / Appearance: Cloudy / S.020 / pH: x  Gluc: x / Ketone: NEGATIVE  / Bili: NEGATIVE / Urobili: 1.0 E.U./dL   Blood: x / Protein: 30 mg/dL / Nitrite: NEGATIVE   Leuk Esterase: Trace / RBC: Many /HPF / WBC > 10 /HPF   Sq Epi: x / Non Sq Epi: Moderate /HPF / Bacteria: Present /HPF        RADIOLOGY & ADDITIONAL TESTS: Reviewed. INTERVAL HPI/OVERNIGHT EVENTS: Patient febrile to 100.5F this AM, recultured. Tolerating CPAP.     SUBJECTIVE: Patient seen and examined at bedside. No complaints. Denies dizziness, HA, CP, SOB, abdominal pain, nausea.     VITAL SIGNS:  ICU Vital Signs Last 24 Hrs  T(C): 38.1, Max: 38.1 ( @ 06:00)  T(F): 100.5, Max: 100.5 ( @ 06:00)  HR: 96 (84 - 98)  BP: 136/61 (109/56 - 138/66)  BP(mean): 83 (70 - 103)  RR: 15 (12 - 28)  SpO2: 96% (94% - 100%)    Mode: CPAP with PS, FiO2: 40, PEEP: 5, PS: 8, MAP: 8, PIP: 13    I & Os for current day (as of  @ 07:37)  =============================================  IN: 5084.5 ml / OUT: 3196 ml / NET: 1888.5 ml    CAPILLARY BLOOD GLUCOSE  126 (2017 06:00)    PHYSICAL EXAM:  Constitutional: elderly male, intubated, somnolent, opens eyes with name calling, follows commands, answers questions appropriately with head non/shake  HEENT: NCAT, PERRL, dry mucous membranes   Neck: supple, no JVD   Pulm: decreased breath sounds on the L (improved), no wheezing  CV: RRR, +S1S2, no murmurs appreciated  GI: soft, nondistended, +BS throughout  Ext: WWP, RLE 3+ pitting edema, LLE 2+ pitting edema  Vasc: DP and radial pulses 2+ bilaterally   Skin: 3x8in ecchymotic patch on the L abdomen; multiple scaly, waxy plaques on legs consistent with possible SKs. 3 linear skin tears on posterior R thigh.   Neuro: somnolent but opens eyes, follows commands, answers questions appropriately with head non/shake    MEDICATIONS:  MEDICATIONS  (STANDING):  pantoprazole  Injectable 40milliGRAM(s) IV Push daily  chlorhexidine 0.12% Liquid 15milliLiter(s) Swish and Spit two times a day  doxazosin 8milliGRAM(s) Oral at bedtime  piperacillin/tazobactam IVPB. 2.25Gram(s) IV Intermittent every 6 hours  sodium chloride 0.9%. 1000milliLiter(s) IV Continuous <Continuous>  heparin  Infusion 1800Unit(s)/Hr IV Continuous <Continuous>  potassium chloride  20 mEq/100 mL IVPB 20milliEquivalent(s) IV Intermittent every 2 hours    MEDICATIONS  (PRN):    LABS:                        7.5    25.2  )-----------( 358      ( 2017 06:17 )             23.5     143  |  106  |  46<H>  ----------------------------<  104<H>  3.1<L>   |  25  |  1.00    Ca    7.8<L>      Phos  3.0     Mg     2.3          PTT - ( 2017 00:36 )  PTT:72.6 sec    Urinalysis Basic - ( 2017 06:55 )  Color: Yellow / Appearance: Cloudy / S.020 / pH: x  Gluc: x / Ketone: NEGATIVE  / Bili: NEGATIVE / Urobili: 1.0 E.U./dL   Blood: x / Protein: 30 mg/dL / Nitrite: NEGATIVE   Leuk Esterase: Trace / RBC: Many /HPF / WBC > 10 /HPF   Sq Epi: x / Non Sq Epi: Moderate /HPF / Bacteria: Present /HPF    RADIOLOGY & ADDITIONAL TESTS: Reviewed.    ASSESSMENT/PLAN:   77M with PMHx of CLL (baseline WBC 19 range), benzo abuse 2/2 insomnia, BPH, recent admission for R femoral neck fracture after fall (s/p R hip igor-arthroplasty, course c/b hypoxia 2/2 provoked acute PE, discharged on Xarelto on ) presents with respiratory distress and AMS s/p intubation for hypoxemic respiratory failure in the setting of LLL pneumonia with atelectasis and thick secretions, now extubated and continued on antibiotic regimen. Patient also found to have JRAED 2/2 obstructive uropathy with associated hyperkalemia, now resolved after catheter placement.     PULM:  #Hypoxic respiratory failure: Patient presented with respiratory distress and hypoxia to the 70s on RA. Placed on NRB and still saturating only to the 90s. Blood gas revealing of hypoxemia. Decision made to intubate 2/2 hypoxemia. Lung exam with decreased breath sounds on left. Etiology likely LLL pneumonia, supported by CT findings. This AM, patient tolerating CPAP. Underwent extubation, tolerated well  - c/w supplemental oxygen PRN  - f/u bronch cultures, BCx     #Pneumonia: On presentation, patient tachycardic and tachypneic. Not febrile, but unclear whether patient received antipyretic prior to presentation. Labs significant for leukocytosis to 80s (hx of CLL with baseline WBCs in the 20s). CT chest with LLL effusion and consolidation with associated atelectasis. Initially started on Vanc and Zosyn for HAP. However, bronch cultures showing GPC in pairs and chains (Strep vs Enterococcus) so patient continues only on Zosyn  - c/w Zosyn 3.375g Q6hrs (renally dosed) for management of HAP    #PE: History of provoked PE on last admission after R hip arthroplasty, discharged on Xarelto but unclear if patient taking.   - c/w Hep gtt, goal ptt 60-80  - will plan for transition to Xarelto with dispo planning    ID:   #Severe sepsis: POA, patient tachycardic, tachypneic, leukocytosis. Etiology likely pneumonia supported by CT imaging. Patient now HD stable, mentating appropriately, U/O adequate. Febrile overnight to 100.5F likely part of course of existing pneumonia, no other source at this time.  - c/w management of PNA as above   - f/u BCx, UCx, bronch cultures    RENAL:  #Acute renal failure: likely 2/2 obstructive uropathy 2/2 underlying BPH (patient started on Cardura on previous admission). On presentation, creatinine elevated to 8.80 (baseline creatinine 1.0 as seen on previous labs). Bladder scan revealing of retained urine. Rodriguez catheter placed and patient with initial U/O 1.7L, total 2.5L. CT scan significant for bilateral hydronephrosis. S/p fluid resuscitation for post-obstructive diuresis.  - c/w Cardura 8mg QHS   - monitor strict I/Os     #Electrolyte disorder: likely all 2/2 ARF. Hyperkalemia resolved, patient now hypokalemic likely from fluid resuscitation   - will replete potassium and continue to monitor BMP daily    #BPH: On Cardura at home.   - c/w Cardura 8mg QHS   - UA significant for blood and RBCs, remains yellow. Concern for prostate vs bladder pathology. Will get urology consult.    HEME:  #Anemia: labs significant for downtrend in H/H. Etiology includes dilutional (supported by all other cell lines decreasing, no signs of active bleed) vs bleed (initial CT scan shows left perinephric fat infiltration and fluid that could have resulted from passage of stone or spontaneous hemorrhage). Physical exam unremarkable for flank ecchymosis or of worsening of existing ecchymotic patch on RLQ. PTT at goal. At this time, there is low concern for active bleed as patient HD stable, mentating appropriately, VSS.   - will continue to monitor CBC  - maintain active T/S    #CLL: Baseline WBCs 20s. No active treatment.     #Insomnia: On benzos at home  - concern for benzo withdrawal if patient no longer receiving Versed, will start Klonopin 0.5mg Q8hrs PRN for anxiety/agitation    FEN:  #Fluids: none  #Electrolytes: replete PRN  #Nutrition: NPO for now, pending dysphagia screen    #Lines: L IJ triple lumen catheter  #Rodriguez: yes (placed )    PPx: Hep gtt  Dispo: MICU  FULL CODE

## 2017-06-08 DIAGNOSIS — J18.9 PNEUMONIA, UNSPECIFIED ORGANISM: ICD-10-CM

## 2017-06-08 DIAGNOSIS — C91.10 CHRONIC LYMPHOCYTIC LEUKEMIA OF B-CELL TYPE NOT HAVING ACHIEVED REMISSION: ICD-10-CM

## 2017-06-08 LAB
ANION GAP SERPL CALC-SCNC: 9 MMOL/L — SIGNIFICANT CHANGE UP (ref 5–17)
BUN SERPL-MCNC: 28 MG/DL — HIGH (ref 7–23)
CALCIUM SERPL-MCNC: 8 MG/DL — LOW (ref 8.4–10.5)
CHLORIDE SERPL-SCNC: 103 MMOL/L — SIGNIFICANT CHANGE UP (ref 96–108)
CO2 SERPL-SCNC: 26 MMOL/L — SIGNIFICANT CHANGE UP (ref 22–31)
CREAT SERPL-MCNC: 0.8 MG/DL — SIGNIFICANT CHANGE UP (ref 0.5–1.3)
CULTURE RESULTS: NO GROWTH — SIGNIFICANT CHANGE UP
CULTURE RESULTS: SIGNIFICANT CHANGE UP
GLUCOSE SERPL-MCNC: 105 MG/DL — HIGH (ref 70–99)
HCT VFR BLD CALC: 24 % — LOW (ref 39–50)
HGB BLD-MCNC: 7.7 G/DL — LOW (ref 13–17)
MAGNESIUM SERPL-MCNC: 2.1 MG/DL — SIGNIFICANT CHANGE UP (ref 1.6–2.6)
MCHC RBC-ENTMCNC: 30.1 PG — SIGNIFICANT CHANGE UP (ref 27–34)
MCHC RBC-ENTMCNC: 32.1 G/DL — SIGNIFICANT CHANGE UP (ref 32–36)
MCV RBC AUTO: 93.8 FL — SIGNIFICANT CHANGE UP (ref 80–100)
OSMOLALITY UR: 653 MOSMOL/KG — HIGH (ref 100–650)
PHOSPHATE SERPL-MCNC: 2.1 MG/DL — LOW (ref 2.5–4.5)
PLATELET # BLD AUTO: 332 K/UL — SIGNIFICANT CHANGE UP (ref 150–400)
POTASSIUM SERPL-MCNC: 3.8 MMOL/L — SIGNIFICANT CHANGE UP (ref 3.5–5.3)
POTASSIUM SERPL-SCNC: 3.8 MMOL/L — SIGNIFICANT CHANGE UP (ref 3.5–5.3)
RBC # BLD: 2.56 M/UL — LOW (ref 4.2–5.8)
RBC # FLD: 14.5 % — SIGNIFICANT CHANGE UP (ref 10.3–16.9)
SODIUM SERPL-SCNC: 138 MMOL/L — SIGNIFICANT CHANGE UP (ref 135–145)
SPECIMEN SOURCE: SIGNIFICANT CHANGE UP
SPECIMEN SOURCE: SIGNIFICANT CHANGE UP
WBC # BLD: 23.4 K/UL — HIGH (ref 3.8–10.5)
WBC # FLD AUTO: 23.4 K/UL — HIGH (ref 3.8–10.5)

## 2017-06-08 PROCEDURE — 99233 SBSQ HOSP IP/OBS HIGH 50: CPT | Mod: GC

## 2017-06-08 PROCEDURE — 71010: CPT | Mod: 26

## 2017-06-08 RX ORDER — POTASSIUM PHOSPHATE, MONOBASIC POTASSIUM PHOSPHATE, DIBASIC 236; 224 MG/ML; MG/ML
15 INJECTION, SOLUTION INTRAVENOUS ONCE
Qty: 0 | Refills: 0 | Status: COMPLETED | OUTPATIENT
Start: 2017-06-08 | End: 2017-06-08

## 2017-06-08 RX ORDER — CEFTRIAXONE 500 MG/1
1 INJECTION, POWDER, FOR SOLUTION INTRAMUSCULAR; INTRAVENOUS EVERY 24 HOURS
Qty: 0 | Refills: 0 | Status: DISCONTINUED | OUTPATIENT
Start: 2017-06-08 | End: 2017-06-09

## 2017-06-08 RX ORDER — SODIUM CHLORIDE 9 MG/ML
1000 INJECTION INTRAMUSCULAR; INTRAVENOUS; SUBCUTANEOUS
Qty: 0 | Refills: 0 | Status: DISCONTINUED | OUTPATIENT
Start: 2017-06-08 | End: 2017-06-09

## 2017-06-08 RX ADMIN — PANTOPRAZOLE SODIUM 40 MILLIGRAM(S): 20 TABLET, DELAYED RELEASE ORAL at 10:28

## 2017-06-08 RX ADMIN — Medication 1 MILLIGRAM(S): at 22:43

## 2017-06-08 RX ADMIN — PIPERACILLIN AND TAZOBACTAM 200 GRAM(S): 4; .5 INJECTION, POWDER, LYOPHILIZED, FOR SOLUTION INTRAVENOUS at 06:06

## 2017-06-08 RX ADMIN — CHLORHEXIDINE GLUCONATE 15 MILLILITER(S): 213 SOLUTION TOPICAL at 06:05

## 2017-06-08 RX ADMIN — APIXABAN 10 MILLIGRAM(S): 2.5 TABLET, FILM COATED ORAL at 17:13

## 2017-06-08 RX ADMIN — TAMSULOSIN HYDROCHLORIDE 0.8 MILLIGRAM(S): 0.4 CAPSULE ORAL at 22:43

## 2017-06-08 RX ADMIN — APIXABAN 10 MILLIGRAM(S): 2.5 TABLET, FILM COATED ORAL at 06:06

## 2017-06-08 RX ADMIN — POTASSIUM PHOSPHATE, MONOBASIC POTASSIUM PHOSPHATE, DIBASIC 62.5 MILLIMOLE(S): 236; 224 INJECTION, SOLUTION INTRAVENOUS at 11:14

## 2017-06-08 RX ADMIN — PIPERACILLIN AND TAZOBACTAM 200 GRAM(S): 4; .5 INJECTION, POWDER, LYOPHILIZED, FOR SOLUTION INTRAVENOUS at 10:27

## 2017-06-08 RX ADMIN — CEFTRIAXONE 100 GRAM(S): 500 INJECTION, POWDER, FOR SOLUTION INTRAMUSCULAR; INTRAVENOUS at 17:13

## 2017-06-08 NOTE — PHYSICAL THERAPY INITIAL EVALUATION ADULT - PERTINENT HX OF CURRENT PROBLEM, REHAB EVAL
Patient is a 77 year old male who presents from [facility] with respiratory distress and AMS s/p intubation for hypoxemic respiratory failure in the setting of LLL pneumonia with atelectasis and thick secretions, now extubated. Patient also found to have JARED 2/2 obstructive uropathy with associated hyperkalemia, now resolved after catheter placement. Patient was recently admitted for right hip fracture after fall at home and is sp/ right hip hemiarthroplasty.

## 2017-06-08 NOTE — PROGRESS NOTE ADULT - SUBJECTIVE AND OBJECTIVE BOX
Patient is a 77y old  Male who presents with a chief complaint of cough (2017 13:48)      HPI:  77M with PMHx of CLL (baseline WBC 19 range), benzo abuse 2/2 insomnia, recent admission for R femoral neck fracture after fall (s/p R hip igor-arthroplasty, course c/b hypoxia 2/2 provoked acute PE, discharged on Xarelto on ) presents with respiratory distress, hypoxia with O2 sat 70% range, AMS, labs notable for Cr 8 range up from 1.00 after last DC, K 9, s/p intubation in ED. Patient unable to provide history at this time. Per history of [facility] NS, patient recently spiking fevers to 101F, +cough. CXR in outpatient setting with no new infiltrate. Unable to clarify at this time whether patient was receiving/refusing Xarelto at rehab.     In the ED, patient afebrile (although receiving antipyretics at [facility]), tachycardic with HRs 112-132, BPs 149-203/79-88, RR 20s, SpO2 80s on RA. Per ED note, patient with labored breathing, remained hypoxic to the 90s on 100% NRB. Patient initially described as alert and awake, but intermittently confused and rambling. Exam notable for decreased breath sounds on the L lung and paradoxical breathing. Blood gas revealing of hypoxemia. Decision made to intubate with Rocuronium and Propofol for hypoxemic respiratory failure. Labs notable for Cr 8 range, K 9, WBC 80s range. CXR with poor inspiratory effort, new LLL opacification and RLL opacification (compared to CXR from ). EKG with sinus tachycardia and new RBBB (not previously seen on EKG). Bladder scan performed and patient found to be retaining. Rodriguez placed with initial removal of 1.7L of urine. Patient administered Insulin/D50, Kayexalate rectal, sodium bicarb, Duonebs, 1L IV NS, Vanc 1g x1, Zosyn 3.375g x1. ICU consulted and patient admitted to ICU for monitoring and management. (2017 13:48)    INTERVAL HPI/OVERNIGHT EVENTS:::    HEALTH ISSUES - PROBLEM Dx:  PE (pulmonary thromboembolism): PE (pulmonary thromboembolism)  PNA (pneumonia): PNA (pneumonia)  Acute renal failure, unspecified acute renal failure type: Acute renal failure, unspecified acute renal failure type  Hyperkalemia: Hyperkalemia  Acute respiratory failure with hypoxia: Acute respiratory failure with hypoxia          PAST MEDICAL & SURGICAL HISTORY:  Hip fracture requiring operative repair, right, sequela  PE (pulmonary thromboembolism)  CLL (chronic lymphocytic leukemia)  S/P hip hemiarthroplasty          Consultant NOTE  REVIEWED  (   )    REVIEW OF SYSTEMS:  [x] As per HPI  CONSTITUTIONAL: No fever, weight loss, or fatigue  RESPIRATORY: No cough, wheezing, chills or hemoptysis; No Shortness of Breath  CARDIOVASCULAR: No chest pain, palpitations, dizziness, or leg swelling  GASTROINTESTINAL: No abdominal or epigastric pain. No nausea, vomiting, or hematemesis; No diarrhea or constipation. No melena or hematochezia.  MUSCULOSKELETAL: No joint pain or swelling; No muscle, back, or extremity pain  PSYCH    awake, alert       [x] All others negative	  [ ] Unable to obtain          Vital Signs Last 24 Hrs  T(C): 36.8, Max: 37.2 ( @ 05:41)  T(F): 98.2, Max: 98.9 ( @ 05:41)  HR: 84 (68 - 112)  BP: 160/62 (104/64 - 171/65)  BP(mean): 84 (79 - 123)  RR: 18 (12 - 28)  SpO2: 96% (95% - 99%)      I & Os for 24h ending  @ 07:00  =============================================  IN: 1094.5 ml / OUT: 2270 ml / NET: -1175.5 ml    I & Os for current day (as of  @ 14:49)  =============================================  IN: 730 ml / OUT: 535 ml / NET: 195 ml    PHYSICAL EXAMINATION:                                    (    )  NO CHANGE  Appearance: Normal	  HEENT:   Normal oral mucosa, PERRL, EOMI	  Neck: Supple, + JVD/ - JVD; Carotid Bruit   Cardiovascular: Normal S1 S2, No JVD, No murmurs,   Respiratory: Lungs clear to auscultation/Decreased Breath Sounds/No Rales, Rhonchi, Wheezing	  Gastrointestinal:  Soft, Non-tender, + BS	  Skin: No rashes, No ecchymoses, No cyanosis  Extremities: Normal range of motion, No clubbing, cyanosis or edema  Vascular: Peripheral pulses palpable 2+ bilaterally  Neurologic: Non-focal  Psychiatry: A & O x 3, Mood & affect appropriate    tamsulosin 0.8milliGRAM(s) Oral at bedtime  apixaban 10milliGRAM(s) Oral two times a day  acetaminophen   Tablet. 650milliGRAM(s) Oral every 6 hours PRN  ALPRAZolam 1milliGRAM(s) Oral every 12 hours PRN  sodium chloride 0.9%. 1000milliLiter(s) IV Continuous <Continuous>  cefTRIAXone   IVPB 1Gram(s) IV Intermittent every 24 hours        PTT - ( 2017 13:16 )  PTT:98.9 sec                              7.7    23.4  )-----------( 332      ( 2017 05:59 )             24.0     06-08    138  |  103  |  28<H>  ----------------------------<  105<H>  3.8   |  26  |  0.80    Ca    8.0<L>      2017 05:59  Phos  2.1     06-08  Mg     2.1     06-08        CAPILLARY BLOOD GLUCOSE    Urinalysis Basic - ( 2017 06:55 )    Color: Yellow / Appearance: Cloudy / S.020 / pH: x  Gluc: x / Ketone: NEGATIVE  / Bili: NEGATIVE / Urobili: 1.0 E.U./dL   Blood: x / Protein: 30 mg/dL / Nitrite: NEGATIVE   Leuk Esterase: Trace / RBC: Many /HPF / WBC > 10 /HPF   Sq Epi: x / Non Sq Epi: Moderate /HPF / Bacteria: Present /HPF Patient is a 77y old  Male who presents with a chief complaint of cough (2017 13:48)      HPI:  77M with PMHx of CLL (baseline WBC 19 range), benzo abuse 2/2 insomnia, recent admission for R femoral neck fracture after fall (s/p R hip igor-arthroplasty, course c/b hypoxia 2/2 provoked acute PE, discharged on Xarelto on ) presents with respiratory distress, hypoxia with O2 sat 70% range, AMS, labs notable for Cr 8 range up from 1.00 after last DC, K 9, s/p intubation in ED. Patient unable to provide history at this time. Per history of [email or web address], patient recently spiking fevers to 101F, +cough. CXR in outpatient setting with no new infiltrate. Unable to clarify at this time whether patient was receiving/refusing Xarelto at rehab.     In the ED, patient afebrile (although receiving antipyretics at [facility]), tachycardic with HRs 112-132, BPs 149-203/79-88, RR 20s, SpO2 80s on RA. Per ED note, patient with labored breathing, remained hypoxic to the 90s on 100% NRB. Patient initially described as alert and awake, but intermittently confused and rambling. Exam notable for decreased breath sounds on the L lung and paradoxical breathing. Blood gas revealing of hypoxemia. Decision made to intubate with Rocuronium and Propofol for hypoxemic respiratory failure. Labs notable for Cr 8 range, K 9, WBC 80s range. CXR with poor inspiratory effort, new LLL opacification and RLL opacification (compared to CXR from ). EKG with sinus tachycardia and new RBBB (not previously seen on EKG). Bladder scan performed and patient found to be retaining. Rodriguez placed with initial removal of 1.7L of urine. Patient administered Insulin/D50, Kayexalate rectal, sodium bicarb, Duonebs, 1L IV NS, Vanc 1g x1, Zosyn 3.375g x1. ICU consulted and patient admitted to ICU for monitoring and management. (2017 13:48)    INTERVAL HPI/OVERNIGHT EVENTS:::improved    HEALTH ISSUES - PROBLEM Dx:  PE (pulmonary thromboembolism): PE (pulmonary thromboembolism)  PNA (pneumonia): PNA (pneumonia)  Acute renal failure, unspecified acute renal failure type: Acute renal failure, unspecified acute renal failure type  Hyperkalemia: Hyperkalemia  Acute respiratory failure with hypoxia: Acute respiratory failure with hypoxia          PAST MEDICAL & SURGICAL HISTORY:  Hip fracture requiring operative repair, right, sequela  PE (pulmonary thromboembolism)  CLL (chronic lymphocytic leukemia)  S/P hip hemiarthroplasty          Consultant NOTE  REVIEWED  ( ++++  )    REVIEW OF SYSTEMS:  [x] As per HPI  CONSTITUTIONAL: No fever, weight loss, or fatigue  RESPIRATORY:resp failure  CARDIOVASCULAR: No chest pain, palpitations, dizziness, or leg swelling  GASTROINTESTINAL: No abdominal or epigastric pain. No nausea, vomiting, or hematemesis; No diarrhea or constipation. No melena or hematochezia.  MUSCULOSKELETAL: No joint pain or swelling; No muscle, back, or extremity pain  PSYCH    awake  [x] All others negative	  [ ] Unable to obtain          Vital Signs Last 24 Hrs  T(C): 36.8, Max: 37.2 ( @ 05:41)  T(F): 98.2, Max: 98.9 ( @ 05:41)  HR: 84 (68 - 112)  BP: 160/62 (104/64 - 171/65)  BP(mean): 84 (79 - 123)  RR: 18 (12 - 28)  SpO2: 96% (95% - 99%)      I & Os for 24h ending  @ 07:00  =============================================  IN: 1094.5 ml / OUT: 2270 ml / NET: -1175.5 ml    I & Os for current day (as of  @ 14:49)  =============================================  IN: 730 ml / OUT: 535 ml / NET: 195 ml    PHYSICAL EXAMINATION:                                    (    )  NO CHANGE  Appearance: Normal	  HEENT:   Normal oral mucosa, PERRL, EOMI	  Neck: Supple, + JVD/ - JVD; Carotid Bruit   Cardiovascular: Normal S1 S2, No JVD, No murmurs,   Respiratory: bilat rhonchi  Gastrointestinal:  Soft, Non-tender, + BS	  Skin: No rashes, No ecchymoses, No cyanosis  Extremities: Normal range of motion, No clubbing, cyanosis or edema  Vascular: Peripheral pulses palpable 2+ bilaterally  Neurologic: Non-focal  Psychiatry: A & O x 3, Mood & affect appropriate    tamsulosin 0.8milliGRAM(s) Oral at bedtime  apixaban 10milliGRAM(s) Oral two times a day  acetaminophen   Tablet. 650milliGRAM(s) Oral every 6 hours PRN  ALPRAZolam 1milliGRAM(s) Oral every 12 hours PRN  sodium chloride 0.9%. 1000milliLiter(s) IV Continuous <Continuous>  cefTRIAXone   IVPB 1Gram(s) IV Intermittent every 24 hours        PTT - ( 2017 13:16 )  PTT:98.9 sec                              7.7    23.4  )-----------( 332      ( 2017 05:59 )             24.0     06-08    138  |  103  |  28<H>  ----------------------------<  105<H>  3.8   |  26  |  0.80    Ca    8.0<L>      2017 05:59  Phos  2.1     06-08  Mg     2.1     06-08        CAPILLARY BLOOD GLUCOSE    Urinalysis Basic - ( 2017 06:55 )    Color: Yellow / Appearance: Cloudy / S.020 / pH: x  Gluc: x / Ketone: NEGATIVE  / Bili: NEGATIVE / Urobili: 1.0 E.U./dL   Blood: x / Protein: 30 mg/dL / Nitrite: NEGATIVE   Leuk Esterase: Trace / RBC: Many /HPF / WBC > 10 /HPF   Sq Epi: x / Non Sq Epi: Moderate /HPF / Bacteria: Present /HPF

## 2017-06-08 NOTE — PROGRESS NOTE ADULT - SUBJECTIVE AND OBJECTIVE BOX
Dr. Mark Singh  Renal Fellow  Beeper # [phone]        Patient seen and examined at bedside.   extubated   eating breakfast on his own.   in 1094 -- out 2270     piperacillin/tazobactam IVPB. 3.375Gram(s) every 6 hours  tamsulosin 0.8milliGRAM(s) at bedtime  apixaban 10milliGRAM(s) two times a day  acetaminophen   Tablet. 650milliGRAM(s) every 6 hours PRN  ALPRAZolam 1milliGRAM(s) every 12 hours PRN      Allergies    No Known Allergies    Intolerances        T(C): , Max: 37.2 ( @ 05:41)  T(F): , Max: 98.9 ( @ 05:41)  HR: 80  BP: 159/66  BP(mean): 98  RR: 24  SpO2: 96%  Wt(kg): --  I & Os for 24h ending  @ 07:00  =============================================  IN:    Solution: 400 ml    sodium chloride 0.9%: 200 ml    Solution: 200 ml    heparin Infusion: 171.5 ml    Solution: 63 ml    Oral Fluid: 60 ml    Total IN: 1094.5 ml  ---------------------------------------------  OUT:    Voided: 2270 ml    Total OUT: 2270 ml  ---------------------------------------------  Total NET: -1175.5 ml    I & Os for current day (as of  @ 10:47)  =============================================  IN:    Solution: 100 ml    Total IN: 100 ml  ---------------------------------------------  OUT:    Voided: 250 ml    Total OUT: 250 ml  ---------------------------------------------  Total NET: -150 ml        PHYSICAL EXAM:  Constitutional:  tired appearing   Neck: Supple. No JVD.  Respiratory: Clear to auscultation   Cardiovascular: S1, S2.  Regular rate and rhythm.    Gastrointestinal: soft, non-tender, non-distended, normal bowel sounds  Extremities: Warm.  2 plus  lower extremity edema.      ACCESS:     LABS:                        7.7    23.4  )-----------( 332      ( 2017 05:59 )             24.0     -08    138  |  103  |  28<H>  ----------------------------<  105<H>  3.8   |  26  |  0.80    Ca    8.0<L>      2017 05:59  Phos  2.1     -  Mg     2.1     -08        PTT - ( 2017 13:16 )  PTT:98.9 sec  Urinalysis Basic - ( 2017 06:55 )    Color: Yellow / Appearance: Cloudy / S.020 / pH: x  Gluc: x / Ketone: NEGATIVE  / Bili: NEGATIVE / Urobili: 1.0 E.U./dL   Blood: x / Protein: 30 mg/dL / Nitrite: NEGATIVE   Leuk Esterase: Trace / RBC: Many /HPF / WBC > 10 /HPF   Sq Epi: x / Non Sq Epi: Moderate /HPF / Bacteria: Present /HPF      Sodium, Random Urine: 30 mmol/L ( @ 11:44)  Chloride, Random Urine: 34 mmol/L ( @ 11:44)

## 2017-06-08 NOTE — PROGRESS NOTE ADULT - PROBLEM SELECTOR PLAN 1
pt with arf resolving 2/2 to resolution of obstruction  creatinine decreased from 9 to 0.8  urine lytes demonstrated urine osm twice plasma osmolarity,  indicating continuous dehydration.   repeat u lytes this am  althogh pt eating and drinking, likely still behind in fluids 2/2 to diruessis , consider starting ivns@ 75 cc/hr       bp acceptable   electrolytes - hypokalemia - repleace with 20 meq  kcl iv times 2

## 2017-06-08 NOTE — PHYSICAL THERAPY INITIAL EVALUATION ADULT - LEVEL OF INDEPENDENCE: SIT/STAND, REHAB EVAL
Limited by strength and patient unwilling to standing. Patient also verbalized a strong fear of falling./unable to perform

## 2017-06-08 NOTE — PHYSICAL THERAPY INITIAL EVALUATION ADULT - CRITERIA FOR SKILLED THERAPEUTIC INTERVENTIONS
anticipated discharge recommendation/therapy frequency/anticipated equipment needs at discharge/rehab potential/impairments found

## 2017-06-08 NOTE — PHYSICAL THERAPY INITIAL EVALUATION ADULT - ADDITIONAL COMMENTS
Patient states that prior to his fall in mid-May, he was ambulating independently at home. Patient states that at [facility], he was ambulating short distances with a rolling walker and 2 person assist.

## 2017-06-08 NOTE — PHYSICAL THERAPY INITIAL EVALUATION ADULT - LEVEL OF INDEPENDENCE: SUPINE/SIT, REHAB EVAL
maximum assist (25% patients effort)/Required mod assist initially to sit at edge of bed, improved to contact guard. 7 min total in sitting.

## 2017-06-08 NOTE — PROGRESS NOTE ADULT - ATTENDING COMMENTS
Patient seen and examined with house-staff during bedside rounds.  Resident note read, including vitals, physical findings, laboratory data, and radiological reports.   Revisions included below.  Direct personal management at bed side and extensive interpretation of the data.  Plan was outlined and discussed in details with the housestaff.  Decision making of high complexity  Stable plan outlined.  On apixaban and nasal connula.  CXR isnt worse as indicated in report,  The patient is not on PPV.  OOB.  Antibiotics.  susan; function is normal RF

## 2017-06-09 DIAGNOSIS — D64.9 ANEMIA, UNSPECIFIED: ICD-10-CM

## 2017-06-09 DIAGNOSIS — G47.00 INSOMNIA, UNSPECIFIED: ICD-10-CM

## 2017-06-09 DIAGNOSIS — Z29.9 ENCOUNTER FOR PROPHYLACTIC MEASURES, UNSPECIFIED: ICD-10-CM

## 2017-06-09 DIAGNOSIS — N40.0 BENIGN PROSTATIC HYPERPLASIA WITHOUT LOWER URINARY TRACT SYMPTOMS: ICD-10-CM

## 2017-06-09 DIAGNOSIS — R63.8 OTHER SYMPTOMS AND SIGNS CONCERNING FOOD AND FLUID INTAKE: ICD-10-CM

## 2017-06-09 LAB
ANION GAP SERPL CALC-SCNC: 10 MMOL/L — SIGNIFICANT CHANGE UP (ref 5–17)
BUN SERPL-MCNC: 17 MG/DL — SIGNIFICANT CHANGE UP (ref 7–23)
CALCIUM SERPL-MCNC: 7.7 MG/DL — LOW (ref 8.4–10.5)
CHLORIDE SERPL-SCNC: 101 MMOL/L — SIGNIFICANT CHANGE UP (ref 96–108)
CO2 SERPL-SCNC: 25 MMOL/L — SIGNIFICANT CHANGE UP (ref 22–31)
CREAT SERPL-MCNC: 0.6 MG/DL — SIGNIFICANT CHANGE UP (ref 0.5–1.3)
GLUCOSE SERPL-MCNC: 97 MG/DL — SIGNIFICANT CHANGE UP (ref 70–99)
HCT VFR BLD CALC: 27.2 % — LOW (ref 39–50)
HGB BLD-MCNC: 8.5 G/DL — LOW (ref 13–17)
MAGNESIUM SERPL-MCNC: 1.8 MG/DL — SIGNIFICANT CHANGE UP (ref 1.6–2.6)
MCHC RBC-ENTMCNC: 29.2 PG — SIGNIFICANT CHANGE UP (ref 27–34)
MCHC RBC-ENTMCNC: 31.3 G/DL — LOW (ref 32–36)
MCV RBC AUTO: 93.5 FL — SIGNIFICANT CHANGE UP (ref 80–100)
OSMOLALITY UR: 573 MOSMOL/KG — SIGNIFICANT CHANGE UP (ref 100–650)
PHOSPHATE SERPL-MCNC: 2.1 MG/DL — LOW (ref 2.5–4.5)
PLATELET # BLD AUTO: 339 K/UL — SIGNIFICANT CHANGE UP (ref 150–400)
POTASSIUM SERPL-MCNC: 4.6 MMOL/L — SIGNIFICANT CHANGE UP (ref 3.5–5.3)
POTASSIUM SERPL-SCNC: 4.6 MMOL/L — SIGNIFICANT CHANGE UP (ref 3.5–5.3)
RBC # BLD: 2.91 M/UL — LOW (ref 4.2–5.8)
RBC # FLD: 14.5 % — SIGNIFICANT CHANGE UP (ref 10.3–16.9)
SODIUM SERPL-SCNC: 136 MMOL/L — SIGNIFICANT CHANGE UP (ref 135–145)
SODIUM UR-SCNC: 183 MMOL/L — SIGNIFICANT CHANGE UP
WBC # BLD: 24.9 K/UL — HIGH (ref 3.8–10.5)
WBC # FLD AUTO: 24.9 K/UL — HIGH (ref 3.8–10.5)

## 2017-06-09 PROCEDURE — 99233 SBSQ HOSP IP/OBS HIGH 50: CPT | Mod: GC

## 2017-06-09 RX ORDER — POTASSIUM PHOSPHATE, MONOBASIC POTASSIUM PHOSPHATE, DIBASIC 236; 224 MG/ML; MG/ML
15 INJECTION, SOLUTION INTRAVENOUS ONCE
Qty: 0 | Refills: 0 | Status: COMPLETED | OUTPATIENT
Start: 2017-06-09 | End: 2017-06-09

## 2017-06-09 RX ORDER — CEFTRIAXONE 500 MG/1
1 INJECTION, POWDER, FOR SOLUTION INTRAMUSCULAR; INTRAVENOUS ONCE
Qty: 0 | Refills: 0 | Status: COMPLETED | OUTPATIENT
Start: 2017-06-09 | End: 2017-06-09

## 2017-06-09 RX ORDER — SENNA PLUS 8.6 MG/1
1 TABLET ORAL DAILY
Qty: 0 | Refills: 0 | Status: DISCONTINUED | OUTPATIENT
Start: 2017-06-09 | End: 2017-06-13

## 2017-06-09 RX ORDER — POLYETHYLENE GLYCOL 3350 17 G/17G
17 POWDER, FOR SOLUTION ORAL DAILY
Qty: 0 | Refills: 0 | Status: DISCONTINUED | OUTPATIENT
Start: 2017-06-09 | End: 2017-06-13

## 2017-06-09 RX ADMIN — POTASSIUM PHOSPHATE, MONOBASIC POTASSIUM PHOSPHATE, DIBASIC 62.5 MILLIMOLE(S): 236; 224 INJECTION, SOLUTION INTRAVENOUS at 09:13

## 2017-06-09 RX ADMIN — Medication 1 MILLIGRAM(S): at 22:54

## 2017-06-09 RX ADMIN — SENNA PLUS 1 TABLET(S): 8.6 TABLET ORAL at 17:50

## 2017-06-09 RX ADMIN — TAMSULOSIN HYDROCHLORIDE 0.8 MILLIGRAM(S): 0.4 CAPSULE ORAL at 22:51

## 2017-06-09 RX ADMIN — APIXABAN 10 MILLIGRAM(S): 2.5 TABLET, FILM COATED ORAL at 06:52

## 2017-06-09 RX ADMIN — APIXABAN 10 MILLIGRAM(S): 2.5 TABLET, FILM COATED ORAL at 17:51

## 2017-06-09 RX ADMIN — CEFTRIAXONE 100 GRAM(S): 500 INJECTION, POWDER, FOR SOLUTION INTRAMUSCULAR; INTRAVENOUS at 17:50

## 2017-06-09 NOTE — PROGRESS NOTE ADULT - SUBJECTIVE AND OBJECTIVE BOX
INTERVAL HPI/OVERNIGHT EVENTS:    SUBJECTIVE: Patient seen and examined at bedside.     ROS:  CV: Denies chest pain  Resp: Denies SOB  GI: Denies abdominal pain, constipation, diarrhea, nausea, vomiting  : Denies dysuria  ID: Denies fevers, chills  MSK: Denies joint pain     OBJECTIVE:    VITAL SIGNS:  ICU Vital Signs Last 24 Hrs  T(C): 37, Max: 37 (06-09 @ 06:10)  T(F): 98.6, Max: 98.6 (06-09 @ 06:10)  HR: 72 (72 - 96)  BP: 157/67 (115/59 - 174/79)  BP(mean): 98 (84 - 134)  ABP: --  ABP(mean): --  RR: 21 (6 - 28)  SpO2: 99% (95% - 100%)        I & Os for current day (as of 06-09 @ 08:06)  =============================================  IN: 1670 ml / OUT: 2315 ml / NET: -645 ml    CAPILLARY BLOOD GLUCOSE      PHYSICAL EXAM:    General: NAD  HEENT: NC/AT; PERRL, clear conjunctiva  Neck: supple  Respiratory: CTA b/l  Cardiovascular: +S1/S2; RRR  Abdomen: soft, NT/ND; +BS x4  Extremities: WWP, 2+ peripheral pulses b/l; no LE edema  Skin: normal color and turgor; no rash  MSK: no joint swelling  Neurological:     MEDICATIONS:  MEDICATIONS  (STANDING):  tamsulosin 0.8milliGRAM(s) Oral at bedtime  apixaban 10milliGRAM(s) Oral two times a day  sodium chloride 0.9%. 1000milliLiter(s) IV Continuous <Continuous>  cefTRIAXone   IVPB 1Gram(s) IV Intermittent every 24 hours  potassium phosphate IVPB 15milliMole(s) IV Intermittent once    MEDICATIONS  (PRN):  acetaminophen   Tablet. 650milliGRAM(s) Oral every 6 hours PRN Moderate Pain (4 - 6)  ALPRAZolam 1milliGRAM(s) Oral every 12 hours PRN anxiety      ALLERGIES:  Allergies    No Known Allergies    Intolerances        LABS:                        8.5    24.9  )-----------( 339      ( 09 Jun 2017 05:47 )             27.2     06-09    136  |  101  |  17  ----------------------------<  97  4.6   |  25  |  0.60    Ca    7.7<L>      09 Jun 2017 05:47  Phos  2.1     06-09  Mg     1.8     06-09      PTT - ( 07 Jun 2017 13:16 )  PTT:98.9 sec      RADIOLOGY & ADDITIONAL TESTS: Reviewed. HOSPITAL COURSE:   77M with PMHx of CLL (baseline WBC 20s), benzo abuse 2/2 insomnia, BPH, recent admission for R femoral neck fracture after fall (s/p R hip igor-arthroplasty, course c/b hypoxia 2/2 provoked acute PE, discharged on Xarelto on 5/26) [facility]. Unable to obtain history from patient, but per records, patient febrile to 101F and with productive cough as rehab. In the ED, patient afebrile ([facility]), tachycardic with HRs 112-132, BPs 149-203/79-88, RR 20s, SpO2 80s on RA. Per ED note, patient with labored breathing, remained hypoxic to the 90s on 100% NRB. Patient initially described as alert and awake, but intermittently confused and rambling. Exam notable for decreased breath sounds on the L lung and paradoxical breathing. Blood gas revealing of hypoxemia. Decision made to intubate with Rocuronium and Propofol for hypoxemic respiratory failure. Labs notable for Cr 8.8 (baseline 1), K 9, WBC 80s range. CXR with poor inspiratory effort, new LLL opacification and RLL opacification (compared to CXR from 5/24). EKG without peaked T waves. Bladder scan revealing of retained urine. Rodriguez catheter placed and patient with initial U/O 1.7L, total 2.5L. Received: insulin/D50, kayexalate rectal, sodium bicarb, duonebs, 1L IV NS, vanc/zosyn. ICU consulted, patient admitted to  for continued management and monitoring. CT chest remarkable for bilateral mild hydronephrosis and hydroureter left greater than right, no definite obstructing ureteric calculus; L perinephric fat infiltration and fluid that could be explained by prior passage of a left ureteric calculus or spontaneous left perinephric hemorrhage; near total atelectasis L lung with small bilateral pleural effusions and likely consolidation. Patient continued on Vanc and Zosyn for presumed pneumonia. Renal consulted for obstructive uropathy. Patient started on fluid replacement for post-obstructive diuresis with replacement of 1/2 U/O. Patient additionally started on Hep gtt for treatment of PE. Bedside bronchoscopy performed. Patient remained HD stable and not requiring pressors. Tolerated CPAP and extubated on 6/7. Hep gtt discontinued and patient started on Eliquis BID (loading dose then maintenance). Cardura changed to Flomax. Patient started on Xanax 1mg Q12hrs PRN for anxiety/insomnia (has baseline dependence/abuse, avoiding starting patient on home regimen). Bronch cultures growing GPC in pairs. Antibiotics de-escalated to Ceftriaxone. Patient discontinued off fluid resucitation on 6/7 as creatinine normalized, but restarted on 6/8 after urine osmolality indicates continued post-obstructive diuresis. Repeat urine osmolality improved; IVF discontinued. Urology consulted for BPH and need for outpatient follow up (f/u recs). Remains HD stable, afebrile >24hrs, leukocytosis improving (currently at baseline). Stable for transfer to [facility] for continued management.     INTERVAL HPI/OVERNIGHT EVENTS: ANNETTA    SUBJECTIVE: Patient seen and examined at bedside. Has no complaints. Denies any dizziness, HA, CP, SOB, abdominal pain, nausea.     VITAL SIGNS:  ICU Vital Signs Last 24 Hrs  T(C): 37, Max: 37 (06-09 @ 06:10)  T(F): 98.6, Max: 98.6 (06-09 @ 06:10)  HR: 72 (72 - 96)  BP: 157/67 (115/59 - 174/79)  BP(mean): 98 (84 - 134)  RR: 21 (6 - 28)  SpO2: 99% (95% - 100%)    I & Os for current day (as of 06-09 @ 08:06)  =============================================  IN: 1670 ml / OUT: 2315 ml / NET: -645 ml    PHYSICAL EXAM:  Constitutional: elderly male, laying comfortably in bed on NC, NAD    HEENT: NCAT, PERRL, moist mucous membranes   Neck: supple, no JVD   Pulm: clear to auscultation bilaterally, no wheezing appreciated  CV: RRR, +S1S2, no murmurs appreciated  GI: soft, nondistended, +BS throughout  Ext: WWP, LE pitting edema bilaterally, R>L   Vasc: DP and radial pulses 2+ bilaterally   Skin: 3x8in ecchymotic patch on the L abdomen; multiple scaly, waxy plaques on legs consistent with possible SKs. 3 linear skin tears on posterior R thigh.   Neuro: somnolent but opens eyes, follows commands, answers questions appropriately with head non/shake     MEDICATIONS:  MEDICATIONS  (STANDING):  tamsulosin 0.8milliGRAM(s) Oral at bedtime  apixaban 10milliGRAM(s) Oral two times a day  sodium chloride 0.9%. 1000milliLiter(s) IV Continuous <Continuous>  cefTRIAXone   IVPB 1Gram(s) IV Intermittent every 24 hours  potassium phosphate IVPB 15milliMole(s) IV Intermittent once    MEDICATIONS  (PRN):  acetaminophen   Tablet. 650milliGRAM(s) Oral every 6 hours PRN Moderate Pain (4 - 6)  ALPRAZolam 1milliGRAM(s) Oral every 12 hours PRN anxiety    LABS:                        8.5    24.9  )-----------( 339      ( 09 Jun 2017 05:47 )             27.2     136  |  101  |  17  ----------------------------<  97  4.6   |  25  |  0.60    Ca    7.7<L>     Phos  2.1    Mg     1.8         RADIOLOGY & ADDITIONAL TESTS: Reviewed.    ASSESSMENT/PLAN:   77M with PMHx of CLL (baseline WBC 19 range), benzo abuse 2/2 insomnia, BPH, recent admission for R femoral neck fracture after fall (s/p R hip igor-arthroplasty, course c/b hypoxia 2/2 provoked acute PE, discharged on Xarelto on 5/26) presents with respiratory distress and AMS s/p intubation for hypoxemic respiratory failure in the setting of LLL pneumonia with atelectasis and thick secretions. Patient also found to have JARED 2/2 obstructive uropathy with associated hyperkalemia, now resolved after catheter placement. Patient extubated on 6/7, remains HD stable, can continue care on RMF.     PULM:  #Hypoxic respiratory failure: Patient presented with respiratory distress and hypoxia to the 70s on RA. Placed on NRB and still saturating only to the 90s. Blood gas revealing of hypoxemia. Decision made to intubate 2/2 hypoxemia. Lung exam with decreased breath sounds on left. Etiology likely LLL pneumonia, supported by CT findings. Extubated on 6/7, tolerated well.    - c/w supplemental oxygen PRN  - f/u bronch cultures, BCx     #Pneumonia: On presentation, patient tachycardic and tachypneic. Not febrile, but unclear whether patient received antipyretic prior to presentation. Labs significant for leukocytosis to 80s (hx of CLL with baseline WBCs in the 20s). CT chest with LLL effusion and consolidation with associated atelectasis. Initially started on Vanc and Zosyn for HAP now de-escalated to Ceftriaxone as bronch cultures growing GPC in pairs (likely Strep)   - c/w Ceftriaxone 1g Q24hrs (Day #5/5)    #PE: History of provoked PE on last admission after R hip arthroplasty, discharged on Xarelto but unclear if patient taking (did not complete loading dose course for Xarelto)  - c/w Eliquis 10mg BID x7d for loading, then 5mg BID    ID:   #Severe sepsis: POA, patient tachycardic, tachypneic, leukocytosis. Etiology likely pneumonia supported by CT imaging. Patient now HD stable, mentating appropriately, U/O adequate. Febrile overnight to 100.5F likely part of course of existing pneumonia, no other source at this time. Now remains afebrile >24hrs. Antibiotics de-escalated from Vanc/Zosyn to Ceftriaxone.   - c/w Ceftriaxone 1g Q24hrs (Day #5/5)  - f/u BCx, UCx, bronch cultures    RENAL:  #Acute renal failure: likely 2/2 obstructive uropathy 2/2 underlying BPH (patient started on Cardura on previous admission). On presentation, creatinine elevated to 8.80 (baseline creatinine 1.0 as seen on previous labs). Bladder scan revealing of retained urine. Rodriguez catheter placed and patient with initial U/O 1.7L, total 2.5L. CT scan significant for bilateral hydronephrosis. Fluid resuscitation for post-obstructive diuresis discontinued on 6/9 after urine osmolality normalized   - c/w Flomax 0.8mg QHS  - monitor strict I/Os   - urology consulted, appreciate recs   - will likely need chronic indwelling rodriguez     #BPH: On Cardura at home.   - c/w Flomax 0.8mg QHS  - monitor strict I/Os   - urology consulted, appreciate recs   - will likely need chronic indwelling rodriguez     HEME:  #Anemia: labs significant for downtrend in H/H. Etiology includes dilutional (supported by all other cell lines decreasing, no signs of active bleed) vs bleed (initial CT scan shows left perinephric fat infiltration and fluid that could have resulted from passage of stone or spontaneous hemorrhage). Physical exam unremarkable for flank ecchymosis or of worsening of existing ecchymotic patch on RLQ. At this time, there is low concern for active bleed as patient HD stable, mentating appropriately, VSS.   - will continue to monitor CBC  - maintain active T/S    #CLL: Baseline WBCs 20s. No active treatment.     #Insomnia: On benzos at home  - c/w Xanax 1mg Q12hrs PRN     FEN:  #Fluids: none, encourage PO intake  #Electrolytes: replete PRN  #Nutrition: regular diet     #Rodriguez: yes (placed 6/5)    PPx: Eliquis  Dispo: MICU  FULL CODE

## 2017-06-09 NOTE — PROGRESS NOTE ADULT - PROBLEM SELECTOR PLAN 1
On presentation, patient tachycardic and tachypneic. Not febrile, but unclear whether patient received antipyretic prior to presentation. Labs significant for leukocytosis to 80s (hx of CLL with baseline WBCs in the 20s). CT chest with LLL effusion and consolidation with associated atelectasis. Initially started on Vanc and Zosyn for HAP now de-escalated to Ceftriaxone as bronch cultures growing GPC in pairs (likely Strep).   - c/w Ceftriaxone 1g Q24hrs (Day #5/5)

## 2017-06-09 NOTE — PROGRESS NOTE ADULT - ASSESSMENT
77M with PMHx of CLL (baseline WBC 19 range), benzo abuse 2/2 insomnia, BPH, recent admission for R femoral neck fracture after fall (s/p R hip igor-arthroplasty, course c/b hypoxia 2/2 provoked acute PE, discharged on Xarelto on 5/26) presents with respiratory distress and AMS s/p intubation for hypoxemic respiratory failure in the setting of LLL pneumonia with atelectasis and thick secretions. Patient also found to have JARED 2/2 obstructive uropathy with associated hyperkalemia, now resolved after catheter placement. Patient extubated on 6/7, remains HD stable, can continue care on RMF.

## 2017-06-09 NOTE — PROGRESS NOTE ADULT - ATTENDING COMMENTS
[facility] EM[facility]     Debbie Schmid APRN CNP  06/01/17 1956     Patient seen and examined with house-staff during bedside rounds.  Resident note read, including vitals, physical findings, laboratory data, and radiological reports.   Revisions included below.  Direct personal management at bed side and extensive interpretation of the data.  Plan was outlined and discussed in details with the housestaff.  Decision making of high complexity  He is clinically stable adequate saturation.  & days of antibiotic and continue anticoagulation.   On nasal cannula and doing well

## 2017-06-09 NOTE — PROGRESS NOTE ADULT - SUBJECTIVE AND OBJECTIVE BOX
Patient is a 77y old  Male who presents with a chief complaint of cough (05 Jun 2017 13:48)      HPI:  77M with PMHx of CLL (baseline WBC 19 range), benzo abuse 2/2 insomnia, recent admission for R femoral neck fracture after fall (s/p R hip igor-arthroplasty, course c/b hypoxia 2/2 provoked acute PE, discharged on Xarelto on 5/24) presents with respiratory distress, hypoxia with O2 sat 70% range, AMS, labs notable for Cr 8 range up from 1.00 after last DC, K 9, s/p intubation in ED. Patient unable to provide history at this time. Per history of UES NS, patient recently spiking fevers to 101F, +cough. CXR in outpatient setting with no new infiltrate. Unable to clarify at this time whether patient was receiving/refusing Xarelto at rehab.     Improved, extubated, awake    INTERVAL HPI/OVERNIGHT EVENTS:::to floor    HEALTH ISSUES - PROBLEM Dx:  Prophylactic measure: Prophylactic measure  Nutrition, metabolism, and development symptoms: Nutrition, metabolism, and development symptoms  Insomnia: Insomnia  Anemia: Anemia  BPH (benign prostatic hyperplasia): BPH (benign prostatic hyperplasia)  CLL (chronic lymphocytic leukemia): CLL (chronic lymphocytic leukemia)  Pneumonia due to infectious organism, unspecified laterality, unspecified part of lung: Pneumonia due to infectious organism, unspecified laterality, unspecified part of lung  PE (pulmonary thromboembolism): PE (pulmonary thromboembolism)  PNA (pneumonia): PNA (pneumonia)  Acute renal failure, unspecified acute renal failure type: Acute renal failure, unspecified acute renal failure type  Hyperkalemia: Hyperkalemia  Acute respiratory failure with hypoxia: Acute respiratory failure with hypoxia          PAST MEDICAL & SURGICAL HISTORY:  Hip fracture requiring operative repair, right, sequela  PE (pulmonary thromboembolism)  CLL (chronic lymphocytic leukemia)  S/P hip hemiarthroplasty          Consultant NOTE  REVIEWED  (   )    REVIEW OF SYSTEMS:  [x] As per HPI  CONSTITUTIONAL: weakness  RESPIRATORY: cough  CARDIOVASCULAR: No chest pain, palpitations, dizziness, or leg swelling  GASTROINTESTINAL: No abdominal or epigastric pain. No nausea, vomiting, or hematemesis; No diarrhea or constipation. No melena or hematochezia.  MUSCULOSKELETAL: No joint pain or swelling; No muscle, back, or extremity pain  s/p hip  PSYCH    awake, alert       [x] All others negative	  [ ] Unable to obtain          Vital Signs Last 24 Hrs  T(C): 36.5, Max: 37 (06-09 @ 06:10)  T(F): 97.7, Max: 98.6 (06-09 @ 06:10)  HR: 91 (72 - 110)  BP: 159/71 (119/60 - 160/86)  BP(mean): 98 (89 - 134)  RR: 20 (6 - 32)  SpO2: 96% (94% - 100%)      I & Os for 24h ending 06-09 @ 07:00  =============================================  IN: 1670 ml / OUT: 2315 ml / NET: -645 ml    I & Os for current day (as of 06-09 @ 19:14)  =============================================  IN: 1520 ml / OUT: 830 ml / NET: 690 ml    PHYSICAL EXAMINATION:                                    (    )  NO CHANGE  Appearance: Normal	  HEENT:   Normal oral mucosa, PERRL, EOMI	  Neck: Supple, + JVD/ - JVD; Carotid Bruit   Cardiovascular: Normal S1 S2, No JVD, No murmurs,   Respiratory: occ rhonchi  Gastrointestinal:  Soft, Non-tender, + BS	  Skin: No rashes, No ecchymoses, No cyanosis  Extremities: Normal range of motion, No clubbing, cyanosis or edema  Vascular: Peripheral pulses palpable 2+ bilaterally  Neurologic: Non-focal  Psychiatry: A & O x 3, Mood & affect appropriate    tamsulosin 0.8milliGRAM(s) Oral at bedtime  apixaban 10milliGRAM(s) Oral two times a day  acetaminophen   Tablet. 650milliGRAM(s) Oral every 6 hours PRN  ALPRAZolam 1milliGRAM(s) Oral every 12 hours PRN  polyethylene glycol 3350 17Gram(s) Oral daily PRN  senna 1Tablet(s) Oral daily                                      8.5    24.9  )-----------( 339      ( 09 Jun 2017 05:47 )             27.2     06-09    136  |  101  |  17  ----------------------------<  97  4.6   |  25  |  0.60    Ca    7.7<L>      09 Jun 2017 05:47  Phos  2.1     06-09  Mg     1.8     06-09        CAPILLARY BLOOD GLUCOSE

## 2017-06-09 NOTE — PROGRESS NOTE ADULT - SUBJECTIVE AND OBJECTIVE BOX
HOSPITAL COURSE  77M with PMHx of CLL (baseline WBC 20s), benzo abuse 2/2 insomnia, BPH, recent admission for R femoral neck fracture after fall (s/p R hip igor-arthroplasty, course c/b hypoxia 2/2 provoked acute PE, discharged on Xarelto on 5/26) presented from [facility] with respiratory distress. Reportedly febrile to 101F and with productive cough at rehab. In the ED, patient afebrile (although [facility]), tachycardic with HRs 112-132, BPs 149-203/79-88, RR 20s, SpO2 80s on RA. Found to have labored breathing, remained hypoxic to the 90s on 100% NRB. Intermittently confused and rambling. Blood gas revealed of hypoxemia and decision was made to intubate. Labs notable for Cr 8.8 (baseline 1), K 9, WBC 80s range. CXR with poor inspiratory effort, new LLL opacification and RLL opacification (compared to CXR from 5/24). EKG without peaked T waves. Bladder scan revealing of retained urine. Rodriguez catheter placed and patient with initial U/O 1.7L, total 2.5L. Started on vanc/zosyn for presumed pna. Was started on fluid replacement for post-obstructive diuresis with replacement of 1/2 U/O. Patient additionally started on Hep gtt for treatment of PE and later started on eliquis with loading doses. Bedside bronchoscopy performed. Patient remained HD stable and not requiring pressors. Tolerated CPAP and extubated on 6/7. Bronch cultures growing GPC in pairs. Antibiotics de-escalated to Ceftriaxone. Patient discontinued off fluid resucitation on 6/7 as creatinine normalized, but restarted on 6/8 after urine osmolality indicates continued post-obstructive diuresis. Repeat urine osmolality improved; IVF discontinued. Urology consulted for BPH and need for outpatient follow up (f/u recs). Remains HD stable, afebrile >24hrs, leukocytosis improving (currently at baseline). Stable for transfer to [facility] for continued management.    SUBJECTIVE: Patient feeling well without shortness of breath. Still with some productive cough. No fevers, chills, nausea, vomiting. No abdominal pain or pain around catheter site.     Vital Signs Last 12 Hrs  T(F): 98.4, Max: 98.6 (06-09 @ 06:10)  HR: 110 (72 - 110)  BP: 138/67 (119/60 - 160/64)  BP(mean): 98 (89 - 101)  RR: 27 (6 - 32)  SpO2: 94% (94% - 100%)    PHYSICAL EXAM:  Constitutional: NAD, AAOx3, comfortable in chair.   Respiratory: Normal rate, rhythm, depth, effort. Some rhonchi at bases bilaterally.  Cardiovascular: RRR, normal S1 and S2, no m/r/g.   Gastrointestinal: +BS, soft NTND.   Extremities: wwp, 2+ pitting edema up to knee.   Vascular: Pulses equal and strong throughout.   Neurological: Cranial nerves grossly intact, strength and sensation intact throughout.   Skin: No gross skin abnormalities.     LABS:                        8.5    24.9  )-----------( 339      ( 09 Jun 2017 05:47 )             27.2     06-09    136  |  101  |  17  ----------------------------<  97  4.6   |  25  |  0.60    Ca    7.7<L>      09 Jun 2017 05:47  Phos  2.1     06-09  Mg     1.8     06-09    MEDICATIONS  (STANDING):  tamsulosin 0.8milliGRAM(s) Oral at bedtime  apixaban 10milliGRAM(s) Oral two times a day  cefTRIAXone   IVPB 1Gram(s) IV Intermittent once  senna 1Tablet(s) Oral daily    MEDICATIONS  (PRN):  acetaminophen   Tablet. 650milliGRAM(s) Oral every 6 hours PRN Moderate Pain (4 - 6)  ALPRAZolam 1milliGRAM(s) Oral every 12 hours PRN anxiety  polyethylene glycol 3350 17Gram(s) Oral daily PRN Constipation

## 2017-06-09 NOTE — CONSULT NOTE ADULT - SUBJECTIVE AND OBJECTIVE BOX
CONSULT:       77M with PMHx of BPH CLL (baseline WBC 19 range), benzo abuse 2/2 insomnia, recent admission for R femoral neck fracture after fall (s/p R hip igor-arthroplasty, course c/b hypoxia 2/2 provoked acute PE, discharged on Xarelto on 5/24) presents with respiratory distress, hypoxia with O2 sat 70% range, AMS, Per ems patient was spiking fevers at home.  Bladder scan performed in ER and patient found to be retaining. Rodriguez placed with initial removal of 1.7L of urine, patient was intubated in ER. Patient now extubated, alert and awake. Patient denies any didfiulty emptying bladder prior to this hospital admission, denies nocturia, denies hematuria, denies any urgency , dysuria or frequency. Patient states he may have had decreased urine out put since his hip surgery in may. Patient states he was not ambulating as much as usual. patient denies constipation. Patient states he saw a urologist and was diagnosed with enlarged prostate but it was over ten years ago, he denies any surgeries to prostate, kidneys, or bladder.       Vital Signs Last 24 Hrs  T(C): 36.7, Max: 37 (06-09 @ 06:10)  T(F): 98, Max: 98.6 (06-09 @ 06:10)  HR: 102 (72 - 102)  BP: 158/68 (115/59 - 174/79)  BP(mean): 89 (84 - 134)  RR: 28 (6 - 28)  SpO2: 95% (95% - 100%)  I&O's Summary  I & Os for 24h ending 09 Jun 2017 07:00  =============================================  IN: 1670 ml / OUT: 2315 ml / NET: -645 ml    I & Os for current day (as of 09 Jun 2017 12:57)  =============================================  IN: 1097.5 ml / OUT: 605 ml / NET: 492.5 ml      PE:  Gen: NAD  Abd: soft , ND, NT  : rodriguez catheter draining clear, + scrotal edema    LABS:                        8.5    24.9  )-----------( 339      ( 09 Jun 2017 05:47 )             27.2     06-09    136  |  101  |  17  ----------------------------<  97  4.6   |  25  |  0.60    Ca    7.7<L>      09 Jun 2017 05:47  Phos  2.1     06-09  Mg     1.8     06-09      PTT - ( 07 Jun 2017 13:16 )  PTT:98.9 sec  Cultures  Culture Results:   No growth at 2 days. (06-07 @ 08:27)  Culture Results:   No growth at 2 days. (06-07 @ 08:27)  Culture Results:   No growth (06-06 @ 20:58)  Culture Results:   Routine respiratory rodrigo present (06-06 @ 14:22)  Culture Results:   Routine respiratory rodrigo present (06-05 @ 14:35)  Culture Results:   No growth at 3 days. (06-05 @ 13:47)  Culture Results:   No growth at 3 days. (06-05 @ 13:47)  Culture Results:   No growth (06-05 @ 11:52)      A/P 78 yo m with ARF 2/2 obstructive uropathy  1) keep rodriguez  2) cont flomax  3) OOB  4) bowel regiman  5) case discussed with gu attending

## 2017-06-09 NOTE — CONSULT NOTE ADULT - ATTENDING COMMENTS
78yo male found to be in urinary retention with obstructive uropathy on admission. 1.7 liters drained from bladder on admission, bilateral hydronephrosis on CT scan. Followup ultrasound shows resolution of hydronephrosis and creatinine has responded appropriately. Continue Rodriguez catheter for now.
Ac resp failure  r/o sepsis  ICU    Culture  IV ab  s/p hip repair  CLL  by history
Patient see and examined. Plans discussed on rounds. Agree with plans

## 2017-06-10 LAB
ANION GAP SERPL CALC-SCNC: 10 MMOL/L — SIGNIFICANT CHANGE UP (ref 5–17)
APTT BLD: 27.5 SEC — SIGNIFICANT CHANGE UP (ref 27.5–37.4)
BUN SERPL-MCNC: 17 MG/DL — SIGNIFICANT CHANGE UP (ref 7–23)
CALCIUM SERPL-MCNC: 8.2 MG/DL — LOW (ref 8.4–10.5)
CHLORIDE SERPL-SCNC: 101 MMOL/L — SIGNIFICANT CHANGE UP (ref 96–108)
CO2 SERPL-SCNC: 23 MMOL/L — SIGNIFICANT CHANGE UP (ref 22–31)
CREAT SERPL-MCNC: 0.7 MG/DL — SIGNIFICANT CHANGE UP (ref 0.5–1.3)
CULTURE RESULTS: SIGNIFICANT CHANGE UP
CULTURE RESULTS: SIGNIFICANT CHANGE UP
GLUCOSE SERPL-MCNC: 86 MG/DL — SIGNIFICANT CHANGE UP (ref 70–99)
HCT VFR BLD CALC: 27.9 % — LOW (ref 39–50)
HGB BLD-MCNC: 8.8 G/DL — LOW (ref 13–17)
INR BLD: 1.5 — HIGH (ref 0.88–1.16)
MAGNESIUM SERPL-MCNC: 1.7 MG/DL — SIGNIFICANT CHANGE UP (ref 1.6–2.6)
MCHC RBC-ENTMCNC: 29.5 PG — SIGNIFICANT CHANGE UP (ref 27–34)
MCHC RBC-ENTMCNC: 31.5 G/DL — LOW (ref 32–36)
MCV RBC AUTO: 93.6 FL — SIGNIFICANT CHANGE UP (ref 80–100)
PLATELET # BLD AUTO: 308 K/UL — SIGNIFICANT CHANGE UP (ref 150–400)
POTASSIUM SERPL-MCNC: 3.8 MMOL/L — SIGNIFICANT CHANGE UP (ref 3.5–5.3)
POTASSIUM SERPL-SCNC: 3.8 MMOL/L — SIGNIFICANT CHANGE UP (ref 3.5–5.3)
PROTHROM AB SERPL-ACNC: 16.8 SEC — HIGH (ref 9.8–12.7)
RBC # BLD: 2.98 M/UL — LOW (ref 4.2–5.8)
RBC # FLD: 14.7 % — SIGNIFICANT CHANGE UP (ref 10.3–16.9)
SODIUM SERPL-SCNC: 134 MMOL/L — LOW (ref 135–145)
SPECIMEN SOURCE: SIGNIFICANT CHANGE UP
SPECIMEN SOURCE: SIGNIFICANT CHANGE UP
WBC # BLD: 24.3 K/UL — HIGH (ref 3.8–10.5)
WBC # FLD AUTO: 24.3 K/UL — HIGH (ref 3.8–10.5)

## 2017-06-10 PROCEDURE — 99221 1ST HOSP IP/OBS SF/LOW 40: CPT

## 2017-06-10 RX ORDER — LACTOBACILLUS ACIDOPHILUS 100MM CELL
1 CAPSULE ORAL DAILY
Qty: 0 | Refills: 0 | Status: DISCONTINUED | OUTPATIENT
Start: 2017-06-11 | End: 2017-06-13

## 2017-06-10 RX ADMIN — SENNA PLUS 1 TABLET(S): 8.6 TABLET ORAL at 11:40

## 2017-06-10 RX ADMIN — APIXABAN 10 MILLIGRAM(S): 2.5 TABLET, FILM COATED ORAL at 06:53

## 2017-06-10 RX ADMIN — Medication 100 MILLIGRAM(S): at 15:25

## 2017-06-10 RX ADMIN — Medication 1 MILLIGRAM(S): at 21:50

## 2017-06-10 RX ADMIN — APIXABAN 10 MILLIGRAM(S): 2.5 TABLET, FILM COATED ORAL at 17:37

## 2017-06-10 RX ADMIN — TAMSULOSIN HYDROCHLORIDE 0.8 MILLIGRAM(S): 0.4 CAPSULE ORAL at 21:40

## 2017-06-10 RX ADMIN — Medication 100 MILLIGRAM(S): at 21:39

## 2017-06-10 NOTE — PROGRESS NOTE ADULT - SUBJECTIVE AND OBJECTIVE BOX
Patient seen and examined. Rodriguez in for 5 days with 1.7 L out in ED on admission. Feels empty, no pain, off abx. Would continue Rodriguez. Discussed with attending, will likely be discharged with Rodriguez catheter and follow up with Dr. Rizvi for discussions regarding surgical options.

## 2017-06-10 NOTE — PROGRESS NOTE ADULT - SUBJECTIVE AND OBJECTIVE BOX
OVERNIGHT EVENTS: ANNETTA.    SUBJECTIVE: Patient feeling well this morning. Without cough or shortness of breath. No fevers or chills. Requesting pain meds, specifically vicodin 120mg for R leg pain but patient appearing comfortable.     Vital Signs Last 12 Hrs  T(F): 98.6, Max: 98.7 (06-10 @ 05:00)  HR: 82 (82 - 102)  BP: 150/82 (133/97 - 163/78)  RR: 16 (16 - 19)  SpO2: 96% (96% - 96%)    PHYSICAL EXAM:  Constitutional: NAD, AAOx3, comfortable in bed.   Respiratory: Normal rate, rhythm, depth, effort. Some rhonchi at bases bilaterally.  Cardiovascular: RRR, normal S1 and S2, no m/r/g.   Gastrointestinal: +BS, soft NTND.   Extremities: wwp, 2+ pitting edema up to knee.   Vascular: Pulses equal and strong throughout.   Neurological: Cranial nerves grossly intact, strength and sensation intact throughout.   Skin: No gross skin abnormalities.      LABS:                        8.8    24.3  )-----------( 308      ( 10 Jaylen 2017 06:36 )             27.9     06-10    134<L>  |  101  |  17  ----------------------------<  86  3.8   |  23  |  0.70    Ca    8.2<L>      10 Jaylen 2017 06:36  Phos  2.1     06-09  Mg     1.7     06-10    PT/INR - ( 10 Jaylen 2017 06:36 )   PT: 16.8 sec;   INR: 1.50       PTT - ( 10 Jaylen 2017 06:36 )  PTT:27.5 sec    MEDICATIONS  (STANDING):  tamsulosin 0.8milliGRAM(s) Oral at bedtime  apixaban 10milliGRAM(s) Oral two times a day  senna 1Tablet(s) Oral daily    MEDICATIONS  (PRN):  acetaminophen   Tablet. 650milliGRAM(s) Oral every 6 hours PRN Moderate Pain (4 - 6)  ALPRAZolam 1milliGRAM(s) Oral every 12 hours PRN anxiety  polyethylene glycol 3350 17Gram(s) Oral daily PRN Constipation

## 2017-06-10 NOTE — PROGRESS NOTE ADULT - PROBLEM SELECTOR PLAN 1
On presentation, patient tachycardic and tachypneic. Not febrile, but unclear whether patient received antipyretic prior to presentation. Labs significant for leukocytosis to 80s (hx of CLL with baseline WBCs in the 20s). CT chest with LLL effusion and consolidation with associated atelectasis. Initially started on Vanc and Zosyn for HAP now de-escalated to Ceftriaxone as bronch cultures growing GPC in pairs (likely Strep).   - Will discontinue ceftriaxone as patient completed 5 day course.

## 2017-06-10 NOTE — PROGRESS NOTE ADULT - SUBJECTIVE AND OBJECTIVE BOX
Patient is a 77y old  Male who presents with a chief complaint of cough (05 Jun 2017 13:48)      HPI:  77M with PMHx of CLL (baseline WBC 19 range), benzo abuse 2/2 insomnia, recent admission for R femoral neck fracture after fall (s/p R hip igor-arthroplasty, course c/b hypoxia 2/2 provoked acute PE, discharged on Xarelto on 5/24) presents with respiratory distress, hypoxia with O2 sat 70% range, AMS, labs notable for Cr 8 range up from 1.00 after last DC, K 9, s/p intubation in ED. Patient unable to provide history at this time. Per history of [facility], patient recently spiking fevers to 101F, +cough. CXR in outpatient setting with no new infiltrate. Unable to clarify at this time whether patient was receiving/refusing Xarelto at rehab.         INTERVAL HPI/OVERNIGHT EVENTS:::improved needs PT    HEALTH ISSUES - PROBLEM Dx:  Prophylactic measure: Prophylactic measure  Nutrition, metabolism, and development symptoms: Nutrition, metabolism, and development symptoms  Insomnia: Insomnia  Anemia: Anemia  BPH (benign prostatic hyperplasia): BPH (benign prostatic hyperplasia)  CLL (chronic lymphocytic leukemia): CLL (chronic lymphocytic leukemia)  Pneumonia due to infectious organism, unspecified laterality, unspecified part of lung: Pneumonia due to infectious organism, unspecified laterality, unspecified part of lung  PE (pulmonary thromboembolism): PE (pulmonary thromboembolism)  PNA (pneumonia): PNA (pneumonia)  Acute renal failure, unspecified acute renal failure type: Acute renal failure, unspecified acute renal failure type  Hyperkalemia: Hyperkalemia  Acute respiratory failure with hypoxia: Acute respiratory failure with hypoxia          PAST MEDICAL & SURGICAL HISTORY:  Hip fracture requiring operative repair, right, sequela  PE (pulmonary thromboembolism)  CLL (chronic lymphocytic leukemia)  S/P hip hemiarthroplasty          Consultant NOTE  REVIEWED  (   )    REVIEW OF SYSTEMS:  [x] As per HPI  CONSTITUTIONAL: No fever, weight loss, or fatigue  RESPIRATORY: No cough, wheezing, chills or hemoptysis; No Shortness of Breath   occ rhonchi  CARDIOVASCULAR: No chest pain, palpitations, dizziness, or leg swelling  GASTROINTESTINAL: No abdominal or epigastric pain. No nausea, vomiting, or hematemesis; No diarrhea or constipation. No melena or hematochezia.  MUSCULOSKELETAL: No joint pain or swelling; No muscle, back, or extremity pain  PSYCH    awake, alert     weakness  [x] All others negative	  [ ] Unable to obtain          Vital Signs Last 24 Hrs  T(C): 37, Max: 37.1 (06-10 @ 05:00)  T(F): 98.6, Max: 98.7 (06-10 @ 05:00)  HR: 100 (82 - 102)  BP: 112/68 (112/68 - 163/78)  BP(mean): --  RR: 17 (16 - 19)  SpO2: 96% (96% - 96%)      I & Os for 24h ending 06-10 @ 07:00  =============================================  IN: 1520 ml / OUT: 2805 ml / NET: -1285 ml    I & Os for current day (as of 06-10 @ 17:38)  =============================================  IN: 0 ml / OUT: 700 ml / NET: -700 ml    PHYSICAL EXAMINATION:                                    (    )  NO CHANGE  Appearance: Normal	  HEENT:   Normal oral mucosa, PERRL, EOMI	  Neck: Supple, + JVD/ - JVD; Carotid Bruit   Cardiovascular: Normal S1 S2, No JVD, No murmurs,   Respiratory: Lungs clear to auscultation/Decreased Breath Sounds/No Rales, Rhonchi, Wheezing	  Gastrointestinal:  Soft, Non-tender, + BS	  Skin: No rashes, No ecchymoses, No cyanosis  Extremities: Normal range of motion, No clubbing, cyanosis or edema   decr stength  Vascular: Peripheral pulses palpable 2+ bilaterally  Neurologic: Non-focal  Psychiatry: A & O x 3, Mood & affect appropriate    tamsulosin 0.8milliGRAM(s) Oral at bedtime  apixaban 10milliGRAM(s) Oral two times a day  acetaminophen   Tablet. 650milliGRAM(s) Oral every 6 hours PRN  ALPRAZolam 1milliGRAM(s) Oral every 12 hours PRN  polyethylene glycol 3350 17Gram(s) Oral daily PRN  senna 1Tablet(s) Oral daily  guaiFENesin    Syrup 100milliGRAM(s) Oral every 6 hours PRN        PT/INR - ( 10 Jaylen 2017 06:36 )   PT: 16.8 sec;   INR: 1.50          PTT - ( 10 Jaylen 2017 06:36 )  PTT:27.5 sec                              8.8    24.3  )-----------( 308      ( 10 Jaylen 2017 06:36 )             27.9     06-10    134<L>  |  101  |  17  ----------------------------<  86  3.8   |  23  |  0.70    Ca    8.2<L>      10 Jaylen 2017 06:36  Phos  2.1     06-09  Mg     1.7     06-10        CAPILLARY BLOOD GLUCOSE

## 2017-06-11 LAB
ANION GAP SERPL CALC-SCNC: 9 MMOL/L — SIGNIFICANT CHANGE UP (ref 5–17)
BUN SERPL-MCNC: 15 MG/DL — SIGNIFICANT CHANGE UP (ref 7–23)
CALCIUM SERPL-MCNC: 8.2 MG/DL — LOW (ref 8.4–10.5)
CHLORIDE SERPL-SCNC: 102 MMOL/L — SIGNIFICANT CHANGE UP (ref 96–108)
CO2 SERPL-SCNC: 27 MMOL/L — SIGNIFICANT CHANGE UP (ref 22–31)
CREAT SERPL-MCNC: 0.7 MG/DL — SIGNIFICANT CHANGE UP (ref 0.5–1.3)
GLUCOSE SERPL-MCNC: 82 MG/DL — SIGNIFICANT CHANGE UP (ref 70–99)
HCT VFR BLD CALC: 28.1 % — LOW (ref 39–50)
HGB BLD-MCNC: 8.7 G/DL — LOW (ref 13–17)
MAGNESIUM SERPL-MCNC: 1.6 MG/DL — SIGNIFICANT CHANGE UP (ref 1.6–2.6)
MCHC RBC-ENTMCNC: 29 PG — SIGNIFICANT CHANGE UP (ref 27–34)
MCHC RBC-ENTMCNC: 31 G/DL — LOW (ref 32–36)
MCV RBC AUTO: 93.7 FL — SIGNIFICANT CHANGE UP (ref 80–100)
PLATELET # BLD AUTO: 293 K/UL — SIGNIFICANT CHANGE UP (ref 150–400)
POTASSIUM SERPL-MCNC: 3.6 MMOL/L — SIGNIFICANT CHANGE UP (ref 3.5–5.3)
POTASSIUM SERPL-SCNC: 3.6 MMOL/L — SIGNIFICANT CHANGE UP (ref 3.5–5.3)
RBC # BLD: 3 M/UL — LOW (ref 4.2–5.8)
RBC # FLD: 14.9 % — SIGNIFICANT CHANGE UP (ref 10.3–16.9)
SODIUM SERPL-SCNC: 138 MMOL/L — SIGNIFICANT CHANGE UP (ref 135–145)
WBC # BLD: 22.6 K/UL — HIGH (ref 3.8–10.5)
WBC # FLD AUTO: 22.6 K/UL — HIGH (ref 3.8–10.5)

## 2017-06-11 PROCEDURE — 99222 1ST HOSP IP/OBS MODERATE 55: CPT

## 2017-06-11 RX ORDER — MAGNESIUM SULFATE 500 MG/ML
2 VIAL (ML) INJECTION ONCE
Qty: 0 | Refills: 0 | Status: COMPLETED | OUTPATIENT
Start: 2017-06-11 | End: 2017-06-11

## 2017-06-11 RX ORDER — POTASSIUM CHLORIDE 20 MEQ
40 PACKET (EA) ORAL ONCE
Qty: 0 | Refills: 0 | Status: COMPLETED | OUTPATIENT
Start: 2017-06-11 | End: 2017-06-11

## 2017-06-11 RX ADMIN — APIXABAN 10 MILLIGRAM(S): 2.5 TABLET, FILM COATED ORAL at 17:26

## 2017-06-11 RX ADMIN — SENNA PLUS 1 TABLET(S): 8.6 TABLET ORAL at 11:37

## 2017-06-11 RX ADMIN — TAMSULOSIN HYDROCHLORIDE 0.8 MILLIGRAM(S): 0.4 CAPSULE ORAL at 23:51

## 2017-06-11 RX ADMIN — Medication 100 MILLIGRAM(S): at 17:29

## 2017-06-11 RX ADMIN — Medication 100 MILLIGRAM(S): at 23:51

## 2017-06-11 RX ADMIN — APIXABAN 10 MILLIGRAM(S): 2.5 TABLET, FILM COATED ORAL at 07:39

## 2017-06-11 RX ADMIN — Medication 50 GRAM(S): at 10:09

## 2017-06-11 RX ADMIN — Medication 1 TABLET(S): at 11:37

## 2017-06-11 RX ADMIN — Medication 1 MILLIGRAM(S): at 23:52

## 2017-06-11 RX ADMIN — Medication 40 MILLIEQUIVALENT(S): at 10:08

## 2017-06-11 NOTE — CONSULT NOTE ADULT - SUBJECTIVE AND OBJECTIVE BOX
REASON FOR CONSULT:    HISTORY OF PRESENT ILLNESS: 77M with PMHx of CLL (baseline WBC 19 range), benzo abuse 2/2 insomnia, recent admission for R femoral neck fracture after fall (s/p R hip igor-arthroplasty, course c/b hypoxia 2/2 provoked acute PE, discharged on Xarelto on 5/24) presents with respiratory distress, hypoxia with O2 sat 70% range, AMS, labs notable for Cr 8 range up from 1.00 after last DC, K 9, s/p intubation in ED. Patient unable to provide history at this time. Per history of [facility], patient recently spiking fevers to 101F, +cough. CXR in outpatient setting with no new infiltrate. Unable to clarify at this time whether patient was receiving/refusing Xarelto at rehab.     PAST MEDICAL & SURGICAL HISTORY:  Hip fracture requiring operative repair, right, sequela  PE (pulmonary thromboembolism)  CLL (chronic lymphocytic leukemia)  S/P hip hemiarthroplasty      [ ] Diabetes   [ ] Hypertension  [ ] Hyperlipidemia  [ ] CAD  [ ] PCI  [ ] CABG    PREVIOUS DIAGNOSTIC TESTING:    [ ] Echocardiogram:  [ ]  Catheterization:  [ ] Stress Test:  	    MEDICATIONS:  tamsulosin 0.8milliGRAM(s) Oral at bedtime      guaiFENesin    Syrup 100milliGRAM(s) Oral every 6 hours PRN    acetaminophen   Tablet. 650milliGRAM(s) Oral every 6 hours PRN  ALPRAZolam 1milliGRAM(s) Oral every 12 hours PRN    polyethylene glycol 3350 17Gram(s) Oral daily PRN  senna 1Tablet(s) Oral daily      apixaban 10milliGRAM(s) Oral two times a day      FAMILY HISTORY:  No pertinent family history in first degree relatives      SOCIAL HISTORY:    [ ] Non-smoker  [ ] Smoker  [ ] Alcohol    Allergies    No Known Allergies    Intolerances    	    REVIEW OF SYSTEMS:    [x] as per HPI  CONSTITUTIONAL: No fever, weight loss, or fatigue  ENT:  No difficulty hearing, tinnitus, vertigo; No sinus or throat pain  RESPIRATORY: No cough, wheezing, chills or hemoptysis; No Shortness of Breath  CARDIOVASCULAR: No chest pain, palpitations, dizziness, or leg swelling  GASTROINTESTINAL: No abdominal or epigastric pain. No nausea, vomiting, or hematemesis; No diarrhea or constipation. No melena or hematochezia.  GENITOURINARY: No dysuria, frequency, hematuria, or incontinence  NEUROLOGICAL: No headaches, memory loss, loss of strength, numbness, or tremors  MUSCULOSKELETAL: No joint pain or swelling; No muscle, back, or extremity pain  [x] All others negative	  [ ] Unable to obtain    PHYSICAL EXAM:  T(C): 37.5, Max: 37.5 (06-11 @ 08:44)  HR: 93 (93 - 103)  BP: 152/81 (112/68 - 153/77)  RR: 16 (16 - 20)  SpO2: 94% (94% - 96%)  Wt(kg): --  I&O's Summary    I & Os for current day (as of 11 Jun 2017 10:55)  =============================================  IN: 1020 ml / OUT: 2000 ml / NET: -980 ml      Appearance: Normal	  HEENT:   Normal oral mucosa, PERRL, EOMI	  Lymphatic: No lymphadenopathy  Cardiovascular: Normal S1 S2, No JVD, No murmurs, No edema  Respiratory: Lungs clear to auscultation	  Psychiatry: A & O x 3, Mood & affect appropriate  Gastrointestinal:  Soft, Non-tender, + BS	  Skin: No rashes, No ecchymoses, No cyanosis	  Neurologic: Non-focal  Extremities: Normal range of motion, No clubbing, cyanosis or edema  Vascular: Peripheral pulses palpable 2+ bilaterally    TELEMETRY: 	    ECG:  Sinus tachycardia with occasional and consecutive premature ventricular complexes and fusion complexes  Right bundle branch block  Anterolateral infarct , age undetermined  Abnormal ECG  ECHO:A complete two-dimensional transthoracic echocardiogram was performed (2D,   M-mode, spectral and color flow doppler). Study Quality: Fair.  Normal   left   ventricular size and wall thickness. The left ventricular wall motion is   normal.  The left ventricular ejection fraction is normal. The left   ventricular ejection fraction is 65%.  The left atrial size is normal.   Right   atrium not well visualized.The right ventricle is not well visualized.   Probably normal right ventricular size and function.  No evidence for any   hemodynamically significant valvular disease.There is no   echocardiographic   evidence for pulmonary hypertension. The pulmonary artery systolic   pressure is   estimated to be 20 mmHg.  The inferior vena cava is normal in size (<2.1   cm)   with normal inspiratory collapse (>50%) consistent with normal right   atrial   pressure.  No aortic root dilatation.Left pleural effusion noted. There   is no   pericardial effusion.  STRESS:  CATH:  	  RADIOLOGY:  CXR:  CT:  US:   	  	  LABS:	 	    CARDIAC MARKERS:                                  8.7    22.6  )-----------( 293      ( 11 Jun 2017 08:24 )             28.1     06-11    138  |  102  |  15  ----------------------------<  82  3.6   |  27  |  0.70    Ca    8.2<L>      11 Jun 2017 08:24  Mg     1.6     06-11      proBNP:   Lipid Profile:   HgA1c:   TSH:     ASSESSMENT/PLAN: 	  ·  Problem: PE (pulmonary thromboembolism).  Plan: History of provoked PE on last admission after R hip arthroplasty, discharged on Xarelto but unclear if patient taking (did not complete loading dose course for Xarelto)  - c/w Eliquis 10mg BID x7d for loading, then 5mg BID.

## 2017-06-11 NOTE — CONSULT NOTE ADULT - CONSULT REASON
arf
Patient well known to me from office and [facility]    h/o CLL
Respiratory distress, ARF, s/p intubation
SOB
bph

## 2017-06-11 NOTE — PROGRESS NOTE ADULT - SUBJECTIVE AND OBJECTIVE BOX
Patient is a 77y old  Male who presents with a chief complaint of cough (05 Jun 2017 13:48)      HPI:  77M with PMHx of CLL (baseline WBC 19 range), benzo abuse 2/2 insomnia, recent admission for R femoral neck fracture after fall (s/p R hip igor-arthroplasty, course c/b hypoxia 2/2 provoked acute PE, discharged on Xarelto on 5/24) presents with respiratory distress, hypoxia with O2 sat 70% range, AMS, labs notable for Cr 8 range up from 1.00 after last DC, K 9, s/p intubation in ED. Patient unable to provide history at this time. Per history of [facility], patient recently spiking fevers to 101F, +cough. CXR in outpatient setting with no new infiltrate. Unable to clarify at this time whether patient was receiving/refusing Xarelto at rehab.     In the ED, patient afebrile (although receiving antipyretics at [facility]), tachycardic with HRs 112-132, BPs 149-203/79-88, RR 20s, SpO2 80s on RA. Per ED note, patient with labored breathing, remained hypoxic to the 90s on 100% NRB. Patient initially described as alert and awake, but intermittently confused and rambling. Exam notable for decreased breath sounds on the L lung and paradoxical breathing. Blood gas revealing of hypoxemia. Decision made to intubate with Rocuronium and Propofol for hypoxemic respiratory failure.    INTERVAL HPI/OVERNIGHT EVENTS:::overall improved  OOB, PT    HEALTH ISSUES - PROBLEM Dx:  Prophylactic measure: Prophylactic measure  Nutrition, metabolism, and development symptoms: Nutrition, metabolism, and development symptoms  Insomnia: Insomnia  Anemia: Anemia  BPH (benign prostatic hyperplasia): BPH (benign prostatic hyperplasia)  CLL (chronic lymphocytic leukemia): CLL (chronic lymphocytic leukemia)  Pneumonia due to infectious organism, unspecified laterality, unspecified part of lung: Pneumonia due to infectious organism, unspecified laterality, unspecified part of lung  PE (pulmonary thromboembolism): PE (pulmonary thromboembolism)  PNA (pneumonia): PNA (pneumonia)  Acute renal failure, unspecified acute renal failure type: Acute renal failure, unspecified acute renal failure type  Hyperkalemia: Hyperkalemia  Acute respiratory failure with hypoxia: Acute respiratory failure with hypoxia          PAST MEDICAL & SURGICAL HISTORY:  Hip fracture requiring operative repair, right, sequela  PE (pulmonary thromboembolism)  CLL (chronic lymphocytic leukemia)  S/P hip hemiarthroplasty          Consultant NOTE  REVIEWED  (  ++ )    REVIEW OF SYSTEMS:  [x] As per HPI  CONSTITUTIONAL: No fever, weight loss, or fatigue  RESPIRATORY: SOB, COUGH  CARDIOVASCULAR: No chest pain, palpitations, dizziness, or leg swelling  GASTROINTESTINAL: No abdominal or epigastric pain. No nausea, vomiting, or hematemesis; No diarrhea or constipation. No melena or hematochezia.  MUSCULOSKELETAL: No joint pain or swelling; No muscle, back, or extremity pain  PSYCH    awake, alert       [x] All others negative	  [ ] Unable to obtain          Vital Signs Last 24 Hrs  T(C): 37.3, Max: 37.5 (06-11 @ 08:44)  T(F): 99.1, Max: 99.5 (06-11 @ 08:44)  HR: 95 (93 - 103)  BP: 135/70 (112/68 - 153/77)  BP(mean): --  RR: 18 (16 - 20)  SpO2: 95% (94% - 96%)        I & Os for current day (as of 06-11 @ 17:58)  =============================================  IN: 1020 ml / OUT: 2000 ml / NET: -980 ml    PHYSICAL EXAMINATION:                                    (    )  NO CHANGE  Appearance: Normal	  HEENT:   Normal oral mucosa, PERRL, EOMI	  Neck: Supple, + JVD/ - JVD; Carotid Bruit   Cardiovascular: Normal S1 S2, No JVD, No murmurs,   Respiratory: occ rhonchi  Gastrointestinal:  Soft, Non-tender, + BS	  Skin: No rashes, No ecchymoses, No cyanosis  Extremities: Normal range of motion, No clubbing, cyanosis or edema  Vascular: Peripheral pulses palpable 2+ bilaterally  Neurologic: Non-focal  Psychiatry: A & O x 3, Mood & affect appropriate    tamsulosin 0.8milliGRAM(s) Oral at bedtime  apixaban 10milliGRAM(s) Oral two times a day  acetaminophen   Tablet. 650milliGRAM(s) Oral every 6 hours PRN  ALPRAZolam 1milliGRAM(s) Oral every 12 hours PRN  polyethylene glycol 3350 17Gram(s) Oral daily PRN  senna 1Tablet(s) Oral daily  guaiFENesin    Syrup 100milliGRAM(s) Oral every 6 hours PRN  lactobacillus acidophilus 1Tablet(s) Oral daily        PT/INR - ( 10 Jaylen 2017 06:36 )   PT: 16.8 sec;   INR: 1.50          PTT - ( 10 Jaylen 2017 06:36 )  PTT:27.5 sec                              8.7    22.6  )-----------( 293      ( 11 Jun 2017 08:24 )             28.1     06-11    138  |  102  |  15  ----------------------------<  82  3.6   |  27  |  0.70    Ca    8.2<L>      11 Jun 2017 08:24  Mg     1.6     06-11        CAPILLARY BLOOD GLUCOSE

## 2017-06-12 LAB
ANION GAP SERPL CALC-SCNC: 10 MMOL/L — SIGNIFICANT CHANGE UP (ref 5–17)
BUN SERPL-MCNC: 18 MG/DL — SIGNIFICANT CHANGE UP (ref 7–23)
CALCIUM SERPL-MCNC: 8.1 MG/DL — LOW (ref 8.4–10.5)
CHLORIDE SERPL-SCNC: 102 MMOL/L — SIGNIFICANT CHANGE UP (ref 96–108)
CO2 SERPL-SCNC: 25 MMOL/L — SIGNIFICANT CHANGE UP (ref 22–31)
CREAT SERPL-MCNC: 0.7 MG/DL — SIGNIFICANT CHANGE UP (ref 0.5–1.3)
CULTURE RESULTS: SIGNIFICANT CHANGE UP
CULTURE RESULTS: SIGNIFICANT CHANGE UP
GLUCOSE SERPL-MCNC: 93 MG/DL — SIGNIFICANT CHANGE UP (ref 70–99)
HCT VFR BLD CALC: 28.2 % — LOW (ref 39–50)
HGB BLD-MCNC: 9 G/DL — LOW (ref 13–17)
MAGNESIUM SERPL-MCNC: 1.8 MG/DL — SIGNIFICANT CHANGE UP (ref 1.6–2.6)
MCHC RBC-ENTMCNC: 29.6 PG — SIGNIFICANT CHANGE UP (ref 27–34)
MCHC RBC-ENTMCNC: 31.9 G/DL — LOW (ref 32–36)
MCV RBC AUTO: 92.8 FL — SIGNIFICANT CHANGE UP (ref 80–100)
PLATELET # BLD AUTO: 240 K/UL — SIGNIFICANT CHANGE UP (ref 150–400)
POTASSIUM SERPL-MCNC: 4.2 MMOL/L — SIGNIFICANT CHANGE UP (ref 3.5–5.3)
POTASSIUM SERPL-SCNC: 4.2 MMOL/L — SIGNIFICANT CHANGE UP (ref 3.5–5.3)
RBC # BLD: 3.04 M/UL — LOW (ref 4.2–5.8)
RBC # FLD: 15 % — SIGNIFICANT CHANGE UP (ref 10.3–16.9)
SODIUM SERPL-SCNC: 137 MMOL/L — SIGNIFICANT CHANGE UP (ref 135–145)
SPECIMEN SOURCE: SIGNIFICANT CHANGE UP
SPECIMEN SOURCE: SIGNIFICANT CHANGE UP
WBC # BLD: 23.5 K/UL — HIGH (ref 3.8–10.5)
WBC # FLD AUTO: 23.5 K/UL — HIGH (ref 3.8–10.5)

## 2017-06-12 PROCEDURE — 99232 SBSQ HOSP IP/OBS MODERATE 35: CPT

## 2017-06-12 PROCEDURE — 99232 SBSQ HOSP IP/OBS MODERATE 35: CPT | Mod: GC

## 2017-06-12 RX ORDER — TAMSULOSIN HYDROCHLORIDE 0.4 MG/1
2 CAPSULE ORAL
Qty: 0 | Refills: 0 | COMMUNITY
Start: 2017-06-12

## 2017-06-12 RX ORDER — LACTOBACILLUS ACIDOPHILUS 100MM CELL
0 CAPSULE ORAL
Qty: 0 | Refills: 0 | COMMUNITY
Start: 2017-06-12

## 2017-06-12 RX ORDER — ALPRAZOLAM 0.25 MG
1 TABLET ORAL EVERY 12 HOURS
Qty: 0 | Refills: 0 | Status: DISCONTINUED | OUTPATIENT
Start: 2017-06-12 | End: 2017-06-13

## 2017-06-12 RX ORDER — RIVAROXABAN 15 MG-20MG
1 KIT ORAL
Qty: 0 | Refills: 0 | COMMUNITY

## 2017-06-12 RX ORDER — APIXABAN 2.5 MG/1
2 TABLET, FILM COATED ORAL
Qty: 10 | Refills: 0 | OUTPATIENT
Start: 2017-06-12 | End: 2017-06-14

## 2017-06-12 RX ORDER — LANOLIN ALCOHOL/MO/W.PET/CERES
3 CREAM (GRAM) TOPICAL AT BEDTIME
Qty: 0 | Refills: 0 | Status: DISCONTINUED | OUTPATIENT
Start: 2017-06-12 | End: 2017-06-13

## 2017-06-12 RX ORDER — ZALEPLON 10 MG
1 CAPSULE ORAL
Qty: 0 | Refills: 0 | COMMUNITY

## 2017-06-12 RX ADMIN — Medication 1 TABLET(S): at 11:34

## 2017-06-12 RX ADMIN — Medication 100 MILLIGRAM(S): at 12:38

## 2017-06-12 RX ADMIN — SENNA PLUS 1 TABLET(S): 8.6 TABLET ORAL at 11:35

## 2017-06-12 RX ADMIN — APIXABAN 10 MILLIGRAM(S): 2.5 TABLET, FILM COATED ORAL at 07:16

## 2017-06-12 RX ADMIN — APIXABAN 10 MILLIGRAM(S): 2.5 TABLET, FILM COATED ORAL at 17:26

## 2017-06-12 RX ADMIN — TAMSULOSIN HYDROCHLORIDE 0.8 MILLIGRAM(S): 0.4 CAPSULE ORAL at 21:42

## 2017-06-12 RX ADMIN — Medication 3 MILLIGRAM(S): at 00:48

## 2017-06-12 RX ADMIN — Medication 100 MILLIGRAM(S): at 07:16

## 2017-06-12 NOTE — PROGRESS NOTE ADULT - PROBLEM SELECTOR PLAN 9
Eliquis    FULL CODE

## 2017-06-12 NOTE — PROGRESS NOTE ADULT - PROBLEM SELECTOR PROBLEM 2
PE (pulmonary thromboembolism)
Pneumonia due to infectious organism, unspecified laterality, unspecified part of lung
PNA (pneumonia)

## 2017-06-12 NOTE — PROGRESS NOTE ADULT - SUBJECTIVE AND OBJECTIVE BOX
Patient is a 77y old  Male who presents with a chief complaint of Cough and altered mental status. (12 Jun 2017 13:30)      HPI:  77M with PMHx of CLL (baseline WBC 19 range), benzo abuse 2/2 insomnia, recent admission for R femoral neck fracture after fall (s/p R hip igor-arthroplasty, course c/b hypoxia 2/2 provoked acute PE, discharged on Xarelto on 5/24) presents with respiratory distress, hypoxia with O2 sat 70% range, AMS, labs notable for Cr 8 range up from 1.00 after last DC, K 9, s/p intubation in ED. Patient unable to provide history at this time. Per history of UES NS, patient recently spiking fevers to 101F, +cough. CXR in outpatient setting with no new infiltrate. Unable to clarify at this time whether patient was receiving/refusing Xarelto at rehab.       INTERVAL HPI/OVERNIGHT EVENTS:::doing well. prob d/c    HEALTH ISSUES - PROBLEM Dx:  Prophylactic measure: Prophylactic measure  Nutrition, metabolism, and development symptoms: Nutrition, metabolism, and development symptoms  Insomnia: Insomnia  Anemia: Anemia  BPH (benign prostatic hyperplasia): BPH (benign prostatic hyperplasia)  CLL (chronic lymphocytic leukemia): CLL (chronic lymphocytic leukemia)  Pneumonia due to infectious organism, unspecified laterality, unspecified part of lung: Pneumonia due to infectious organism, unspecified laterality, unspecified part of lung  PE (pulmonary thromboembolism): PE (pulmonary thromboembolism)  PNA (pneumonia): PNA (pneumonia)  Acute renal failure, unspecified acute renal failure type: Acute renal failure, unspecified acute renal failure type  Hyperkalemia: Hyperkalemia  Acute respiratory failure with hypoxia: Acute respiratory failure with hypoxia          PAST MEDICAL & SURGICAL HISTORY:  Hip fracture requiring operative repair, right, sequela  PE (pulmonary thromboembolism)  CLL (chronic lymphocytic leukemia)  S/P hip hemiarthroplasty          Consultant NOTE  REVIEWED  (   )    REVIEW OF SYSTEMS:  [x] As per HPI  CONSTITUTIONAL: No fever, weight loss, or fatigue  RESPIRATORY: occ cough  CARDIOVASCULAR: No chest pain, palpitations, dizziness, or leg swelling  GASTROINTESTINAL: No abdominal or epigastric pain. No nausea, vomiting, or hematemesis; No diarrhea or constipation. No melena or hematochezia.  MUSCULOSKELETAL: No joint pain or swelling; No muscle, back, or extremity pain  PSYCH    awake, alert       [x] All others negative	  [ ] Unable to obtain          Vital Signs Last 24 Hrs  T(C): 36.6, Max: 37.3 (06-11 @ 16:06)  T(F): 97.8, Max: 99.1 (06-11 @ 16:06)  HR: 88 (85 - 95)  BP: 159/81 (133/78 - 159/81)  BP(mean): --  RR: 18 (17 - 18)  SpO2: 93% (93% - 99%)      I & Os for 24h ending 06-12 @ 07:00  =============================================  IN: 900 ml / OUT: 1300 ml / NET: -400 ml    I & Os for current day (as of 06-12 @ 15:16)  =============================================  IN: 0 ml / OUT: 400 ml / NET: -400 ml    PHYSICAL EXAMINATION:                                    (    )  NO CHANGE  Appearance: Normal	  HEENT:   Normal oral mucosa, PERRL, EOMI	  Neck: Supple, + JVD/ - JVD; Carotid Bruit   Cardiovascular: Normal S1 S2, No JVD, No murmurs,   Respiratory: Lungs clear to auscultation/Decreased Breath Sounds/No Rales, Rhonchi, Wheezing	  Gastrointestinal:  Soft, Non-tender, + BS	  Skin: No rashes, No ecchymoses, No cyanosis  Extremities: Normal range of motion, No clubbing, cyanosis  Vascular: Peripheral pulses palpable 2+ bilaterally  Neurologic: Non-focal  Psychiatry: A & O x 3, Mood & affect appropriate    tamsulosin 0.8milliGRAM(s) Oral at bedtime  apixaban 10milliGRAM(s) Oral two times a day  acetaminophen   Tablet. 650milliGRAM(s) Oral every 6 hours PRN  polyethylene glycol 3350 17Gram(s) Oral daily PRN  senna 1Tablet(s) Oral daily  guaiFENesin    Syrup 100milliGRAM(s) Oral every 6 hours PRN  lactobacillus acidophilus 1Tablet(s) Oral daily  melatonin 3milliGRAM(s) Oral at bedtime PRN  ALPRAZolam 1milliGRAM(s) Oral every 12 hours PRN                                      9.0    23.5  )-----------( 240      ( 12 Jun 2017 07:22 )             28.2     06-12    137  |  102  |  18  ----------------------------<  93  4.2   |  25  |  0.70    Ca    8.1<L>      12 Jun 2017 07:22  Mg     1.8     06-12        CAPILLARY BLOOD GLUCOSE Patient is a 77y old  Male who presents with a chief complaint of Cough and altered mental status. (12 Jun 2017 13:30)      HPI:  77M with PMHx of CLL (baseline WBC 19 range), benzo abuse 2/2 insomnia, recent admission for R femoral neck fracture after fall (s/p R hip igor-arthroplasty, course c/b hypoxia 2/2 provoked acute PE, discharged on Xarelto on 5/24) presents with respiratory distress, hypoxia with O2 sat 70% range, AMS, labs notable for Cr 8 range up from 1.00 after last DC, K 9, s/p intubation in ED. Patient unable to provide history at this time. Per history of UES NS, patient recently spiking fevers to 101F, +cough. CXR in outpatient setting with no new infiltrate. Unable to clarify at this time whether patient was receiving/refusing Xarelto at rehab.       INTERVAL HPI/OVERNIGHT EVENTS:::doing well. prob d/c    HEALTH ISSUES - PROBLEM Dx:  Prophylactic measure: Prophylactic measure  Nutrition, metabolism, and development symptoms: Nutrition, metabolism, and development symptoms  Insomnia: Insomnia  Anemia: Anemia  BPH (benign prostatic hyperplasia): BPH (benign prostatic hyperplasia)  CLL (chronic lymphocytic leukemia): CLL (chronic lymphocytic leukemia)  Pneumonia due to infectious organism, unspecified laterality, unspecified part of lung: Pneumonia due to infectious organism, unspecified laterality, unspecified part of lung  PE (pulmonary thromboembolism): PE (pulmonary thromboembolism)  PNA (pneumonia): PNA (pneumonia)  Acute renal failure, unspecified acute renal failure type: Acute renal failure, unspecified acute renal failure type  Hyperkalemia: Hyperkalemia  Acute respiratory failure with hypoxia: Acute respiratory failure with hypoxia          PAST MEDICAL & SURGICAL HISTORY:  Hip fracture requiring operative repair, right, sequela  PE (pulmonary thromboembolism)  CLL (chronic lymphocytic leukemia)  S/P hip hemiarthroplasty          Consultant NOTE  REVIEWED  (+++   )    REVIEW OF SYSTEMS:  [x] As per HPI  CONSTITUTIONAL: No fever, weight loss, or fatigue  RESPIRATORY: occ cough  CARDIOVASCULAR: No chest pain, palpitations, dizziness, or leg swelling  GASTROINTESTINAL: No abdominal or epigastric pain. No nausea, vomiting, or hematemesis; No diarrhea or constipation. No melena or hematochezia.  MUSCULOSKELETAL: No joint pain or swelling; No muscle, back, or extremity pain  PSYCH    awake, alert       [x] All others negative	  [ ] Unable to obtain          Vital Signs Last 24 Hrs  T(C): 36.6, Max: 37.3 (06-11 @ 16:06)  T(F): 97.8, Max: 99.1 (06-11 @ 16:06)  HR: 88 (85 - 95)  BP: 159/81 (133/78 - 159/81)  BP(mean): --  RR: 18 (17 - 18)  SpO2: 93% (93% - 99%)      I & Os for 24h ending 06-12 @ 07:00  =============================================  IN: 900 ml / OUT: 1300 ml / NET: -400 ml    I & Os for current day (as of 06-12 @ 15:16)  =============================================  IN: 0 ml / OUT: 400 ml / NET: -400 ml    PHYSICAL EXAMINATION:                                    (    )  NO CHANGE  Appearance: Normal	  HEENT:   Normal oral mucosa, PERRL, EOMI	  Neck: Supple, + JVD/ - JVD; Carotid Bruit   Cardiovascular: Normal S1 S2, No JVD, No murmurs,   Respiratory: Lungs clear to auscultation/Decreased Breath Sounds/No Rales, Rhonchi, Wheezing	  Gastrointestinal:  Soft, Non-tender, + BS	  Skin: No rashes, No ecchymoses, No cyanosis  Extremities: Normal range of motion, No clubbing, cyanosis  Vascular: Peripheral pulses palpable 2+ bilaterally  Neurologic: Non-focal  Psychiatry: A & O x 3, Mood & affect appropriate    tamsulosin 0.8milliGRAM(s) Oral at bedtime  apixaban 10milliGRAM(s) Oral two times a day  acetaminophen   Tablet. 650milliGRAM(s) Oral every 6 hours PRN  polyethylene glycol 3350 17Gram(s) Oral daily PRN  senna 1Tablet(s) Oral daily  guaiFENesin    Syrup 100milliGRAM(s) Oral every 6 hours PRN  lactobacillus acidophilus 1Tablet(s) Oral daily  melatonin 3milliGRAM(s) Oral at bedtime PRN  ALPRAZolam 1milliGRAM(s) Oral every 12 hours PRN                                      9.0    23.5  )-----------( 240      ( 12 Jun 2017 07:22 )             28.2     06-12    137  |  102  |  18  ----------------------------<  93  4.2   |  25  |  0.70    Ca    8.1<L>      12 Jun 2017 07:22  Mg     1.8     06-12        CAPILLARY BLOOD GLUCOSE

## 2017-06-12 NOTE — PROGRESS NOTE ADULT - PROBLEM SELECTOR PLAN 2
History of provoked PE on last admission after R hip arthroplasty, discharged on Xarelto but unclear if patient taking (did not complete loading dose course for Xarelto)  - c/w Eliquis 10mg BID x7d for loading, then 5mg BID
Resolve and the patient is off antibiotic. Baseline oxygen saturation is normal

## 2017-06-12 NOTE — PROGRESS NOTE ADULT - PROBLEM SELECTOR PROBLEM 4
BPH (benign prostatic hyperplasia)
CLL (chronic lymphocytic leukemia)

## 2017-06-12 NOTE — PROGRESS NOTE ADULT - PROBLEM SELECTOR PLAN 6
Baseline WBCs 20s. No active treatment.

## 2017-06-12 NOTE — PROGRESS NOTE ADULT - SUBJECTIVE AND OBJECTIVE BOX
OVERNIGHT EVENTS: ANNETTA.    SUBJECTIVE: Patient feeling well. With continued cough but not apparent by nurses. No fevers, chills, shortness of breath. Feeling weak. Asking when he could walk on his own.     Vital Signs Last 12 Hrs  T(F): 97.8, Max: 98 (06-12 @ 01:00)  HR: 88 (85 - 93)  BP: 159/81 (133/78 - 159/81)  RR: 18 (17 - 18)  SpO2: 93% (93% - 99%)    PHYSICAL EXAM:  Constitutional: NAD, AAOx3, comfortable in bed.   Respiratory: Normal rate, rhythm, depth, effort. Some rhonchi at bases bilaterally.  Cardiovascular: RRR, normal S1 and S2, no m/r/g.   Gastrointestinal: +BS, soft NTND.   Extremities: wwp, 2+ pitting edema up to knee.   Vascular: Pulses equal and strong throughout.   Neurological: Cranial nerves grossly intact, strength and sensation intact throughout.   Skin: No gross skin abnormalities.      LABS:                        9.0    23.5  )-----------( 240      ( 12 Jun 2017 07:22 )             28.2     06-12    137  |  102  |  18  ----------------------------<  93  4.2   |  25  |  0.70    Ca    8.1<L>      12 Jun 2017 07:22  Mg     1.8     06-12    MEDICATIONS  (STANDING):  tamsulosin 0.8milliGRAM(s) Oral at bedtime  apixaban 10milliGRAM(s) Oral two times a day  senna 1Tablet(s) Oral daily  lactobacillus acidophilus 1Tablet(s) Oral daily    MEDICATIONS  (PRN):  acetaminophen   Tablet. 650milliGRAM(s) Oral every 6 hours PRN Moderate Pain (4 - 6)  ALPRAZolam 1milliGRAM(s) Oral every 12 hours PRN anxiety  polyethylene glycol 3350 17Gram(s) Oral daily PRN Constipation  guaiFENesin    Syrup 100milliGRAM(s) Oral every 6 hours PRN Cough  melatonin 3milliGRAM(s) Oral at bedtime PRN Insomnia

## 2017-06-12 NOTE — DISCHARGE NOTE ADULT - HOSPITAL COURSE
77M with PMHx of CLL (baseline WBC 20s), benzo abuse 2/2 insomnia, BPH, recent admission for R femoral neck fracture after fall (s/p R hip igor-arthroplasty, course c/b hypoxia 2/2 provoked acute PE, discharged on Xarelto on 5/26) presented from [facility] with respiratory distress. Reportedly febrile to 101F and with productive cough at rehab. In the ED, patient afebrile (although receiving antipyretics at [facility]), tachycardic with HRs 112-132, BPs 149-203/79-88, RR 20s, SpO2 80s on RA. Found to have labored breathing, remained hypoxic to the 90s on 100% NRB. Intermittently confused and rambling. Blood gas revealed of hypoxemia and decision was made to intubate. Labs notable for Cr 8.8 (baseline 1), K 9, WBC 80s range. CXR with poor inspiratory effort, new LLL opacification and RLL opacification (compared to CXR from 5/24). EKG without peaked T waves. Bladder scan revealing of retained urine. Rodriguez catheter placed and patient with initial U/O 1.7L, total 2.5L. Started on vanc/zosyn for presumed pna. Was started on fluid replacement for post-obstructive diuresis with replacement of 1/2 U/O. Patient additionally started on Hep gtt for treatment of PE and later started on eliquis with loading doses. Bedside bronchoscopy performed. Patient remained HD stable and not requiring pressors. Tolerated CPAP and extubated on 6/7. Bronch cultures growing GPC in pairs. Antibiotics de-escalated to Ceftriaxone. Patient discontinued off fluid resucitation on 6/7 as creatinine normalized, but restarted on 6/8 after urine osmolality indicates continued post-obstructive diuresis. Repeat urine osmolality improved; IVF discontinued. Urology consulted for BPH and need for outpatient follow up and to keep rodriguez in until then, will most likely need TURP as outpatient. Remains HD stable, afebrile >24hrs, leukocytosis improving (currently at baseline). Abx were discontinued and patient was ready to be discharged back to rehab.

## 2017-06-12 NOTE — DISCHARGE NOTE ADULT - PLAN OF CARE
Resolved You presented with pneumonia and were put on antibiotics. You completed your course of antibiotics and no longer need to take these. You can take robitussin for cough every 8 hours. Follow up with Dr. Pérez in 1 week. You also presented with renal failure from obstruction most likely due to BPH. You now have a rodriguez in place. Keep this rodriguez in place until you follow up with Dr. Rizvi in 1-2 weeks. Stable Continue taking tamsulosin as prescribed. New You have a new pulmonary embolus that was diagnosed in late May. You will need to take Eliquis 10mg twice a day until June 14th. Then on June 15th you will have to start taking Eliquis 5mg twice a day until you are told to stop.

## 2017-06-12 NOTE — PROGRESS NOTE ADULT - SUBJECTIVE AND OBJECTIVE BOX
Interval Events: reviewed  Patient seen and examined at bedside.    Patient is a 77y old  Male who presents with a chief complaint of Cough and altered mental status. (12 Jun 2017 13:30)    h is doing well and wants to go to rehab  PAST MEDICAL & SURGICAL HISTORY:  Hip fracture requiring operative repair, right, sequela  PE (pulmonary thromboembolism)  CLL (chronic lymphocytic leukemia)  S/P hip hemiarthroplasty      MEDICATIONS:  Pulmonary:  guaiFENesin    Syrup 100milliGRAM(s) Oral every 6 hours PRN    Antimicrobials:    Anticoagulants:  apixaban 10milliGRAM(s) Oral two times a day    Cardiac:  tamsulosin 0.8milliGRAM(s) Oral at bedtime      Allergies    No Known Allergies    Intolerances        Vital Signs Last 24 Hrs  T(C): 36.7, Max: 36.7 (06-12 @ 01:00)  T(F): 98, Max: 98 (06-12 @ 01:00)  HR: 98 (85 - 105)  BP: 135/73 (130/77 - 159/81)  BP(mean): --  RR: 18 (16 - 18)  SpO2: 95% (93% - 99%)  I & Os for 24h ending 06-12 @ 07:00  =============================================  IN: 900 ml / OUT: 1300 ml / NET: -400 ml    I & Os for current day (as of 06-12 @ 21:38)  =============================================  IN: 0 ml / OUT: 750 ml / NET: -750 ml        LABS:      CBC Full  -  ( 12 Jun 2017 07:22 )  WBC Count : 23.5 K/uL  Hemoglobin : 9.0 g/dL  Hematocrit : 28.2 %  Platelet Count - Automated : 240 K/uL  Mean Cell Volume : 92.8 fL  Mean Cell Hemoglobin : 29.6 pg  Mean Cell Hemoglobin Concentration : 31.9 g/dL  Auto Neutrophil # : x  Auto Lymphocyte # : x  Auto Monocyte # : x  Auto Eosinophil # : x  Auto Basophil # : x  Auto Neutrophil % : x  Auto Lymphocyte % : x  Auto Monocyte % : x  Auto Eosinophil % : x  Auto Basophil % : x    06-12    137  |  102  |  18  ----------------------------<  93  4.2   |  25  |  0.70    Ca    8.1<L>      12 Jun 2017 07:22  Mg     1.8     06-12                          RADIOLOGY & ADDITIONAL STUDIES (The following images were personally reviewed):  Rodriguez:                                   No  Urine output:               Yes          DVT prophylaxis:         Yes          Flattus:                          Yes         Bowel movement:       Yes

## 2017-06-12 NOTE — DISCHARGE NOTE ADULT - MEDICATION SUMMARY - MEDICATIONS TO STOP TAKING
I will STOP taking the medications listed below when I get home from the hospital:    doxazosin 2 mg oral tablet  -- 1 tab(s) by mouth once a day (at bedtime)    traMADol 50 mg oral tablet  -- 0.5 tab(s) by mouth every 4 hours, As needed, Moderate Pain (4 - 6)    traMADol 50 mg oral tablet  -- 1 tab(s) by mouth every 4 hours, As needed, Severe Pain (7 - 10)    acetaminophen 325 mg oral tablet  -- 3 tab(s) by mouth every 8 hours    LORazepam 1 mg oral tablet  -- 1 tab(s) by mouth every 6 hours, As needed, Anxiety    zaleplon 5 mg oral capsule  -- 1 cap(s) by mouth once a day (at bedtime), As needed, Insomnia    bisacodyl 10 mg rectal suppository  -- 1 suppository(ies) rectally once a day, As needed    rivaroxaban 15 mg oral tablet  -- 15 mg twice a day for 21 days (started 5/26/17).  Then change to 20 mg daily   for a total of 3 months of treatment with Xarelto.    Xarelto 20 mg oral tablet  -- 1 tab(s) by mouth once a day (in the evening)    zaleplon 5 mg oral capsule  -- 1 cap(s) by mouth once a day (at bedtime)

## 2017-06-12 NOTE — PROGRESS NOTE ADULT - ASSESSMENT
Patient is a 78 yo M with PMHx of CLL (baseline WBC 19 range), benzo abuse 2/2 insomnia, BPH, R femoral neck fracture, and acute PE on eliquis who LLL pneumonia. Also found to have JARED 2/2 obstructive uropathy with associated hyperkalemia, now resolved after catheter placement. Now off abx with rodriguez in awaiting VIVI.

## 2017-06-12 NOTE — PROGRESS NOTE ADULT - PROBLEM SELECTOR PLAN 4
On Cardura at home.   - c/w Flomax 0.8mg QHS  - monitor strict I/Os   - urology consulted, appreciate recs
On Cardura at home.   - c/w Flomax 0.8mg QHS  - monitor strict I/Os   - urology consulted, appreciate recs   - will likely need chronic indwelling rodriguez

## 2017-06-12 NOTE — PROGRESS NOTE ADULT - PROBLEM SELECTOR PLAN 1
he is to continue on anticoagn. The duration is total for three months for provoked pulmonary emboli. Is no evidence of bleeding.

## 2017-06-12 NOTE — DISCHARGE NOTE ADULT - MEDICATION SUMMARY - MEDICATIONS TO TAKE
I will START or STAY ON the medications listed below when I get home from the hospital:    tamsulosin 0.4 mg oral capsule  -- 2 cap(s) by mouth once a day (at bedtime)  -- Indication: For BPH (benign prostatic hyperplasia)    Eliquis 5 mg oral tablet  -- 2 tab(s) by mouth 2 times a day  -- Indication: For PE (pulmonary thromboembolism)    Eliquis 5 mg oral tablet  -- 1 tab(s) by mouth 2 times a day  -- Indication: For PE (pulmonary thromboembolism)    ALPRAZolam 2 mg oral tablet  -- 1 tab(s) by mouth once a day (at bedtime)  -- Indication: For Insomnia    guaiFENesin 100 mg/5 mL oral liquid  -- 5 milliliter(s) by mouth every 6 hours, As needed, Cough  -- Indication: For Cough    senna oral tablet  -- 2 tab(s) by mouth once a day (at bedtime)  -- Indication: For Constipation    polyethylene glycol 3350 oral powder for reconstitution  -- 17 gram(s) by mouth once a day  -- Indication: For Constipation    lactobacillus acidophilus oral capsule  --  by mouth   -- Indication: For Prophylactic measure    pantoprazole 40 mg oral delayed release tablet  -- 1 tab(s) by mouth once a day  -- Indication: For Prophylactic measure

## 2017-06-12 NOTE — DISCHARGE NOTE ADULT - PATIENT PORTAL LINK FT
“You can access the [facility] Patient Portal, offered by [facility], by registering with the following website: [email or web address]”

## 2017-06-12 NOTE — PROGRESS NOTE ADULT - PROBLEM SELECTOR PLAN 7
On benzos at home  - c/w Xanax 1mg Q12hrs PRN

## 2017-06-12 NOTE — PROGRESS NOTE ADULT - PROBLEM SELECTOR PROBLEM 6
CLL (chronic lymphocytic leukemia)

## 2017-06-12 NOTE — PROGRESS NOTE ADULT - SUBJECTIVE AND OBJECTIVE BOX
Chief Complaint/Reason for Consult: sob  INTERVAL HPI:   	  MEDICATIONS:  tamsulosin 0.8milliGRAM(s) Oral at bedtime      guaiFENesin    Syrup 100milliGRAM(s) Oral every 6 hours PRN    acetaminophen   Tablet. 650milliGRAM(s) Oral every 6 hours PRN  melatonin 3milliGRAM(s) Oral at bedtime PRN  ALPRAZolam 1milliGRAM(s) Oral every 12 hours PRN    polyethylene glycol 3350 17Gram(s) Oral daily PRN  senna 1Tablet(s) Oral daily      apixaban 10milliGRAM(s) Oral two times a day      REVIEW OF SYSTEMS:  [x] As per HPI  CONSTITUTIONAL: No fever, weight loss, or fatigue  RESPIRATORY: No cough, wheezing, chills or hemoptysis; No Shortness of Breath  CARDIOVASCULAR: No chest pain, palpitations, dizziness, or leg swelling  GASTROINTESTINAL: No abdominal or epigastric pain. No nausea, vomiting, or hematemesis; No diarrhea or constipation. No melena or hematochezia.  MUSCULOSKELETAL: No joint pain or swelling; No muscle, back, or extremity pain  [x] All others negative	  [ ] Unable to obtain    PHYSICAL EXAM:  T(C): 36.6, Max: 37.3 (06-11 @ 16:06)  HR: 88 (85 - 95)  BP: 159/81 (133/78 - 159/81)  RR: 18 (17 - 18)  SpO2: 93% (93% - 99%)  Wt(kg): --  I&O's Summary  I & Os for 24h ending 12 Jun 2017 07:00  =============================================  IN: 900 ml / OUT: 1300 ml / NET: -400 ml    I & Os for current day (as of 12 Jun 2017 14:30)  =============================================  IN: 0 ml / OUT: 400 ml / NET: -400 ml        Appearance: Normal	  HEENT:   Normal oral mucosa  Cardiovascular: Normal S1 S2, No JVD, No murmurs, No edema  Respiratory: Lungs clear to auscultation	  Gastrointestinal:  Soft, Non-tender, + BS	  Extremities: Normal range of motion, No clubbing, cyanosis or edema  Vascular: Peripheral pulses palpable 2+ bilaterally    TELEMETRY: 	    ECG:    	  RADIOLOGY:   CXR:  CT:  US:    CARDIAC TESTING:  Echocardiogram:  Catheterization:  Stress Test:      LABS:	 	    CARDIAC MARKERS:                                  9.0    23.5  )-----------( 240      ( 12 Jun 2017 07:22 )             28.2     06-12    137  |  102  |  18  ----------------------------<  93  4.2   |  25  |  0.70    Ca    8.1<L>      12 Jun 2017 07:22  Mg     1.8     06-12      proBNP:   Lipid Profile:   HgA1c:   TSH:     ASSESSMENT/PLAN: 	  ·  Problem: PE (pulmonary thromboembolism).  Plan: History of provoked PE on last admission after R hip arthroplasty, discharged on Xarelto but unclear if patient taking (did not complete loading dose course for Xarelto)  - c/w Eliquis 10mg BID x7d for loading, then 5mg BID. Chief Complaint/Reason for Consult: sob  INTERVAL HPI:  occasional non productive cough  	  MEDICATIONS:  tamsulosin 0.8milliGRAM(s) Oral at bedtime      guaiFENesin    Syrup 100milliGRAM(s) Oral every 6 hours PRN    acetaminophen   Tablet. 650milliGRAM(s) Oral every 6 hours PRN  melatonin 3milliGRAM(s) Oral at bedtime PRN  ALPRAZolam 1milliGRAM(s) Oral every 12 hours PRN    polyethylene glycol 3350 17Gram(s) Oral daily PRN  senna 1Tablet(s) Oral daily      apixaban 10milliGRAM(s) Oral two times a day      REVIEW OF SYSTEMS:  [x] As per HPI  CONSTITUTIONAL: No fever, weight loss, or fatigue  RESPIRATORY: No cough, wheezing, chills or hemoptysis; No Shortness of Breath  CARDIOVASCULAR: No chest pain, palpitations, dizziness, or leg swelling  GASTROINTESTINAL: No abdominal or epigastric pain. No nausea, vomiting, or hematemesis; No diarrhea or constipation. No melena or hematochezia.  MUSCULOSKELETAL: No joint pain or swelling; No muscle, back, or extremity pain  [x] All others negative	  [ ] Unable to obtain    PHYSICAL EXAM:  T(C): 36.6, Max: 37.3 (06-11 @ 16:06)  HR: 88 (85 - 95)  BP: 159/81 (133/78 - 159/81)  RR: 18 (17 - 18)  SpO2: 93% (93% - 99%)  Wt(kg): --  I&O's Summary  I & Os for 24h ending 12 Jun 2017 07:00  =============================================  IN: 900 ml / OUT: 1300 ml / NET: -400 ml    I & Os for current day (as of 12 Jun 2017 14:30)  =============================================  IN: 0 ml / OUT: 400 ml / NET: -400 ml        Appearance: Normal	  HEENT:   Normal oral mucosa  Cardiovascular: Normal S1 S2, No JVD, No murmurs, No edema  Respiratory: Lungs clear to auscultation	  Gastrointestinal:  Soft, Non-tender, + BS	  Extremities: Normal range of motion, No clubbing, cyanosis or edema  Vascular: Peripheral pulses palpable 2+ bilaterally    TELEMETRY: 	    ECG:    	  RADIOLOGY:   CXR:  CT:  US:    CARDIAC TESTING:  Echocardiogram:  Catheterization:  Stress Test:      LABS:	 	    CARDIAC MARKERS:                                  9.0    23.5  )-----------( 240      ( 12 Jun 2017 07:22 )             28.2     06-12    137  |  102  |  18  ----------------------------<  93  4.2   |  25  |  0.70    Ca    8.1<L>      12 Jun 2017 07:22  Mg     1.8     06-12      proBNP:   Lipid Profile:   HgA1c:   TSH:     ASSESSMENT/PLAN: 	  ·  Problem: PE (pulmonary thromboembolism).  Plan: History of provoked PE on last admission after R hip arthroplasty, discharged on Xarelto but unclear if patient taking (did not complete loading dose course for Xarelto)  - c/w Eliquis 10mg BID x7d for loading, then 5mg BID.

## 2017-06-12 NOTE — DISCHARGE NOTE ADULT - SECONDARY DIAGNOSIS.
Acute renal failure, unspecified acute renal failure type BPH (benign prostatic hyperplasia) PE (pulmonary thromboembolism)

## 2017-06-12 NOTE — PROGRESS NOTE ADULT - PROBLEM SELECTOR PROBLEM 8
Nutrition, metabolism, and development symptoms

## 2017-06-12 NOTE — PROGRESS NOTE ADULT - PROBLEM SELECTOR PLAN 1
On presentation, patient tachycardic and tachypneic. Not febrile, but unclear whether patient received antipyretic prior to presentation. Labs significant for leukocytosis to 80s (hx of CLL with baseline WBCs in the 20s). CT chest with LLL effusion and consolidation with associated atelectasis. Initially started on Vanc and Zosyn for HAP , de-escalated to Ceftriaxone as bronch cultures growing GPC in pairs (likely Strep), now off abx.

## 2017-06-12 NOTE — DISCHARGE NOTE ADULT - CARE PROVIDER_API CALL
Sterling Pérez), Internal Medicine  [address] [address]  Phone: [phone]  Fax: [phone]    Phillip Rizvi), Urology  [address] [address]  Phone: [phone]  Fax: [phone]

## 2017-06-12 NOTE — PROGRESS NOTE ADULT - PROBLEM SELECTOR PLAN 5
labs significant for downtrend in H/H. Etiology includes dilutional (supported by all other cell lines decreasing, no signs of active bleed) vs bleed (initial CT scan shows left perinephric fat infiltration and fluid that could have resulted from passage of stone or spontaneous hemorrhage). Physical exam unremarkable for flank ecchymosis or of worsening of existing ecchymotic patch on RLQ. At this time, there is low concern for active bleed as patient HD stable, mentating appropriately, VSS.   - will continue to monitor CBC  - maintain active T/S

## 2017-06-12 NOTE — DISCHARGE NOTE ADULT - CARE PLAN
Principal Discharge DX:	PNA (pneumonia)  Goal:	Resolved  Instructions for follow-up, activity and diet:	You presented with pneumonia and were put on antibiotics. You completed your course of antibiotics and no longer need to take these. You can take robitussin for cough every 8 hours. Follow up with Dr. Pérez in 1 week.  Secondary Diagnosis:	Acute renal failure, unspecified acute renal failure type  Goal:	Resolved  Instructions for follow-up, activity and diet:	You also presented with renal failure from obstruction most likely due to BPH. You now have a rodriguez in place. Keep this rodriguez in place until you follow up with Dr. Rizvi in 1-2 weeks.  Secondary Diagnosis:	BPH (benign prostatic hyperplasia)  Goal:	Stable  Instructions for follow-up, activity and diet:	Continue taking tamsulosin as prescribed.  Secondary Diagnosis:	PE (pulmonary thromboembolism)  Goal:	New  Instructions for follow-up, activity and diet:	You have a new pulmonary embolus that was diagnosed in late May. You will need to take Eliquis 10mg twice a day until June 14th. Then on June 15th you will have to start taking Eliquis 5mg twice a day until you are told to stop.

## 2017-06-13 VITALS
RESPIRATION RATE: 20 BRPM | DIASTOLIC BLOOD PRESSURE: 71 MMHG | SYSTOLIC BLOOD PRESSURE: 135 MMHG | OXYGEN SATURATION: 94 % | TEMPERATURE: 97 F | HEART RATE: 90 BPM

## 2017-06-13 PROCEDURE — 87633 RESP VIRUS 12-25 TARGETS: CPT

## 2017-06-13 PROCEDURE — 87086 URINE CULTURE/COLONY COUNT: CPT

## 2017-06-13 PROCEDURE — 97162 PT EVAL MOD COMPLEX 30 MIN: CPT

## 2017-06-13 PROCEDURE — 87581 M.PNEUMON DNA AMP PROBE: CPT

## 2017-06-13 PROCEDURE — 87070 CULTURE OTHR SPECIMN AEROBIC: CPT

## 2017-06-13 PROCEDURE — 84295 ASSAY OF SERUM SODIUM: CPT

## 2017-06-13 PROCEDURE — 76770 US EXAM ABDO BACK WALL COMP: CPT

## 2017-06-13 PROCEDURE — 96375 TX/PRO/DX INJ NEW DRUG ADDON: CPT | Mod: XU

## 2017-06-13 PROCEDURE — 84300 ASSAY OF URINE SODIUM: CPT

## 2017-06-13 PROCEDURE — 93306 TTE W/DOPPLER COMPLETE: CPT

## 2017-06-13 PROCEDURE — 87798 DETECT AGENT NOS DNA AMP: CPT

## 2017-06-13 PROCEDURE — 74176 CT ABD & PELVIS W/O CONTRAST: CPT

## 2017-06-13 PROCEDURE — 93005 ELECTROCARDIOGRAM TRACING: CPT | Mod: XU

## 2017-06-13 PROCEDURE — 87040 BLOOD CULTURE FOR BACTERIA: CPT

## 2017-06-13 PROCEDURE — 97116 GAIT TRAINING THERAPY: CPT

## 2017-06-13 PROCEDURE — 82550 ASSAY OF CK (CPK): CPT

## 2017-06-13 PROCEDURE — 87252 VIRUS INOCULATION TISSUE: CPT

## 2017-06-13 PROCEDURE — 84132 ASSAY OF SERUM POTASSIUM: CPT

## 2017-06-13 PROCEDURE — 82436 ASSAY OF URINE CHLORIDE: CPT

## 2017-06-13 PROCEDURE — 87102 FUNGUS ISOLATION CULTURE: CPT

## 2017-06-13 PROCEDURE — 81001 URINALYSIS AUTO W/SCOPE: CPT

## 2017-06-13 PROCEDURE — 84133 ASSAY OF URINE POTASSIUM: CPT

## 2017-06-13 PROCEDURE — 80202 ASSAY OF VANCOMYCIN: CPT

## 2017-06-13 PROCEDURE — 86900 BLOOD TYPING SEROLOGIC ABO: CPT

## 2017-06-13 PROCEDURE — 87486 CHLMYD PNEUM DNA AMP PROBE: CPT

## 2017-06-13 PROCEDURE — 94640 AIRWAY INHALATION TREATMENT: CPT

## 2017-06-13 PROCEDURE — 84540 ASSAY OF URINE/UREA-N: CPT

## 2017-06-13 PROCEDURE — 85027 COMPLETE CBC AUTOMATED: CPT

## 2017-06-13 PROCEDURE — 80053 COMPREHEN METABOLIC PANEL: CPT

## 2017-06-13 PROCEDURE — 36415 COLL VENOUS BLD VENIPUNCTURE: CPT

## 2017-06-13 PROCEDURE — 71250 CT THORAX DX C-: CPT

## 2017-06-13 PROCEDURE — 82570 ASSAY OF URINE CREATININE: CPT

## 2017-06-13 PROCEDURE — 96374 THER/PROPH/DIAG INJ IV PUSH: CPT | Mod: XU

## 2017-06-13 PROCEDURE — 85730 THROMBOPLASTIN TIME PARTIAL: CPT

## 2017-06-13 PROCEDURE — 71045 X-RAY EXAM CHEST 1 VIEW: CPT

## 2017-06-13 PROCEDURE — 86850 RBC ANTIBODY SCREEN: CPT

## 2017-06-13 PROCEDURE — 82465 ASSAY BLD/SERUM CHOLESTEROL: CPT

## 2017-06-13 PROCEDURE — 80307 DRUG TEST PRSMV CHEM ANLYZR: CPT

## 2017-06-13 PROCEDURE — 82330 ASSAY OF CALCIUM: CPT

## 2017-06-13 PROCEDURE — 83735 ASSAY OF MAGNESIUM: CPT

## 2017-06-13 PROCEDURE — 99291 CRITICAL CARE FIRST HOUR: CPT | Mod: 25

## 2017-06-13 PROCEDURE — 82803 BLOOD GASES ANY COMBINATION: CPT

## 2017-06-13 PROCEDURE — 80048 BASIC METABOLIC PNL TOTAL CA: CPT

## 2017-06-13 PROCEDURE — 84100 ASSAY OF PHOSPHORUS: CPT

## 2017-06-13 PROCEDURE — 93970 EXTREMITY STUDY: CPT

## 2017-06-13 PROCEDURE — 99292 CRITICAL CARE ADDL 30 MIN: CPT | Mod: 25

## 2017-06-13 PROCEDURE — 94002 VENT MGMT INPAT INIT DAY: CPT

## 2017-06-13 PROCEDURE — 82553 CREATINE MB FRACTION: CPT

## 2017-06-13 PROCEDURE — 99232 SBSQ HOSP IP/OBS MODERATE 35: CPT

## 2017-06-13 PROCEDURE — 83605 ASSAY OF LACTIC ACID: CPT

## 2017-06-13 PROCEDURE — 84550 ASSAY OF BLOOD/URIC ACID: CPT

## 2017-06-13 PROCEDURE — 86901 BLOOD TYPING SEROLOGIC RH(D): CPT

## 2017-06-13 PROCEDURE — 84484 ASSAY OF TROPONIN QUANT: CPT

## 2017-06-13 PROCEDURE — 94003 VENT MGMT INPAT SUBQ DAY: CPT

## 2017-06-13 PROCEDURE — 85025 COMPLETE CBC W/AUTO DIFF WBC: CPT

## 2017-06-13 PROCEDURE — 31500 INSERT EMERGENCY AIRWAY: CPT

## 2017-06-13 PROCEDURE — 83935 ASSAY OF URINE OSMOLALITY: CPT

## 2017-06-13 PROCEDURE — 85610 PROTHROMBIN TIME: CPT

## 2017-06-13 RX ADMIN — SENNA PLUS 1 TABLET(S): 8.6 TABLET ORAL at 13:18

## 2017-06-13 RX ADMIN — Medication 1 MILLIGRAM(S): at 00:35

## 2017-06-13 RX ADMIN — Medication 1 TABLET(S): at 13:18

## 2017-06-13 RX ADMIN — Medication 100 MILLIGRAM(S): at 00:34

## 2017-06-13 RX ADMIN — Medication 100 MILLIGRAM(S): at 14:35

## 2017-06-13 RX ADMIN — APIXABAN 10 MILLIGRAM(S): 2.5 TABLET, FILM COATED ORAL at 06:01

## 2017-06-13 NOTE — PROGRESS NOTE ADULT - SUBJECTIVE AND OBJECTIVE BOX
Chief Complaint/Reason for Consult: sob  INTERVAL HPI:  occasional non productive cough  	  MEDICATIONS:  tamsulosin 0.8milliGRAM(s) Oral at bedtime      guaiFENesin    Syrup 100milliGRAM(s) Oral every 6 hours PRN    acetaminophen   Tablet. 650milliGRAM(s) Oral every 6 hours PRN  melatonin 3milliGRAM(s) Oral at bedtime PRN  ALPRAZolam 1milliGRAM(s) Oral every 12 hours PRN    polyethylene glycol 3350 17Gram(s) Oral daily PRN  senna 1Tablet(s) Oral daily      apixaban 10milliGRAM(s) Oral two times a day      REVIEW OF SYSTEMS:  [x] As per HPI  CONSTITUTIONAL: No fever, weight loss, or fatigue  RESPIRATORY: No cough, wheezing, chills or hemoptysis; No Shortness of Breath  CARDIOVASCULAR: No chest pain, palpitations, dizziness, or leg swelling  GASTROINTESTINAL: No abdominal or epigastric pain. No nausea, vomiting, or hematemesis; No diarrhea or constipation. No melena or hematochezia.  MUSCULOSKELETAL: No joint pain or swelling; No muscle, back, or extremity pain  [x] All others negative	  [ ] Unable to obtain    PHYSICAL EXAM:  T(C): 36.3, Max: 36.7 (06-12 @ 16:29)  HR: 90 (90 - 105)  BP: 135/71 (130/77 - 135/73)  RR: 20 (16 - 20)  SpO2: 94% (94% - 96%)  Wt(kg): --  I&O's Summary    I & Os for current day (as of 13 Jun 2017 13:15)  =============================================  IN: 0 ml / OUT: 1750 ml / NET: -1750 ml        Appearance: Normal	  HEENT:   Normal oral mucosa  Cardiovascular: Normal S1 S2, No JVD, No murmurs, No edema  Respiratory: Lungs clear to auscultation	  Gastrointestinal:  Soft, Non-tender, + BS	  Extremities: Normal range of motion, No clubbing, cyanosis or edema  Vascular: Peripheral pulses palpable 2+ bilaterally    TELEMETRY: 	    ECG:    	  RADIOLOGY:   CXR:  CT:  US:    CARDIAC TESTING:  Echocardiogram:  Catheterization:  Stress Test:      LABS:	 	    CARDIAC MARKERS:                                  9.0    23.5  )-----------( 240      ( 12 Jun 2017 07:22 )             28.2     06-12    137  |  102  |  18  ----------------------------<  93  4.2   |  25  |  0.70    Ca    8.1<L>      12 Jun 2017 07:22  Mg     1.8     06-12      proBNP:   Lipid Profile:   HgA1c:   TSH:     ASSESSMENT/PLAN: 	  ·  Problem: PE (pulmonary thromboembolism).  Plan: History of provoked PE on last admission after R hip arthroplasty, discharged on Xarelto but unclear if patient taking (did not complete loading dose course for Xarelto)  - c/w Eliquis 10mg BID x7d for loading, then 5mg BID.

## 2017-06-13 NOTE — PROGRESS NOTE ADULT - SUBJECTIVE AND OBJECTIVE BOX
Patient is a 77y old  Male who presents with a chief complaint of Cough and altered mental status. (12 Jun 2017 13:30)      HPI:  77M with PMHx of CLL (baseline WBC 19 range), benzo abuse 2/2 insomnia, recent admission for R femoral neck fracture after fall (s/p R hip igor-arthroplasty, course c/b hypoxia 2/2 provoked acute PE, discharged on Xarelto on 5/24) presents with respiratory distress, hypoxia with O2 sat 70% range, AMS, labs notable for Cr 8 range up from 1.00 after last DC, K 9, s/p intubation in ED. Patient unable to provide history at this time. Per history of [facility], patient recently spiking fevers to 101F, +cough. CXR in outpatient setting with no new infiltrate. Unable to clarify at this time whether patient was receiving/refusing Xarelto at rehab.     In the ED, patient afebrile (although receiving antipyretics at [facility]), tachycardic with HRs 112-132, BPs 149-203/79-88, RR 20s, SpO2 80s on RA. Per ED note, patient with labored breathing, remained hypoxic to the 90s on 100% NRB. Patient initially described as alert and awake, but intermittently confused and rambling. Exam notable for decreased breath sounds on the L lung and paradoxical breathing. Blood gas revealing of hypoxemia. Decision made to intubate with Rocuronium and Propofol for hypoxemic respiratory failure. Labs notable for Cr 8 range, K 9, WBC 80s range. CXR with poor inspiratory effort, new LLL opacification and RLL opacification (compared to CXR from 5/24). EKG with sinus tachycardia and new RBBB (not previously seen on EKG). Bladder scan performed and patient found to be retaining. Rodriguez placed with initial removal of 1.7L of urine. Patient administered Insulin/D50, Kayexalate rectal, sodium bicarb, Duonebs, 1L IV NS, Vanc 1g x1, Zosyn 3.375g x1. ICU consulted and patient admitted to ICU for monitoring and management. (05 Jun 2017 13:48)    INTERVAL HPI/OVERNIGHT EVENTS:::    HEALTH ISSUES - PROBLEM Dx:  Prophylactic measure: Prophylactic measure  Nutrition, metabolism, and development symptoms: Nutrition, metabolism, and development symptoms  Insomnia: Insomnia  Anemia: Anemia  BPH (benign prostatic hyperplasia): BPH (benign prostatic hyperplasia)  CLL (chronic lymphocytic leukemia): CLL (chronic lymphocytic leukemia)  Pneumonia due to infectious organism, unspecified laterality, unspecified part of lung: Pneumonia due to infectious organism, unspecified laterality, unspecified part of lung  PE (pulmonary thromboembolism): PE (pulmonary thromboembolism)  PNA (pneumonia): PNA (pneumonia)  Acute renal failure, unspecified acute renal failure type: Acute renal failure, unspecified acute renal failure type  Hyperkalemia: Hyperkalemia  Acute respiratory failure with hypoxia: Acute respiratory failure with hypoxia          PAST MEDICAL & SURGICAL HISTORY:  Hip fracture requiring operative repair, right, sequela  PE (pulmonary thromboembolism)  CLL (chronic lymphocytic leukemia)  S/P hip hemiarthroplasty          Consultant NOTE  REVIEWED  (   )    REVIEW OF SYSTEMS:  [x] As per HPI  CONSTITUTIONAL: No fever, weight loss, or fatigue  RESPIRATORY: No cough, wheezing, chills or hemoptysis; No Shortness of Breath  CARDIOVASCULAR: No chest pain, palpitations, dizziness, or leg swelling  GASTROINTESTINAL: No abdominal or epigastric pain. No nausea, vomiting, or hematemesis; No diarrhea or constipation. No melena or hematochezia.  MUSCULOSKELETAL: No joint pain or swelling; No muscle, back, or extremity pain  PSYCH    awake, alert       [x] All others negative	  [ ] Unable to obtain          Vital Signs Last 24 Hrs  T(C): 36.3, Max: 36.7 (06-12 @ 16:29)  T(F): 97.4, Max: 98 (06-12 @ 16:29)  HR: 90 (90 - 105)  BP: 135/71 (130/77 - 135/73)  BP(mean): --  RR: 20 (16 - 20)  SpO2: 94% (94% - 96%)        I & Os for current day (as of 06-13 @ 10:47)  =============================================  IN: 0 ml / OUT: 1750 ml / NET: -1750 ml    PHYSICAL EXAMINATION:                                    (    )  NO CHANGE  Appearance: Normal	  HEENT:   Normal oral mucosa, PERRL, EOMI	  Neck: Supple, + JVD/ - JVD; Carotid Bruit   Cardiovascular: Normal S1 S2, No JVD, No murmurs,   Respiratory: Lungs clear to auscultation/Decreased Breath Sounds/No Rales, Rhonchi, Wheezing	  Gastrointestinal:  Soft, Non-tender, + BS	  Skin: No rashes, No ecchymoses, No cyanosis  Extremities: Normal range of motion, No clubbing, cyanosis or edema  Vascular: Peripheral pulses palpable 2+ bilaterally  Neurologic: Non-focal  Psychiatry: A & O x 3, Mood & affect appropriate    tamsulosin 0.8milliGRAM(s) Oral at bedtime  apixaban 10milliGRAM(s) Oral two times a day  acetaminophen   Tablet. 650milliGRAM(s) Oral every 6 hours PRN  polyethylene glycol 3350 17Gram(s) Oral daily PRN  senna 1Tablet(s) Oral daily  guaiFENesin    Syrup 100milliGRAM(s) Oral every 6 hours PRN  lactobacillus acidophilus 1Tablet(s) Oral daily  melatonin 3milliGRAM(s) Oral at bedtime PRN  ALPRAZolam 1milliGRAM(s) Oral every 12 hours PRN                                      9.0    23.5  )-----------( 240      ( 12 Jun 2017 07:22 )             28.2     06-12    137  |  102  |  18  ----------------------------<  93  4.2   |  25  |  0.70    Ca    8.1<L>      12 Jun 2017 07:22  Mg     1.8     06-12        CAPILLARY BLOOD GLUCOSE

## 2017-06-15 DIAGNOSIS — J18.9 PNEUMONIA, UNSPECIFIED ORGANISM: ICD-10-CM

## 2017-06-15 DIAGNOSIS — I45.10 UNSPECIFIED RIGHT BUNDLE-BRANCH BLOCK: ICD-10-CM

## 2017-06-15 DIAGNOSIS — R65.20 SEVERE SEPSIS WITHOUT SEPTIC SHOCK: ICD-10-CM

## 2017-06-15 DIAGNOSIS — E87.5 HYPERKALEMIA: ICD-10-CM

## 2017-06-15 DIAGNOSIS — R33.8 OTHER RETENTION OF URINE: ICD-10-CM

## 2017-06-15 DIAGNOSIS — E86.0 DEHYDRATION: ICD-10-CM

## 2017-06-15 DIAGNOSIS — Z78.1 PHYSICAL RESTRAINT STATUS: ICD-10-CM

## 2017-06-15 DIAGNOSIS — A41.9 SEPSIS, UNSPECIFIED ORGANISM: ICD-10-CM

## 2017-06-15 DIAGNOSIS — N13.8 OTHER OBSTRUCTIVE AND REFLUX UROPATHY: ICD-10-CM

## 2017-06-15 DIAGNOSIS — C91.10 CHRONIC LYMPHOCYTIC LEUKEMIA OF B-CELL TYPE NOT HAVING ACHIEVED REMISSION: ICD-10-CM

## 2017-06-15 DIAGNOSIS — I47.1 SUPRAVENTRICULAR TACHYCARDIA: ICD-10-CM

## 2017-06-15 DIAGNOSIS — D64.9 ANEMIA, UNSPECIFIED: ICD-10-CM

## 2017-06-15 DIAGNOSIS — I26.99 OTHER PULMONARY EMBOLISM WITHOUT ACUTE COR PULMONALE: ICD-10-CM

## 2017-06-15 DIAGNOSIS — N17.9 ACUTE KIDNEY FAILURE, UNSPECIFIED: ICD-10-CM

## 2017-06-15 DIAGNOSIS — G47.00 INSOMNIA, UNSPECIFIED: ICD-10-CM
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
## 2017-06-15 DIAGNOSIS — Z79.01 LONG TERM (CURRENT) USE OF ANTICOAGULANTS: ICD-10-CM

## 2017-06-15 DIAGNOSIS — J96.01 ACUTE RESPIRATORY FAILURE WITH HYPOXIA: ICD-10-CM

## 2017-06-15 DIAGNOSIS — J98.11 ATELECTASIS: ICD-10-CM

## 2017-06-15 DIAGNOSIS — N13.30 UNSPECIFIED HYDRONEPHROSIS: ICD-10-CM

## 2017-06-15 DIAGNOSIS — J91.8 PLEURAL EFFUSION IN OTHER CONDITIONS CLASSIFIED ELSEWHERE: ICD-10-CM

## 2017-06-15 DIAGNOSIS — N40.1 BENIGN PROSTATIC HYPERPLASIA WITH LOWER URINARY TRACT SYMPTOMS: ICD-10-CM

## 2017-06-15 RX ORDER — APIXABAN 2.5 MG/1
1 TABLET, FILM COATED ORAL
Qty: 60 | Refills: 0 | OUTPATIENT
Start: 2017-06-15 | End: 2017-07-15

## 2017-06-19 PROBLEM — S72.001S: Chronic | Status: ACTIVE | Noted: 2017-06-05

## 2017-06-19 PROBLEM — I26.99 OTHER PULMONARY EMBOLISM WITHOUT ACUTE COR PULMONALE: Chronic | Status: ACTIVE | Noted: 2017-06-05

## 2017-06-26 LAB — RESPIRATORY PATH PNL SPEC CULT: SIGNIFICANT CHANGE UP

## 2017-06-28 ENCOUNTER — APPOINTMENT (OUTPATIENT)
Dept: UROLOGY | Facility: CLINIC | Age: 77
End: 2017-06-28

## 2017-06-28 VITALS
HEIGHT: 70 IN | SYSTOLIC BLOOD PRESSURE: 128 MMHG | HEART RATE: 114 BPM | TEMPERATURE: 98.2 F | WEIGHT: 170 LBS | BODY MASS INDEX: 24.34 KG/M2 | DIASTOLIC BLOOD PRESSURE: 79 MMHG

## 2017-07-06 LAB
CULTURE RESULTS: SIGNIFICANT CHANGE UP
SPECIMEN SOURCE: SIGNIFICANT CHANGE UP

## 2017-07-11 ENCOUNTER — APPOINTMENT (OUTPATIENT)
Dept: NEUROLOGY | Facility: CLINIC | Age: 77
End: 2017-07-11

## 2017-07-19 ENCOUNTER — APPOINTMENT (OUTPATIENT)
Dept: UROLOGY | Facility: CLINIC | Age: 77
End: 2017-07-19

## 2017-07-19 ENCOUNTER — APPOINTMENT (OUTPATIENT)
Dept: NEUROLOGY | Facility: CLINIC | Age: 77
End: 2017-07-19

## 2017-07-19 VITALS — HEART RATE: 109 BPM | DIASTOLIC BLOOD PRESSURE: 82 MMHG | SYSTOLIC BLOOD PRESSURE: 152 MMHG | TEMPERATURE: 98.4 F

## 2017-08-02 ENCOUNTER — APPOINTMENT (OUTPATIENT)
Dept: NEUROLOGY | Facility: CLINIC | Age: 77
End: 2017-08-02
Payer: MEDICARE

## 2017-08-02 VITALS
HEIGHT: 70 IN | DIASTOLIC BLOOD PRESSURE: 70 MMHG | OXYGEN SATURATION: 95 % | TEMPERATURE: 98 F | WEIGHT: 175 LBS | BODY MASS INDEX: 25.05 KG/M2 | HEART RATE: 109 BPM | SYSTOLIC BLOOD PRESSURE: 140 MMHG

## 2017-08-02 DIAGNOSIS — Z87.898 PERSONAL HISTORY OF OTHER SPECIFIED CONDITIONS: ICD-10-CM

## 2017-08-02 DIAGNOSIS — R41.3 OTHER AMNESIA: ICD-10-CM

## 2017-08-02 PROCEDURE — 99205 OFFICE O/P NEW HI 60 MIN: CPT

## 2017-08-02 RX ORDER — TAMSULOSIN HYDROCHLORIDE 0.4 MG/1
0.4 CAPSULE ORAL
Refills: 0 | Status: ACTIVE | COMMUNITY

## 2017-08-02 RX ORDER — DEXLANSOPRAZOLE 60 MG/1
60 CAPSULE, DELAYED RELEASE ORAL
Refills: 0 | Status: DISCONTINUED | COMMUNITY
End: 2017-08-02

## 2017-08-02 RX ORDER — FUROSEMIDE 80 MG/1
TABLET ORAL
Refills: 0 | Status: ACTIVE | COMMUNITY

## 2017-08-02 RX ORDER — ATORVASTATIN CALCIUM 10 MG/1
10 TABLET, FILM COATED ORAL
Refills: 0 | Status: DISCONTINUED | COMMUNITY
End: 2017-08-02

## 2017-08-09 ENCOUNTER — APPOINTMENT (OUTPATIENT)
Dept: UROLOGY | Facility: CLINIC | Age: 77
End: 2017-08-09
Payer: MEDICARE

## 2017-08-09 VITALS
WEIGHT: 175 LBS | TEMPERATURE: 98 F | SYSTOLIC BLOOD PRESSURE: 133 MMHG | BODY MASS INDEX: 25.05 KG/M2 | HEIGHT: 70 IN | DIASTOLIC BLOOD PRESSURE: 76 MMHG | HEART RATE: 111 BPM

## 2017-08-09 DIAGNOSIS — N13.9 OBSTRUCTIVE AND REFLUX UROPATHY, UNSPECIFIED: ICD-10-CM

## 2017-08-09 DIAGNOSIS — R33.8 BENIGN PROSTATIC HYPERPLASIA WITH LOWER URINARY TRACT SYMPMS: ICD-10-CM

## 2017-08-09 DIAGNOSIS — N40.1 BENIGN PROSTATIC HYPERPLASIA WITH LOWER URINARY TRACT SYMPMS: ICD-10-CM

## 2017-08-09 PROCEDURE — 51702 INSERT TEMP BLADDER CATH: CPT

## 2017-08-09 PROCEDURE — 99213 OFFICE O/P EST LOW 20 MIN: CPT | Mod: 25

## 2017-08-11 ENCOUNTER — INPATIENT (INPATIENT)
Facility: HOSPITAL | Age: 77
LOS: 3 days | Discharge: NURSING, REHAB OR LONG-TERM CARE | DRG: 593 | End: 2017-08-15
Attending: INTERNAL MEDICINE | Admitting: INTERNAL MEDICINE
Payer: MEDICARE

## 2017-08-11 VITALS
TEMPERATURE: 98 F | DIASTOLIC BLOOD PRESSURE: 78 MMHG | OXYGEN SATURATION: 95 % | HEART RATE: 100 BPM | WEIGHT: 175.05 LBS | SYSTOLIC BLOOD PRESSURE: 130 MMHG | RESPIRATION RATE: 18 BRPM

## 2017-08-11 DIAGNOSIS — Z96.649 PRESENCE OF UNSPECIFIED ARTIFICIAL HIP JOINT: Chronic | ICD-10-CM

## 2017-08-11 DIAGNOSIS — C91.10 CHRONIC LYMPHOCYTIC LEUKEMIA OF B-CELL TYPE NOT HAVING ACHIEVED REMISSION: ICD-10-CM

## 2017-08-11 DIAGNOSIS — Z29.9 ENCOUNTER FOR PROPHYLACTIC MEASURES, UNSPECIFIED: ICD-10-CM

## 2017-08-11 DIAGNOSIS — I26.99 OTHER PULMONARY EMBOLISM WITHOUT ACUTE COR PULMONALE: ICD-10-CM

## 2017-08-11 DIAGNOSIS — R63.8 OTHER SYMPTOMS AND SIGNS CONCERNING FOOD AND FLUID INTAKE: ICD-10-CM

## 2017-08-11 DIAGNOSIS — L97.409 NON-PRESSURE CHRONIC ULCER OF UNSPECIFIED HEEL AND MIDFOOT WITH UNSPECIFIED SEVERITY: ICD-10-CM

## 2017-08-11 DIAGNOSIS — L03.90 CELLULITIS, UNSPECIFIED: ICD-10-CM

## 2017-08-11 LAB
ALBUMIN SERPL ELPH-MCNC: 3.2 G/DL — LOW (ref 3.3–5)
ALP SERPL-CCNC: 94 U/L — SIGNIFICANT CHANGE UP (ref 40–120)
ALT FLD-CCNC: 16 U/L — SIGNIFICANT CHANGE UP (ref 10–45)
ANION GAP SERPL CALC-SCNC: 11 MMOL/L — SIGNIFICANT CHANGE UP (ref 5–17)
APPEARANCE UR: CLEAR — SIGNIFICANT CHANGE UP
APTT BLD: 26.4 SEC — LOW (ref 27.5–37.4)
AST SERPL-CCNC: 19 U/L — SIGNIFICANT CHANGE UP (ref 10–40)
BACTERIA # UR AUTO: PRESENT /HPF
BASOPHILS NFR BLD AUTO: 0.2 % — SIGNIFICANT CHANGE UP (ref 0–2)
BILIRUB SERPL-MCNC: 0.2 MG/DL — SIGNIFICANT CHANGE UP (ref 0.2–1.2)
BILIRUB UR-MCNC: NEGATIVE — SIGNIFICANT CHANGE UP
BLD GP AB SCN SERPL QL: NEGATIVE — SIGNIFICANT CHANGE UP
BUN SERPL-MCNC: 24 MG/DL — HIGH (ref 7–23)
CALCIUM SERPL-MCNC: 9 MG/DL — SIGNIFICANT CHANGE UP (ref 8.4–10.5)
CHLORIDE SERPL-SCNC: 99 MMOL/L — SIGNIFICANT CHANGE UP (ref 96–108)
CO2 SERPL-SCNC: 31 MMOL/L — SIGNIFICANT CHANGE UP (ref 22–31)
COLOR SPEC: YELLOW — SIGNIFICANT CHANGE UP
CREAT SERPL-MCNC: 1 MG/DL — SIGNIFICANT CHANGE UP (ref 0.5–1.3)
CRP SERPL-MCNC: 0.6 MG/DL — HIGH (ref 0–0.4)
DIFF PNL FLD: (no result)
EOSINOPHIL NFR BLD AUTO: 1.3 % — SIGNIFICANT CHANGE UP (ref 0–6)
EPI CELLS # UR: SIGNIFICANT CHANGE UP /HPF
ERYTHROCYTE [SEDIMENTATION RATE] IN BLOOD: 28 MM/HR — HIGH
GLUCOSE SERPL-MCNC: 114 MG/DL — HIGH (ref 70–99)
GLUCOSE UR QL: NEGATIVE — SIGNIFICANT CHANGE UP
HCT VFR BLD CALC: 38.5 % — LOW (ref 39–50)
HGB BLD-MCNC: 11.9 G/DL — LOW (ref 13–17)
INR BLD: 1.04 — SIGNIFICANT CHANGE UP (ref 0.88–1.16)
KETONES UR-MCNC: NEGATIVE — SIGNIFICANT CHANGE UP
LEUKOCYTE ESTERASE UR-ACNC: (no result)
LYMPHOCYTES # BLD AUTO: 52.2 % — HIGH (ref 13–44)
MCHC RBC-ENTMCNC: 27.5 PG — SIGNIFICANT CHANGE UP (ref 27–34)
MCHC RBC-ENTMCNC: 30.9 G/DL — LOW (ref 32–36)
MCV RBC AUTO: 89.1 FL — SIGNIFICANT CHANGE UP (ref 80–100)
MONOCYTES NFR BLD AUTO: 4.8 % — SIGNIFICANT CHANGE UP (ref 2–14)
NEUTROPHILS NFR BLD AUTO: 41.5 % — LOW (ref 43–77)
NITRITE UR-MCNC: NEGATIVE — SIGNIFICANT CHANGE UP
NT-PROBNP SERPL-SCNC: 72 PG/ML — SIGNIFICANT CHANGE UP (ref 0–300)
PH UR: 7.5 — SIGNIFICANT CHANGE UP (ref 5–8)
PLATELET # BLD AUTO: 183 K/UL — SIGNIFICANT CHANGE UP (ref 150–400)
POTASSIUM SERPL-MCNC: 3.9 MMOL/L — SIGNIFICANT CHANGE UP (ref 3.5–5.3)
POTASSIUM SERPL-SCNC: 3.9 MMOL/L — SIGNIFICANT CHANGE UP (ref 3.5–5.3)
PROT SERPL-MCNC: 6.2 G/DL — SIGNIFICANT CHANGE UP (ref 6–8.3)
PROT UR-MCNC: 30 MG/DL
PROTHROM AB SERPL-ACNC: 11.6 SEC — SIGNIFICANT CHANGE UP (ref 9.8–12.7)
RBC # BLD: 4.32 M/UL — SIGNIFICANT CHANGE UP (ref 4.2–5.8)
RBC # FLD: 15.7 % — SIGNIFICANT CHANGE UP (ref 10.3–16.9)
RBC CASTS # UR COMP ASSIST: (no result) /HPF
RH IG SCN BLD-IMP: POSITIVE — SIGNIFICANT CHANGE UP
SODIUM SERPL-SCNC: 141 MMOL/L — SIGNIFICANT CHANGE UP (ref 135–145)
SP GR SPEC: 1.01 — SIGNIFICANT CHANGE UP (ref 1–1.03)
UROBILINOGEN FLD QL: 0.2 E.U./DL — SIGNIFICANT CHANGE UP
WBC # BLD: 12.6 K/UL — HIGH (ref 3.8–10.5)
WBC # FLD AUTO: 12.6 K/UL — HIGH (ref 3.8–10.5)
WBC UR QL: (no result) /HPF

## 2017-08-11 PROCEDURE — 73620 X-RAY EXAM OF FOOT: CPT | Mod: 26,50

## 2017-08-11 PROCEDURE — 71010: CPT | Mod: 26

## 2017-08-11 PROCEDURE — 99285 EMERGENCY DEPT VISIT HI MDM: CPT

## 2017-08-11 RX ORDER — APIXABAN 2.5 MG/1
5 TABLET, FILM COATED ORAL EVERY 12 HOURS
Qty: 0 | Refills: 0 | Status: DISCONTINUED | OUTPATIENT
Start: 2017-08-11 | End: 2017-08-15

## 2017-08-11 RX ORDER — PANTOPRAZOLE SODIUM 20 MG/1
40 TABLET, DELAYED RELEASE ORAL
Qty: 0 | Refills: 0 | Status: DISCONTINUED | OUTPATIENT
Start: 2017-08-12 | End: 2017-08-15

## 2017-08-11 RX ORDER — VANCOMYCIN HCL 1 G
1250 VIAL (EA) INTRAVENOUS ONCE
Qty: 0 | Refills: 0 | Status: COMPLETED | OUTPATIENT
Start: 2017-08-11 | End: 2017-08-11

## 2017-08-11 RX ORDER — VANCOMYCIN HCL 1 G
1000 VIAL (EA) INTRAVENOUS ONCE
Qty: 0 | Refills: 0 | Status: DISCONTINUED | OUTPATIENT
Start: 2017-08-11 | End: 2017-08-11

## 2017-08-11 RX ORDER — LACTOBACILLUS ACIDOPHILUS 100MM CELL
1 CAPSULE ORAL DAILY
Qty: 0 | Refills: 0 | Status: DISCONTINUED | OUTPATIENT
Start: 2017-08-12 | End: 2017-08-15

## 2017-08-11 RX ORDER — SENNA PLUS 8.6 MG/1
2 TABLET ORAL AT BEDTIME
Qty: 0 | Refills: 0 | Status: DISCONTINUED | OUTPATIENT
Start: 2017-08-11 | End: 2017-08-15

## 2017-08-11 RX ORDER — SODIUM CHLORIDE 9 MG/ML
1000 INJECTION INTRAMUSCULAR; INTRAVENOUS; SUBCUTANEOUS
Qty: 0 | Refills: 0 | Status: DISCONTINUED | OUTPATIENT
Start: 2017-08-11 | End: 2017-08-15

## 2017-08-11 RX ORDER — PIPERACILLIN AND TAZOBACTAM 4; .5 G/20ML; G/20ML
3.38 INJECTION, POWDER, LYOPHILIZED, FOR SOLUTION INTRAVENOUS ONCE
Qty: 0 | Refills: 0 | Status: COMPLETED | OUTPATIENT
Start: 2017-08-11 | End: 2017-08-11

## 2017-08-11 RX ORDER — TAMSULOSIN HYDROCHLORIDE 0.4 MG/1
0.4 CAPSULE ORAL AT BEDTIME
Qty: 0 | Refills: 0 | Status: DISCONTINUED | OUTPATIENT
Start: 2017-08-11 | End: 2017-08-15

## 2017-08-11 RX ADMIN — Medication 166.67 MILLIGRAM(S): at 17:34

## 2017-08-11 RX ADMIN — PIPERACILLIN AND TAZOBACTAM 200 GRAM(S): 4; .5 INJECTION, POWDER, LYOPHILIZED, FOR SOLUTION INTRAVENOUS at 16:02

## 2017-08-11 NOTE — H&P ADULT - NSHPPHYSICALEXAM_GEN_ALL_CORE
.  VITAL SIGNS:  T(C): 36.3 (08-11-17 @ 19:37), Max: 36.7 (08-11-17 @ 14:47)  T(F): 97.3 (08-11-17 @ 19:37), Max: 98.1 (08-11-17 @ 14:47)  HR: 85 (08-11-17 @ 19:37) (85 - 100)  BP: 148/78 (08-11-17 @ 19:37) (130/78 - 148/78)  BP(mean): --  RR: 16 (08-11-17 @ 19:37) (16 - 18)  SpO2: 98% (08-11-17 @ 19:37) (95% - 98%)  Wt(kg): --    PHYSICAL EXAM:    Constitutional: WDWN resting comfortably in bed; NAD  Head: NC/AT  Eyes: PERRL, EOMI, clear conjunctiva  ENT: no nasal discharge; uvula midline, no oropharyngeal erythema or exudates; MMM  Neck: supple; no JVD or thyromegaly  Respiratory: CTA B/L; no W/R/R, no retractions  Cardiac: +S1/S2; RRR; no M/R/G; PMI non-displaced  Gastrointestinal: soft, NT/ND; no rebound or guarding; +BSx4  Genitourinary: normal external genitalia  Back: spine midline, no bony tenderness or step-offs; no CVAT B/L  Extremities: WWP, no clubbing or cyanosis; no peripheral edema  Musculoskeletal: NROM x4; no joint swelling, tenderness or erythema  Vascular: 2+ radial, femoral, DP/PT pulses B/L  Dermatologic: skin warm, dry and intact; no rashes, wounds, or scars  Lymphatic: no submandibular or cervical LAD  Neurologic: AAOx3; CNII-XII grossly intact; no focal deficits  Psychiatric: affect and characteristics of appearance, verbalizations, behaviors are appropriate .  VITAL SIGNS:  T(C): 36.3 (08-11-17 @ 19:37), Max: 36.7 (08-11-17 @ 14:47)  T(F): 97.3 (08-11-17 @ 19:37), Max: 98.1 (08-11-17 @ 14:47)  HR: 85 (08-11-17 @ 19:37) (85 - 100)  BP: 148/78 (08-11-17 @ 19:37) (130/78 - 148/78)  BP(mean): --  RR: 16 (08-11-17 @ 19:37) (16 - 18)  SpO2: 98% (08-11-17 @ 19:37) (95% - 98%)  Wt(kg): --    PHYSICAL EXAM:    Constitutional: WDWN resting comfortably in bed; NAD  Head: NC/AT  Eyes: PERRL, EOMI, clear conjunctiva  ENT: no nasal discharge; uvula midline, no oropharyngeal erythema or exudates; MMD  Neck: supple; no JVD or thyromegaly  Respiratory: CTABL  Cardiac: +S1/S2; RRR; no M/R/G;   Gastrointestinal: soft, NT/ND; no rebound or guarding; +BSx4  Genitourinary: normal external genitalia  Back: spine midline, no bony tenderness or step-offs; no CVAT B/L  Extremities:3+ pedal edema, bilateral L>R, also has necrotic ulcer on L and R. Heel base, L>R in size, no active discharge(visible after removing dressing)  Musculoskeletal: dec rom on R. leg, 2/2 recent operation, nl rom on left leg  Vascular: 2+ radial, femoral, DP/PT pulses B/L  Dermatologic: skin warm, dry and intact; no rashes, wounds, or scars  Lymphatic: no submandibular or cervical LAD  Neurologic: AAOx3; CNII-XII grossly intact; no focal deficits  Psychiatric: affect and characteristics of appearance, verbalizations, behaviors are appropriate

## 2017-08-11 NOTE — CONSULT NOTE ADULT - SUBJECTIVE AND OBJECTIVE BOX
HPI: 78 yo male with history of CLL, BPH, insomnia, recent right femoral neck fracture s/p hemiarthroplasty (may 2017) complicated by PE (on Xarelto) and pneumonia (July 2017), now presents from his PCP office due to left and right heel wound.   Patient currently lives at home and has visiting nurse that noticed blister on patient's left heel as well as small wound on right heel. Patient denies fevers, chills, N/V/SOB or palpitations. States he is still weak after recent hospitalizations and gets fatigued very quickly but is able to walk with a walker. Denies lower extremities pain, tingling/numbness/sensory loss/ trauma or bug bite, although has recently noticed that his ankles and calfs are swollen.       PAST MEDICAL & SURGICAL HISTORY:  Hip fracture requiring operative repair, right, sequela  PE (pulmonary thromboembolism)  CLL (chronic lymphocytic leukemia)  S/P hip hemiarthroplasty      ROS: See HPI    MEDICATIONS:  ALLERGIES:    SOCIAL HISTORY:  Smoke: Never Smoker  EtOH: occasional    Vital Signs Last 24 Hrs  T(C): 36.4 (11 Aug 2017 18:58), Max: 36.7 (11 Aug 2017 14:47)  T(F): 97.6 (11 Aug 2017 18:58), Max: 98.1 (11 Aug 2017 14:47)  HR: 90 (11 Aug 2017 18:58) (90 - 100)  BP: 145/74 (11 Aug 2017 18:58) (130/78 - 145/74)  BP(mean): --  RR: 18 (11 Aug 2017 18:58) (18 - 18)  SpO2: 97% (11 Aug 2017 18:58) (95% - 97%)    PHYSICAL EXAM  Gen: NAD, in no acute distress  CV: RRR  Pulm: CTAB  Abd: Soft, NT/ND  Extremities: WWP, no cyanosis or clubbing, 2+ pitting edema, palpable DP/femoral bilaterally, triphasic PT/popliteal Doppler signals bilaterally, left heel wound 5x5cm with an old eschar - eschar removed revealing superficial non-bleeding wound - no drainage appreciated/ small 2 x2cm superficial right heel wound with - no drainage/bleeding    LABS:                        11.9   12.6  )-----------( 183      ( 11 Aug 2017 16:18 )             38.5     08-11    141  |  99  |  24<H>  ----------------------------<  114<H>  3.9   |  31  |  1.00    Ca    9.0      11 Aug 2017 16:18    TPro  6.2  /  Alb  3.2<L>  /  TBili  0.2  /  DBili  x   /  AST  19  /  ALT  16  /  AlkPhos  94  08-11    PT/INR - ( 11 Aug 2017 16:18 )   PT: 11.6 sec;   INR: 1.04          PTT - ( 11 Aug 2017 16:18 )  PTT:26.4 sec      RADIOLOGY & ADDITIONAL STUDIES:      ASSESSMENT AND PLAN:  78 yo male with superficial left and right heel wound.   Patient remains hemodynamically stable, afebrile. Mildly elevated WBC.     - recommend admission to medicine service  - no surgical intervention at this time  - leg elevation  - daily wet to dry dressing, ace wrap  - continue IV antibiotics  - f/u wound cultures and heel XR  - vascular surgery team will follow, please call with any questions  - plan discussed with chief and attending

## 2017-08-11 NOTE — H&P ADULT - ATTENDING COMMENTS
pt admitted from office  NECROTIC large Left med mallelous ulcer  PVD  DJD  CLL  h/o hip fracture  LIMIT narcotics

## 2017-08-11 NOTE — ED PROVIDER NOTE - SKIN COLOR
eschar overlying bilateral heels medial and lateral aspects---down to dermis-  pos sensation - foul smelling exudate noted no fluctuance or crepitus

## 2017-08-11 NOTE — ED PROVIDER NOTE - OBJECTIVE STATEMENT
77M with PMHx of CLL (baseline WBC 20s), benzo abuse 2/2 insomnia, BPH, recent admission for R femoral neck fracture after fall (s/p R hip igor-arthroplasty, course c/b hypoxia 2/2 provoked acute PE, discharged on Xarelto on 5/26) presented from [facility] with respiratory distress. Reportedly febrile to 101F and with productive cough at rehab. In the ED, patient afebrile ([facility] tachycardic with HRs 112-132, BPs 149-203/79-88, RR 20s, SpO2 80s on RA. Found to have labored breathing, remained hypoxic to the 90s on 100% NRB. Intermittently confused and rambling. Blood gas revealed of hypoxemia and decision was made to intubate. Labs notable for Cr 8.8 (baseline 1), K 9, WBC 80s range. CXR with poor inspiratory effort, new LLL opacification and RLL opacification (compared to CXR from 5/24). EKG without peaked T waves. Bladder scan revealing of retained urine. Rodriguez catheter placed and patient with initial U/O 1.7L, total 2.5L. Started on vanc/zosyn for presumed pna. Was started on fluid replacement for post-obstructive diuresis with replacement of 1/2 U/O. Patient additionally started on Hep gtt for treatment of PE and later started on eliquis with loading doses. Bedside bronchoscopy performed. Patient remained HD stable and not requiring pressors. Tolerated CPAP and extubated on 6/7. Bronch cultures growing GPC in pairs. Antibiotics de-escalated to Ceftriaxone. Patient discontinued off fluid resucitation on 6/7 as creatinine normalized, but restarted on 6/8 after urine osmolality indicates continued post-obstructive diuresis. Repeat urine osmolality improved; IVF discontinued. Urology consulted for BPH and need for outpatient follow up and to keep rodriguez in until then, will most likely need TURP as outpatient. Remains HD stable, afebrile >24hrs, leukocytosis improving (currently at baseline). Abx were discontinued and patient was ready to be discharged back to rehab.       Pt brought to ED with necrotic heel ulcers by A after seeing dr mcfarlane in office-- referred to dr gill - no fevers or chills- worsening over the past 2 weeks  no DM or known PVD-  now with drainage from right heel? 77M with PMHx of CLL (baseline WBC 20s), benzo abuse 2/2 insomnia, BPH, recent admission for R femoral neck fracture after fall (s/p R hip igor-arthroplasty  in May 2017  Pt brought to ED  today by [facility]HA with necrotic heel ulcers after seeing dr mcfarlane in office-- referred to dr Thomas - no fevers or chills- worsening over the past 2 weeks  no DM or known PVD-  now with drainage from right heel?

## 2017-08-11 NOTE — H&P ADULT - HISTORY OF PRESENT ILLNESS
76 yo male with history of CLL, BPH, insomnia, JARED(resolved),  recent right femoral neck fracture s/p hemiarthroplasty (may 2017) complicated by PE (on Xarelto) and pneumonia (July 2017), now presents from his PCP office due to left and right heel wound. Patient currently lives at home and has visiting nurse that noticed blister on patient's left heel as well as small wound on right heel. Patient denies fevers, chills, N/V/SOB or palpitations. Denies lower extremities pain, tingling/numbness/sensory loss/ trauma or bug bite, although has recently noticed that his ankles and calfs are swollen. Pt brought to ED  today by [facility]HA with necrotic heel ulcers after seeing dr mcfarlane in office-- referred to dr Thomas - no fevers or chills- worsening over the past 2 weeks  no DM or known PVD-  now with drainage from right heel?    In ED vitals were   Vital Signs Last 24 Hrs  T(C): 36.3 (11 Aug 2017 19:37), Max: 36.7 (11 Aug 2017 14:47)  T(F): 97.3 (11 Aug 2017 19:37), Max: 98.1 (11 Aug 2017 14:47)  HR: 85 (11 Aug 2017 19:37) (85 - 100)  BP: 148/78 (11 Aug 2017 19:37) (130/78 - 148/78)  BP(mean): --  RR: 16 (11 Aug 2017 19:37) (16 - 18)  SpO2: 98% (11 Aug 2017 19:37) (95% - 98%)    He received vancomycin and zosyn x1. Vascular svc was consulted for heel wound. 78 yo male with history of CLL, BPH, insomnia, JARED 2/2 obstructive uropathy(resolved, on chronic rodriguez follows Dr. Leigh urology ),  recent right femoral neck fracture s/p hemiarthroplasty (may 2017) complicated by PE (on Xarelto) and pneumonia (July 2017), now presents from his PCP office due to left and right heel wound. Patient currently lives at home and has visiting nurse that noticed blister on patient's left heel as well as small wound on right heel. He reports the blister, started approx. 2 months ago, with serous discharge in the beginning and now resolved. It is also a/w mild redness. Patient denies fevers, chills, N/V/SOB or palpitations. Denies lower extremities pain, tingling/numbness/sensory loss/ trauma or bug bite, although has recently noticed that his ankles and calfs are swollen. Pt brought to ED  today by [facility] with necrotic heel ulcers after seeing dr mcfarlane in office-- referred to dr Thomas - no fevers or chills- worsening over the past 2 weeks  no DM or known PVD-  now with drainage from right heel?    In ED vitals were   Vital Signs Last 24 Hrs  T(C): 36.3 (11 Aug 2017 19:37), Max: 36.7 (11 Aug 2017 14:47)  T(F): 97.3 (11 Aug 2017 19:37), Max: 98.1 (11 Aug 2017 14:47)  HR: 85 (11 Aug 2017 19:37) (85 - 100)  BP: 148/78 (11 Aug 2017 19:37) (130/78 - 148/78)  BP(mean): --  RR: 16 (11 Aug 2017 19:37) (16 - 18)  SpO2: 98% (11 Aug 2017 19:37) (95% - 98%)    He received vancomycin and zosyn x1. Vascular svc was consulted for heel wound. 76 yo male with history of CLL, BPH, insomnia, JARED 2/2 obstructive uropathy(developed after operation for fracture, resolved, [facility] ),  recent right femoral neck fracture s/p hemiarthroplasty (may 2017) complicated by PE (on Xarelto) and pneumonia (July 2017), now presents from his PCP office due to left and right heel wound. Patient currently lives at home and has visiting nurse that noticed blister on patient's left heel as well as small wound on right heel. He reports the blister, started approx. 2 months ago, with serous discharge in the beginning and now resolved. It is also a/w mild redness and worsening SIMON. Patient denies fevers, chills, N/V/SOB or palpitations, hx of trauma. Denies lower extremities pain, tingling/numbness/sensory loss/ trauma or bug bite. He was recently d/pricila from [facility] s/p complicated course from PNA and PE. Denies smoking, drinking or IVDA.   In ED vitals were   Vital Signs Last 24 Hrs  T(C): 36.3 (11 Aug 2017 19:37), Max: 36.7 (11 Aug 2017 14:47)  T(F): 97.3 (11 Aug 2017 19:37), Max: 98.1 (11 Aug 2017 14:47)  HR: 85 (11 Aug 2017 19:37) (85 - 100)  BP: 148/78 (11 Aug 2017 19:37) (130/78 - 148/78)  BP(mean): --  RR: 16 (11 Aug 2017 19:37) (16 - 18)  SpO2: 98% (11 Aug 2017 19:37) (95% - 98%)    He received vancomycin and zosyn x1. Vascular svc was consulted for heel wound.

## 2017-08-11 NOTE — H&P ADULT - PROBLEM SELECTOR PLAN 6
eliquis , protonix Hx of bzd abuse - abuse xanax, apparently, seen by Dr. Coles, for evaluation where he had MMSE 26/30  - outpatient w/u  - istop to check bzd

## 2017-08-11 NOTE — H&P ADULT - NSHPLABSRESULTS_GEN_ALL_CORE
.  LABS:                         11.9   12.6  )-----------( 183      ( 11 Aug 2017 16:18 )             38.5     08-11    141  |  99  |  24<H>  ----------------------------<  114<H>  3.9   |  31  |  1.00    Ca    9.0      11 Aug 2017 16:18    TPro  6.2  /  Alb  3.2<L>  /  TBili  0.2  /  DBili  x   /  AST  19  /  ALT  16  /  AlkPhos  94  08-11    PT/INR - ( 11 Aug 2017 16:18 )   PT: 11.6 sec;   INR: 1.04          PTT - ( 11 Aug 2017 16:18 )  PTT:26.4 sec              RADIOLOGY, EKG & ADDITIONAL TESTS: Reviewed.

## 2017-08-11 NOTE — H&P ADULT - ASSESSMENT
78 yo male with history of CLL, BPH, insomnia, JARED 2/2 obstructive uropathy(developed after operation for fracture, resolved, on chronic rodriguez follows Dr. Leigh urology ),  recent right femoral neck fracture s/p hemiarthroplasty (may 2017) complicated by PE (on Xarelto) and pneumonia (July 2017), now presents from his PCP office due to left and right heel wound.

## 2017-08-11 NOTE — ED ADULT TRIAGE NOTE - CHIEF COMPLAINT QUOTE
sent in by Dr Sterling Garg to follow up w/ vascular regarding unstageable left foot ulcer x 3 months.

## 2017-08-11 NOTE — ED ADULT NURSE NOTE - OBJECTIVE STATEMENT
pt presents via wheelchair for evaluation of left wound to heel and ankle.  pt reports wound has developed over the last couple of months but has gotten worse.  + ulcer to left heel that is covered with eschar tissue, redness around eschar and swelling, pt also has ulcer that is stage two break down to medial aspect of left ankle.  pt denies fever and chills and pain.  pt unable to ambulate and is wheelchair bound.

## 2017-08-11 NOTE — ED PROVIDER NOTE - MEDICAL DECISION MAKING DETAILS
cellulitis and necrosis - bilateral heel ulcers- will require IV antibiotics and debridement - vascular surgery to admit cellulitis and necrosis - bilateral heel ulcers- ? osteomyelitis vs pressure induced  has palpable pulses even through dorsal foot edema -- will require IV antibiotics and debridement - vascular surgery eval pt  rec   IV abx /elevation-  wet to dry dressings---- medicine to admit--

## 2017-08-11 NOTE — H&P ADULT - PROBLEM SELECTOR PLAN 4
was seen by Dr. Rizvi, 2 days ago, where his rodriguez was changed. Probably his JARED happened after surgery, where he retained urine, resolved w rodriguez  - Per note in allscript, they want to continue rodriguez w flomax for one more month before TOV  - Also, UA from rodriguez is dirty, perhaps is already colonized, his abx vanc + zosyn would cover anything

## 2017-08-11 NOTE — ED PROVIDER NOTE - SKIN [+], MLM
necrotic  heel ulcer L  dry eschar  also right heel ulcer with skin breakdown to dermis with serous drainage--

## 2017-08-11 NOTE — H&P ADULT - PROBLEM SELECTOR PLAN 5
Hx of bzd abuse - abuse xanax, apparently, seen by Dr. Coles, for evaluation where he had MMSE 26/30  - outpatient w/u  - istop to check bzd bilateral, unknown cause, ?2/2 imbolisation . As Nl, BNP, Nl echo w/o pulm htn  - can check duplex to r/o dvt, would not  as on eliquis  - Also, vascular following, appreciate recs  - Elevate leg

## 2017-08-12 DIAGNOSIS — R41.3 OTHER AMNESIA: ICD-10-CM

## 2017-08-12 DIAGNOSIS — N13.9 OBSTRUCTIVE AND REFLUX UROPATHY, UNSPECIFIED: ICD-10-CM

## 2017-08-12 DIAGNOSIS — R60.0 LOCALIZED EDEMA: ICD-10-CM

## 2017-08-12 LAB
ALBUMIN SERPL ELPH-MCNC: 2.9 G/DL — LOW (ref 3.3–5)
ALP SERPL-CCNC: 81 U/L — SIGNIFICANT CHANGE UP (ref 40–120)
ALT FLD-CCNC: 17 U/L — SIGNIFICANT CHANGE UP (ref 10–45)
ANION GAP SERPL CALC-SCNC: 12 MMOL/L — SIGNIFICANT CHANGE UP (ref 5–17)
AST SERPL-CCNC: 21 U/L — SIGNIFICANT CHANGE UP (ref 10–40)
BASOPHILS NFR BLD AUTO: 0.2 % — SIGNIFICANT CHANGE UP (ref 0–2)
BILIRUB SERPL-MCNC: 0.4 MG/DL — SIGNIFICANT CHANGE UP (ref 0.2–1.2)
BUN SERPL-MCNC: 18 MG/DL — SIGNIFICANT CHANGE UP (ref 7–23)
CALCIUM SERPL-MCNC: 8.6 MG/DL — SIGNIFICANT CHANGE UP (ref 8.4–10.5)
CHLORIDE SERPL-SCNC: 98 MMOL/L — SIGNIFICANT CHANGE UP (ref 96–108)
CO2 SERPL-SCNC: 25 MMOL/L — SIGNIFICANT CHANGE UP (ref 22–31)
CREAT SERPL-MCNC: 0.9 MG/DL — SIGNIFICANT CHANGE UP (ref 0.5–1.3)
EOSINOPHIL NFR BLD AUTO: 2.4 % — SIGNIFICANT CHANGE UP (ref 0–6)
GLUCOSE SERPL-MCNC: 100 MG/DL — HIGH (ref 70–99)
HBA1C BLD-MCNC: 5.2 % — SIGNIFICANT CHANGE UP (ref 4–5.6)
HCT VFR BLD CALC: 36.6 % — LOW (ref 39–50)
HGB BLD-MCNC: 11.6 G/DL — LOW (ref 13–17)
LYMPHOCYTES # BLD AUTO: 59.3 % — HIGH (ref 13–44)
MAGNESIUM SERPL-MCNC: 2 MG/DL — SIGNIFICANT CHANGE UP (ref 1.6–2.6)
MCHC RBC-ENTMCNC: 27.8 PG — SIGNIFICANT CHANGE UP (ref 27–34)
MCHC RBC-ENTMCNC: 31.7 G/DL — LOW (ref 32–36)
MCV RBC AUTO: 87.6 FL — SIGNIFICANT CHANGE UP (ref 80–100)
MONOCYTES NFR BLD AUTO: 4.7 % — SIGNIFICANT CHANGE UP (ref 2–14)
NEUTROPHILS NFR BLD AUTO: 33.4 % — LOW (ref 43–77)
PLATELET # BLD AUTO: 151 K/UL — SIGNIFICANT CHANGE UP (ref 150–400)
POTASSIUM SERPL-MCNC: 4 MMOL/L — SIGNIFICANT CHANGE UP (ref 3.5–5.3)
POTASSIUM SERPL-SCNC: 4 MMOL/L — SIGNIFICANT CHANGE UP (ref 3.5–5.3)
PROT SERPL-MCNC: 5.3 G/DL — LOW (ref 6–8.3)
RBC # BLD: 4.18 M/UL — LOW (ref 4.2–5.8)
RBC # FLD: 15.9 % — SIGNIFICANT CHANGE UP (ref 10.3–16.9)
SODIUM SERPL-SCNC: 135 MMOL/L — SIGNIFICANT CHANGE UP (ref 135–145)
WBC # BLD: 12.4 K/UL — HIGH (ref 3.8–10.5)
WBC # FLD AUTO: 12.4 K/UL — HIGH (ref 3.8–10.5)

## 2017-08-12 RX ORDER — PIPERACILLIN AND TAZOBACTAM 4; .5 G/20ML; G/20ML
INJECTION, POWDER, LYOPHILIZED, FOR SOLUTION INTRAVENOUS
Qty: 0 | Refills: 0 | Status: DISCONTINUED | OUTPATIENT
Start: 2017-08-12 | End: 2017-08-15

## 2017-08-12 RX ORDER — TIOTROPIUM BROMIDE 18 UG/1
1 CAPSULE ORAL; RESPIRATORY (INHALATION) DAILY
Qty: 0 | Refills: 0 | Status: DISCONTINUED | OUTPATIENT
Start: 2017-08-12 | End: 2017-08-12

## 2017-08-12 RX ORDER — PIPERACILLIN AND TAZOBACTAM 4; .5 G/20ML; G/20ML
3.38 INJECTION, POWDER, LYOPHILIZED, FOR SOLUTION INTRAVENOUS ONCE
Qty: 0 | Refills: 0 | Status: COMPLETED | OUTPATIENT
Start: 2017-08-12 | End: 2017-08-12

## 2017-08-12 RX ORDER — PIPERACILLIN AND TAZOBACTAM 4; .5 G/20ML; G/20ML
3.38 INJECTION, POWDER, LYOPHILIZED, FOR SOLUTION INTRAVENOUS EVERY 6 HOURS
Qty: 0 | Refills: 0 | Status: DISCONTINUED | OUTPATIENT
Start: 2017-08-12 | End: 2017-08-15

## 2017-08-12 RX ORDER — ALBUTEROL 90 UG/1
2 AEROSOL, METERED ORAL EVERY 6 HOURS
Qty: 0 | Refills: 0 | Status: DISCONTINUED | OUTPATIENT
Start: 2017-08-12 | End: 2017-08-12

## 2017-08-12 RX ORDER — ALBUTEROL 90 UG/1
2 AEROSOL, METERED ORAL EVERY 6 HOURS
Qty: 0 | Refills: 0 | Status: DISCONTINUED | OUTPATIENT
Start: 2017-08-12 | End: 2017-08-15

## 2017-08-12 RX ORDER — VANCOMYCIN HCL 1 G
1250 VIAL (EA) INTRAVENOUS EVERY 12 HOURS
Qty: 0 | Refills: 0 | Status: DISCONTINUED | OUTPATIENT
Start: 2017-08-12 | End: 2017-08-15

## 2017-08-12 RX ORDER — FINASTERIDE 5 MG/1
5 TABLET, FILM COATED ORAL DAILY
Qty: 0 | Refills: 0 | Status: DISCONTINUED | OUTPATIENT
Start: 2017-08-12 | End: 2017-08-15

## 2017-08-12 RX ADMIN — Medication 166.67 MILLIGRAM(S): at 17:23

## 2017-08-12 RX ADMIN — PIPERACILLIN AND TAZOBACTAM 200 GRAM(S): 4; .5 INJECTION, POWDER, LYOPHILIZED, FOR SOLUTION INTRAVENOUS at 11:46

## 2017-08-12 RX ADMIN — PANTOPRAZOLE SODIUM 40 MILLIGRAM(S): 20 TABLET, DELAYED RELEASE ORAL at 06:07

## 2017-08-12 RX ADMIN — PIPERACILLIN AND TAZOBACTAM 200 GRAM(S): 4; .5 INJECTION, POWDER, LYOPHILIZED, FOR SOLUTION INTRAVENOUS at 00:34

## 2017-08-12 RX ADMIN — SODIUM CHLORIDE 125 MILLILITER(S): 9 INJECTION INTRAMUSCULAR; INTRAVENOUS; SUBCUTANEOUS at 00:34

## 2017-08-12 RX ADMIN — PIPERACILLIN AND TAZOBACTAM 200 GRAM(S): 4; .5 INJECTION, POWDER, LYOPHILIZED, FOR SOLUTION INTRAVENOUS at 05:28

## 2017-08-12 RX ADMIN — PIPERACILLIN AND TAZOBACTAM 200 GRAM(S): 4; .5 INJECTION, POWDER, LYOPHILIZED, FOR SOLUTION INTRAVENOUS at 17:23

## 2017-08-12 RX ADMIN — SENNA PLUS 2 TABLET(S): 8.6 TABLET ORAL at 22:07

## 2017-08-12 RX ADMIN — Medication 166.67 MILLIGRAM(S): at 06:07

## 2017-08-12 RX ADMIN — APIXABAN 5 MILLIGRAM(S): 2.5 TABLET, FILM COATED ORAL at 17:23

## 2017-08-12 RX ADMIN — TAMSULOSIN HYDROCHLORIDE 0.4 MILLIGRAM(S): 0.4 CAPSULE ORAL at 22:07

## 2017-08-12 RX ADMIN — Medication 1 TABLET(S): at 11:47

## 2017-08-12 RX ADMIN — APIXABAN 5 MILLIGRAM(S): 2.5 TABLET, FILM COATED ORAL at 05:26

## 2017-08-12 RX ADMIN — SODIUM CHLORIDE 125 MILLILITER(S): 9 INJECTION INTRAMUSCULAR; INTRAVENOUS; SUBCUTANEOUS at 14:38

## 2017-08-12 NOTE — PROGRESS NOTE ADULT - ASSESSMENT
ASSESSMENT/[id number]/o male pt c clinical lung atelectases, bilateral LE cellulitis, PE, CLL, high aspiration risk    1. O2 use 2LNC humidified at this time  2. Bronchodilators:  Atrovent/ albuterol q 4 – 6 hours as needed  3. Corticosteroids:off  4. ID/Antibiotics:Vancomycin/Zosyn  5. Cardiac/HTN:  6. GI: Rx/ prophylaxis c PPI/H2B  7. Heme: Rx/VT prophylaxis c SQH/SCD/AC pt on Eliquis lizbeth continue  8. Aspiration precautions at all times, engage speech and swallow, attempt incentive s[pirometry  Discussed with managing team

## 2017-08-12 NOTE — PROGRESS NOTE ADULT - SUBJECTIVE AND OBJECTIVE BOX
SUBJECTIVE: Patient seen and examined bedside by me, no active complains, patient is doing well, but not ambulating well. tolerating with diet    apixaban 5 milliGRAM(s) Oral every 12 hours  tamsulosin 0.4 milliGRAM(s) Oral at bedtime  piperacillin/tazobactam IVPB.   IV Intermittent   vancomycin  IVPB 1250 milliGRAM(s) IV Intermittent every 12 hours  piperacillin/tazobactam IVPB. 3.375 Gram(s) IV Intermittent every 6 hours      Vital Signs Last 24 Hrs  T(C): 36.8 (12 Aug 2017 08:19), Max: 36.8 (12 Aug 2017 05:20)  T(F): 98.3 (12 Aug 2017 08:19), Max: 98.3 (12 Aug 2017 05:20)  HR: 84 (12 Aug 2017 08:19) (84 - 92)  BP: 157/83 (12 Aug 2017 08:19) (137/71 - 158/82)  BP(mean): --  RR: 17 (12 Aug 2017 08:19) (16 - 18)  SpO2: 96% (12 Aug 2017 08:19) (96% - 98%)  I&O's Detail    11 Aug 2017 07:01  -  12 Aug 2017 07:00  --------------------------------------------------------  IN:  Total IN: 0 mL    OUT:    Indwelling Catheter - Urethral: 900 mL  Total OUT: 900 mL    Total NET: -900 mL          General: NAD, resting comfortably in bed  C/V: NSR  Pulm: Nonlabored breathing, no respiratory distress  Abd: soft, NT/ND.  Extrem: bilateral heel : pressure ulcer, palpable DP and PT pulses on both side.  Left ulcer is around [id number] and right ulcer around [id number]. No pus, no discharge. skin around it is normal.    LABS:                        11.6   12.4  )-----------( 151      ( 12 Aug 2017 08:29 )             36.6     08-12    135  |  98  |  18  ----------------------------<  100<H>  4.0   |  25  |  0.90    Ca    8.6      12 Aug 2017 08:29  Mg     2.0     08-12    TPro  5.3<L>  /  Alb  2.9<L>  /  TBili  0.4  /  DBili  x   /  AST  21  /  ALT  17  /  AlkPhos  81  08-12    PT/INR - ( 11 Aug 2017 16:18 )   PT: 11.6 sec;   INR: 1.04          PTT - ( 11 Aug 2017 16:18 )  PTT:26.4 sec  Urinalysis Basic - ( 11 Aug 2017 22:13 )    Color: Yellow / Appearance: Clear / S.015 / pH: x  Gluc: x / Ketone: NEGATIVE  / Bili: NEGATIVE / Urobili: 0.2 E.U./dL   Blood: x / Protein: 30 mg/dL / Nitrite: NEGATIVE   Leuk Esterase: Moderate / RBC: 5-10 /HPF / WBC Many /HPF   Sq Epi: x / Non Sq Epi: Rare /HPF / Bacteria: Present /HPF        RADIOLOGY & ADDITIONAL STUDIES:

## 2017-08-12 NOTE — PROGRESS NOTE ADULT - ASSESSMENT
77 years old male with bilateral heel pressure wound, left>right and good palpable pulses.    1) cont antibiotics.  2) no need for surgical intervention yet (good circulation)  3) Rec PT to see him   4) OOTB  5) cont primary care team management 77 years old male with bilateral heel pressure wound, left>right and good palpable pulses.    1) cont antibiotics.  2) no need for surgical intervention yet (good circulation)  3) Rec PT to see him   4) OOTB  5) cont primary care team management  6) rec to get a heel float boots

## 2017-08-12 NOTE — PROGRESS NOTE ADULT - SUBJECTIVE AND OBJECTIVE BOX
Interventional, Pulmonary, Critical, Chest Special Procedures.    Pt was seen and fully examined by myself.     Time spent with patient in minutes:77    Patient is a 77y old  Male who presents with a chief complaint of bilateral leg pains. Events leading to this admission reviewed. The patient ill appearing, disheveled, eupneic on RA at rest. Denies sob.  HPI:  76 yo male with history of CLL, BPH, insomnia, JARED 2/2 obstructive uropathy(developed after operation for fracture, resolved, on chronic rodriguez follows Dr. Leigh urology ),  recent right femoral neck fracture s/p hemiarthroplasty (may 2017) complicated by PE (on Xarelto) and pneumonia (July 2017), now presents from his PCP office due to left and right heel wound. Patient currently lives at home and has visiting nurse that noticed blister on patient's left heel as well as small wound on right heel. He reports the blister, started approx. 2 months ago, with serous discharge in the beginning and now resolved. It is also a/w mild redness and worsening SIMON. Patient denies fevers, chills, N/V/SOB or palpitations, hx of trauma. Denies lower extremities pain, tingling/numbness/sensory loss/ trauma or bug bite. He was recently d/pricila from [facility] s/p complicated course from PNA and PE. Denies smoking, drinking or IVDA.   In ED vitals were   Vital Signs Last 24 Hrs  T(C): 36.3 (11 Aug 2017 19:37), Max: 36.7 (11 Aug 2017 14:47)  T(F): 97.3 (11 Aug 2017 19:37), Max: 98.1 (11 Aug 2017 14:47)  HR: 85 (11 Aug 2017 19:37) (85 - 100)  BP: 148/78 (11 Aug 2017 19:37) (130/78 - 148/78)  BP(mean): --  RR: 16 (11 Aug 2017 19:37) (16 - 18)  SpO2: 98% (11 Aug 2017 19:37) (95% - 98%)    He received vancomycin and zosyn x1. Vascular svc was consulted for heel wound. (11 Aug 2017 20:30)    REVIEW OF SYSTEMS:  Constitutional: No fever, weight loss, chills +++atigue  Eyes: No eye pain, visual disturbances, or discharge  ENMT:  some  difficulty hearing, tinnitus, vertigo; No sinus or throat pain. No epistaxis, dysphagia, +dysphonia,+ hoarseness no odynophagia  Neck: No pain, stiffness or neck swelling.  No masses or deformities  Respiratory: No cough, wheezing, chills or hemoptysis  - COPD  - ILD   - PE   - ASTHMA     - PNEUMONIA  Cardiovascular: No chest pain, dysrhythmia, palpitations, dizziness or edema   - COPD     - CAD   + CHF   - HTN  Gastrointestinal: No abdominal or epigastric pain. No nausea, vomiting or hematemesis; No diarrhea or constipation. No melena or hematochezia. No dysphagia or Icterus.          Genitourinary: No dysuria, frequency, hematuria or incontinence   - CKD/JARED      - ESRD  Neurological: No headaches, +memory loss, loss of strength, numbness or tremors      +DEMENTIA     - STROKE    - SEIZURE  Skin: No itching, burning, rashes or lesions   Lymph Nodes: No enlarged glands  Endocrine: No heat or cold intolerance; No hair loss       - DM     - THYROID DISORDER  Musculoskeletal: No joint pain or swelling; No muscle, bilateral LE  extremity pain  Psychiatric: No depression, anxiety, mood swings or difficulty sleeping  Heme/Lymph: No easy bruising or bleeding gums         - ANEMIA      + CANCER   +COAGULOPATHY  Allergy and Immunologic: No hives or eczema    PAST MEDICAL & SURGICAL HISTORY:  Hip fracture requiring operative repair, right, sequela  PE (pulmonary thromboembolism)  CLL (chronic lymphocytic leukemia)  S/P hip hemiarthroplasty    FAMILY HISTORY:  No pertinent family history in first degree relatives    SOCIAL HISTORY:      - Tobacco     - ETOH    Allergies    No Known Allergies    Intolerances      Vital Signs Last 24 Hrs  T(C): 36.6 (12 Aug 2017 15:25), Max: 36.8 (12 Aug 2017 05:20)  T(F): 97.8 (12 Aug 2017 15:25), Max: 98.3 (12 Aug 2017 05:20)  HR: 90 (12 Aug 2017 15:25) (84 - 92)  BP: 135/75 (12 Aug 2017 15:25) (135/75 - 158/82)  BP(mean): --  RR: 16 (12 Aug 2017 15:25) (16 - 18)  SpO2: 95% (12 Aug 2017 15:25) (95% - 98%)    08-11 @ 07:01  -  08-12 @ 07:00  --------------------------------------------------------  IN: 0 mL / OUT: 900 mL / NET: -900 mL        PHYSICAL EXAM:  Un Comfortable, no distress  Eyes: PERRL, EOM intact; conjunctiva and sclera clear  Head: Normocephalic;  No Trauma  ENMT: No nasal discharge, +hoarseness,   Neck: Supple; non tender; no masses or deformities.    No JVD  Respiratory:  - WHEEZING   - RHONCHI  - RALES  -+CRACKLES.  Diminished breath sounds  BILATERAL  RIGHT  LEFT bases  Cardiovascular: Regular rate and rhythm. S1 and S2 Normal; No murmurs, gallops or rubs     - PPM/AICD  Gastrointestinal: Soft non-tender, non-distended; Normal bowel sounds; No hepatosplenomegaly.     -PEG    -  GT   - RODRIGUEZ  Genitourinary: No costovertebral angle tenderness. No dysuria  Extremities: AROM, I was present at dressing change by vascular, discussed at bedside  Vascular: Peripheral pulses palpable 2+ bilaterally  Neurological: Alert and responisve to stimuli   Skin: Warm and dry. No obvious rash  Lymph Nodes: No acute cervical or supraclavicular adenopathy  Psychiatric: Cooperative and appropriate mood    DEVICES:  - DENTURES   +IV R / L     - ETUBE   -TRACH   -CTUBE  R / L      LABS:                          11.6   12.4  )-----------( 151      ( 12 Aug 2017 08:29 )             36.6     08-12    135  |  98  |  18  ----------------------------<  100<H>  4.0   |  25  |  0.90    Ca    8.6      12 Aug 2017 08:29  Mg     2.0     08-12    TPro  5.3<L>  /  Alb  2.9<L>  /  TBili  0.4  /  DBili  x   /  AST  21  /  ALT  17  /  AlkPhos  81  08-12    PT/INR - ( 11 Aug 2017 16:18 )   PT: 11.6 sec;   INR: 1.04          PTT - ( 11 Aug 2017 16:18 )  PTT:26.4 sec  Interventional, Pulmonary, Critical, Chest Special Procedures.    Pt was seen and fully examined by myself.     Time spent with patient in minutes:    Patient is a 77y old  Male who presents with a chief complaint of   HPI:  76 yo male with history of CLL, BPH, insomnia, JARED 2/2 obstructive uropathy(developed after operation for fracture, resolved, on chronic rodriguez follows Dr. Leigh urology ),  recent right femoral neck fracture s/p hemiarthroplasty (may 2017) complicated by PE (on Xarelto) and pneumonia (July 2017), now presents from his PCP office due to left and right heel wound. Patient currently lives at home and has visiting nurse that noticed blister on patient's left heel as well as small wound on right heel. He reports the blister, started approx. 2 months ago, with serous discharge in the beginning and now resolved. It is also a/w mild redness and worsening SIMON. Patient denies fevers, chills, N/V/SOB or palpitations, hx of trauma. Denies lower extremities pain, tingling/numbness/sensory loss/ trauma or bug bite. He was recently d/pricila from [facility] s/p complicated course from PNA and PE. Denies smoking, drinking or IVDA.   In ED vitals were   Vital Signs Last 24 Hrs  T(C): 36.3 (11 Aug 2017 19:37), Max: 36.7 (11 Aug 2017 14:47)  T(F): 97.3 (11 Aug 2017 19:37), Max: 98.1 (11 Aug 2017 14:47)  HR: 85 (11 Aug 2017 19:37) (85 - 100)  BP: 148/78 (11 Aug 2017 19:37) (130/78 - 148/78)  BP(mean): --  RR: 16 (11 Aug 2017 19:37) (16 - 18)  SpO2: 98% (11 Aug 2017 19:37) (95% - 98%)    He received vancomycin and zosyn x1. Vascular svc was consulted for heel wound. (11 Aug 2017 20:30)    REVIEW OF SYSTEMS:  Constitutional: No fever, weight loss, chills or fatigue  Eyes: No eye pain, visual disturbances, or discharge  ENMT:  No difficulty hearing, tinnitus, vertigo; No sinus or throat pain. No epistaxis, dysphagia, dysphonia, hoarseness or odynophagia  Neck: No pain, stiffness or neck swelling.  No masses or deformities  Respiratory: No cough, wheezing, chills or hemoptysis  - COPD  - ILD   - PE   - ASTHMA     - PNEUMONIA  Cardiovascular: No chest pain, dysrhythmia, palpitations, dizziness or edema   - COPD     - CAD   - CHF   - HTN  Gastrointestinal: No abdominal or epigastric pain. No nausea, vomiting or hematemesis; No diarrhea or constipation. No melena or hematochezia. No dysphagia or Icterus.          Genitourinary: No dysuria, frequency, hematuria or incontinence   - CKD/JARED      - ESRD  Neurological: No headaches, memory loss, loss of strength, numbness or tremors      -DEMENTIA     - STROKE    - SEIZURE  Skin: No itching, burning, rashes or lesions   Lymph Nodes: No enlarged glands  Endocrine: No heat or cold intolerance; No hair loss       - DM     - THYROID DISORDER  Musculoskeletal: No joint pain or swelling; No muscle, back or extremity pain  Psychiatric: No depression, anxiety, mood swings or difficulty sleeping  Heme/Lymph: No easy bruising or bleeding gums         - ANEMIA      - CANCER   -COAGULOPATHY  Allergy and Immunologic: No hives or eczema    PAST MEDICAL & SURGICAL HISTORY:  Hip fracture requiring operative repair, right, sequela  PE (pulmonary thromboembolism)  CLL (chronic lymphocytic leukemia)  S/P hip hemiarthroplasty    FAMILY HISTORY:  No pertinent family history in first degree relatives    SOCIAL HISTORY:      - Tobacco     - ETOH    Allergies    No Known Allergies    Intolerances      Vital Signs Last 24 Hrs  T(C): 36.6 (12 Aug 2017 15:25), Max: 36.8 (12 Aug 2017 05:20)  T(F): 97.8 (12 Aug 2017 15:25), Max: 98.3 (12 Aug 2017 05:20)  HR: 90 (12 Aug 2017 15:25) (84 - 92)  BP: 135/75 (12 Aug 2017 15:25) (135/75 - 158/82)  BP(mean): --  RR: 16 (12 Aug 2017 15:25) (16 - 18)  SpO2: 95% (12 Aug 2017 15:25) (95% - 98%)    08-11 @ 07:01  -  08-12 @ 07:00  --------------------------------------------------------  IN: 0 mL / OUT: 900 mL / NET: -900 mL        PHYSICAL EXAM:  Comfortable, no distress  Eyes: PERRL, EOM intact; conjunctiva and sclera clear  Head: Normocephalic;  No Trauma  ENMT: No nasal discharge, hoarseness, cough or hemoptysis.  Airway clear  Neck: Supple; non tender; no masses or deformities.    No JVD  Respiratory: CTA,  - WHEEZING   - RHONCHI  - RALES  - CRACKLES.  Diminished breath sounds  BILATERAL  RIGHT  LEFT  Cardiovascular: Regular rate and rhythm. S1 and S2 Normal; No murmurs, gallops or rubs     - PPM/AICD  Gastrointestinal: Soft non-tender, non-distended; Normal bowel sounds; No hepatosplenomegaly.     -PEG    -  GT   - RODRIGUEZ  Genitourinary: No costovertebral angle tenderness. No dysuria  Extremities: AROM, No clubbing, cyanosis or edema    Vascular: Peripheral pulses palpable 2+ bilaterally  Neurological: Alert and responisve to stimuli   Skin: Warm and dry. No obvious rash  Lymph Nodes: No acute cervical or supraclavicular adenopathy  Psychiatric: Cooperative and appropriate mood    DEVICES:  - DENTURES   +IV R / L     - ETUBE   -TRACH   -CTUBE  R / L      LABS:                          11.6   12.4  )-----------( 151      ( 12 Aug 2017 08:29 )             36.6     08-12    135  |  98  |  18  ----------------------------<  100<H>  4.0   |  25  |  0.90    Ca    8.6      12 Aug 2017 08:29  Mg     2.0     08-12    TPro  5.3<L>  /  Alb  2.9<L>  /  TBili  0.4  /  DBili  x   /  AST  21  /  ALT  17  /  AlkPhos  81  08-12    PT/INR - ( 11 Aug 2017 16:18 )   PT: 11.6 sec;   INR: 1.04          PTT - ( 11 Aug 2017 16:18 )  PTT:26.4 sec  RADIOLOGY & ADDITIONAL STUDIES (The following images were personally reviewed):RADIOLOGY & ADDITIONAL STUDIES (The following images were personally reviewed):

## 2017-08-13 LAB
ANION GAP SERPL CALC-SCNC: 13 MMOL/L — SIGNIFICANT CHANGE UP (ref 5–17)
BUN SERPL-MCNC: 14 MG/DL — SIGNIFICANT CHANGE UP (ref 7–23)
CALCIUM SERPL-MCNC: 8.8 MG/DL — SIGNIFICANT CHANGE UP (ref 8.4–10.5)
CHLORIDE SERPL-SCNC: 103 MMOL/L — SIGNIFICANT CHANGE UP (ref 96–108)
CO2 SERPL-SCNC: 22 MMOL/L — SIGNIFICANT CHANGE UP (ref 22–31)
CREAT SERPL-MCNC: 0.9 MG/DL — SIGNIFICANT CHANGE UP (ref 0.5–1.3)
GLUCOSE SERPL-MCNC: 97 MG/DL — SIGNIFICANT CHANGE UP (ref 70–99)
HCT VFR BLD CALC: 37.6 % — LOW (ref 39–50)
HGB BLD-MCNC: 12.1 G/DL — LOW (ref 13–17)
MAGNESIUM SERPL-MCNC: 2 MG/DL — SIGNIFICANT CHANGE UP (ref 1.6–2.6)
MCHC RBC-ENTMCNC: 28.3 PG — SIGNIFICANT CHANGE UP (ref 27–34)
MCHC RBC-ENTMCNC: 32.2 G/DL — SIGNIFICANT CHANGE UP (ref 32–36)
MCV RBC AUTO: 88.1 FL — SIGNIFICANT CHANGE UP (ref 80–100)
PHOSPHATE SERPL-MCNC: 3.8 MG/DL — SIGNIFICANT CHANGE UP (ref 2.5–4.5)
PLATELET # BLD AUTO: 160 K/UL — SIGNIFICANT CHANGE UP (ref 150–400)
POTASSIUM SERPL-MCNC: 4.1 MMOL/L — SIGNIFICANT CHANGE UP (ref 3.5–5.3)
POTASSIUM SERPL-SCNC: 4.1 MMOL/L — SIGNIFICANT CHANGE UP (ref 3.5–5.3)
RBC # BLD: 4.27 M/UL — SIGNIFICANT CHANGE UP (ref 4.2–5.8)
RBC # FLD: 15.8 % — SIGNIFICANT CHANGE UP (ref 10.3–16.9)
SODIUM SERPL-SCNC: 138 MMOL/L — SIGNIFICANT CHANGE UP (ref 135–145)
VANCOMYCIN TROUGH SERPL-MCNC: 14.8 UG/ML — SIGNIFICANT CHANGE UP (ref 10–20)
WBC # BLD: 12.4 K/UL — HIGH (ref 3.8–10.5)
WBC # FLD AUTO: 12.4 K/UL — HIGH (ref 3.8–10.5)

## 2017-08-13 PROCEDURE — 93970 EXTREMITY STUDY: CPT | Mod: 26

## 2017-08-13 RX ADMIN — APIXABAN 5 MILLIGRAM(S): 2.5 TABLET, FILM COATED ORAL at 05:09

## 2017-08-13 RX ADMIN — SODIUM CHLORIDE 125 MILLILITER(S): 9 INJECTION INTRAMUSCULAR; INTRAVENOUS; SUBCUTANEOUS at 04:35

## 2017-08-13 RX ADMIN — TAMSULOSIN HYDROCHLORIDE 0.4 MILLIGRAM(S): 0.4 CAPSULE ORAL at 21:01

## 2017-08-13 RX ADMIN — PIPERACILLIN AND TAZOBACTAM 200 GRAM(S): 4; .5 INJECTION, POWDER, LYOPHILIZED, FOR SOLUTION INTRAVENOUS at 23:05

## 2017-08-13 RX ADMIN — FINASTERIDE 5 MILLIGRAM(S): 5 TABLET, FILM COATED ORAL at 13:50

## 2017-08-13 RX ADMIN — SODIUM CHLORIDE 125 MILLILITER(S): 9 INJECTION INTRAMUSCULAR; INTRAVENOUS; SUBCUTANEOUS at 21:20

## 2017-08-13 RX ADMIN — Medication 1 TABLET(S): at 13:50

## 2017-08-13 RX ADMIN — Medication 166.67 MILLIGRAM(S): at 21:01

## 2017-08-13 RX ADMIN — PIPERACILLIN AND TAZOBACTAM 200 GRAM(S): 4; .5 INJECTION, POWDER, LYOPHILIZED, FOR SOLUTION INTRAVENOUS at 05:10

## 2017-08-13 RX ADMIN — PANTOPRAZOLE SODIUM 40 MILLIGRAM(S): 20 TABLET, DELAYED RELEASE ORAL at 05:09

## 2017-08-13 RX ADMIN — PIPERACILLIN AND TAZOBACTAM 200 GRAM(S): 4; .5 INJECTION, POWDER, LYOPHILIZED, FOR SOLUTION INTRAVENOUS at 13:49

## 2017-08-13 RX ADMIN — PIPERACILLIN AND TAZOBACTAM 200 GRAM(S): 4; .5 INJECTION, POWDER, LYOPHILIZED, FOR SOLUTION INTRAVENOUS at 17:11

## 2017-08-13 RX ADMIN — Medication 166.67 MILLIGRAM(S): at 09:49

## 2017-08-13 RX ADMIN — SENNA PLUS 2 TABLET(S): 8.6 TABLET ORAL at 21:01

## 2017-08-13 RX ADMIN — APIXABAN 5 MILLIGRAM(S): 2.5 TABLET, FILM COATED ORAL at 17:11

## 2017-08-13 RX ADMIN — PIPERACILLIN AND TAZOBACTAM 200 GRAM(S): 4; .5 INJECTION, POWDER, LYOPHILIZED, FOR SOLUTION INTRAVENOUS at 00:12

## 2017-08-13 NOTE — PROGRESS NOTE ADULT - PROBLEM SELECTOR PLAN 4
-Patient was seen by Dr. Rizvi, 2 days ago, where his rodriguez was changed. Probably his JARED happened after surgery, where he retained urine, resolved with rodriguez  -Per note in allscript, they want to continue rodriguez w flomax for one more month before TOV  - Also, UA from rodriguez is dirty, perhaps is already colonized, his abx vanc + zosyn would cover for infection

## 2017-08-13 NOTE — PROGRESS NOTE ADULT - SUBJECTIVE AND OBJECTIVE BOX
Overnight Events: No acute events overnight    Subjective: Patient seen and examined at bedside. In  NAD.    [OBJECTIVE]:    Vital Signs:  T(F): , Max: 98.1 (17 @ 20:52)  HR:  (87 - 90)  BP:  (130/76 - 138/79)  BP(mean): --  RR:  (16 - 18)  SpO2:  (95% - 96%)  Wt(kg): --  CVP(cm H2O): --       @ 07:01  -   @ 07:00  --------------------------------------------------------  IN: 1600 mL / OUT: 2850 mL / NET: -1250 mL      CAPILLARY BLOOD GLUCOSE          Physcial Exam:  T(F): 97.4 (17 @ 09:00)  HR: 88 (17 @ 09:00)  BP: 138/77 (17 @ 09:00)  RR: 16 (17 @ 09:00)  SpO2: 95% (17 @ 09:00)  Wt(kg): --    Constitutional: WDWN resting comfortably in bed; NAD  Head: NC/AT  Eyes: PERRL, EOMI, clear conjunctiva  ENT: no nasal discharge; uvula midline, no oropharyngeal erythema or exudates; MMD  Neck: supple; no JVD or thyromegaly  Respiratory: CTABL  Cardiac: +S1/S2; RRR; no M/R/G;   Gastrointestinal: soft, NT/ND; no rebound or guarding; +BSx4  Genitourinary: normal external genitalia  Back: spine midline, no bony tenderness or step-offs; no CVAT B/L  Extremities: necrotic ulcer on L and R. Heel base, L>R in size, no active discharge(visible after removing dressing)  Musculoskeletal: dec rom on R. leg, 2/2 recent operation, nl rom on left leg  Vascular: 2+ radial, femoral, DP/PT pulses B/L  Dermatologic: skin warm, dry and intact; no rashes, wounds, or scars  Lymphatic: no submandibular or cervical LAD  Neurologic: AAOx3; CNII-XII grossly intact; no focal deficits  Psychiatric: affect and characteristics of appearance, verbalizations, behaviors are appropriate    Medications:  MEDICATIONS  (STANDING):  sodium chloride 0.9%. 1000 milliLiter(s) (125 mL/Hr) IV Continuous <Continuous>  apixaban 5 milliGRAM(s) Oral every 12 hours  tamsulosin 0.4 milliGRAM(s) Oral at bedtime  pantoprazole    Tablet 40 milliGRAM(s) Oral before breakfast  senna 2 Tablet(s) Oral at bedtime  lactobacillus acidophilus 1 Tablet(s) Oral daily  piperacillin/tazobactam IVPB.   IV Intermittent   vancomycin  IVPB 1250 milliGRAM(s) IV Intermittent every 12 hours  piperacillin/tazobactam IVPB. 3.375 Gram(s) IV Intermittent every 6 hours  finasteride 5 milliGRAM(s) Oral daily    MEDICATIONS  (PRN):  ALBUTerol    90 MICROgram(s) HFA Inhaler 2 Puff(s) Inhalation every 6 hours PRN Shortness of Breath      Allergies:  Allergies    No Known Allergies    Intolerances        Labs:                        12.1   12.4  )-----------( 160      ( 13 Aug 2017 07:54 )             37.6     08-13    138  |  103  |  14  ----------------------------<  97  4.1   |  22  |  0.90    Ca    8.8      13 Aug 2017 07:54  Phos  3.8     08-13  Mg     2.0     08-13    TPro  5.3<L>  /  Alb  2.9<L>  /  TBili  0.4  /  DBili  x   /  AST  21  /  ALT  17  /  AlkPhos  81  08-12    PT/INR - ( 11 Aug 2017 16:18 )   PT: 11.6 sec;   INR: 1.04          PTT - ( 11 Aug 2017 16:18 )  PTT:26.4 sec  Urinalysis Basic - ( 11 Aug 2017 22:13 )    Color: Yellow / Appearance: Clear / S.015 / pH: x  Gluc: x / Ketone: NEGATIVE  / Bili: NEGATIVE / Urobili: 0.2 E.U./dL   Blood: x / Protein: 30 mg/dL / Nitrite: NEGATIVE   Leuk Esterase: Moderate / RBC: 5-10 /HPF / WBC Many /HPF   Sq Epi: x / Non Sq Epi: Rare /HPF / Bacteria: Present /HPF        Radiology and other tests: Reviewed

## 2017-08-13 NOTE — PROGRESS NOTE ADULT - PROBLEM SELECTOR PLAN 1
-Bilateral heel pressure wound, left>right and good palpable pulses.  -Vascular following, recs appreciated.  -As per vascular, c/w antibiotics, no need for surgical intervention yet as he has good circulation  -No pain, no active discharge, no systemic signs  -Given severity of ulcer, will c/w vanc + zosyn  -Check esr, crp, and foot x-ray to r/o osteomyelitis.  -Consult wound care, f/u recs  -PT consulted, f/u recs  -leg elevation

## 2017-08-13 NOTE — PROGRESS NOTE ADULT - PROBLEM SELECTOR PLAN 6
-Hx of bzd abuse - abuse xanax, apparently, seen by Dr. Coles, for evaluation where he had MMSE 26/30  - outpatient workup

## 2017-08-13 NOTE — PROGRESS NOTE ADULT - PROBLEM SELECTOR PLAN 5
-bilateral, unknown cause, ?2/2 immbolization   -Duplex U/S negative for any DVT  - Also, vascular following, appreciate recs  - Elevate leg

## 2017-08-13 NOTE — PROGRESS NOTE ADULT - ASSESSMENT
76 yo male with history of CLL, BPH, insomnia, JARED 2/2 obstructive uropathy(developed after operation for fracture, resolved, on chronic rodriguez follows Dr. Leigh urology ),  recent right femoral neck fracture s/p hemiarthroplasty (may 2017) complicated by PE (on Xarelto) and pneumonia (July 2017), now presents from his PCP office due to left and right heel wound.

## 2017-08-13 NOTE — PROGRESS NOTE ADULT - SUBJECTIVE AND OBJECTIVE BOX
Patient is a 77y old  Male who presents with a chief complaint of     HPI:  78 yo male with history of CLL, BPH, insomnia, JARED 2/2 obstructive uropathy(developed after operation for fracture, resolved, on chronic rodriguez follows Dr. Leigh urology ),  recent right femoral neck fracture s/p hemiarthroplasty (may 2017) complicated by PE (on Xarelto) and pneumonia (2017), now presents from his PCP office due to left and right heel wound. Patient currently lives at home and has visiting nurse that noticed blister on patient's left heel as well as small wound on right heel. He reports the blister, started approx. 2 months ago, with serous discharge in the beginning and now resolved. It is also a/w mild redness and worsening SIMON. Patient denies fevers, chills, N/V/SOB or palpitations, hx of trauma. Denies lower extremities pain, tingling/numbness/sensory loss/ trauma or bug bite. He was recently d/pricila from [facility] s/p complicated course from PNA and PE. Denies smoking, drinking or IVDA.   In ED vitals were   Vital Signs Last 24 Hrs  T(C): 36.3 (11 Aug 2017 19:37), Max: 36.7 (11 Aug 2017 14:47)  T(F): 97.3 (11 Aug 2017 19:37), Max: 98.1 (11 Aug 2017 14:47)  HR: 85 (11 Aug 2017 19:37) (85 - 100)  BP: 148/78 (11 Aug 2017 19:37) (130/78 - 148/78)  BP(mean): --  RR: 16 (11 Aug 2017 19:37) (16 - 18)  SpO2: 98% (11 Aug 2017 19:37) (95% - 98%)    He received vancomycin and zosyn x1. Vascular svc was consulted for heel wound. (11 Aug 2017 20:30)    INTERVAL HPI/OVERNIGHT EVENTS:::cont present-LWC IV ab    HEALTH ISSUES - PROBLEM Dx:  Lower extremity edema: Lower extremity edema  Memory loss: Memory loss  Obstructive uropathy: Obstructive uropathy  Nutrition, metabolism, and development symptoms: Nutrition, metabolism, and development symptoms  Prophylactic measure: Prophylactic measure  PE (pulmonary thromboembolism): PE (pulmonary thromboembolism)  CLL (chronic lymphocytic leukemia): CLL (chronic lymphocytic leukemia)  Heel ulcer: Heel ulcer  Cellulitis: Cellulitis          PAST MEDICAL & SURGICAL HISTORY:  Hip fracture requiring operative repair, right, sequela  PE (pulmonary thromboembolism)  CLL (chronic lymphocytic leukemia)  S/P hip hemiarthroplasty          Consultant NOTE  REVIEWED  (   )    REVIEW OF SYSTEMS:  [x] As per HPI  CONSTITUTIONAL: No fever, weight loss, or fatigue  RESPIRATORY: No cough, wheezing, chills or hemoptysis; No Shortness of Breath  CARDIOVASCULAR: No chest pain, palpitations, dizziness, or leg swelling  GASTROINTESTINAL: No abdominal or epigastric pain. No nausea, vomiting, or hematemesis; No diarrhea or constipation. No melena or hematochezia.  MUSCULOSKELETAL: No joint pain or swelling; No muscle, back, or extremity pain  PSYCH    awake, alert       [x] All others negative	  [ ] Unable to obtain          Vital Signs Last 24 Hrs  T(C): 36.3 (13 Aug 2017 09:00), Max: 36.7 (12 Aug 2017 20:52)  T(F): 97.4 (13 Aug 2017 09:00), Max: 98.1 (12 Aug 2017 20:52)  HR: 88 (13 Aug 2017 09:00) (87 - 90)  BP: 138/77 (13 Aug 2017 09:00) (130/76 - 138/79)  BP(mean): --  RR: 16 (13 Aug 2017 09:00) (16 - 18)  SpO2: 95% (13 Aug 2017 09:00) (95% - 96%)        08-12 @ 07:01  -  08-13 @ 07:00  --------------------------------------------------------  IN: 1600 mL / OUT: 2850 mL / NET: -1250 mL      PHYSICAL EXAMINATION:                                    (    )  NO CHANGE  Appearance: Normal	  HEENT:   Normal oral mucosa, PERRL, EOMI	  Neck: Supple, + JVD/ - JVD; Carotid Bruit   Cardiovascular: Normal S1 S2, No JVD, No murmurs,   Respiratory: Lungs clear to auscultation/Decreased Breath Sounds/No Rales, Rhonchi, Wheezing	  Gastrointestinal:  Soft, Non-tender, + BS	  Skin: L med wound  Extremities: LLE wound  Vascular: Peripheral pulses palpable 2+ bilaterally  Neurologic: Non-focal  Psychiatry: A & O x 3, Mood & affect appropriate    sodium chloride 0.9%. 1000 milliLiter(s) IV Continuous <Continuous>  apixaban 5 milliGRAM(s) Oral every 12 hours  tamsulosin 0.4 milliGRAM(s) Oral at bedtime  pantoprazole    Tablet 40 milliGRAM(s) Oral before breakfast  senna 2 Tablet(s) Oral at bedtime  lactobacillus acidophilus 1 Tablet(s) Oral daily  piperacillin/tazobactam IVPB.   IV Intermittent   vancomycin  IVPB 1250 milliGRAM(s) IV Intermittent every 12 hours  piperacillin/tazobactam IVPB. 3.375 Gram(s) IV Intermittent every 6 hours  ALBUTerol    90 MICROgram(s) HFA Inhaler 2 Puff(s) Inhalation every 6 hours PRN  finasteride 5 milliGRAM(s) Oral daily        PT/INR - ( 11 Aug 2017 16:18 )   PT: 11.6 sec;   INR: 1.04          PTT - ( 11 Aug 2017 16:18 )  PTT:26.4 sec                              12.1   12.4  )-----------( 160      ( 13 Aug 2017 07:54 )             37.6     08-13    138  |  103  |  14  ----------------------------<  97  4.1   |  22  |  0.90    Ca    8.8      13 Aug 2017 07:54  Phos  3.8     08-13  Mg     2.0     08-13    TPro  5.3<L>  /  Alb  2.9<L>  /  TBili  0.4  /  DBili  x   /  AST  21  /  ALT  17  /  AlkPhos  81  08-12      CAPILLARY BLOOD GLUCOSE        Urinalysis Basic - ( 11 Aug 2017 22:13 )    Color: Yellow / Appearance: Clear / S.015 / pH: x  Gluc: x / Ketone: NEGATIVE  / Bili: NEGATIVE / Urobili: 0.2 E.U./dL   Blood: x / Protein: 30 mg/dL / Nitrite: NEGATIVE   Leuk Esterase: Moderate / RBC: 5-10 /HPF / WBC Many /HPF   Sq Epi: x / Non Sq Epi: Rare /HPF / Bacteria: Present /HPF

## 2017-08-13 NOTE — PROGRESS NOTE ADULT - ASSESSMENT
ASSESSMENT/[id number] male pt c clinical lung atelectases, bilateral LE cellulitis, PE, CLL, high aspiration risk    1. O2 use 2LNC humidified at this time  2. Bronchodilators:  Atrovent/ albuterol q 4 – 6 hours as needed  3. Corticosteroids:off  4. ID/Antibiotics:Vancomycin/Zosyn  5. Cardiac/HTN:  6. GI: Rx/ prophylaxis c PPI/H2B  7. Heme: Rx/VT prophylaxis c SQH/SCD/AC pt on Eliquis lizbeth continue  8. Aspiration precautions at all times, engage speech and swallow, attempt incentive s[pirometry  Discussed with managing team

## 2017-08-13 NOTE — PHYSICAL THERAPY INITIAL EVALUATION ADULT - MODALITIES TREATMENT COMMENTS
patient dangled at the edge of the bed, performed theraputic exercises.  Patient refused to get OOB despite max encouragement

## 2017-08-13 NOTE — CHART NOTE - NSCHARTNOTEFT_GEN_A_CORE
PGY-2 EVENT NOTE:    Alerted by RN around lunch time today that pt's daughter and HCP Gifty ([phone]) was concerned about pt's history of benzo dependence and a friend/visitor may be surreptitiously supplying pt with xanax when he visits pt here in the hospital.     Had extensive conversation with the pt's daughter this afternoon around 1445hrs where she clarified these concerns. She does not know for how long pt has had benzo addiction/dependence problems but found out this past Fall 2016. Apparently, one of the pt's good friends (Mr. Vic Blake [I am unsure of spelling]) is alleged to have been enabling/supplying the pt with xanax while he was a resident of [facility] rehab facility. This was so severe that the social workers/CM staff at [facility] reached out to Gifty and explained that he had been banned from entering their premises so that he could not visit the patient. She also explains that these issues have also impaired the pt's ability from being accepted to various [facility] agencies in the past.     According to RN via Gifty and confirmed with my phone conversation as well, pt was visited by this individual last night but it is not suspected he received any surreptitious xanax/benzos or other contraband at that time. Daughter spoke with patient in AM to confirm this.     Upon further clarification, Gifty does not want us banning Mr. Blake from visiting the patient but only to make our teams aware of the history and her concerns. She fears that by banning the individual (who is pt's "best friend", and who offers support and "walks" the patient at home) it would only enrage pt and estrange him from contact with family -- they live in [place].     I told her I would document this for other providers to see, and spoke with nursing team who will also relay message to appropriate evening counterparts, for us to exercise additional vigilance but not outright ban visitation by Mr. Blake. Should suspicion arise of illicit substances or contraband being given to pt, then we will take further action such as prohibiting visitation and/or contacting [place]. She was in agreement with this plan and thankful for our efforts.

## 2017-08-13 NOTE — PROGRESS NOTE ADULT - SUBJECTIVE AND OBJECTIVE BOX
Interventional, Pulmonary, Critical, Chest Special Procedures.    Pt was seen and fully examined by myself.     Time spent with patient in minutes:37    Patient is a 77y old  Male who presents with a chief complaint of LE pains. Todeay the patient more engaging, eupneic on RA when seen.  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 eLigh urology ),  recent right femoral neck fracture s/p hemiarthroplasty (may 2017) complicated by PE (on Xarelto) and pneumonia (July 2017), now presents from his PCP office due to left and right heel wound. Patient currently lives at home and has visiting nurse that noticed blister on patient's left heel as well as small wound on right heel. He reports the blister, started approx. 2 months ago, with serous discharge in the beginning and now resolved. It is also a/w mild redness and worsening SIMON. Patient denies fevers, chills, N/V/SOB or palpitations, hx of trauma. Denies lower extremities pain, tingling/numbness/sensory loss/ trauma or bug bite. He was recently d/[facility]ila from [facility] s/p complicated course from PNA and PE. Denies smoking, drinking or IVDA.   In ED vitals were   Vital Signs Last 24 Hrs  T(C): 36.3 (11 Aug 2017 19:37), Max: 36.7 (11 Aug 2017 14:47)  T(F): 97.3 (11 Aug 2017 19:37), Max: 98.1 (11 Aug 2017 14:47)  HR: 85 (11 Aug 2017 19:37) (85 - 100)  BP: 148/78 (11 Aug 2017 19:37) (130/78 - 148/78)  BP(mean): --  RR: 16 (11 Aug 2017 19:37) (16 - 18)  SpO2: 98% (11 Aug 2017 19:37) (95% - 98%)    He received vancomycin and zosyn x1. Vascular svc was consulted for heel wound. (11 Aug 2017 20:30)    REVIEW OF SYSTEMS:  Constitutional: No fever, weight loss, chills + fatigue  Eyes: No eye pain, visual disturbances, or discharge  ENMT:  No difficulty hearing, tinnitus, vertigo; No sinus or throat pain. No epistaxis, dysphagia, dysphonia, hoarseness or odynophagia  Neck: No pain, stiffness or neck swelling.  No masses or deformities  Respiratory: No cough, wheezing, chills or hemoptysis  - COPD  - ILD   - PE   - ASTHMA     - PNEUMONIA  Cardiovascular: No chest pain, dysrhythmia, palpitations, dizziness or edema   - COPD     - CAD   - CHF   - HTN  Gastrointestinal: No abdominal or epigastric pain. No nausea, vomiting or hematemesis; No diarrhea or constipation. No melena or hematochezia. No dysphagia or Icterus.          Genitourinary: No dysuria, frequency, hematuria or incontinence   - CKD/JARED      - ESRD  Neurological: No headaches, memory loss, loss of strength, numbness or tremors      -DEMENTIA     - STROKE    - SEIZURE  Skin: No itching, burning, rashes or lesions   Lymph Nodes: No enlarged glands  Endocrine: No heat or cold intolerance; No hair loss       - DM     - THYROID DISORDER  Musculoskeletal: No joint pain or swelling; No muscle, back or extremity pain  Psychiatric: No depression, anxiety, mood swings or difficulty sleeping  Heme/Lymph: No easy bruising or bleeding gums         - ANEMIA      - CANCER   -COAGULOPATHY  Allergy and Immunologic: No hives or eczema    PAST MEDICAL & SURGICAL HISTORY:  Hip fracture requiring operative repair, right, sequela  PE (pulmonary thromboembolism)  CLL (chronic lymphocytic leukemia)  S/P hip hemiarthroplasty    FAMILY HISTORY:  No pertinent family history in first degree relatives    SOCIAL HISTORY:      - Tobacco     - ETOH    Allergies    No Known Allergies    Intolerances      Vital Signs Last 24 Hrs  T(C): 36.3 (13 Aug 2017 09:00), Max: 36.7 (12 Aug 2017 20:52)  T(F): 97.4 (13 Aug 2017 09:00), Max: 98.1 (12 Aug 2017 20:52)  HR: 88 (13 Aug 2017 09:00) (87 - 90)  BP: 138/77 (13 Aug 2017 09:00) (130/76 - 138/79)  BP(mean): --  RR: 16 (13 Aug 2017 09:00) (16 - 18)  SpO2: 95% (13 Aug 2017 09:00) (95% - 96%)    08-12 @ 07:01  -  08-13 @ 07:00  --------------------------------------------------------  IN: 1600 mL / OUT: 2850 mL / NET: -1250 mL        PHYSICAL EXAM:  Un Comfortable, no distress  Eyes: PERRL, EOM intact; conjunctiva and sclera clear  Head: Normocephalic;  No Trauma  ENMT: No nasal discharge, +hoarseness,   Neck: Supple; non tender; no masses or deformities.    No JVD  Respiratory:  - WHEEZING   - RHONCHI  - RALES  -+CRACKLES.  Diminished breath sounds  BILATERAL  RIGHT  LEFT bases  Cardiovascular: Regular rate and rhythm. S1 and S2 Normal; No murmurs, gallops or rubs     - PPM/AICD  Gastrointestinal: Soft non-tender, non-distended; Normal bowel sounds; No hepatosplenomegaly.     -PEG    -  GT   - RODRIGUEZ  Genitourinary: No costovertebral angle tenderness. No dysuria  Extremities: AROM, I was present at dressing change by vascular, discussed at bedside  Vascular: Peripheral pulses palpable 2+ bilaterally  Neurological: Alert and responisve to stimuli   Skin: Warm and dry. No obvious rash  Lymph Nodes: No acute cervical or supraclavicular adenopathy  Psychiatric: Cooperative and appropriate mood    DEVICES:  - DENTURES   +IV R / L     - ETUBE   -TRACH   -CTUBE  R / L      LABS:                          12.1   12.4  )-----------( 160      ( 13 Aug 2017 07:54 )             37.6     08-13    138  |  103  |  14  ----------------------------<  97  4.1   |  22  |  0.90    Ca    8.8      13 Aug 2017 07:54  Phos  3.8     08-13  Mg     2.0     08-13    TPro  5.3<L>  /  Alb  2.9<L>  /  TBili  0.4  /  DBili  x   /  AST  21  /  ALT  17  /  AlkPhos  81  08-12    PT/INR - ( 11 Aug 2017 16:18 )   PT: 11.6 sec;   INR: 1.04          PTT - ( 11 Aug 2017 16:18 )  PTT:26.4 sec  < from: US Duplex Venous Lower Ext Complete, Bilateral (08.13.17 @ 11:51) >    INDICATION: Bilateral lower extremity edema; Evaluate for DVT    TECHNIQUE: Duplex Doppler evaluation including gray-scale ultrasound   imaging, color flow Doppler imaging, and Doppler spectral analysis of the   veins of both lower extremities was performed.     COMPARISON: Bilateral lower extremity ultrasound 6/5/2017    FINDINGS:    Thigh veins: Evaluation of the left popliteal vein is limited due to the   patient's refusal to reposition. The right popliteal vein is patent and   free of thrombus. The common femoral, femoral, proximal greater   saphenous, and proximal deep femoral veins are patent and free of   thrombus bilaterally. The veins are normally compressible and have normal   phasic flow.    Calf veins: Interval resolution of previously seen left intramuscular   calf veinthrombosis. Evaluation of the left peroneal veins are limited   due to patient's refusal to reposition. The right peroneal veins are   patent and free of thrombus. The paired posterior tibial calf veins are   patent bilaterally.      IMPRESSION:  No deep vein thrombosis seen within the visualized deep venous structures.    < end of copied text >  RADIOLOGY & ADDITIONAL STUDIES (The following images were personally reviewed):

## 2017-08-14 ENCOUNTER — TRANSCRIPTION ENCOUNTER (OUTPATIENT)
Age: 77
End: 2017-08-14

## 2017-08-14 LAB
ANION GAP SERPL CALC-SCNC: 13 MMOL/L — SIGNIFICANT CHANGE UP (ref 5–17)
BUN SERPL-MCNC: 17 MG/DL — SIGNIFICANT CHANGE UP (ref 7–23)
CALCIUM SERPL-MCNC: 8.8 MG/DL — SIGNIFICANT CHANGE UP (ref 8.4–10.5)
CHLORIDE SERPL-SCNC: 102 MMOL/L — SIGNIFICANT CHANGE UP (ref 96–108)
CK SERPL-CCNC: 53 U/L — SIGNIFICANT CHANGE UP (ref 30–200)
CO2 SERPL-SCNC: 25 MMOL/L — SIGNIFICANT CHANGE UP (ref 22–31)
CREAT SERPL-MCNC: 0.9 MG/DL — SIGNIFICANT CHANGE UP (ref 0.5–1.3)
GLUCOSE SERPL-MCNC: 109 MG/DL — HIGH (ref 70–99)
HCT VFR BLD CALC: 38.7 % — LOW (ref 39–50)
HGB BLD-MCNC: 12.6 G/DL — LOW (ref 13–17)
LDH SERPL L TO P-CCNC: 153 U/L — SIGNIFICANT CHANGE UP (ref 50–242)
MAGNESIUM SERPL-MCNC: 2 MG/DL — SIGNIFICANT CHANGE UP (ref 1.6–2.6)
MCHC RBC-ENTMCNC: 28.1 PG — SIGNIFICANT CHANGE UP (ref 27–34)
MCHC RBC-ENTMCNC: 32.6 G/DL — SIGNIFICANT CHANGE UP (ref 32–36)
MCV RBC AUTO: 86.4 FL — SIGNIFICANT CHANGE UP (ref 80–100)
NT-PROBNP SERPL-SCNC: 213 PG/ML — SIGNIFICANT CHANGE UP (ref 0–300)
PHOSPHATE SERPL-MCNC: 3.5 MG/DL — SIGNIFICANT CHANGE UP (ref 2.5–4.5)
PLATELET # BLD AUTO: 177 K/UL — SIGNIFICANT CHANGE UP (ref 150–400)
POTASSIUM SERPL-MCNC: 4 MMOL/L — SIGNIFICANT CHANGE UP (ref 3.5–5.3)
POTASSIUM SERPL-SCNC: 4 MMOL/L — SIGNIFICANT CHANGE UP (ref 3.5–5.3)
PREALB SERPL-MCNC: 22 MG/DL — SIGNIFICANT CHANGE UP (ref 20–40)
RBC # BLD: 4.48 M/UL — SIGNIFICANT CHANGE UP (ref 4.2–5.8)
RBC # FLD: 15.5 % — SIGNIFICANT CHANGE UP (ref 10.3–16.9)
SODIUM SERPL-SCNC: 140 MMOL/L — SIGNIFICANT CHANGE UP (ref 135–145)
VANCOMYCIN TROUGH SERPL-MCNC: 22 UG/ML — HIGH (ref 10–20)
WBC # BLD: 13.4 K/UL — HIGH (ref 3.8–10.5)
WBC # FLD AUTO: 13.4 K/UL — HIGH (ref 3.8–10.5)

## 2017-08-14 PROCEDURE — 99222 1ST HOSP IP/OBS MODERATE 55: CPT

## 2017-08-14 RX ADMIN — Medication 166.67 MILLIGRAM(S): at 10:33

## 2017-08-14 RX ADMIN — TAMSULOSIN HYDROCHLORIDE 0.4 MILLIGRAM(S): 0.4 CAPSULE ORAL at 21:19

## 2017-08-14 RX ADMIN — FINASTERIDE 5 MILLIGRAM(S): 5 TABLET, FILM COATED ORAL at 12:41

## 2017-08-14 RX ADMIN — APIXABAN 5 MILLIGRAM(S): 2.5 TABLET, FILM COATED ORAL at 05:05

## 2017-08-14 RX ADMIN — PIPERACILLIN AND TAZOBACTAM 200 GRAM(S): 4; .5 INJECTION, POWDER, LYOPHILIZED, FOR SOLUTION INTRAVENOUS at 05:06

## 2017-08-14 RX ADMIN — Medication 166.67 MILLIGRAM(S): at 21:19

## 2017-08-14 RX ADMIN — PIPERACILLIN AND TAZOBACTAM 200 GRAM(S): 4; .5 INJECTION, POWDER, LYOPHILIZED, FOR SOLUTION INTRAVENOUS at 13:45

## 2017-08-14 RX ADMIN — Medication 1 TABLET(S): at 12:41

## 2017-08-14 RX ADMIN — PIPERACILLIN AND TAZOBACTAM 200 GRAM(S): 4; .5 INJECTION, POWDER, LYOPHILIZED, FOR SOLUTION INTRAVENOUS at 18:22

## 2017-08-14 RX ADMIN — SENNA PLUS 2 TABLET(S): 8.6 TABLET ORAL at 21:19

## 2017-08-14 RX ADMIN — PANTOPRAZOLE SODIUM 40 MILLIGRAM(S): 20 TABLET, DELAYED RELEASE ORAL at 05:05

## 2017-08-14 RX ADMIN — APIXABAN 5 MILLIGRAM(S): 2.5 TABLET, FILM COATED ORAL at 18:23

## 2017-08-14 RX ADMIN — SODIUM CHLORIDE 125 MILLILITER(S): 9 INJECTION INTRAMUSCULAR; INTRAVENOUS; SUBCUTANEOUS at 21:19

## 2017-08-14 RX ADMIN — PIPERACILLIN AND TAZOBACTAM 200 GRAM(S): 4; .5 INJECTION, POWDER, LYOPHILIZED, FOR SOLUTION INTRAVENOUS at 23:17

## 2017-08-14 NOTE — DISCHARGE NOTE ADULT - CARE PROVIDER_API CALL
Sterling Pérez (MD), Internal Medicine  [address] [address]  Phone: [phone]  Fax: [phone]

## 2017-08-14 NOTE — DISCHARGE NOTE ADULT - HOSPITAL COURSE
Patient is a 76 yo male with history of CLL, BPH, insomnia, JARED 2/2 obstructive uropathy which developed after operation done for fracture which has now resolved, on chronic rodriguez,  recent right femoral neck fracture s/p hemiarthroplasty done in May 2017, complicated by PE (on Xarelto) , now presents from his PCP office due to left and right heel wound. Patient currently lives at home and has a visiting nurse that noticed blisters on patient's left heel as well as a small wound on the right heel. He reports the blister, started approximately. 2 months ago, with serous discharge in the beginning which has now resolved. It was also accompanied with mild redness and worsening lower extremity edema. Patient denies fevers, chills, nausea, vomiting, shortness of breath, palpitations. Denies lower extremity pain, tingling/numbness/sensory loss/ trauma or bug bite. He was admitted to [facility] on 8/11 for further management of his ulcers. He was started on antibiotics for treatment with vancomycin and zosyn with vascular surgery following him. As per vascular surgery, they think patient needs no surgical intervention as he has good circulation and management can be continued with antibiotics. Patient is stable to be discharged today and follow up as an outpatient for further treatment. Patient is a 78 yo male with history of CLL, BPH, insomnia, JARED 2/2 obstructive uropathy which developed after operation done for fracture which has now resolved, on chronic rodriguez,  recent right femoral neck fracture s/p hemiarthroplasty done in May 2017, complicated by PE (on Xarelto) , presenting from his PCP office due to left and right heel wound. He reports the blister, started approximately. 2 months ago, with serous discharge in the beginning which has now resolved. It was also accompanied with mild redness and worsening lower extremity edema. Patient denies fevers, chills, nausea, vomiting, shortness of breath, palpitations. Denies lower extremity pain, tingling/numbness/sensory loss/ trauma or bug bite. He was admitted to [facility] on 8/11 for further management of his ulcers. He was started on antibiotics for treatment with vancomycin and zosyn with vascular surgery following him. As per vascular surgery, they think patient needs no surgical intervention as he has good circulation and management can be continued with antibiotics. PICC line was placed today for IV antibiotics which need to be continued for another 10 days. Patient is stable to be discharged today. Patient is a 78 yo male with history of CLL, BPH, insomnia, JARED 2/2 obstructive uropathy which developed after operation done for fracture which has now resolved, on chronic rodriguez,  recent right femoral neck fracture s/p hemiarthroplasty done in May 2017, complicated by PE (on Xarelto) , presenting from his PCP office due to left and right heel wound. He reports the blister, started approximately. 2 months ago, with serous discharge in the beginning which has now resolved. It was also accompanied with mild redness and worsening lower extremity edema. Patient denies fevers, chills, nausea, vomiting, shortness of breath, palpitations. Denies lower extremity pain, tingling/numbness/sensory loss/ trauma or bug bite. He was admitted to [facility] on 8/11 for further management of his ulcers. He was started on antibiotics for treatment with vancomycin and zosyn with vascular surgery following him. As per vascular surgery, they think patient needs no surgical intervention as he has good circulation and management can be continued with antibiotics. PICC line was placed today for IV antibiotics which need to be continued for another 10 days. Patient is stable to be discharged today to rehab with plan to f/u with his pcp.

## 2017-08-14 NOTE — DISCHARGE NOTE ADULT - MEDICATION SUMMARY - MEDICATIONS TO TAKE
I will START or STAY ON the medications listed below when I get home from the hospital:    tamsulosin 0.4 mg oral capsule  -- 2 cap(s) by mouth once a day (at bedtime)  -- Indication: For BPH    Eliquis 5 mg oral tablet  -- 1 tab(s) by mouth 2 times a day  -- Indication: For Anticoagulation    guaiFENesin 100 mg/5 mL oral liquid  -- 5 milliliter(s) by mouth every 6 hours, As needed, Cough  -- Indication: For Cough    vancomycin 1 g intravenous injection  -- 1 gram(s) intravenous every 12 hours  -- Indication: For Foot ulcer infection    polyethylene glycol 3350 oral powder for reconstitution  -- 17 gram(s) by mouth once a day  -- Indication: For Constipation    piperacillin-tazobactam 2 g-0.25 g intravenous injection  --  intravenous   -- Indication: For Foot ulcer infection    lactobacillus acidophilus oral capsule  --  by mouth   -- Indication: For For gut motility     pantoprazole 40 mg oral delayed release tablet  -- 1 tab(s) by mouth once a day  -- Indication: For GERD I will START or STAY ON the medications listed below when I get home from the hospital:    tamsulosin 0.4 mg oral capsule  -- 2 cap(s) by mouth once a day (at bedtime)  -- Indication: For BPH    guaiFENesin 100 mg/5 mL oral liquid  -- 5 milliliter(s) by mouth every 6 hours, As needed, Cough  -- Indication: For Cough    vancomycin 1 g intravenous injection  -- 1 gram(s) intravenous every 12 hours  -- Indication: For Foot ulcer infection    polyethylene glycol 3350 oral powder for reconstitution  -- 17 gram(s) by mouth once a day  -- Indication: For Constipation    Zosyn 3 g-0.375 g/50 mL intravenous solution  --  intravenous every 6 hours  -- Indication: For Heel ulcer    lactobacillus acidophilus oral capsule  --  by mouth   -- Indication: For For gut motility     pantoprazole 40 mg oral delayed release tablet  -- 1 tab(s) by mouth once a day  -- Indication: For GERD I will START or STAY ON the medications listed below when I get home from the hospital:    tamsulosin 0.4 mg oral capsule  -- 2 cap(s) by mouth once a day (at bedtime)  -- Indication: For BPH    apixaban 5 mg oral tablet  -- 1 tab(s) by mouth every 12 hours  -- Indication: For Anticoagulation    guaiFENesin 100 mg/5 mL oral liquid  -- 5 milliliter(s) by mouth every 6 hours, As needed, Cough  -- Indication: For Cough    vancomycin 1 g/250 mL-D5% intravenous solution  --  intravenous every 12 hours  -- Indication: For Heel ulcer    senna oral tablet  -- 2 tab(s) by mouth once a day (at bedtime)  -- Indication: For Constipation    polyethylene glycol 3350 oral powder for reconstitution  -- 17 gram(s) by mouth once a day  -- Indication: For Constipation    Zosyn 3 g-0.375 g/50 mL intravenous solution  --  intravenous every 6 hours  -- Indication: For Heel ulcer    lactobacillus acidophilus oral capsule  --  by mouth   -- Indication: For For gut motility     pantoprazole 40 mg oral delayed release tablet  -- 1 tab(s) by mouth once a day  -- Indication: For GERD

## 2017-08-14 NOTE — PROGRESS NOTE ADULT - SUBJECTIVE AND OBJECTIVE BOX
SUBJECTIVE: Pt seen and examined at bedside. No overnight events. Feels well, denies nausea /vomiting /chest pain/ palpitations/fevers or chills. Has no complaints at this time.     Vital Signs Last 24 Hrs  T(C): 36.6 (14 Aug 2017 09:12), Max: 36.7 (13 Aug 2017 15:59)  T(F): 97.8 (14 Aug 2017 09:12), Max: 98.1 (14 Aug 2017 05:24)  HR: 98 (14 Aug 2017 09:12) (90 - 98)  BP: 129/88 (14 Aug 2017 09:12) (123/74 - 147/84)  BP(mean): --  RR: 18 (14 Aug 2017 09:12) (17 - 18)  SpO2: 94% (14 Aug 2017 09:12) (94% - 97%)    PHYSICAL EXAM  Gen:  in no acute distress  CV: RRR  Pulm: CTAB  Abd: Soft, NT/ND  Extremities: WWP, no cyanosis or clubbing, 1+ pitting edema, palpable DP/femoral bilaterally, triphasic PT/popliteal Doppler signals bilaterally, left heel wound 4x5cm healing well/ small 1x2cm superficial right heel wound with - no drainage/bleeding        LABS:                        12.6   13.4  )-----------( 177      ( 14 Aug 2017 06:56 )             38.7     08-14    140  |  102  |  17  ----------------------------<  109<H>  4.0   |  25  |  0.90    Ca    8.8      14 Aug 2017 06:56  Phos  3.5     08-14  Mg     2.0     08-14      ASSESSMENT AND PLAN  76 yo male with superficial left and right heel wound.     - daily wet to dry dressing change, ace wrap, leg elevation  - rest of care per primary team  - vascular surgery will sign off, please re-consult with any questions  - patient to follow-up as outpatient with Dr. Reynolds, please call to schedule an appointment ([phone])  - plan discussed with chief and attending

## 2017-08-14 NOTE — PROGRESS NOTE ADULT - PROBLEM SELECTOR PLAN 4
-Patient was seen by Dr. Rizvi, 2 days ago, where his rodriguez was changed. Probably his JARED happened after surgery, where he retained urine, resolved with rodriguez  -Per note in allscript, they want to continue rodriguez w flomax for one more month before TOV  - Also, UA from rodriguez is dirty, perhaps is already colonized, his abx vanc + zosyn would cover for infection -Patient was seen by Dr. Rizvi a few days ago where his rodriguez was changed. Probably his JARED happened after surgery, where he retained urine, resolved with rodriguez placement  -Per note in allscript, they want to continue rodriguez w flomax for one more month before TOV  - Also, UA from rodriguez is dirty, perhaps is already colonized, his abx vanc + zosyn would cover for infection

## 2017-08-14 NOTE — CONSULT NOTE ADULT - SUBJECTIVE AND OBJECTIVE BOX
REASON FOR CONSULT:    HISTORY OF PRESENT ILLNESS: 76 yo male with history of CLL, BPH, insomnia, JARED 2/2 obstructive uropathy(developed after operation for fracture, resolved, on chronic rodriguez follows Dr. Leigh urology ),  recent right femoral neck fracture s/p hemiarthroplasty (may 2017) complicated by PE (on Xarelto) and pneumonia (July 2017), now presents from his PCP office due to left and right heel wound. Patient currently lives at home and has visiting nurse that noticed blister on patient's left heel as well as small wound on right heel. He reports the blister, started approx. 2 months ago, with serous discharge in the beginning and now resolved. It is also a/w mild redness and worsening SIMON. Patient denies fevers, chills, N/V/SOB or palpitations, hx of trauma. Denies lower extremities pain, tingling/numbness/sensory loss/ trauma or bug bite. He was recently d/pricila from [facility] s/p complicated course from PNA and PE. Denies smoking, drinking or IVDA.     PAST MEDICAL & SURGICAL HISTORY:  Hip fracture requiring operative repair, right, sequela  PE (pulmonary thromboembolism)  CLL (chronic lymphocytic leukemia)  S/P hip hemiarthroplasty      [ ] Diabetes   [ ] Hypertension  [ ] Hyperlipidemia  [ ] CAD  [ ] PCI  [ ] CABG    PREVIOUS DIAGNOSTIC TESTING:    [ ] Echocardiogram:  [ ]  Catheterization:  [ ] Stress Test:  	    MEDICATIONS:  tamsulosin 0.4 milliGRAM(s) Oral at bedtime    piperacillin/tazobactam IVPB.   IV Intermittent   vancomycin  IVPB 1250 milliGRAM(s) IV Intermittent every 12 hours  piperacillin/tazobactam IVPB. 3.375 Gram(s) IV Intermittent every 6 hours    ALBUTerol    90 MICROgram(s) HFA Inhaler 2 Puff(s) Inhalation every 6 hours PRN      pantoprazole    Tablet 40 milliGRAM(s) Oral before breakfast  senna 2 Tablet(s) Oral at bedtime    finasteride 5 milliGRAM(s) Oral daily    sodium chloride 0.9%. 1000 milliLiter(s) IV Continuous <Continuous>  apixaban 5 milliGRAM(s) Oral every 12 hours      FAMILY HISTORY:  No pertinent family history in first degree relatives      SOCIAL HISTORY:    [ ] Non-smoker  [ ] Smoker  [ ] Alcohol    Allergies    No Known Allergies    Intolerances    	    REVIEW OF SYSTEMS:    [x] as per HPI  CONSTITUTIONAL: No fever, weight loss, or fatigue  ENT:  No difficulty hearing, tinnitus, vertigo; No sinus or throat pain  RESPIRATORY: No cough, wheezing, chills or hemoptysis; No Shortness of Breath  CARDIOVASCULAR: No chest pain, palpitations, dizziness, or leg swelling  GASTROINTESTINAL: No abdominal or epigastric pain. No nausea, vomiting, or hematemesis; No diarrhea or constipation. No melena or hematochezia.  GENITOURINARY: No dysuria, frequency, hematuria, or incontinence  NEUROLOGICAL: No headaches, memory loss, loss of strength, numbness, or tremors  MUSCULOSKELETAL: No joint pain or swelling; No muscle, back, or extremity pain  [x] All others negative	  [ ] Unable to obtain    PHYSICAL EXAM:  T(C): 36.7 (08-14-17 @ 05:24), Max: 36.7 (08-13-17 @ 15:59)  HR: 93 (08-14-17 @ 05:24) (88 - 94)  BP: 147/84 (08-14-17 @ 05:24) (123/74 - 147/84)  RR: 17 (08-14-17 @ 05:24) (16 - 18)  SpO2: 95% (08-14-17 @ 05:24) (94% - 97%)  Wt(kg): --  I&O's Summary    13 Aug 2017 07:01  -  14 Aug 2017 07:00  --------------------------------------------------------  IN: 375 mL / OUT: 2250 mL / NET: -1875 mL        Appearance: Normal	  HEENT:   Normal oral mucosa, PERRL, EOMI	  Lymphatic: No lymphadenopathy  Cardiovascular: Normal S1 S2, No JVD, No murmurs, No edema  Respiratory: Lungs clear to auscultation	  Psychiatry: A & O x 3, Mood & affect appropriate  Gastrointestinal:  Soft, Non-tender, + BS	  Skin: No rashes, No ecchymoses, No cyanosis	  Neurologic: Non-focal  Extremities: Normal range of motion, No clubbing, cyanosis or edema  Vascular: Peripheral pulses palpable 2+ bilaterally    TELEMETRY: 	    ECG:  < from: 12 Lead ECG (06.05.17 @ 10:53) >  Diagnosis Line *** Age and gender specific ECG analysis ***  Sinus tachycardia with occasional and consecutive premature ventricular complexes and fusion complexes  Right bundle branch block  Anterolateral infarct , age undetermined  Abnormal ECG    < end of copied text >    ECHO:< from: Echocardiogram (06.05.17 @ 16:18) >  A complete two-dimensional transthoracic echocardiogram was performed (2D,   M-mode, spectral and color flow doppler). Study Quality: Fair.  Normal   left   ventricular size and wall thickness. The left ventricular wall motion is   normal.  The left ventricular ejection fraction is normal. The left   ventricular ejection fraction is 65%.  The left atrial size is normal.   Right   atrium not well visualized.The right ventricle is not well visualized.   Probably normal right ventricular size and function.  No evidence for any   hemodynamically significant valvular disease.There is no   echocardiographic   evidence for pulmonary hypertension. The pulmonary artery systolic   pressure is   estimated to be 20 mmHg.  The inferior vena cava is normal in size (<2.1   cm)   with normal inspiratory collapse (>50%) consistent with normal right   atrial   pressure.  No aortic root dilatation.Left pleural effusion noted. There   is no   pericardial effusion.    < end of copied text >    STRESS:  CATH:  	  RADIOLOGY:  CXR:< from: Xray Chest 1 View AP/PA (08.11.17 @ 16:50) >  Impression: No acute infiltrates    < end of copied text >    CT:  US:   	  	  LABS:	 	    CARDIAC MARKERS:                                  12.6   13.4  )-----------( 177      ( 14 Aug 2017 06:56 )             38.7     08-14    140  |  102  |  17  ----------------------------<  109<H>  4.0   |  25  |  0.90    Ca    8.8      14 Aug 2017 06:56  Phos  3.5     08-14  Mg     2.0     08-14    TPro  5.3<L>  /  Alb  2.9<L>  /  TBili  0.4  /  DBili  x   /  AST  21  /  ALT  17  /  AlkPhos  81  08-12    proBNP:   Lipid Profile:   HgA1c:   TSH:     ASSESSMENT/PLAN: 	   Problem: PE (pulmonary thromboembolism).  Plan: -Hx of PE s/p hip surgery  -c/w eliquis. no tachycardia no desaturation       Problem: Lower extremity edema.  Plan: -bilateral  -low probability cardiac in origin, likely third spacing with superimposed cellulitis.   -Duplex U/S negative for any DVT  - vascular following, appreciate recs  - Elevate leg.     Recommend check BNP (r/o CHF), CPK & LDH (r/o compartment synd), and Prealbumin level (r/o anasarca)

## 2017-08-14 NOTE — PROGRESS NOTE ADULT - PROBLEM SELECTOR PLAN 6
-Hx of bzd abuse - abuse xanax, apparently, seen by Dr. Coles, for evaluation where he had MMSE 26/30  - outpatient workup -Hx of benzodiazepine abuse - xanax, apparently, seen by Dr. Coles, for evaluation where he had MMSE 26/30  -Continue with outpatient workup  -Was alerted by RN that patients bestfriend has been supplying him with Xanax. Had a detailed discussion with the daughter. For right now the daughter would not like to ban the friend but in case of there is a suspicion of bringing illegal substances into the hospital, then consider banning him or even contacting the [facility]. Please read the chart note if any confusion.

## 2017-08-14 NOTE — PROGRESS NOTE ADULT - SUBJECTIVE AND OBJECTIVE BOX
Interventional, Pulmonary, Critical, Chest Special Procedures.    Pt was seen and fully examined by myself.     Time spent with patient in minutes:37    Patient is a 77y old  Male who presents with a chief complaint of -Left and right heel wound (14 Aug 2017 07:58)The patient eupneic on RA when seen, pain free.    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 Leigh urology ),  recent right femoral neck fracture s/p hemiarthroplasty (may 2017) complicated by PE (on Xarelto) and pneumonia (July 2017), now presents from his PCP office due to left and right heel wound. Patient currently lives at home and has visiting nurse that noticed blister on patient's left heel as well as small wound on right heel. He reports the blister, started approx. 2 months ago, with serous discharge in the beginning and now resolved. It is also a/w mild redness and worsening SIMON. Patient denies fevers, chills, N/V/SOB or palpitations, hx of trauma. Denies lower extremities pain, tingling/numbness/sensory loss/ trauma or bug bite. He was recently d/pricila from [facility] s/p complicated course from PNA and PE. Denies smoking, drinking or IVDA.   In ED vitals were   Vital Signs Last 24 Hrs  T(C): 36.3 (11 Aug 2017 19:37), Max: 36.7 (11 Aug 2017 14:47)  T(F): 97.3 (11 Aug 2017 19:37), Max: 98.1 (11 Aug 2017 14:47)  HR: 85 (11 Aug 2017 19:37) (85 - 100)  BP: 148/78 (11 Aug 2017 19:37) (130/78 - 148/78)  BP(mean): --  RR: 16 (11 Aug 2017 19:37) (16 - 18)  SpO2: 98% (11 Aug 2017 19:37) (95% - 98%)    He received vancomycin and zosyn x1. Vascular svc was consulted for heel wound. (11 Aug 2017 20:30)    REVIEW OF SYSTEMS:updated  Constitutional: No fever, weight loss, chills or fatigue  Eyes: No eye pain, visual disturbances, or discharge  ENMT:  No difficulty hearing, tinnitus, vertigo; No sinus or throat pain. No epistaxis, dysphagia, dysphonia, hoarseness or odynophagia  Neck: No pain, stiffness or neck swelling.  No masses or deformities  Respiratory: No cough, wheezing, chills or hemoptysis  - COPD  - ILD   - PE   - ASTHMA     - PNEUMONIA  Cardiovascular: No chest pain, dysrhythmia, palpitations, dizziness or edema   - COPD     - CAD   - CHF   - HTN  Gastrointestinal: No abdominal or epigastric pain. No nausea, vomiting or hematemesis; No diarrhea or constipation. No melena or hematochezia. No dysphagia or Icterus.          Genitourinary: No dysuria, frequency, hematuria or incontinence   - CKD/JARED      - ESRD  Neurological: No headaches, memory loss, loss of strength, numbness or tremors      -DEMENTIA     - STROKE    - SEIZURE  Skin: No itching, burning, rashes or lesions   Lymph Nodes: No enlarged glands  Endocrine: No heat or cold intolerance; No hair loss       - DM     - THYROID DISORDER  Musculoskeletal: No joint pain or swelling; No muscle, back or extremity pain  Psychiatric: No depression, anxiety, mood swings or difficulty sleeping  Heme/Lymph: No easy bruising or bleeding gums         - ANEMIA      - CANCER   -COAGULOPATHY  Allergy and Immunologic: No hives or eczema    PAST MEDICAL & SURGICAL HISTORY:  Hip fracture requiring operative repair, right, sequela  PE (pulmonary thromboembolism)  CLL (chronic lymphocytic leukemia)  S/P hip hemiarthroplasty    FAMILY HISTORY:  No pertinent family history in first degree relatives    SOCIAL HISTORY:      - Tobacco     - ETOH    Allergies    No Known Allergies    Intolerances      Vital Signs Last 24 Hrs  T(C): 36.8 (14 Aug 2017 15:46), Max: 36.8 (14 Aug 2017 15:46)  T(F): 98.2 (14 Aug 2017 15:46), Max: 98.2 (14 Aug 2017 15:46)  HR: 100 (14 Aug 2017 15:46) (93 - 100)  BP: 170/94 (14 Aug 2017 15:46) (129/88 - 170/94)  BP(mean): --  RR: 18 (14 Aug 2017 15:46) (17 - 18)  SpO2: 96% (14 Aug 2017 15:46) (94% - 96%)    08-13 @ 07:01  -  08-14 @ 07:00  --------------------------------------------------------  IN: 375 mL / OUT: 2250 mL / NET: -1875 mL    08-14 @ 07:01  -  08-14 @ 16:34  --------------------------------------------------------  IN: 0 mL / OUT: 1300 mL / NET: -1300 mL        PHYSICAL EXAM:  More Comfortable, no distress  Eyes: PERRL, EOM intact; conjunctiva and sclera clear  Head: Normocephalic;  No Trauma  ENMT: No nasal discharge, +hoarseness,   Neck: Supple; non tender; no masses or deformities.    No JVD  Respiratory:  - WHEEZING   - RHONCHI  - RALES  -+CRACKLES.  Diminished breath sounds  BILATERAL  RIGHT  LEFT bases  Cardiovascular: Regular rate and rhythm. S1 and S2 Normal; No murmurs, gallops or rubs     - PPM/AICD  Gastrointestinal: Soft non-tender, non-distended; Normal bowel sounds; No hepatosplenomegaly.     -PEG    -  GT   + RODRIGUEZ  Genitourinary: No costovertebral angle tenderness. No dysuria  Extremities: AROM, I was present at dressing change by vascular, discussed at bedside  Vascular: Peripheral pulses palpable 2+ bilaterally  Neurological: Alert and responisve to stimuli   Skin: Warm and dry. No obvious rash  Lymph Nodes: No acute cervical or supraclavicular adenopathy  Psychiatric: Cooperative and appropriate mood    DEVICES:  - DENTURES   +IV R / L     - ETUBE   -TRACH   -CTUBE  R / L      LABS:                          12.6   13.4  )-----------( 177      ( 14 Aug 2017 06:56 )             38.7     08-14    140  |  102  |  17  ----------------------------<  109<H>  4.0   |  25  |  0.90    Ca    8.8      14 Aug 2017 06:56  Phos  3.5     08-14  Mg     2.0     08-14        < from: US Duplex Venous Lower Ext Complete, Bilateral (08.13.17 @ 11:51) >      INTERPRETATION:      VENOUS DUPLEX DOPPLER OF BOTH LOWER EXTREMITIES dated 8/13/2017 11:51 AM    INDICATION: Bilateral lower extremity edema; Evaluate for DVT    TECHNIQUE: Duplex Doppler evaluation including gray-scale ultrasound   imaging, color flow Doppler imaging, and Doppler spectral analysis of the   veins of both lower extremities was performed.     COMPARISON: Bilateral lower extremity ultrasound 6/5/2017    FINDINGS:    Thigh veins: Evaluation of the left popliteal vein is limited due to the   patient's refusal to reposition. The right popliteal vein is patent and   free of thrombus. The common femoral, femoral, proximal greater   saphenous, and proximal deep femoral veins are patent and free of   thrombus bilaterally. The veins are normally compressible and have normal   phasic flow.    Calf veins: Interval resolution of previously seen left intramuscular   calf veinthrombosis. Evaluation of the left peroneal veins are limited   due to patient's refusal to reposition. The right peroneal veins are   patent and free of thrombus. The paired posterior tibial calf veins are   patent bilaterally.      IMPRESSION:  No deep vein thrombosis seen within the visualized deep venous structures.    < end of copied text >  RADIOLOGY & ADDITIONAL STUDIES (The following images were personally reviewed):

## 2017-08-14 NOTE — PROGRESS NOTE ADULT - SUBJECTIVE AND OBJECTIVE BOX
Overnight Events: No acute events overnight.    Subjective: Patient seen and examined at bedside. In NAD.    [OBJECTIVE]:    Vital Signs:  T(F): , Max: 98.1 (08-14-17 @ 05:24)  HR:  (90 - 94)  BP:  (123/74 - 147/84)  BP(mean): --  RR:  (17 - 18)  SpO2:  (94% - 97%)  Wt(kg): --  CVP(cm H2O): --      08-13 @ 07:01  -  08-14 @ 07:00  --------------------------------------------------------  IN: 375 mL / OUT: 2250 mL / NET: -1875 mL      CAPILLARY BLOOD GLUCOSE          Physcial Exam:  T(F): 98.1 (08-14-17 @ 05:24)  HR: 93 (08-14-17 @ 05:24)  BP: 147/84 (08-14-17 @ 05:24)  RR: 17 (08-14-17 @ 05:24)  SpO2: 95% (08-14-17 @ 05:24)  Wt(kg): --    Constitutional: WDWN resting comfortably in bed; NAD  Head: NC/AT  Eyes: PERRL, EOMI, clear conjunctiva  ENT: no nasal discharge; uvula midline, no oropharyngeal erythema or exudates; MMD  Neck: supple; no JVD or thyromegaly  Respiratory: CTABL  Cardiac: +S1/S2; RRR; no M/R/G;   Gastrointestinal: soft, NT/ND; no rebound or guarding; +BSx4  Genitourinary: normal external genitalia  Back: spine midline, no bony tenderness or step-offs; no CVAT B/L  Extremities: necrotic ulcer on L and R. Heel base, L>R in size, no active discharge(visible after removing dressing)  Musculoskeletal: dec rom on R. leg, 2/2 recent operation, nl rom on left leg  Vascular: 2+ radial, femoral, DP/PT pulses B/L  Dermatologic: skin warm, dry and intact; no rashes, wounds, or scars  Lymphatic: no submandibular or cervical LAD  Neurologic: AAOx3; CNII-XII grossly intact; no focal deficits  Psychiatric: affect and characteristics of appearance, verbalizations, behaviors are appropriate    Medications:  MEDICATIONS  (STANDING):  sodium chloride 0.9%. 1000 milliLiter(s) (125 mL/Hr) IV Continuous <Continuous>  apixaban 5 milliGRAM(s) Oral every 12 hours  tamsulosin 0.4 milliGRAM(s) Oral at bedtime  pantoprazole    Tablet 40 milliGRAM(s) Oral before breakfast  senna 2 Tablet(s) Oral at bedtime  lactobacillus acidophilus 1 Tablet(s) Oral daily  piperacillin/tazobactam IVPB.   IV Intermittent   vancomycin  IVPB 1250 milliGRAM(s) IV Intermittent every 12 hours  piperacillin/tazobactam IVPB. 3.375 Gram(s) IV Intermittent every 6 hours  finasteride 5 milliGRAM(s) Oral daily    MEDICATIONS  (PRN):  ALBUTerol    90 MICROgram(s) HFA Inhaler 2 Puff(s) Inhalation every 6 hours PRN Shortness of Breath      Allergies:  Allergies    No Known Allergies    Intolerances        Labs:                        12.6   13.4  )-----------( 177      ( 14 Aug 2017 06:56 )             38.7     08-14    140  |  102  |  17  ----------------------------<  109<H>  4.0   |  25  |  0.90    Ca    8.8      14 Aug 2017 06:56  Phos  3.5     08-14  Mg     2.0     08-14            Radiology and other tests: Reviewed Overnight Events: No acute events overnight.    Subjective: Patient seen and examined at bedside. In NAD.    [OBJECTIVE]:    Vital Signs:  T(F): , Max: 98.1 (08-14-17 @ 05:24)  HR:  (90 - 94)  BP:  (123/74 - 147/84)  BP(mean): --  RR:  (17 - 18)  SpO2:  (94% - 97%)  Wt(kg): --  CVP(cm H2O): --      08-13 @ 07:01  -  08-14 @ 07:00  --------------------------------------------------------  IN: 375 mL / OUT: 2250 mL / NET: -1875 mL      CAPILLARY BLOOD GLUCOSE          Physcial Exam:  T(F): 98.1 (08-14-17 @ 05:24)  HR: 93 (08-14-17 @ 05:24)  BP: 147/84 (08-14-17 @ 05:24)  RR: 17 (08-14-17 @ 05:24)  SpO2: 95% (08-14-17 @ 05:24)  Wt(kg): --    Constitutional: WDWN resting comfortably in bed; NAD  Head: NC/AT  Eyes: PERRL, EOMI, clear conjunctiva  ENT: no nasal discharge; uvula midline, no oropharyngeal erythema or exudates; MMD  Neck: supple; no JVD or thyromegaly  Respiratory: CTAL B/L  Cardiac: +S1/S2; RRR; no M/R/G;   Gastrointestinal: soft, NT/ND; no rebound or guarding; +BSx4  Extremities: necrotic ulcer on L and R. Heel base, L>R in size, no active discharge(visible after removing dressing)  Musculoskeletal: dec rom on R. leg, 2/2 recent operation, nl rom on left leg  Vascular: 2+ radial, femoral, DP/PT pulses B/L  Dermatologic: skin warm, dry and intact; no rashes, wounds, or scars  Lymphatic: no submandibular or cervical LAD  Neurologic: AAOx3; CNII-XII grossly intact; no focal deficits  Psychiatric: affect and characteristics of appearance, verbalizations, behaviors are appropriate    Medications:  MEDICATIONS  (STANDING):  sodium chloride 0.9%. 1000 milliLiter(s) (125 mL/Hr) IV Continuous <Continuous>  apixaban 5 milliGRAM(s) Oral every 12 hours  tamsulosin 0.4 milliGRAM(s) Oral at bedtime  pantoprazole    Tablet 40 milliGRAM(s) Oral before breakfast  senna 2 Tablet(s) Oral at bedtime  lactobacillus acidophilus 1 Tablet(s) Oral daily  piperacillin/tazobactam IVPB.   IV Intermittent   vancomycin  IVPB 1250 milliGRAM(s) IV Intermittent every 12 hours  piperacillin/tazobactam IVPB. 3.375 Gram(s) IV Intermittent every 6 hours  finasteride 5 milliGRAM(s) Oral daily    MEDICATIONS  (PRN):  ALBUTerol    90 MICROgram(s) HFA Inhaler 2 Puff(s) Inhalation every 6 hours PRN Shortness of Breath      Allergies:  Allergies    No Known Allergies    Intolerances        Labs:                        12.6   13.4  )-----------( 177      ( 14 Aug 2017 06:56 )             38.7     08-14    140  |  102  |  17  ----------------------------<  109<H>  4.0   |  25  |  0.90    Ca    8.8      14 Aug 2017 06:56  Phos  3.5     08-14  Mg     2.0     08-14            Radiology and other tests: Reviewed

## 2017-08-14 NOTE — DISCHARGE NOTE ADULT - PLAN OF CARE
-No infection -You were sent in by your PMD for a heel ulcer on your right and left foot. You were admitted to the hospital and treated with antibiotics for your ulcers. Vascular surgery has been following you and decided that your ulcers do not need any surgical intervention and can be managed with antibiotics.  -You are being discharged with antibiotics, please continue to take them for days  -Please follow up with your PMD in 1 week. -Please continue with your home medications upon discharge  -Please follow up with your PMD in 1 week -You were found to have bilateral lower extremity edema on exam. You had a duplex ultrasound of your lower extremities done which were negative for any clot.  -Please continue with your home medications upon discharge  -Please follow up with your PMD in 1 week. -You were sent in by your PMD for a heel ulcer on your right and left foot. You were admitted to the hospital and treated with antibiotics for your ulcers. Vascular surgery has been following you and decided that your ulcers do not need any surgical intervention and can be managed with antibiotics.  -You had a PICC line placed today after consent was taken from your health care proxy as you need IV antibiotics on an outpatient basis  -You have been prescribed with 2 antibiotics, please continue to take them for another 10 days.  -Please follow up with your PMD in 1 week. -You were sent in by your PMD for a heel ulcer on your right and left foot. You were admitted to the hospital and treated with antibiotics for your ulcers. Vascular surgery has been following you and decided that your ulcers do not need any surgical intervention and can be managed with antibiotics.  -You had a PICC line placed today after consent was taken from your health care proxy as you need IV antibiotics on an outpatient basis  -You have been prescribed with 2 antibiotics, please continue to take them for another 10 days.  -You need a vancomycin trough level tomorrow at 9 pm before your 10 pm dose. Please make sure you get the vancomycin trough done before you are given your night time dose.   -Please follow up with your PMD in 1 week. -Please continue with your home medications upon discharge  -Please follow up with your PMD in 1 week. -You were sent in by your PMD for a heel ulcer on your right and left foot. You were admitted to the hospital and treated with antibiotics for your ulcers. Vascular surgery has been following you and decided that your ulcers do not need any surgical intervention and can be managed with antibiotics.  -You had a PICC line placed today after consent was taken from your health care proxy as you need IV antibiotics on an outpatient basis  -You have been prescribed with 2 antibiotics, please continue to take them for another 10 days.  -You need a vancomycin trough level tomorrow at 9 pm before your 10 pm dose. Please make sure you get the vancomycin trough done before you are given your night time dose. When the level results, you may increase the vancomycin dose by 250mg if the trough is less than 15 micrograms/dL or you can decrease the dose by 250mg if the trough is above 20 micrograms/dL. Please continue to have your vancomycin level checked prior to the fourth dose after the previous trough. Please continue on vancomycin to complete your two week course.   -Please follow up with your PMD, Dr. Pérez in 1 week to have your antibiotics monitored and your infection monitored.

## 2017-08-14 NOTE — DISCHARGE NOTE ADULT - PATIENT PORTAL LINK FT
“You can access the [facility] Patient Portal, offered by [facility], by registering with the following website: [email or web address]”

## 2017-08-14 NOTE — PROGRESS NOTE ADULT - PROBLEM SELECTOR PLAN 5
-bilateral, unknown cause, ?2/2 immbolization   -Duplex U/S negative for any DVT  - Also, vascular following, appreciate recs  - Elevate leg  -Cardiology consulted -bilateral, unknown cause, likely 2/2 immbolization?   -Duplex U/S negative for any DVT  - Also, vascular following, appreciate recs  - Elevate leg  -Cardiology consulted, recs appreciated, low probability cardiac in origin, likely third spacing with superimposed cellulitis. Recommend checking BNP (r/o CHF), CPK & LDH (r/o compartment synd), and Prealbumin level (r/o anasarca)

## 2017-08-14 NOTE — PROGRESS NOTE ADULT - PROBLEM SELECTOR PLAN 2
-stable, baseline WBC of 15-20 K  -WBC this AM of 12.4 -stable, baseline WBC of 15-20 K  -WBC this AM of 13.4  -Continue to monitor

## 2017-08-14 NOTE — PROGRESS NOTE ADULT - PROBLEM SELECTOR PLAN 8
-Regular diet  -Replete lytes with goal K>4 and Mg> 2    CODE STATUS: FULL CODE

## 2017-08-14 NOTE — PROGRESS NOTE ADULT - PROBLEM SELECTOR PLAN 1
-Bilateral heel pressure wound, left>right and good palpable pulses.  -Vascular following, recs appreciated.  -As per vascular, c/w antibiotics, no need for surgical intervention yet as he has good circulation  -No pain, no active discharge, no systemic signs  -Given severity of ulcer, will c/w vanc + zosyn  -Check esr, crp, and foot x-ray to r/o osteomyelitis.  -Consult wound care, f/u recs  -PT consulted, f/u recs  -leg elevation -Bilateral heel pressure wound, left>right and good palpable pulses.  -Vascular following, recs appreciated.  -As per vascular, c/w antibiotics, no need for surgical intervention yet as he has good circulation  -No pain, no active discharge, no systemic signs  -Given severity of ulcer, will c/w vanc + zosyn  -Consult wound care, f/u recs  -PT consulted, recs appreciated, as per PT, patient will need balance training; bed mobility training; gait training; transfer training; strengthening  -continue to elevate leg

## 2017-08-14 NOTE — DISCHARGE NOTE ADULT - CARE PLAN
Principal Discharge DX:	Heel ulcer  Goal:	-No infection  Instructions for follow-up, activity and diet:	-You were sent in by your PMD for a heel ulcer on your right and left foot. You were admitted to the hospital and treated with antibiotics for your ulcers. Vascular surgery has been following you and decided that your ulcers do not need any surgical intervention and can be managed with antibiotics.  -You are being discharged with antibiotics, please continue to take them for days  -Please follow up with your PMD in 1 week.  Secondary Diagnosis:	CLL (chronic lymphocytic leukemia)  Instructions for follow-up, activity and diet:	-Please continue with your home medications upon discharge  -Please follow up with your PMD in 1 week  Secondary Diagnosis:	PE (pulmonary thromboembolism)  Instructions for follow-up, activity and diet:	-Please continue with your home medications upon discharge  -Please follow up with your PMD in 1 week  Secondary Diagnosis:	Obstructive uropathy  Instructions for follow-up, activity and diet:	-Please continue with your home medications upon discharge  -Please follow up with your PMD in 1 week  Secondary Diagnosis:	Lower extremity edema  Instructions for follow-up, activity and diet:	-You were found to have bilateral lower extremity edema on exam. You had a duplex ultrasound of your lower extremities done which were negative for any clot.  -Please continue with your home medications upon discharge  -Please follow up with your PMD in 1 week. Principal Discharge DX:	Heel ulcer  Goal:	-No infection  Instructions for follow-up, activity and diet:	-You were sent in by your PMD for a heel ulcer on your right and left foot. You were admitted to the hospital and treated with antibiotics for your ulcers. Vascular surgery has been following you and decided that your ulcers do not need any surgical intervention and can be managed with antibiotics.  -You had a PICC line placed today after consent was taken from your health care proxy as you need IV antibiotics on an outpatient basis  -You have been prescribed with 2 antibiotics, please continue to take them for another 10 days.  -Please follow up with your PMD in 1 week.  Secondary Diagnosis:	CLL (chronic lymphocytic leukemia)  Instructions for follow-up, activity and diet:	-Please continue with your home medications upon discharge  -Please follow up with your PMD in 1 week  Secondary Diagnosis:	PE (pulmonary thromboembolism)  Instructions for follow-up, activity and diet:	-Please continue with your home medications upon discharge  -Please follow up with your PMD in 1 week  Secondary Diagnosis:	Obstructive uropathy  Instructions for follow-up, activity and diet:	-Please continue with your home medications upon discharge  -Please follow up with your PMD in 1 week  Secondary Diagnosis:	Lower extremity edema  Instructions for follow-up, activity and diet:	-You were found to have bilateral lower extremity edema on exam. You had a duplex ultrasound of your lower extremities done which were negative for any clot.  -Please continue with your home medications upon discharge  -Please follow up with your PMD in 1 week. Principal Discharge DX:	Heel ulcer  Goal:	-No infection  Instructions for follow-up, activity and diet:	-You were sent in by your PMD for a heel ulcer on your right and left foot. You were admitted to the hospital and treated with antibiotics for your ulcers. Vascular surgery has been following you and decided that your ulcers do not need any surgical intervention and can be managed with antibiotics.  -You had a PICC line placed today after consent was taken from your health care proxy as you need IV antibiotics on an outpatient basis  -You have been prescribed with 2 antibiotics, please continue to take them for another 10 days.  -You need a vancomycin trough level tomorrow at 9 pm before your 10 pm dose. Please make sure you get the vancomycin trough done before you are given your night time dose.   -Please follow up with your PMD in 1 week.  Secondary Diagnosis:	CLL (chronic lymphocytic leukemia)  Instructions for follow-up, activity and diet:	-Please continue with your home medications upon discharge  -Please follow up with your PMD in 1 week  Secondary Diagnosis:	PE (pulmonary thromboembolism)  Instructions for follow-up, activity and diet:	-Please continue with your home medications upon discharge  -Please follow up with your PMD in 1 week  Secondary Diagnosis:	Obstructive uropathy  Instructions for follow-up, activity and diet:	-Please continue with your home medications upon discharge  -Please follow up with your PMD in 1 week  Secondary Diagnosis:	Lower extremity edema  Instructions for follow-up, activity and diet:	-You were found to have bilateral lower extremity edema on exam. You had a duplex ultrasound of your lower extremities done which were negative for any clot.  -Please continue with your home medications upon discharge  -Please follow up with your PMD in 1 week. Principal Discharge DX:	Heel ulcer  Goal:	-No infection  Instructions for follow-up, activity and diet:	-You were sent in by your PMD for a heel ulcer on your right and left foot. You were admitted to the hospital and treated with antibiotics for your ulcers. Vascular surgery has been following you and decided that your ulcers do not need any surgical intervention and can be managed with antibiotics.  -You had a PICC line placed today after consent was taken from your health care proxy as you need IV antibiotics on an outpatient basis  -You have been prescribed with 2 antibiotics, please continue to take them for another 10 days.  -You need a vancomycin trough level tomorrow at 9 pm before your 10 pm dose. Please make sure you get the vancomycin trough done before you are given your night time dose. When the level results, you may increase the vancomycin dose by 250mg if the trough is less than 15 micrograms/dL or you can decrease the dose by 250mg if the trough is above 20 micrograms/dL. Please continue to have your vancomycin level checked prior to the fourth dose after the previous trough. Please continue on vancomycin to complete your two week course.   -Please follow up with your PMD, Dr. Pérez in 1 week to have your antibiotics monitored and your infection monitored.  Secondary Diagnosis:	CLL (chronic lymphocytic leukemia)  Instructions for follow-up, activity and diet:	-Please continue with your home medications upon discharge  -Please follow up with your PMD in 1 week  Secondary Diagnosis:	PE (pulmonary thromboembolism)  Instructions for follow-up, activity and diet:	-Please continue with your home medications upon discharge  -Please follow up with your PMD in 1 week.  Secondary Diagnosis:	Obstructive uropathy  Instructions for follow-up, activity and diet:	-Please continue with your home medications upon discharge  -Please follow up with your PMD in 1 week  Secondary Diagnosis:	Lower extremity edema  Instructions for follow-up, activity and diet:	-You were found to have bilateral lower extremity edema on exam. You had a duplex ultrasound of your lower extremities done which were negative for any clot.  -Please continue with your home medications upon discharge  -Please follow up with your PMD in 1 week.

## 2017-08-14 NOTE — DISCHARGE NOTE ADULT - SECONDARY DIAGNOSIS.
CLL (chronic lymphocytic leukemia) PE (pulmonary thromboembolism) Obstructive uropathy Lower extremity edema

## 2017-08-14 NOTE — DISCHARGE NOTE ADULT - MEDICATION SUMMARY - MEDICATIONS TO STOP TAKING
I will STOP taking the medications listed below when I get home from the hospital:    Eliquis 5 mg oral tablet  -- 2 tab(s) by mouth 2 times a day I will STOP taking the medications listed below when I get home from the hospital:  None

## 2017-08-14 NOTE — PROGRESS NOTE ADULT - SUBJECTIVE AND OBJECTIVE BOX
Patient is a 77y old  Male who presents with a chief complaint of -Left and right heel wound (14 Aug 2017 07:58)      HPI:  76 yo male with history of CLL, BPH, insomnia, JARED 2/2 obstructive uropathy(developed after operation for fracture, resolved, on chronic rodriguez follows Dr. Leigh urology ),  recent right femoral neck fracture s/p hemiarthroplasty (may 2017) complicated by PE (on Xarelto) and pneumonia (July 2017), now presents from his PCP office due to left and right heel wound. Patient currently lives at home and has visiting nurse that noticed blister on patient's left heel as well as small wound on right heel. He reports the blister, started approx. 2 months ago, with serous discharge in the beginning and now resolved. It is also a/w mild redness and worsening SIMON. Patient denies fevers, chills, N/V/SOB or palpitations, hx of trauma. Denies lower extremities pain, tingling/numbness/sensory loss/ trauma or bug bite. He was recently d/pricila from [facility] s/p complicated course from PNA and PE. Denies smoking, drinking or IVDA.   In ED vitals were   Vital Signs Last 24 Hrs  T(C): 36.3 (11 Aug 2017 19:37), Max: 36.7 (11 Aug 2017 14:47)  T(F): 97.3 (11 Aug 2017 19:37), Max: 98.1 (11 Aug 2017 14:47)  HR: 85 (11 Aug 2017 19:37) (85 - 100)  BP: 148/78 (11 Aug 2017 19:37) (130/78 - 148/78)  BP(mean): --  RR: 16 (11 Aug 2017 19:37) (16 - 18)  SpO2: 98% (11 Aug 2017 19:37) (95% - 98%)    He received vancomycin and zosyn x1. Vascular svc was consulted for heel wound. (11 Aug 2017 20:30)    INTERVAL HPI/OVERNIGHT EVENTS:::on IV ab to cont  LLE wrapped. Vasc no further rx.      HEALTH ISSUES - PROBLEM Dx:  Lower extremity edema: Lower extremity edema  Memory loss: Memory loss  Obstructive uropathy: Obstructive uropathy  Nutrition, metabolism, and development symptoms: Nutrition, metabolism, and development symptoms  Prophylactic measure: Prophylactic measure  PE (pulmonary thromboembolism): PE (pulmonary thromboembolism)  CLL (chronic lymphocytic leukemia): CLL (chronic lymphocytic leukemia)  Heel ulcer: Heel ulcer  Cellulitis: Cellulitis          PAST MEDICAL & SURGICAL HISTORY:  Hip fracture requiring operative repair, right, sequela  PE (pulmonary thromboembolism)  CLL (chronic lymphocytic leukemia)  S/P hip hemiarthroplasty          Consultant NOTE  REVIEWED  (   )    REVIEW OF SYSTEMS:  [x] As per HPI  CONSTITUTIONAL: fatigue  RESPIRATORY: No cough, wheezing, chills or hemoptysis; No Shortness of Breath  CARDIOVASCULAR: No chest pain, palpitations, dizziness, or leg swelling  GASTROINTESTINAL: No abdominal or epigastric pain. No nausea, vomiting, or hematemesis; No diarrhea or constipation. No melena or hematochezia.  MUSCULOSKELETAL: weakness  LLE wrapped  PSYCH    awake, alert       [x] All others negative	  [ ] Unable to obtain          Vital Signs Last 24 Hrs  T(C): 36.8 (14 Aug 2017 15:46), Max: 36.8 (14 Aug 2017 15:46)  T(F): 98.2 (14 Aug 2017 15:46), Max: 98.2 (14 Aug 2017 15:46)  HR: 100 (14 Aug 2017 15:46) (93 - 100)  BP: 170/94 (14 Aug 2017 15:46) (129/88 - 170/94)  BP(mean): --  RR: 18 (14 Aug 2017 15:46) (17 - 18)  SpO2: 96% (14 Aug 2017 15:46) (94% - 96%)        08-13 @ 07:01 - 08-14 @ 07:00  --------------------------------------------------------  IN: 375 mL / OUT: 2250 mL / NET: -1875 mL    08-14 @ 07:01  -  08-14 @ 17:09  --------------------------------------------------------  IN: 0 mL / OUT: 1300 mL / NET: -1300 mL      PHYSICAL EXAMINATION:                                    (    )  NO CHANGE  Appearance: Normal	  HEENT:   Normal oral mucosa, PERRL, EOMI	  Neck: Supple, + JVD/ - JVD; Carotid Bruit   Cardiovascular: Normal S1 S2, No JVD, No murmurs,   Respiratory: Lungs clear to auscultation/Decreased Breath Sounds/No Rales, Rhonchi, Wheezing	  Gastrointestinal:  Soft, Non-tender, + BS	  Skin: LLE  wrapped  Extremities: no s/s of acute ischenia  Vascular: Peripheral pulses decr  Neurologic: Non-focal  Psychiatry: A & O x 3, Mood & affect appropriate    sodium chloride 0.9%. 1000 milliLiter(s) IV Continuous <Continuous>  apixaban 5 milliGRAM(s) Oral every 12 hours  tamsulosin 0.4 milliGRAM(s) Oral at bedtime  pantoprazole    Tablet 40 milliGRAM(s) Oral before breakfast  senna 2 Tablet(s) Oral at bedtime  lactobacillus acidophilus 1 Tablet(s) Oral daily  piperacillin/tazobactam IVPB.   IV Intermittent   vancomycin  IVPB 1250 milliGRAM(s) IV Intermittent every 12 hours  piperacillin/tazobactam IVPB. 3.375 Gram(s) IV Intermittent every 6 hours  ALBUTerol    90 MICROgram(s) HFA Inhaler 2 Puff(s) Inhalation every 6 hours PRN  finasteride 5 milliGRAM(s) Oral daily            CARDIAC MARKERS ( 14 Aug 2017 12:50 )  x     / x     / 53 U/L / x     / x                                12.6   13.4  )-----------( 177      ( 14 Aug 2017 06:56 )             38.7     08-14    140  |  102  |  17  ----------------------------<  109<H>  4.0   |  25  |  0.90    Ca    8.8      14 Aug 2017 06:56  Phos  3.5     08-14  Mg     2.0     08-14        CAPILLARY BLOOD GLUCOSE

## 2017-08-14 NOTE — PROGRESS NOTE ADULT - ASSESSMENT
ASSESSMENT/[id number]/o male pt c clinical lung atelectases, bilateral LE cellulitis, PE, CLL, high aspiration risk    1. O2 use 2LNC humidified at this time  2. Bronchodilators:  Atrovent/ albuterol q 4 – 6 hours as needed  3. Corticosteroids:off  4. ID/Antibiotics:Vancomycin/Zosyn  5. Cardiac/HTN:  6. GI: Rx/ prophylaxis c PPI/H2B  7. Heme: Rx/VT prophylaxis c SQH/SCD/AC pt on Eliquis lizbeth continue  8. Aspiration precautions at all times, engage speech and swallow, attempt incentive s[pirometry  Discussed with managing team

## 2017-08-14 NOTE — PROGRESS NOTE ADULT - PROBLEM SELECTOR PLAN 3
-Hx of PE s/p hip surgery  -c/w eliquis

## 2017-08-15 VITALS
HEART RATE: 108 BPM | TEMPERATURE: 98 F | DIASTOLIC BLOOD PRESSURE: 91 MMHG | OXYGEN SATURATION: 92 % | SYSTOLIC BLOOD PRESSURE: 165 MMHG | RESPIRATION RATE: 17 BRPM

## 2017-08-15 LAB
ANION GAP SERPL CALC-SCNC: 12 MMOL/L — SIGNIFICANT CHANGE UP (ref 5–17)
BUN SERPL-MCNC: 16 MG/DL — SIGNIFICANT CHANGE UP (ref 7–23)
CALCIUM SERPL-MCNC: 8.8 MG/DL — SIGNIFICANT CHANGE UP (ref 8.4–10.5)
CHLORIDE SERPL-SCNC: 101 MMOL/L — SIGNIFICANT CHANGE UP (ref 96–108)
CO2 SERPL-SCNC: 25 MMOL/L — SIGNIFICANT CHANGE UP (ref 22–31)
CREAT SERPL-MCNC: 0.9 MG/DL — SIGNIFICANT CHANGE UP (ref 0.5–1.3)
GLUCOSE SERPL-MCNC: 105 MG/DL — HIGH (ref 70–99)
HCT VFR BLD CALC: 36.9 % — LOW (ref 39–50)
HGB BLD-MCNC: 11.9 G/DL — LOW (ref 13–17)
MAGNESIUM SERPL-MCNC: 1.9 MG/DL — SIGNIFICANT CHANGE UP (ref 1.6–2.6)
MCHC RBC-ENTMCNC: 27.7 PG — SIGNIFICANT CHANGE UP (ref 27–34)
MCHC RBC-ENTMCNC: 32.2 G/DL — SIGNIFICANT CHANGE UP (ref 32–36)
MCV RBC AUTO: 85.8 FL — SIGNIFICANT CHANGE UP (ref 80–100)
PHOSPHATE SERPL-MCNC: 3.5 MG/DL — SIGNIFICANT CHANGE UP (ref 2.5–4.5)
PLATELET # BLD AUTO: 164 K/UL — SIGNIFICANT CHANGE UP (ref 150–400)
POTASSIUM SERPL-MCNC: 3.8 MMOL/L — SIGNIFICANT CHANGE UP (ref 3.5–5.3)
POTASSIUM SERPL-SCNC: 3.8 MMOL/L — SIGNIFICANT CHANGE UP (ref 3.5–5.3)
RBC # BLD: 4.3 M/UL — SIGNIFICANT CHANGE UP (ref 4.2–5.8)
RBC # FLD: 15.5 % — SIGNIFICANT CHANGE UP (ref 10.3–16.9)
SODIUM SERPL-SCNC: 138 MMOL/L — SIGNIFICANT CHANGE UP (ref 135–145)
WBC # BLD: 12 K/UL — HIGH (ref 3.8–10.5)
WBC # FLD AUTO: 12 K/UL — HIGH (ref 3.8–10.5)

## 2017-08-15 PROCEDURE — 71010: CPT | Mod: 26

## 2017-08-15 PROCEDURE — 76937 US GUIDE VASCULAR ACCESS: CPT | Mod: 26

## 2017-08-15 PROCEDURE — 99232 SBSQ HOSP IP/OBS MODERATE 35: CPT

## 2017-08-15 PROCEDURE — 36569 INSJ PICC 5 YR+ W/O IMAGING: CPT

## 2017-08-15 RX ORDER — VANCOMYCIN HCL 1 G
1 VIAL (EA) INTRAVENOUS
Qty: 0 | Refills: 0 | COMMUNITY
Start: 2017-08-15

## 2017-08-15 RX ORDER — SENNA PLUS 8.6 MG/1
2 TABLET ORAL
Qty: 0 | Refills: 0 | COMMUNITY
Start: 2017-08-15

## 2017-08-15 RX ORDER — PIPERACILLIN AND TAZOBACTAM 4; .5 G/20ML; G/20ML
0 INJECTION, POWDER, LYOPHILIZED, FOR SOLUTION INTRAVENOUS
Qty: 0 | Refills: 0 | COMMUNITY
Start: 2017-08-15

## 2017-08-15 RX ORDER — VANCOMYCIN HCL 1 G
1 VIAL (EA) INTRAVENOUS
Qty: 18 | Refills: 0 | OUTPATIENT
Start: 2017-08-15 | End: 2017-08-24

## 2017-08-15 RX ORDER — VANCOMYCIN HCL 1 G
1000 VIAL (EA) INTRAVENOUS EVERY 12 HOURS
Qty: 0 | Refills: 0 | Status: DISCONTINUED | OUTPATIENT
Start: 2017-08-15 | End: 2017-08-15

## 2017-08-15 RX ORDER — SODIUM CHLORIDE 9 MG/ML
10 INJECTION INTRAMUSCULAR; INTRAVENOUS; SUBCUTANEOUS
Qty: 0 | Refills: 0 | Status: DISCONTINUED | OUTPATIENT
Start: 2017-08-15 | End: 2017-08-15

## 2017-08-15 RX ORDER — SODIUM CHLORIDE 9 MG/ML
10 INJECTION INTRAMUSCULAR; INTRAVENOUS; SUBCUTANEOUS EVERY 12 HOURS
Qty: 0 | Refills: 0 | Status: DISCONTINUED | OUTPATIENT
Start: 2017-08-15 | End: 2017-08-15

## 2017-08-15 RX ORDER — SODIUM CHLORIDE 9 MG/ML
20 INJECTION INTRAMUSCULAR; INTRAVENOUS; SUBCUTANEOUS ONCE
Qty: 0 | Refills: 0 | Status: DISCONTINUED | OUTPATIENT
Start: 2017-08-15 | End: 2017-08-15

## 2017-08-15 RX ORDER — APIXABAN 2.5 MG/1
1 TABLET, FILM COATED ORAL
Qty: 0 | Refills: 0 | COMMUNITY
Start: 2017-08-15

## 2017-08-15 RX ADMIN — PANTOPRAZOLE SODIUM 40 MILLIGRAM(S): 20 TABLET, DELAYED RELEASE ORAL at 05:52

## 2017-08-15 RX ADMIN — APIXABAN 5 MILLIGRAM(S): 2.5 TABLET, FILM COATED ORAL at 05:52

## 2017-08-15 RX ADMIN — Medication 250 MILLIGRAM(S): at 11:50

## 2017-08-15 RX ADMIN — PIPERACILLIN AND TAZOBACTAM 200 GRAM(S): 4; .5 INJECTION, POWDER, LYOPHILIZED, FOR SOLUTION INTRAVENOUS at 05:52

## 2017-08-15 NOTE — PROCEDURE NOTE - NSINFORMCONSENT_GEN_A_CORE
hcp yudith barrazar, over phone/Benefits, risks, and possible complications of procedure explained to patient/caregiver who verbalized understanding and gave verbal consent.

## 2017-08-15 NOTE — PROGRESS NOTE ADULT - SUBJECTIVE AND OBJECTIVE BOX
Patient is a 77y old  Male who presents with a chief complaint of -Left and right heel wound (14 Aug 2017 07:58)      HPI:  78 yo male with history of CLL, BPH, insomnia, JARED 2/2 obstructive uropathy(developed after operation for fracture, resolved, on chronic rodriguez follows Dr. Leigh urology ),  recent right femoral neck fracture s/p hemiarthroplasty (may 2017) complicated by PE (on Xarelto) and pneumonia (July 2017), now presents from his PCP office due to left and right heel wound. Patient currently lives at home and has visiting nurse that noticed blister on patient's left heel as well as small wound on right heel. He reports the blister, started approx. 2 months ago, with serous discharge in the beginning and now resolved. It is also a/w mild redness and worsening SIMON. Patient denies fevers, chills, N/V/SOB or palpitations, hx of trauma. Denies lower extremities pain, tingling/numbness/sensory loss/ trauma or bug bite. He was recently d/pricila from [facility] s/p complicated course from PNA and PE. Denies smoking, drinking or IVDA.   In ED vitals were   Vital Signs Last 24 Hrs  T(C): 36.3 (11 Aug 2017 19:37), Max: 36.7 (11 Aug 2017 14:47)  T(F): 97.3 (11 Aug 2017 19:37), Max: 98.1 (11 Aug 2017 14:47)  HR: 85 (11 Aug 2017 19:37) (85 - 100)  BP: 148/78 (11 Aug 2017 19:37) (130/78 - 148/78)  BP(mean): --  RR: 16 (11 Aug 2017 19:37) (16 - 18)  SpO2: 98% (11 Aug 2017 19:37) (95% - 98%)    He received vancomycin and zosyn x1. Vascular svc was consulted for heel wound. (11 Aug 2017 20:30)    INTERVAL HPI/OVERNIGHT EVENTS:::    HEALTH ISSUES - PROBLEM Dx:  Lower extremity edema: Lower extremity edema  Memory loss: Memory loss  Obstructive uropathy: Obstructive uropathy  Nutrition, metabolism, and development symptoms: Nutrition, metabolism, and development symptoms  Prophylactic measure: Prophylactic measure  PE (pulmonary thromboembolism): PE (pulmonary thromboembolism)  CLL (chronic lymphocytic leukemia): CLL (chronic lymphocytic leukemia)  Heel ulcer: Heel ulcer  Cellulitis: Cellulitis          PAST MEDICAL & SURGICAL HISTORY:  Hip fracture requiring operative repair, right, sequela  PE (pulmonary thromboembolism)  CLL (chronic lymphocytic leukemia)  S/P hip hemiarthroplasty          Consultant NOTE  REVIEWED  (   )    REVIEW OF SYSTEMS:  [x] As per HPI  CONSTITUTIONAL: No fever, weight loss, or fatigue  RESPIRATORY: No cough, wheezing, chills or hemoptysis; No Shortness of Breath  CARDIOVASCULAR: No chest pain, palpitations, dizziness, or leg swelling  GASTROINTESTINAL: No abdominal or epigastric pain. No nausea, vomiting, or hematemesis; No diarrhea or constipation. No melena or hematochezia.  MUSCULOSKELETAL: No joint pain or swelling; No muscle, back, or extremity pain  PSYCH    awake, alert       [x] All others negative	  [ ] Unable to obtain          Vital Signs Last 24 Hrs  T(C): 36.6 (15 Aug 2017 09:31), Max: 37.1 (14 Aug 2017 17:40)  T(F): 97.9 (15 Aug 2017 09:31), Max: 98.7 (14 Aug 2017 17:40)  HR: 108 (15 Aug 2017 09:31) (88 - 108)  BP: 165/91 (15 Aug 2017 09:31) (149/77 - 170/94)  BP(mean): --  RR: 17 (15 Aug 2017 09:31) (17 - 18)  SpO2: 92% (15 Aug 2017 09:31) (92% - 96%)        08-14 @ 07:01  -  08-15 @ 07:00  --------------------------------------------------------  IN: 2000 mL / OUT: 4100 mL / NET: -2100 mL    08-15 @ 07:01  -  08-15 @ 11:54  --------------------------------------------------------  IN: 0 mL / OUT: 650 mL / NET: -650 mL      PHYSICAL EXAMINATION:                                    (    )  NO CHANGE  Appearance: Normal	  HEENT:   Normal oral mucosa, PERRL, EOMI	  Neck: Supple, + JVD/ - JVD; Carotid Bruit   Cardiovascular: Normal S1 S2, No JVD, No murmurs,   Respiratory: Lungs clear to auscultation/Decreased Breath Sounds/No Rales, Rhonchi, Wheezing	  Gastrointestinal:  Soft, Non-tender, + BS	  Skin: No rashes, No ecchymoses, No cyanosis  Extremities: Normal range of motion, No clubbing, cyanosis or edema  Vascular: Peripheral pulses palpable 2+ bilaterally  Neurologic: Non-focal  Psychiatry: A & O x 3, Mood & affect appropriate    sodium chloride 0.9%. 1000 milliLiter(s) IV Continuous <Continuous>  apixaban 5 milliGRAM(s) Oral every 12 hours  tamsulosin 0.4 milliGRAM(s) Oral at bedtime  pantoprazole    Tablet 40 milliGRAM(s) Oral before breakfast  senna 2 Tablet(s) Oral at bedtime  lactobacillus acidophilus 1 Tablet(s) Oral daily  piperacillin/tazobactam IVPB.   IV Intermittent   piperacillin/tazobactam IVPB. 3.375 Gram(s) IV Intermittent every 6 hours  ALBUTerol    90 MICROgram(s) HFA Inhaler 2 Puff(s) Inhalation every 6 hours PRN  finasteride 5 milliGRAM(s) Oral daily  vancomycin  IVPB 1000 milliGRAM(s) IV Intermittent every 12 hours  sodium chloride 0.9% lock flush 10 milliLiter(s) IV Push every 1 hour PRN  sodium chloride 0.9% lock flush 10 milliLiter(s) IV Push every 12 hours PRN  sodium chloride 0.9% lock flush 20 milliLiter(s) IV Push once            CARDIAC MARKERS ( 14 Aug 2017 12:50 )  x     / x     / 53 U/L / x     / x                                11.9   12.0  )-----------( 164      ( 15 Aug 2017 05:55 )             36.9     08-15    138  |  101  |  16  ----------------------------<  105<H>  3.8   |  25  |  0.90    Ca    8.8      15 Aug 2017 05:54  Phos  3.5     08-15  Mg     1.9     08-15        CAPILLARY BLOOD GLUCOSE Patient is a 77y old  Male who presents with a chief complaint of -Left and right heel wound (14 Aug 2017 07:58)      HPI:  78 yo male with history of CLL, BPH, insomnia, JARED 2/2 obstructive uropathy(developed after operation for fracture, resolved, on chronic rodriguez follows Dr. Leigh urology ),  recent right femoral neck fracture s/p hemiarthroplasty (may 2017) complicated by PE (on Xarelto) and pneumonia (July 2017), now presents from his PCP office due to left and right heel wound. Patient currently lives at home and has visiting nurse that noticed blister on patient's left heel as well as small wound on right heel. He reports the blister, started approx. 2 months ago, with serous discharge in the beginning and now resolved. It is also a/w mild redness and worsening SIMON. Patient denies fevers, chills, N/V/SOB or palpitations, hx of trauma. Denies lower extremities pain, tingling/numbness/sensory loss/ trauma or bug bite. He was recently d/pricila from [facility] s/p complicated course from PNA and PE. Denies smoking, drinking or IVDA.   In ED vitals were   Vital Signs Last 24 Hrs  T(C): 36.3 (11 Aug 2017 19:37), Max: 36.7 (11 Aug 2017 14:47)  T(F): 97.3 (11 Aug 2017 19:37), Max: 98.1 (11 Aug 2017 14:47)  HR: 85 (11 Aug 2017 19:37) (85 - 100)  BP: 148/78 (11 Aug 2017 19:37) (130/78 - 148/78)  BP(mean): --  RR: 16 (11 Aug 2017 19:37) (16 - 18)  SpO2: 98% (11 Aug 2017 19:37) (95% - 98%)    He received vancomycin and zosyn x1. Vascular svc was consulted for heel wound. (11 Aug 2017 20:30)    INTERVAL HPI/OVERNIGHT EVENTS:::cont IV ab, LWC.  CV stable  pain/anxiety controlled    HEALTH ISSUES - PROBLEM Dx:  Lower extremity edema: Lower extremity edema  Memory loss: Memory loss  Obstructive uropathy: Obstructive uropathy  Nutrition, metabolism, and development symptoms: Nutrition, metabolism, and development symptoms  Prophylactic measure: Prophylactic measure  PE (pulmonary thromboembolism): PE (pulmonary thromboembolism)  CLL (chronic lymphocytic leukemia): CLL (chronic lymphocytic leukemia)  Heel ulcer: Heel ulcer  Cellulitis: Cellulitis          PAST MEDICAL & SURGICAL HISTORY:  Hip fracture requiring operative repair, right, sequela  PE (pulmonary thromboembolism)  CLL (chronic lymphocytic leukemia)  S/P hip hemiarthroplasty          Consultant NOTE  REVIEWED  (   )    REVIEW OF SYSTEMS:  [x] As per HPI  CONSTITUTIONAL: No fever, weight loss, or fatigue  RESPIRATORY: No cough, wheezing, chills or hemoptysis; No Shortness of Breath  CARDIOVASCULAR: No chest pain, palpitations, dizziness, or leg swelling  GASTROINTESTINAL: No abdominal or epigastric pain. No nausea, vomiting, or hematemesis; No diarrhea or constipation. No melena or hematochezia.  MUSCULOSKELETAL: s/p hip fracture    PSYCH    awake, alert       [x] All others negative	  [ ] Unable to obtain          Vital Signs Last 24 Hrs  T(C): 36.6 (15 Aug 2017 09:31), Max: 37.1 (14 Aug 2017 17:40)  T(F): 97.9 (15 Aug 2017 09:31), Max: 98.7 (14 Aug 2017 17:40)  HR: 108 (15 Aug 2017 09:31) (88 - 108)  BP: 165/91 (15 Aug 2017 09:31) (149/77 - 170/94)  BP(mean): --  RR: 17 (15 Aug 2017 09:31) (17 - 18)  SpO2: 92% (15 Aug 2017 09:31) (92% - 96%)        08-14 @ 07:01  -  08-15 @ 07:00  --------------------------------------------------------  IN: 2000 mL / OUT: 4100 mL / NET: -2100 mL    08-15 @ 07:01  -  08-15 @ 11:54  --------------------------------------------------------  IN: 0 mL / OUT: 650 mL / NET: -650 mL      PHYSICAL EXAMINATION:                                    (    )  NO CHANGE  Appearance: Normal	  HEENT:   Normal oral mucosa, PERRL, EOMI	  Neck: Supple, + JVD/ - JVD; Carotid Bruit   Cardiovascular: Normal S1 S2, mu  Respiratory: Lungs clear to auscultation/Decreased Breath Sounds/No Rales, Rhonchi, Wheezing	  Gastrointestinal:  Soft, Non-tender, + BS	  Skin: No rashes, L med mall wrapped  Extremities: Normal range of motion, No clubbing, cyanosis or edema  Vascular: Peripheral pulses decr  Neurologic: Non-focal  Psychiatry: A & O x 3, Mood & affect appropriate    sodium chloride 0.9%. 1000 milliLiter(s) IV Continuous <Continuous>  apixaban 5 milliGRAM(s) Oral every 12 hours  tamsulosin 0.4 milliGRAM(s) Oral at bedtime  pantoprazole    Tablet 40 milliGRAM(s) Oral before breakfast  senna 2 Tablet(s) Oral at bedtime  lactobacillus acidophilus 1 Tablet(s) Oral daily  piperacillin/tazobactam IVPB.   IV Intermittent   piperacillin/tazobactam IVPB. 3.375 Gram(s) IV Intermittent every 6 hours  ALBUTerol    90 MICROgram(s) HFA Inhaler 2 Puff(s) Inhalation every 6 hours PRN  finasteride 5 milliGRAM(s) Oral daily  vancomycin  IVPB 1000 milliGRAM(s) IV Intermittent every 12 hours  sodium chloride 0.9% lock flush 10 milliLiter(s) IV Push every 1 hour PRN  sodium chloride 0.9% lock flush 10 milliLiter(s) IV Push every 12 hours PRN  sodium chloride 0.9% lock flush 20 milliLiter(s) IV Push once            CARDIAC MARKERS ( 14 Aug 2017 12:50 )  x     / x     / 53 U/L / x     / x                                11.9   12.0  )-----------( 164      ( 15 Aug 2017 05:55 )             36.9     08-15    138  |  101  |  16  ----------------------------<  105<H>  3.8   |  25  |  0.90    Ca    8.8      15 Aug 2017 05:54  Phos  3.5     08-15  Mg     1.9     08-15        CAPILLARY BLOOD GLUCOSE    Culture - Blood (08.11.17 @ 17:07)    Specimen Source: .Blood Blood    Culture Results:   No growth at 3 days.

## 2017-08-15 NOTE — PROGRESS NOTE ADULT - ATTENDING COMMENTS
LLE wound--LWC    cont IV ab 10--14 days needs PICC  will d/c to [facility] for PT/LWC.  as per resident taking benzo-- on his own.  NO rx for benzodiazepime/AMBIEN  d/c to [facility]  outpt f/u with MD at rehab.  CLL stable
LLE cont LWC  cont IV ab  CLL stable  NO NARCOTICS.  Nutrition  PT  vasc f/u PRN
LLE LWC  IV ab  supp rx  pain--monitor   keep rodriguez  CLL stable  may need [facility] for ab/LWC  NO AMBIEN  PT

## 2017-08-15 NOTE — PROGRESS NOTE ADULT - SUBJECTIVE AND OBJECTIVE BOX
Chief Complaint/Reason for Consult: CAD  INTERVAL HPI: no acute events noted overnight, for rehab  	  MEDICATIONS:  tamsulosin 0.4 milliGRAM(s) Oral at bedtime    piperacillin/tazobactam IVPB.   IV Intermittent   vancomycin  IVPB 1250 milliGRAM(s) IV Intermittent every 12 hours  piperacillin/tazobactam IVPB. 3.375 Gram(s) IV Intermittent every 6 hours    ALBUTerol    90 MICROgram(s) HFA Inhaler 2 Puff(s) Inhalation every 6 hours PRN      pantoprazole    Tablet 40 milliGRAM(s) Oral before breakfast  senna 2 Tablet(s) Oral at bedtime    finasteride 5 milliGRAM(s) Oral daily    sodium chloride 0.9%. 1000 milliLiter(s) IV Continuous <Continuous>  apixaban 5 milliGRAM(s) Oral every 12 hours      REVIEW OF SYSTEMS:  [x] As per HPI  CONSTITUTIONAL: No fever, weight loss, or fatigue  RESPIRATORY: No cough, wheezing, chills or hemoptysis; No Shortness of Breath  CARDIOVASCULAR: No chest pain, palpitations, dizziness, or leg swelling  GASTROINTESTINAL: No abdominal or epigastric pain. No nausea, vomiting, or hematemesis; No diarrhea or constipation. No melena or hematochezia.  MUSCULOSKELETAL: No joint pain or swelling; No muscle, back, or extremity pain  [x] All others negative	  [ ] Unable to obtain    PHYSICAL EXAM:  T(C): 36.8 (08-15-17 @ 05:29), Max: 37.1 (08-14-17 @ 17:40)  HR: 88 (08-15-17 @ 05:29) (88 - 100)  BP: 157/82 (08-15-17 @ 05:29) (129/88 - 170/94)  RR: 18 (08-15-17 @ 05:29) (17 - 18)  SpO2: 93% (08-15-17 @ 05:29) (93% - 96%)  Wt(kg): --  I&O's Summary    14 Aug 2017 07:01  -  15 Aug 2017 07:00  --------------------------------------------------------  IN: 2000 mL / OUT: 2450 mL / NET: -450 mL          Appearance: Normal	  HEENT:   Normal oral mucosa  Cardiovascular: Normal S1 S2, No JVD, No murmurs, No edema  Respiratory: Lungs clear to auscultation	  Gastrointestinal:  Soft, Non-tender, + BS	  Extremities: Normal range of motion, No clubbing, cyanosis or edema  Vascular: Peripheral pulses palpable 2+ bilaterally    TELEMETRY: 	    ECG:    	  RADIOLOGY:   CXR:  CT:  US:    CARDIAC TESTING:  Echocardiogram:  Catheterization:  Stress Test:      LABS:	 	    CARDIAC MARKERS:                                  11.9   12.0  )-----------( 164      ( 15 Aug 2017 05:55 )             36.9     08-15    138  |  101  |  16  ----------------------------<  105<H>  3.8   |  25  |  0.90    Ca    8.8      15 Aug 2017 05:54  Phos  3.5     08-15  Mg     1.9     08-15      proBNP: Serum Pro-Brain Natriuretic Peptide: 213 pg/mL (08-14 @ 12:50)    Lipid Profile:   HgA1c:   TSH:     ASSESSMENT/PLAN: 	   Problem: PE (pulmonary thromboembolism).  Plan: -Hx of PE s/p hip surgery  -c/w eliquis. no tachycardia no desaturation       Problem: Lower extremity edema.  Plan: -bilateral  -low probability cardiac in origin, likely third spacing with superimposed cellulitis.   -Duplex U/S negative for any DVT  - vascular following, appreciate recs  - Elevate leg.

## 2017-08-15 NOTE — PROGRESS NOTE ADULT - SUBJECTIVE AND OBJECTIVE BOX
CC/ HPI Patient is a 77y old male with history of pulmonary embolus following hip surgery, admitted with bilateral heel wounds, today without respiratory complaint.      PAST MEDICAL & SURGICAL HISTORY:  Hip fracture requiring operative repair, right, sequela  PE (pulmonary thromboembolism)  CLL (chronic lymphocytic leukemia)  S/P hip hemiarthroplasty    SOCHX:    -  alcohol    FMHX: FA/MO  - contributory     ROS reviewed below with positive findings marked (+) :  GEN:  fever, chills ENT: tracheostomy,   epistaxis,  sinusitis COR: CAD, CHF,  HTN, dysrhythmia PUL: COPD, ILD, asthma, pneumonia GI: PEG, dysphagia, hemorrhage, other HIMA: kidney disease, electrolyte disorder HEM:  anemia, +thrombus, coagulopathy, cancer ENDO:  thyroid disease, diabetes mellitus CNS:  dementia, stroke, seizure, PSY:  depression, anxiety, other      MEDICATIONS  (STANDING):  sodium chloride 0.9%. 1000 milliLiter(s) (125 mL/Hr) IV Continuous <Continuous>  apixaban 5 milliGRAM(s) Oral every 12 hours  tamsulosin 0.4 milliGRAM(s) Oral at bedtime  pantoprazole    Tablet 40 milliGRAM(s) Oral before breakfast  senna 2 Tablet(s) Oral at bedtime  lactobacillus acidophilus 1 Tablet(s) Oral daily  piperacillin/tazobactam IVPB.   IV Intermittent   vancomycin  IVPB 1250 milliGRAM(s) IV Intermittent every 12 hours  piperacillin/tazobactam IVPB. 3.375 Gram(s) IV Intermittent every 6 hours  finasteride 5 milliGRAM(s) Oral daily    MEDICATIONS  (PRN):  ALBUTerol    90 MICROgram(s) HFA Inhaler 2 Puff(s) Inhalation every 6 hours PRN Shortness of Breath      Vital Signs Last 24 Hrs  T(C): 36.8 (15 Aug 2017 05:29), Max: 37.1 (14 Aug 2017 17:40)  T(F): 98.3 (15 Aug 2017 05:29), Max: 98.7 (14 Aug 2017 17:40)  HR: 88 (15 Aug 2017 05:29) (88 - 100)  BP: 157/82 (15 Aug 2017 05:29) (129/88 - 170/94)  RR: 18 (15 Aug 2017 05:29) (17 - 18)  SpO2: 93% (15 Aug 2017 05:29) (93% - 96%)    GENERAL:         comfortable,  - distress.  HEENT:            - trauma,  - icterus,  - injection,  - nasal discharge.  NECK:              - jugular venous distention, - thyromegaly.  LYMPH:           - lymphadenopathy, - masses.  RESP:              + clear,   - rales,   - rhonchi,   - wheezes.   COR:                S1S2   - gallops,  - rubs.  ABD:                bowel sounds,   soft, - tender, - distended, - organomegaly.  EXT/MSC:         - cyanosis,  - clubbing,  - edema.    NEURO:             alert,   responds to stimuli.                        11.9   12.0  )-----------( 164      ( 15 Aug 2017 05:55 )             36.9         ASSESSMENT/PLAN    1)  Heel wounds  2)  Pulmonary embolus  3)  CLL    Bronchodilators:  Albuterol q 4 -6 hours as needed  ID/Antibiotics: status post Vanco/Zosyn  Cardiac/HTN:  BP stable  GI: Rx/ prophylaxis c PPI/H2B  Heme: Rx/VT  on AC  Discuss with medical team

## 2017-08-15 NOTE — CONSULT NOTE ADULT - SUBJECTIVE AND OBJECTIVE BOX
Vascular Access Service Consult Note    77yMaleHEALTH ISSUES - PROBLEM Dx:  Lower extremity edema: Lower extremity edema  Memory loss: Memory loss  Obstructive uropathy: Obstructive uropathy  Nutrition, metabolism, and development symptoms: Nutrition, metabolism, and development symptoms  Prophylactic measure: Prophylactic measure  PE (pulmonary thromboembolism): PE (pulmonary thromboembolism)  CLL (chronic lymphocytic leukemia): CLL (chronic lymphocytic leukemia)  Heel ulcer: Heel ulcer  Cellulitis: Cellulitis             Diagnosis:cellulitis     Indications for Vascular Access (Check all that apply)  [ X ]  Antibiotic Therapy       Antibiotic Prescribed: vanc/zosyn                                                                            Expected Duration of Therapy:  2 weeks             [  ]  IV Hydration  [  ]  Total Parenteral Nutrition  [  ]  Chemotherapy  [  ]  Difficult Venous Access  [  ]  CVP monitoring  [  ]  Medications with high potential for tissue necrosis on extravasation  [  ]  Other    Screening (Check all that apply)  Previous Radiation to chest  [  ] Yes      [X  ]  No  Breast Cancer                          [  ] Left     [  ]  Right    [ X ]  No  Lymph Node Dissection         [  ] Left     [  ]  Right    [ X ]  No  Pacemaker or ICD                   [  ] Left     [  ]  Right    [X  ]  No  Upper Extremity DVT             [  ] Left     [  ]  Right    [X  ]  No  Chronic Kidney Disease         [  ]  Yes     [ X ]  No  Hemodialysis                           [  ]  Yes     [ X ]  No  AV Fistula/ Graft                     [  ]  Left    [  ]  Right    [  X]  No  Temp>101F in past 24 H       [  ]  Yes     [ X ]  No  H/O PICC/Midline                   [  ]  Yes     [ X ]  No    Lab data:                        11.9   12.0  )-----------( 164      ( 15 Aug 2017 05:55 )             36.9     08-15    138  |  101  |  16  ----------------------------<  105<H>  3.8   |  25  |  0.90    Ca    8.8      15 Aug 2017 05:54  Phos  3.5     08-15  Mg     1.9     08-15                I have reviewed the chart, interviewed and examined the patient and determined that this patient:  [X  ] Is a candidate for a PICC line  [  ] Is a candidate for a Midline  [  ] Is not a candidate for vascular access device (reason)    Lumens:    [X  ] Single  [  ] Double

## 2017-08-16 ENCOUNTER — EMERGENCY (EMERGENCY)
Facility: HOSPITAL | Age: 77
LOS: 1 days | Discharge: HOME | End: 2017-08-16
Attending: EMERGENCY MEDICINE | Admitting: EMERGENCY MEDICINE
Payer: MEDICARE

## 2017-08-16 VITALS
DIASTOLIC BLOOD PRESSURE: 82 MMHG | OXYGEN SATURATION: 95 % | TEMPERATURE: 98 F | RESPIRATION RATE: 18 BRPM | SYSTOLIC BLOOD PRESSURE: 165 MMHG | HEART RATE: 98 BPM

## 2017-08-16 VITALS
HEART RATE: 106 BPM | RESPIRATION RATE: 18 BRPM | OXYGEN SATURATION: 95 % | TEMPERATURE: 98 F | SYSTOLIC BLOOD PRESSURE: 149 MMHG | DIASTOLIC BLOOD PRESSURE: 80 MMHG

## 2017-08-16 DIAGNOSIS — Z79.899 OTHER LONG TERM (CURRENT) DRUG THERAPY: ICD-10-CM

## 2017-08-16 DIAGNOSIS — T82.898A OTHER SPECIFIED COMPLICATION OF VASCULAR PROSTHETIC DEVICES, IMPLANTS AND GRAFTS, INITIAL ENCOUNTER: ICD-10-CM

## 2017-08-16 DIAGNOSIS — Z96.649 PRESENCE OF UNSPECIFIED ARTIFICIAL HIP JOINT: Chronic | ICD-10-CM

## 2017-08-16 DIAGNOSIS — L89.613 PRESSURE ULCER OF RIGHT HEEL, STAGE 3: ICD-10-CM

## 2017-08-16 DIAGNOSIS — Z79.2 LONG TERM (CURRENT) USE OF ANTIBIOTICS: ICD-10-CM

## 2017-08-16 LAB
CULTURE RESULTS: SIGNIFICANT CHANGE UP
CULTURE RESULTS: SIGNIFICANT CHANGE UP
SPECIMEN SOURCE: SIGNIFICANT CHANGE UP
SPECIMEN SOURCE: SIGNIFICANT CHANGE UP

## 2017-08-16 PROCEDURE — 99284 EMERGENCY DEPT VISIT MOD MDM: CPT

## 2017-08-16 RX ORDER — PIPERACILLIN AND TAZOBACTAM 4; .5 G/20ML; G/20ML
3.38 INJECTION, POWDER, LYOPHILIZED, FOR SOLUTION INTRAVENOUS ONCE
Qty: 0 | Refills: 0 | Status: COMPLETED | OUTPATIENT
Start: 2017-08-16 | End: 2017-08-16

## 2017-08-16 RX ORDER — ALTEPLASE 100 MG
2 KIT INTRAVENOUS ONCE
Qty: 0 | Refills: 0 | Status: COMPLETED | OUTPATIENT
Start: 2017-08-16 | End: 2017-08-16

## 2017-08-16 RX ADMIN — PIPERACILLIN AND TAZOBACTAM 200 GRAM(S): 4; .5 INJECTION, POWDER, LYOPHILIZED, FOR SOLUTION INTRAVENOUS at 11:58

## 2017-08-16 RX ADMIN — ALTEPLASE 2 MILLIGRAM(S): KIT at 12:28

## 2017-08-16 NOTE — ED PROVIDER NOTE - MEDICAL DECISION MAKING DETAILS
Discussed case with IR, injected 2mg (2mL) alteplase into PICC, left in PICC for 5min, was able to easily draw back 2mL, discard alteplase, and flush w/sterile NS w/out resistance. PICC now functional, received scheduled dose of zosyn in ED. No other complaints at this time, discharged back to rehab facility.

## 2017-08-16 NOTE — ED PROVIDER NOTE - PHYSICAL EXAMINATION
VITAL SIGNS: I have reviewed nursing notes and confirm.  CONSTITUTIONAL: Chronically ill appearing man lying in stretcher speaking clearly in complete sentences moving all ext, A&Ox3; in no acute distress.  SKIN: Agree with RN documentation regarding decubitus evaluation. + non-purulent stage 3 foot ulcer R heel w/surrounding erythema, no crepitus  HEAD: Normocephalic; atraumatic.  EYES: PERRL, EOM intact; conjunctiva and sclera clear.  ENT: No nasal discharge; airway clear.  NECK: Supple; non tender.  CARD: S1, S2 normal; no murmurs, gallops, or rubs. Regular rate and rhythm.  RESP: No wheezes, rales or rhonchi.  ABD: Normal bowel sounds; soft; non-distended; non-tender; no hepatosplenomegaly.  EXT: Normal ROM. No clubbing, cyanosis or edema. + PICC RUE no surrounding erythema or discharge  LYMPH: No acute cervical adenopathy.  NEURO: Alert, oriented. Grossly unremarkable.  PSYCH: Cooperative, appropriate.

## 2017-08-16 NOTE — ED PROVIDER NOTE - OBJECTIVE STATEMENT
78 y/o M DM w/foot infection ?osteo on zosyn/vanc coming from rehab facility for clogged PICC in [facility], placed yesterday at [facility] by IR. Did not receive morning dose of zosyn. No fever/chills/pain of any kind. States he is receiving daily dressing changes of ulcer.

## 2017-08-16 NOTE — ED ADULT TRIAGE NOTE - CHIEF COMPLAINT QUOTE
BIBA from [facility] rehab for new PICC line. Patient RUE PICC line is clogged. Denies any fever/chills, N/V/D, dizziness.

## 2017-08-16 NOTE — ED ADULT NURSE NOTE - OBJECTIVE STATEMENT
Pt presents to ED from [place], for replacement of R upper PICC placed yesterday- clogged per report. IV access established for usual dose of antibiotics for foot ulcer. Awaiting IR.

## 2017-08-16 NOTE — ED ADULT NURSE NOTE - CHIEF COMPLAINT QUOTE
BIBA from [facility] rehab for new PICC line. Patient RUE PICC line is clogged. Denies any fever/chills, N/V/D, dizziness.

## 2017-08-17 ENCOUNTER — INPATIENT (INPATIENT)
Facility: HOSPITAL | Age: 77
LOS: 4 days | Discharge: NURSING, REHAB OR LONG-TERM CARE | DRG: 698 | End: 2017-08-22
Attending: INTERNAL MEDICINE | Admitting: INTERNAL MEDICINE
Payer: MEDICARE

## 2017-08-17 VITALS
RESPIRATION RATE: 16 BRPM | DIASTOLIC BLOOD PRESSURE: 126 MMHG | SYSTOLIC BLOOD PRESSURE: 184 MMHG | TEMPERATURE: 98 F | HEART RATE: 113 BPM | OXYGEN SATURATION: 96 %

## 2017-08-17 DIAGNOSIS — J98.11 ATELECTASIS: ICD-10-CM

## 2017-08-17 DIAGNOSIS — I25.10 ATHEROSCLEROTIC HEART DISEASE OF NATIVE CORONARY ARTERY WITHOUT ANGINA PECTORIS: ICD-10-CM

## 2017-08-17 DIAGNOSIS — R41.3 OTHER AMNESIA: ICD-10-CM

## 2017-08-17 DIAGNOSIS — L03.116 CELLULITIS OF LEFT LOWER LIMB: ICD-10-CM

## 2017-08-17 DIAGNOSIS — N17.9 ACUTE KIDNEY FAILURE, UNSPECIFIED: ICD-10-CM

## 2017-08-17 DIAGNOSIS — S90.922A UNSPECIFIED SUPERFICIAL INJURY OF LEFT FOOT, INITIAL ENCOUNTER: ICD-10-CM

## 2017-08-17 DIAGNOSIS — S72.001S FRACTURE OF UNSPECIFIED PART OF NECK OF RIGHT FEMUR, SEQUELA: ICD-10-CM

## 2017-08-17 DIAGNOSIS — G47.00 INSOMNIA, UNSPECIFIED: ICD-10-CM

## 2017-08-17 DIAGNOSIS — Z96.649 PRESENCE OF UNSPECIFIED ARTIFICIAL HIP JOINT: Chronic | ICD-10-CM

## 2017-08-17 DIAGNOSIS — N40.1 BENIGN PROSTATIC HYPERPLASIA WITH LOWER URINARY TRACT SYMPTOMS: ICD-10-CM

## 2017-08-17 DIAGNOSIS — C91.10 CHRONIC LYMPHOCYTIC LEUKEMIA OF B-CELL TYPE NOT HAVING ACHIEVED REMISSION: ICD-10-CM

## 2017-08-17 DIAGNOSIS — Z79.01 LONG TERM (CURRENT) USE OF ANTICOAGULANTS: ICD-10-CM

## 2017-08-17 DIAGNOSIS — Z86.711 PERSONAL HISTORY OF PULMONARY EMBOLISM: ICD-10-CM

## 2017-08-17 DIAGNOSIS — N13.8 OTHER OBSTRUCTIVE AND REFLUX UROPATHY: ICD-10-CM

## 2017-08-17 DIAGNOSIS — L97.429 NON-PRESSURE CHRONIC ULCER OF LEFT HEEL AND MIDFOOT WITH UNSPECIFIED SEVERITY: ICD-10-CM

## 2017-08-17 LAB — LACTATE SERPL-SCNC: 1.2 MMOL/L — SIGNIFICANT CHANGE UP (ref 0.5–2)

## 2017-08-17 PROCEDURE — 93010 ELECTROCARDIOGRAM REPORT: CPT

## 2017-08-17 PROCEDURE — 99285 EMERGENCY DEPT VISIT HI MDM: CPT | Mod: 25

## 2017-08-17 PROCEDURE — 71010: CPT | Mod: 26

## 2017-08-17 RX ORDER — SODIUM CHLORIDE 9 MG/ML
1000 INJECTION INTRAMUSCULAR; INTRAVENOUS; SUBCUTANEOUS ONCE
Qty: 0 | Refills: 0 | Status: COMPLETED | OUTPATIENT
Start: 2017-08-17 | End: 2017-08-17

## 2017-08-17 RX ORDER — AMLODIPINE BESYLATE 2.5 MG/1
5 TABLET ORAL ONCE
Qty: 0 | Refills: 0 | Status: COMPLETED | OUTPATIENT
Start: 2017-08-17 | End: 2017-08-17

## 2017-08-17 RX ORDER — ACETAMINOPHEN 500 MG
650 TABLET ORAL ONCE
Qty: 0 | Refills: 0 | Status: COMPLETED | OUTPATIENT
Start: 2017-08-17 | End: 2017-08-17

## 2017-08-17 RX ORDER — PIPERACILLIN AND TAZOBACTAM 4; .5 G/20ML; G/20ML
3.38 INJECTION, POWDER, LYOPHILIZED, FOR SOLUTION INTRAVENOUS ONCE
Qty: 0 | Refills: 0 | Status: COMPLETED | OUTPATIENT
Start: 2017-08-17 | End: 2017-08-17

## 2017-08-17 RX ORDER — VANCOMYCIN HCL 1 G
1000 VIAL (EA) INTRAVENOUS ONCE
Qty: 0 | Refills: 0 | Status: COMPLETED | OUTPATIENT
Start: 2017-08-17 | End: 2017-08-17

## 2017-08-17 RX ADMIN — SODIUM CHLORIDE 1000 MILLILITER(S): 9 INJECTION INTRAMUSCULAR; INTRAVENOUS; SUBCUTANEOUS at 20:00

## 2017-08-17 RX ADMIN — PIPERACILLIN AND TAZOBACTAM 200 GRAM(S): 4; .5 INJECTION, POWDER, LYOPHILIZED, FOR SOLUTION INTRAVENOUS at 21:30

## 2017-08-17 RX ADMIN — Medication 650 MILLIGRAM(S): at 20:56

## 2017-08-17 RX ADMIN — Medication 250 MILLIGRAM(S): at 22:40

## 2017-08-17 RX ADMIN — AMLODIPINE BESYLATE 5 MILLIGRAM(S): 2.5 TABLET ORAL at 21:47

## 2017-08-17 NOTE — ED ADULT NURSE NOTE - OBJECTIVE STATEMENT
76 y/o male sent from nursing home w/ hx of PE, CLL, and right hip fracture c/o AMS and fever. Patient afebrile orally, 100 rectal. Patient A+Ox3, non ambulatory at baseline, blanchable redness noted to sacrum. Patient denying pain or discomfort at this time. Patient tachycardic to 110 on arrival. 76 y/o male sent from nursing home w/ hx of PE, CLL, and right hip fracture c/o AMS and fever. Patient afebrile orally, 100 rectal. Patient A+Ox3, non ambulatory at baseline, blanchable redness noted to sacrum. Patient denying pain or discomfort at this time. Patient tachycardic to 110 on arrival

## 2017-08-17 NOTE — ED PROVIDER NOTE - OBJECTIVE STATEMENT
77y male h/o CLL, recent R hip fracture complicated by PE, b/l heel infections (?osteo) on zosyn/vanc via PICC (pulled out PICC) coming from rehab facility for fever and AMS. Seen in ED yesterday for PICC problem, replaced by IR. Pt denies cough, shortness of breath, chest pain, V/D, abd pain. Chronic indwelling Rodriguez.

## 2017-08-17 NOTE — ED PROVIDER NOTE - MEDICAL DECISION MAKING DETAILS
77y male with fever and AMS, being treated with zosyn/vanc for heel ulcers. Febrile to 100. Sepsis w/u initiated. 77y male with fever and AMS, being treated with zosyn/vanc for heel ulcers. Febrile to 100, hypertensive. Sepsis w/u initiated. WBC 17, lactate elevated. UA cloudy, +LE. CXR without obvious infiltrate. Cultures pending. Repeat lactate ordered s/p 2L IVF. Abx ordered. D/w Dr Elizondo (covering for Dr Pérez) - will admit for UTI. 77y male with fever and AMS, being treated with zosyn/vanc for heel ulcers. Febrile to 100, initially hypertensive. Sepsis w/u initiated. WBC 17, lactate elevated, cleared after IVF. Rodriguez changed in ED - UA cloudy, +LE. CXR without obvious infiltrate. Cultures pending. Abx ordered. D/w Dr Elizondo (covering for Dr Pérez) - will admit for fever, UTI, f/u blood cultures, IR for PICC.

## 2017-08-17 NOTE — ED ADULT TRIAGE NOTE - CHIEF COMPLAINT QUOTE
Patient sent in from NH for AMS & Fever.  Afebrile on triage, and A+OX3 on triage.  Denies any complaints.  Denies cough or burning on urination.  EMS states patient recently had PICC line to R upper arm which he pulled out.

## 2017-08-17 NOTE — ED ADULT NURSE REASSESSMENT NOTE - NS ED NURSE REASSESS COMMENT FT1
pt presents with rodriguez from nursing home. rodriguez removed and new rodriguez reinserted with no resistance. urine sample light pink, 50ml drained. sample sent to lab.

## 2017-08-17 NOTE — ED PROVIDER NOTE - CONSTITUTIONAL, MLM
normal... awake, alert, oriented to person, place, time/situation and in no apparent distress. babbling, but easily redirected. follows commands. awake, alert, oriented to person, place, time/situation and in no apparent distress. babbling, talking to self, but follows commands.

## 2017-08-18 DIAGNOSIS — C91.10 CHRONIC LYMPHOCYTIC LEUKEMIA OF B-CELL TYPE NOT HAVING ACHIEVED REMISSION: ICD-10-CM

## 2017-08-18 DIAGNOSIS — L97.409 NON-PRESSURE CHRONIC ULCER OF UNSPECIFIED HEEL AND MIDFOOT WITH UNSPECIFIED SEVERITY: ICD-10-CM

## 2017-08-18 DIAGNOSIS — A41.9 SEPSIS, UNSPECIFIED ORGANISM: ICD-10-CM

## 2017-08-18 DIAGNOSIS — S72.001S FRACTURE OF UNSPECIFIED PART OF NECK OF RIGHT FEMUR, SEQUELA: ICD-10-CM

## 2017-08-18 DIAGNOSIS — R41.82 ALTERED MENTAL STATUS, UNSPECIFIED: ICD-10-CM

## 2017-08-18 DIAGNOSIS — N39.0 URINARY TRACT INFECTION, SITE NOT SPECIFIED: ICD-10-CM

## 2017-08-18 DIAGNOSIS — Z29.9 ENCOUNTER FOR PROPHYLACTIC MEASURES, UNSPECIFIED: ICD-10-CM

## 2017-08-18 DIAGNOSIS — I26.99 OTHER PULMONARY EMBOLISM WITHOUT ACUTE COR PULMONALE: ICD-10-CM

## 2017-08-18 DIAGNOSIS — R63.8 OTHER SYMPTOMS AND SIGNS CONCERNING FOOD AND FLUID INTAKE: ICD-10-CM

## 2017-08-18 LAB
ALBUMIN SERPL ELPH-MCNC: 3.4 G/DL — SIGNIFICANT CHANGE UP (ref 3.3–5)
ALBUMIN SERPL ELPH-MCNC: 3.6 G/DL — SIGNIFICANT CHANGE UP (ref 3.3–5)
ALP SERPL-CCNC: 75 U/L — SIGNIFICANT CHANGE UP (ref 40–120)
ALP SERPL-CCNC: 85 U/L — SIGNIFICANT CHANGE UP (ref 40–120)
ALT FLD-CCNC: 17 U/L — SIGNIFICANT CHANGE UP (ref 10–45)
ALT FLD-CCNC: 18 U/L — SIGNIFICANT CHANGE UP (ref 10–45)
ANION GAP SERPL CALC-SCNC: 13 MMOL/L — SIGNIFICANT CHANGE UP (ref 5–17)
ANION GAP SERPL CALC-SCNC: 19 MMOL/L — HIGH (ref 5–17)
APTT BLD: 164.3 SEC — CRITICAL HIGH (ref 27.5–37.4)
APTT BLD: 28.9 SEC — SIGNIFICANT CHANGE UP (ref 27.5–37.4)
APTT BLD: 29 SEC — SIGNIFICANT CHANGE UP (ref 27.5–37.4)
APTT BLD: 73.8 SEC — HIGH (ref 27.5–37.4)
AST SERPL-CCNC: 29 U/L — SIGNIFICANT CHANGE UP (ref 10–40)
AST SERPL-CCNC: 32 U/L — SIGNIFICANT CHANGE UP (ref 10–40)
BASOPHILS NFR BLD AUTO: 1 % — SIGNIFICANT CHANGE UP (ref 0–2)
BILIRUB SERPL-MCNC: 0.5 MG/DL — SIGNIFICANT CHANGE UP (ref 0.2–1.2)
BILIRUB SERPL-MCNC: 0.6 MG/DL — SIGNIFICANT CHANGE UP (ref 0.2–1.2)
BUN SERPL-MCNC: 20 MG/DL — SIGNIFICANT CHANGE UP (ref 7–23)
BUN SERPL-MCNC: 23 MG/DL — SIGNIFICANT CHANGE UP (ref 7–23)
CALCIUM SERPL-MCNC: 8.9 MG/DL — SIGNIFICANT CHANGE UP (ref 8.4–10.5)
CALCIUM SERPL-MCNC: 9.4 MG/DL — SIGNIFICANT CHANGE UP (ref 8.4–10.5)
CHLORIDE SERPL-SCNC: 102 MMOL/L — SIGNIFICANT CHANGE UP (ref 96–108)
CHLORIDE SERPL-SCNC: 103 MMOL/L — SIGNIFICANT CHANGE UP (ref 96–108)
CK MB CFR SERPL CALC: 6.7 NG/ML — SIGNIFICANT CHANGE UP (ref 0–6.7)
CK SERPL-CCNC: 417 U/L — HIGH (ref 30–200)
CO2 SERPL-SCNC: 20 MMOL/L — LOW (ref 22–31)
CO2 SERPL-SCNC: 24 MMOL/L — SIGNIFICANT CHANGE UP (ref 22–31)
CREAT SERPL-MCNC: 1 MG/DL — SIGNIFICANT CHANGE UP (ref 0.5–1.3)
CREAT SERPL-MCNC: 1.1 MG/DL — SIGNIFICANT CHANGE UP (ref 0.5–1.3)
CRP SERPL-MCNC: 0.3 MG/DL — SIGNIFICANT CHANGE UP (ref 0–0.4)
EOSINOPHIL NFR BLD AUTO: 1 % — SIGNIFICANT CHANGE UP (ref 0–6)
ERYTHROCYTE [SEDIMENTATION RATE] IN BLOOD: 20 MM/HR — SIGNIFICANT CHANGE UP
GLUCOSE SERPL-MCNC: 116 MG/DL — HIGH (ref 70–99)
GLUCOSE SERPL-MCNC: 123 MG/DL — HIGH (ref 70–99)
HCT VFR BLD CALC: 39.2 % — SIGNIFICANT CHANGE UP (ref 39–50)
HCT VFR BLD CALC: 39.3 % — SIGNIFICANT CHANGE UP (ref 39–50)
HGB BLD-MCNC: 12.5 G/DL — LOW (ref 13–17)
HGB BLD-MCNC: 13 G/DL — SIGNIFICANT CHANGE UP (ref 13–17)
HIV 1+2 AB+HIV1 P24 AG SERPL QL IA: SIGNIFICANT CHANGE UP
INR BLD: 1.33 — HIGH (ref 0.88–1.16)
LACTATE SERPL-SCNC: 1.6 MMOL/L — SIGNIFICANT CHANGE UP (ref 0.5–2)
LACTATE SERPL-SCNC: 3.7 MMOL/L — HIGH (ref 0.5–2)
LYMPHOCYTES # BLD AUTO: 51 % — HIGH (ref 13–44)
MAGNESIUM SERPL-MCNC: 2 MG/DL — SIGNIFICANT CHANGE UP (ref 1.6–2.6)
MAGNESIUM SERPL-MCNC: 2.3 MG/DL — SIGNIFICANT CHANGE UP (ref 1.6–2.6)
MCHC RBC-ENTMCNC: 27.6 PG — SIGNIFICANT CHANGE UP (ref 27–34)
MCHC RBC-ENTMCNC: 28.5 PG — SIGNIFICANT CHANGE UP (ref 27–34)
MCHC RBC-ENTMCNC: 31.8 G/DL — LOW (ref 32–36)
MCHC RBC-ENTMCNC: 33.2 G/DL — SIGNIFICANT CHANGE UP (ref 32–36)
MCV RBC AUTO: 86 FL — SIGNIFICANT CHANGE UP (ref 80–100)
MCV RBC AUTO: 86.8 FL — SIGNIFICANT CHANGE UP (ref 80–100)
MONOCYTES NFR BLD AUTO: 5 % — SIGNIFICANT CHANGE UP (ref 2–14)
NEUTROPHILS NFR BLD AUTO: 42 % — LOW (ref 43–77)
PCP SPEC-MCNC: SIGNIFICANT CHANGE UP
PHOSPHATE SERPL-MCNC: 3.2 MG/DL — SIGNIFICANT CHANGE UP (ref 2.5–4.5)
PHOSPHATE SERPL-MCNC: 3.9 MG/DL — SIGNIFICANT CHANGE UP (ref 2.5–4.5)
PLATELET # BLD AUTO: 190 K/UL — SIGNIFICANT CHANGE UP (ref 150–400)
PLATELET # BLD AUTO: 194 K/UL — SIGNIFICANT CHANGE UP (ref 150–400)
POTASSIUM SERPL-MCNC: 3.2 MMOL/L — LOW (ref 3.5–5.3)
POTASSIUM SERPL-MCNC: 3.5 MMOL/L — SIGNIFICANT CHANGE UP (ref 3.5–5.3)
POTASSIUM SERPL-SCNC: 3.2 MMOL/L — LOW (ref 3.5–5.3)
POTASSIUM SERPL-SCNC: 3.5 MMOL/L — SIGNIFICANT CHANGE UP (ref 3.5–5.3)
PROT SERPL-MCNC: 6.1 G/DL — SIGNIFICANT CHANGE UP (ref 6–8.3)
PROT SERPL-MCNC: 6.5 G/DL — SIGNIFICANT CHANGE UP (ref 6–8.3)
PROTHROM AB SERPL-ACNC: 14.9 SEC — HIGH (ref 9.8–12.7)
RBC # BLD: 4.53 M/UL — SIGNIFICANT CHANGE UP (ref 4.2–5.8)
RBC # BLD: 4.56 M/UL — SIGNIFICANT CHANGE UP (ref 4.2–5.8)
RBC # FLD: 15.8 % — SIGNIFICANT CHANGE UP (ref 10.3–16.9)
RBC # FLD: 15.9 % — SIGNIFICANT CHANGE UP (ref 10.3–16.9)
SODIUM SERPL-SCNC: 139 MMOL/L — SIGNIFICANT CHANGE UP (ref 135–145)
SODIUM SERPL-SCNC: 142 MMOL/L — SIGNIFICANT CHANGE UP (ref 135–145)
TROPONIN T SERPL-MCNC: 0.03 NG/ML — HIGH (ref 0–0.01)
WBC # BLD: 17.4 K/UL — HIGH (ref 3.8–10.5)
WBC # BLD: 18.5 K/UL — HIGH (ref 3.8–10.5)
WBC # FLD AUTO: 17.4 K/UL — HIGH (ref 3.8–10.5)
WBC # FLD AUTO: 18.5 K/UL — HIGH (ref 3.8–10.5)

## 2017-08-18 PROCEDURE — 83880 ASSAY OF NATRIURETIC PEPTIDE: CPT

## 2017-08-18 PROCEDURE — 36415 COLL VENOUS BLD VENIPUNCTURE: CPT

## 2017-08-18 PROCEDURE — 86900 BLOOD TYPING SEROLOGIC ABO: CPT

## 2017-08-18 PROCEDURE — 84134 ASSAY OF PREALBUMIN: CPT

## 2017-08-18 PROCEDURE — 83735 ASSAY OF MAGNESIUM: CPT

## 2017-08-18 PROCEDURE — 70450 CT HEAD/BRAIN W/O DYE: CPT | Mod: 26

## 2017-08-18 PROCEDURE — 97116 GAIT TRAINING THERAPY: CPT

## 2017-08-18 PROCEDURE — 81001 URINALYSIS AUTO W/SCOPE: CPT

## 2017-08-18 PROCEDURE — 80202 ASSAY OF VANCOMYCIN: CPT

## 2017-08-18 PROCEDURE — 99291 CRITICAL CARE FIRST HOUR: CPT

## 2017-08-18 PROCEDURE — 86901 BLOOD TYPING SEROLOGIC RH(D): CPT

## 2017-08-18 PROCEDURE — 99285 EMERGENCY DEPT VISIT HI MDM: CPT | Mod: 25

## 2017-08-18 PROCEDURE — 80048 BASIC METABOLIC PNL TOTAL CA: CPT

## 2017-08-18 PROCEDURE — 73620 X-RAY EXAM OF FOOT: CPT

## 2017-08-18 PROCEDURE — 84100 ASSAY OF PHOSPHORUS: CPT

## 2017-08-18 PROCEDURE — 80053 COMPREHEN METABOLIC PANEL: CPT

## 2017-08-18 PROCEDURE — 85610 PROTHROMBIN TIME: CPT

## 2017-08-18 PROCEDURE — 82550 ASSAY OF CK (CPK): CPT

## 2017-08-18 PROCEDURE — 86850 RBC ANTIBODY SCREEN: CPT

## 2017-08-18 PROCEDURE — 71045 X-RAY EXAM CHEST 1 VIEW: CPT

## 2017-08-18 PROCEDURE — 83615 LACTATE (LD) (LDH) ENZYME: CPT

## 2017-08-18 PROCEDURE — 97530 THERAPEUTIC ACTIVITIES: CPT

## 2017-08-18 PROCEDURE — 87040 BLOOD CULTURE FOR BACTERIA: CPT

## 2017-08-18 PROCEDURE — 93970 EXTREMITY STUDY: CPT

## 2017-08-18 PROCEDURE — 85730 THROMBOPLASTIN TIME PARTIAL: CPT

## 2017-08-18 PROCEDURE — 83036 HEMOGLOBIN GLYCOSYLATED A1C: CPT

## 2017-08-18 PROCEDURE — 97162 PT EVAL MOD COMPLEX 30 MIN: CPT

## 2017-08-18 PROCEDURE — 85025 COMPLETE CBC W/AUTO DIFF WBC: CPT

## 2017-08-18 PROCEDURE — 86140 C-REACTIVE PROTEIN: CPT

## 2017-08-18 PROCEDURE — 93010 ELECTROCARDIOGRAM REPORT: CPT

## 2017-08-18 PROCEDURE — 96375 TX/PRO/DX INJ NEW DRUG ADDON: CPT

## 2017-08-18 PROCEDURE — 85027 COMPLETE CBC AUTOMATED: CPT

## 2017-08-18 PROCEDURE — 96374 THER/PROPH/DIAG INJ IV PUSH: CPT

## 2017-08-18 PROCEDURE — 36569 INSJ PICC 5 YR+ W/O IMAGING: CPT

## 2017-08-18 PROCEDURE — 85652 RBC SED RATE AUTOMATED: CPT

## 2017-08-18 PROCEDURE — 76937 US GUIDE VASCULAR ACCESS: CPT

## 2017-08-18 RX ORDER — GENTAMICIN SULFATE 40 MG/ML
120 VIAL (ML) INJECTION ONCE
Qty: 0 | Refills: 0 | Status: COMPLETED | OUTPATIENT
Start: 2017-08-18 | End: 2017-08-18

## 2017-08-18 RX ORDER — CLONAZEPAM 1 MG
1 TABLET ORAL EVERY 12 HOURS
Qty: 0 | Refills: 0 | Status: DISCONTINUED | OUTPATIENT
Start: 2017-08-18 | End: 2017-08-18

## 2017-08-18 RX ORDER — THIAMINE MONONITRATE (VIT B1) 100 MG
100 TABLET ORAL DAILY
Qty: 0 | Refills: 0 | Status: DISCONTINUED | OUTPATIENT
Start: 2017-08-18 | End: 2017-08-22

## 2017-08-18 RX ORDER — POTASSIUM CHLORIDE 20 MEQ
20 PACKET (EA) ORAL ONCE
Qty: 0 | Refills: 0 | Status: COMPLETED | OUTPATIENT
Start: 2017-08-18 | End: 2017-08-18

## 2017-08-18 RX ORDER — TAMSULOSIN HYDROCHLORIDE 0.4 MG/1
0.8 CAPSULE ORAL AT BEDTIME
Qty: 0 | Refills: 0 | Status: DISCONTINUED | OUTPATIENT
Start: 2017-08-18 | End: 2017-08-22

## 2017-08-18 RX ORDER — PANTOPRAZOLE SODIUM 20 MG/1
40 TABLET, DELAYED RELEASE ORAL
Qty: 0 | Refills: 0 | Status: DISCONTINUED | OUTPATIENT
Start: 2017-08-18 | End: 2017-08-18

## 2017-08-18 RX ORDER — CLONAZEPAM 1 MG
1 TABLET ORAL EVERY 12 HOURS
Qty: 0 | Refills: 0 | Status: DISCONTINUED | OUTPATIENT
Start: 2017-08-18 | End: 2017-08-19

## 2017-08-18 RX ORDER — POTASSIUM CHLORIDE 20 MEQ
40 PACKET (EA) ORAL ONCE
Qty: 0 | Refills: 0 | Status: COMPLETED | OUTPATIENT
Start: 2017-08-18 | End: 2017-08-18

## 2017-08-18 RX ORDER — SODIUM CHLORIDE 9 MG/ML
1000 INJECTION INTRAMUSCULAR; INTRAVENOUS; SUBCUTANEOUS
Qty: 0 | Refills: 0 | Status: DISCONTINUED | OUTPATIENT
Start: 2017-08-18 | End: 2017-08-18

## 2017-08-18 RX ORDER — HEPARIN SODIUM 5000 [USP'U]/ML
5000 INJECTION INTRAVENOUS; SUBCUTANEOUS ONCE
Qty: 0 | Refills: 0 | Status: COMPLETED | OUTPATIENT
Start: 2017-08-18 | End: 2017-08-18

## 2017-08-18 RX ORDER — ADENOSINE 3 MG/ML
6 INJECTION INTRAVENOUS ONCE
Qty: 0 | Refills: 0 | Status: DISCONTINUED | OUTPATIENT
Start: 2017-08-18 | End: 2017-08-18

## 2017-08-18 RX ORDER — CEFEPIME 1 G/1
2000 INJECTION, POWDER, FOR SOLUTION INTRAMUSCULAR; INTRAVENOUS ONCE
Qty: 0 | Refills: 0 | Status: DISCONTINUED | OUTPATIENT
Start: 2017-08-18 | End: 2017-08-18

## 2017-08-18 RX ORDER — HEPARIN SODIUM 5000 [USP'U]/ML
1500 INJECTION INTRAVENOUS; SUBCUTANEOUS
Qty: 25000 | Refills: 0 | Status: DISCONTINUED | OUTPATIENT
Start: 2017-08-18 | End: 2017-08-18

## 2017-08-18 RX ORDER — PANTOPRAZOLE SODIUM 20 MG/1
40 TABLET, DELAYED RELEASE ORAL
Qty: 0 | Refills: 0 | Status: DISCONTINUED | OUTPATIENT
Start: 2017-08-18 | End: 2017-08-22

## 2017-08-18 RX ORDER — AMPICILLIN TRIHYDRATE 250 MG
2000 CAPSULE ORAL EVERY 4 HOURS
Qty: 0 | Refills: 0 | Status: DISCONTINUED | OUTPATIENT
Start: 2017-08-18 | End: 2017-08-18

## 2017-08-18 RX ORDER — PIPERACILLIN AND TAZOBACTAM 4; .5 G/20ML; G/20ML
3.38 INJECTION, POWDER, LYOPHILIZED, FOR SOLUTION INTRAVENOUS EVERY 6 HOURS
Qty: 0 | Refills: 0 | Status: DISCONTINUED | OUTPATIENT
Start: 2017-08-18 | End: 2017-08-18

## 2017-08-18 RX ORDER — PANTOPRAZOLE SODIUM 20 MG/1
40 TABLET, DELAYED RELEASE ORAL DAILY
Qty: 0 | Refills: 0 | Status: DISCONTINUED | OUTPATIENT
Start: 2017-08-18 | End: 2017-08-18

## 2017-08-18 RX ORDER — HEPARIN SODIUM 5000 [USP'U]/ML
5000 INJECTION INTRAVENOUS; SUBCUTANEOUS EVERY 8 HOURS
Qty: 0 | Refills: 0 | Status: DISCONTINUED | OUTPATIENT
Start: 2017-08-18 | End: 2017-08-18

## 2017-08-18 RX ORDER — CEFEPIME 1 G/1
2000 INJECTION, POWDER, FOR SOLUTION INTRAMUSCULAR; INTRAVENOUS EVERY 8 HOURS
Qty: 0 | Refills: 0 | Status: DISCONTINUED | OUTPATIENT
Start: 2017-08-18 | End: 2017-08-18

## 2017-08-18 RX ORDER — CEFTRIAXONE 500 MG/1
2 INJECTION, POWDER, FOR SOLUTION INTRAMUSCULAR; INTRAVENOUS ONCE
Qty: 0 | Refills: 0 | Status: DISCONTINUED | OUTPATIENT
Start: 2017-08-18 | End: 2017-08-18

## 2017-08-18 RX ORDER — SODIUM CHLORIDE 9 MG/ML
1000 INJECTION, SOLUTION INTRAVENOUS
Qty: 0 | Refills: 0 | Status: DISCONTINUED | OUTPATIENT
Start: 2017-08-18 | End: 2017-08-19

## 2017-08-18 RX ORDER — ACYCLOVIR SODIUM 500 MG
VIAL (EA) INTRAVENOUS
Qty: 0 | Refills: 0 | Status: DISCONTINUED | OUTPATIENT
Start: 2017-08-18 | End: 2017-08-18

## 2017-08-18 RX ORDER — ACYCLOVIR SODIUM 500 MG
800 VIAL (EA) INTRAVENOUS EVERY 8 HOURS
Qty: 0 | Refills: 0 | Status: DISCONTINUED | OUTPATIENT
Start: 2017-08-18 | End: 2017-08-18

## 2017-08-18 RX ORDER — FOLIC ACID 0.8 MG
1 TABLET ORAL DAILY
Qty: 0 | Refills: 0 | Status: DISCONTINUED | OUTPATIENT
Start: 2017-08-18 | End: 2017-08-22

## 2017-08-18 RX ORDER — ACYCLOVIR SODIUM 500 MG
800 VIAL (EA) INTRAVENOUS ONCE
Qty: 0 | Refills: 0 | Status: DISCONTINUED | OUTPATIENT
Start: 2017-08-18 | End: 2017-08-18

## 2017-08-18 RX ORDER — SODIUM CHLORIDE 9 MG/ML
1000 INJECTION INTRAMUSCULAR; INTRAVENOUS; SUBCUTANEOUS ONCE
Qty: 0 | Refills: 0 | Status: COMPLETED | OUTPATIENT
Start: 2017-08-18 | End: 2017-08-18

## 2017-08-18 RX ORDER — APIXABAN 2.5 MG/1
5 TABLET, FILM COATED ORAL EVERY 12 HOURS
Qty: 0 | Refills: 0 | Status: DISCONTINUED | OUTPATIENT
Start: 2017-08-18 | End: 2017-08-18

## 2017-08-18 RX ORDER — HEPARIN SODIUM 5000 [USP'U]/ML
1200 INJECTION INTRAVENOUS; SUBCUTANEOUS
Qty: 25000 | Refills: 0 | Status: DISCONTINUED | OUTPATIENT
Start: 2017-08-18 | End: 2017-08-19

## 2017-08-18 RX ORDER — VANCOMYCIN HCL 1 G
1000 VIAL (EA) INTRAVENOUS EVERY 12 HOURS
Qty: 0 | Refills: 0 | Status: DISCONTINUED | OUTPATIENT
Start: 2017-08-18 | End: 2017-08-18

## 2017-08-18 RX ORDER — LACTOBACILLUS ACIDOPHILUS 100MM CELL
1 CAPSULE ORAL DAILY
Qty: 0 | Refills: 0 | Status: DISCONTINUED | OUTPATIENT
Start: 2017-08-18 | End: 2017-08-22

## 2017-08-18 RX ORDER — QUETIAPINE FUMARATE 200 MG/1
25 TABLET, FILM COATED ORAL ONCE
Qty: 0 | Refills: 0 | Status: COMPLETED | OUTPATIENT
Start: 2017-08-18 | End: 2017-08-18

## 2017-08-18 RX ORDER — MAGNESIUM SULFATE 500 MG/ML
1 VIAL (ML) INJECTION ONCE
Qty: 0 | Refills: 0 | Status: COMPLETED | OUTPATIENT
Start: 2017-08-18 | End: 2017-08-18

## 2017-08-18 RX ADMIN — Medication 1 MILLIGRAM(S): at 04:26

## 2017-08-18 RX ADMIN — Medication 1 MILLIGRAM(S): at 11:16

## 2017-08-18 RX ADMIN — Medication 200 MILLIGRAM(S): at 07:23

## 2017-08-18 RX ADMIN — HEPARIN SODIUM 15 UNIT(S)/HR: 5000 INJECTION INTRAVENOUS; SUBCUTANEOUS at 06:27

## 2017-08-18 RX ADMIN — QUETIAPINE FUMARATE 25 MILLIGRAM(S): 200 TABLET, FILM COATED ORAL at 11:02

## 2017-08-18 RX ADMIN — Medication 100 GRAM(S): at 06:26

## 2017-08-18 RX ADMIN — PIPERACILLIN AND TAZOBACTAM 200 GRAM(S): 4; .5 INJECTION, POWDER, LYOPHILIZED, FOR SOLUTION INTRAVENOUS at 02:50

## 2017-08-18 RX ADMIN — Medication 20 MILLIEQUIVALENT(S): at 09:35

## 2017-08-18 RX ADMIN — Medication 1 MILLIGRAM(S): at 22:41

## 2017-08-18 RX ADMIN — SODIUM CHLORIDE 100 MILLILITER(S): 9 INJECTION INTRAMUSCULAR; INTRAVENOUS; SUBCUTANEOUS at 06:27

## 2017-08-18 RX ADMIN — SODIUM CHLORIDE 4000 MILLILITER(S): 9 INJECTION INTRAMUSCULAR; INTRAVENOUS; SUBCUTANEOUS at 04:10

## 2017-08-18 RX ADMIN — HEPARIN SODIUM 12 UNIT(S)/HR: 5000 INJECTION INTRAVENOUS; SUBCUTANEOUS at 12:15

## 2017-08-18 RX ADMIN — Medication 1 MILLIGRAM(S): at 09:34

## 2017-08-18 RX ADMIN — PANTOPRAZOLE SODIUM 40 MILLIGRAM(S): 20 TABLET, DELAYED RELEASE ORAL at 09:35

## 2017-08-18 RX ADMIN — SODIUM CHLORIDE 100 MILLILITER(S): 9 INJECTION, SOLUTION INTRAVENOUS at 11:02

## 2017-08-18 RX ADMIN — PIPERACILLIN AND TAZOBACTAM 200 GRAM(S): 4; .5 INJECTION, POWDER, LYOPHILIZED, FOR SOLUTION INTRAVENOUS at 16:31

## 2017-08-18 RX ADMIN — Medication 1 MILLIGRAM(S): at 03:05

## 2017-08-18 RX ADMIN — PIPERACILLIN AND TAZOBACTAM 200 GRAM(S): 4; .5 INJECTION, POWDER, LYOPHILIZED, FOR SOLUTION INTRAVENOUS at 09:34

## 2017-08-18 RX ADMIN — Medication 40 MILLIEQUIVALENT(S): at 09:35

## 2017-08-18 RX ADMIN — CEFTRIAXONE 100 GRAM(S): 500 INJECTION, POWDER, FOR SOLUTION INTRAMUSCULAR; INTRAVENOUS at 04:25

## 2017-08-18 RX ADMIN — Medication 250 MILLIGRAM(S): at 13:00

## 2017-08-18 RX ADMIN — TAMSULOSIN HYDROCHLORIDE 0.8 MILLIGRAM(S): 0.4 CAPSULE ORAL at 22:41

## 2017-08-18 NOTE — H&P ADULT - PROBLEM SELECTOR PLAN 2
Meets SIRS on admission with fever, tachycardia, and leukocytosis. Leukocytosis could be from comorbidity of CLL.  Etiologies include UTI, PNA, Heel ulcer- or replacement of PICC line x2 this week. Blood cultures drawn.   Sepsis now resolved. Problems as in #3, #4

## 2017-08-18 NOTE — DIETITIAN INITIAL EVALUATION ADULT. - ENERGY NEEDS
82 kg ABW; 78 kg IBW; BMI 25; 105% of IBW.   ABW used due to pt between % of IBW.  Needs assessed 2/2 advanced age.

## 2017-08-18 NOTE — PROGRESS NOTE ADULT - ASSESSMENT
77M PMH CLL, BPH (chronic Rodriguez POA), JARED 2/2 obstructive uropathy, recent right femoral neck hip fx (s/p hemiarthroplasty 5/17) c/b PE (on Xarelto) and PNA (July 2017) admitted with AMS and transferred to MICU for increased agitation w/ narrow complex tachycardia >180 and hypotension.    NEURO  #AMS/Toxic metabolic encephalopathy    -Likely 2/2 benzo withdrawal, as pt has been taking Xanax not prescribed to him and last took last night, less likely 2/2 sepsis as pt is having audio and visual hallucinations as well as tremors c/w withdrawal and responds to Ativan/Klonopin and pt is afebrile w/o obvious source of infection  -will f/u cx's to r/o infectious etiology, c/w IV Vanc/Zosyn for now   -lactate downtrended  -CT head negative for intracranial bleed or acute intracranial pathology   -will supplement with thiamine/folate and multivitamin   -Ativan and Seroquel as needed for agitation/withdrawal sx  -monitor for signs/sx of seizures in the setting of likely benzo withdrawal    CV  #Narrow complex tachycardia    -Since arrival to MICU, pt's tachycardia has been controlled and pt only becomes tachycardic when agitated  -c/w IVF    RESPIRATORY  #Pulmonary Embolism   -on Xarelto at home, started on heparin gtt due to mental status/inability to swallow pills/cooperate  -PTT high this AM, turned off, repeat downtrended, restarted at 12/hr  -f/u PTT 6PM    GI  -No active issues    RENAL  #JARED 2/2 obstructive uropathy  -Cr 1.3 on admission, downtrended to 1.0  -trend BMP  -monitor UOP  -c/w Flomax    ID  #Sepsis of unclear source  -low suspicion   -pt on Vanc/Zosyn as outpt, will continue with this for now   -f/u urine cx, blood cx  -cefepime  -vancomycin  -gentamicin    HEME  #CLL    FLUIDS/ELECTROLYTES/NUTRITION  -NS 100cc/h  -Monitor and Replete to K>4 and Mg>2  -NPO    PROPHYLAXIS  -Heparin drip  -protonix      DISPO MICU  FULL CODE 77M PMH CLL, BPH (chronic Rodriguez POA), JARED 2/2 obstructive uropathy, recent right femoral neck hip fx (s/p hemiarthroplasty 5/17) c/b PE (on Xarelto) and PNA (July 2017) admitted with AMS and transferred to MICU for increased agitation w/ narrow complex tachycardia >180 and hypotension.    NEURO  #AMS/Toxic metabolic encephalopathy    -Likely 2/2 benzo withdrawal, as pt has been taking Xanax not prescribed to him and last took last night, less likely 2/2 sepsis as pt is having audio and visual hallucinations as well as tremors c/w withdrawal and responds to Ativan/Klonopin and pt is afebrile w/o obvious source of infection  -will f/u cx's to r/o infectious etiology, c/w IV Vanc/Zosyn for now   -lactate downtrended  -CT head negative for intracranial bleed or acute intracranial pathology   -will supplement with thiamine/folate and multivitamin   -Ativan and Seroquel as needed for agitation/withdrawal sx  -monitor for signs/sx of seizures in the setting of likely benzo withdrawal    CV  #Narrow complex tachycardia    -Since arrival to MICU, pt's tachycardia has been controlled and pt only becomes tachycardic when agitated  -c/w IVF, tx of underlying withdrawal    RESPIRATORY  #PE   -on Xarelto at home, started on heparin gtt due to mental status/inability to swallow pills/cooperate  -PTT high this AM, turned off, repeat downtrended, restarted at 12/hr  -f/u PTT 6PM    GI  -No active issues    RENAL  #JARED 2/2 obstructive uropathy  -Cr 1.3 on admission, downtrended to 1.0  -trend BMP  -monitor UOP  -c/w Flomax    ID  #Sepsis of unclear source  -low suspicion that AMS is of septic origin, as pt with active AVH and signs/sx c/w acute benzo withdrawal  -pt on Vanc/Zosyn as outpt, will continue with this for now and monitor, likely d/c this tomorrow if remains afebrile and clinically   -f/u urine cx (pending)  -f/u blood cx's (negative to date)  -HIV nonreactive     HEME  #CLL  -does not appear to be in blast crisis  -trend leukocyte count, as pt has leukocytosis     FEN  #Fluids: NS@100cc/h  #Electrolytes: Monitor and Replete to K>4 and Mg>2  #Diet: NPO    PPX  -Heparin gtt  -Protonix       Code Status: FULL CODE

## 2017-08-18 NOTE — PROGRESS NOTE ADULT - ASSESSMENT
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      NEURO  #AMS 2/2 sepsis v. benzo w/d. Tox screen negative.  Decision was made not to CT because very high clinical susp    CARDIOVASCULAR  #Tachycardia likely 2/2 sepsis.  Resolved after arrival to MICU and levophed being started.    #Severe Sepsis 2/2 UTI v. SSTI v. PNA. Lactate of 3.7 with hypotension  -trend lactate until resolves    PULMONARY  #No respiratory distress, sating well on RA    RENAL  #JARED 2/2 obstructive uropathy. Cr 1.3 on admission  -monitor urine output  -maintain euvolemic  -flomax    INFECTIOUS DISEASE  #Sepsis of unclear source. UTI-UA w/ LE, WBC.  Chronic rodriguez so high high risk  Soft tissue infection of b/l LE heels.  Was recently discharged on vanc and zosyn.  Wounds appear to be doing well without any pus,swelling, and minimal erythema.  PNA-questionable LLL infiltrate on CXR. Leukocytosis present but patient has CLL so will trend  -patient failed vanc/zosyn with clinical deterioration so will broaden abx  -f/u urine cx, blood cx  -cefepime  -vancomycin  -gentamicin    HEME  #CLL    FLUIDS/ELECTROLYTES/NUTRITION  -IVF  -Monitor and Replete to K>4 and Mg>2  -NPO    PROPHYLAXIS  -Heparin drip  -protonix      DISPO MICU  FULL CODE 77M with PMH of CLL, BPH with chronic indwelling rodriguez, JARED 2/2 obstructive uropathy, recent right femoral neck fx s/p hemiarthroplasty (5/17) complicated by PE (on Xarelto) and PNA (July 2017) admitted with AMS and transferred to MICU shortly after with tachycardia >180 and hypotension.      NEURO  #AMS 2/2 sepsis v. benzo w/d r/u stroke. Tox screen negative.  Neurological exam patient has clenched fist on the left that he will not release, drop foot b/l, PERRL, able to grasp finger b/l.   -CT head    CARDIOVASCULAR  #Tachycardia likely 2/2 sepsis.  Narrow complex Resolved after arrival to MICU and levophed being started.    #Severe Sepsis 2/2 UTI v. SSTI v. PNA. Lactate of 3.7 with hypotension  -trend lactate until resolves    PULMONARY  #Pulmonary Embolism. Sating well on RA.   -normally on xarelto but unclear if can swallow pills at this time  -therapeutic heparin drip  -f/u PTT    RENAL  #JARED 2/2 obstructive uropathy. Cr 1.3 on admission  -monitor urine output  -maintain euvolemic  -flomax    INFECTIOUS DISEASE  #Sepsis of unclear source. UTI-UA w/ LE, WBC.  Chronic rodriguez so high high risk  Soft tissue infection of b/l LE heels.  Was recently discharged on vanc and zosyn.  Wounds appear to be doing well without any pus,swelling, and minimal erythema.  PNA-questionable LLL infiltrate on CXR. Leukocytosis present but patient has CLL so will trend  -patient failed vanc/zosyn with clinical deterioration so will broaden abx  -f/u urine cx, blood cx  -cefepime  -vancomycin  -gentamicin    HEME  #CLL    FLUIDS/ELECTROLYTES/NUTRITION  -IVF  -Monitor and Replete to K>4 and Mg>2  -NPO    PROPHYLAXIS  -Heparin drip  -protonix      DISPO MICU  FULL CODE 77M with PMH of CLL, BPH with chronic indwelling rodriguez, JARED 2/2 obstructive uropathy, recent right femoral neck fx s/p hemiarthroplasty (5/17) complicated by PE (on Xarelto) and PNA (July 2017) admitted with AMS and transferred to MICU shortly after with tachycardia >180 and hypotension.      NEURO  #AMS 2/2 sepsis v. benzo w/d r/u stroke. Tox screen negative.  Neurological exam patient has clenched fist on the left that he will not release, drop foot b/l, PERRL, able to grasp finger b/l. No meningeal signs on exam  -CT head    CARDIOVASCULAR  #Tachycardia likely 2/2 sepsis.  Narrow complex Resolved after arrival to MICU and levophed being started.    -continue with IVF   -consider adenosine if occurs again  #Severe Sepsis 2/2 UTI v. SSTI v. PNA. Lactate of 3.7 with hypotension  -trend lactate until resolves  -maintain MAP >65, levo if does not respond to IVF    PULMONARY  #Pulmonary Embolism. Sating well on RA.   -normally on xarelto but unclear if can swallow pills at this time  -therapeutic heparin drip  -f/u PTT    RENAL  #JARED 2/2 obstructive uropathy. Cr 1.3 on admission  -monitor urine output  -maintain euvolemic  -flomax    INFECTIOUS DISEASE  #Sepsis of unclear source. UTI-UA w/ LE, WBC.  Chronic rodriguez so high high risk  Soft tissue infection of b/l LE heels.  Was recently discharged on vanc and zosyn.  Wounds appear to be doing well without any pus,swelling, and minimal erythema.  PNA-questionable LLL infiltrate on CXR. Leukocytosis present but patient has CLL so will trend  -patient failed vanc/zosyn with clinical deterioration so will broaden abx  -f/u urine cx, blood cx  -cefepime  -vancomycin  -gentamicin    HEME  #CLL    FLUIDS/ELECTROLYTES/NUTRITION  -NS 100cc/h  -Monitor and Replete to K>4 and Mg>2  -NPO    PROPHYLAXIS  -Heparin drip  -protonix      DISPO MICU  FULL CODE 77M with PMH of CLL, BPH with chronic indwelling rodriguez, JARED 2/2 obstructive uropathy, recent right femoral neck fx s/p hemiarthroplasty (5/17) complicated by PE (on Xarelto) and PNA (July 2017) admitted with AMS and transferred to MICU shortly after with tachycardia >180 and hypotension.      NEURO  #AMS 2/2 sepsis v. benzo w/d r/u stroke. Tox screen negative.  Neurological exam patient has clenched fist on the left that he will not release, drop foot b/l, PERRL, able to grasp finger b/l. No meningeal signs on exam  -CT head  -consider LP if CT negative and not improvement after treating infection    CARDIOVASCULAR  #Tachycardia likely 2/2 sepsis.  Narrow complex Resolved after arrival to MICU and levophed being started.    -continue with IVF   -consider adenosine if occurs again  #Severe Sepsis 2/2 UTI v. SSTI v. PNA. Lactate of 3.7 with hypotension  -trend lactate until resolves  -maintain MAP >65, levo if does not respond to IVF    PULMONARY  #Pulmonary Embolism. Sating well on RA.   -normally on xarelto but unclear if can swallow pills at this time  -therapeutic heparin drip  -f/u PTT    RENAL  #JARED 2/2 obstructive uropathy. Cr 1.3 on admission  -monitor urine output  -maintain euvolemic  -flomax    INFECTIOUS DISEASE  #Sepsis of unclear source. UTI-UA w/ LE, WBC.  Chronic rodriguez so high high risk  Soft tissue infection of b/l LE heels.  Was recently discharged on vanc and zosyn.  Wounds appear to be doing well without any pus,swelling, and minimal erythema.  PNA-questionable LLL infiltrate on CXR. Leukocytosis present but patient has CLL so will trend  -patient failed vanc/zosyn with clinical deterioration so will broaden abx  -f/u urine cx, blood cx  -cefepime  -vancomycin  -gentamicin    HEME  #CLL    FLUIDS/ELECTROLYTES/NUTRITION  -NS 100cc/h  -Monitor and Replete to K>4 and Mg>2  -NPO    PROPHYLAXIS  -Heparin drip  -protonix      DISPO MICU  FULL CODE

## 2017-08-18 NOTE — H&P ADULT - PROBLEM SELECTOR PLAN 3
UA positive. Patient with history of urinary retention after orthopedic surgery. Follows up with Dr. Rizvi as o/p.   urine seen to be cloudy, replaced rodriguez and took UA, which was positive.   Will continue to treat with Vanc/Zosyn for now as already treating for Heel Ulcers.

## 2017-08-18 NOTE — DIETITIAN INITIAL EVALUATION ADULT. - PROBLEM SELECTOR PLAN 1
Patient presented with fever 102 (has not been febrile here) and altered mental status. After searching through previous admission, patient had been found to have tolerance and was abusing benzodiazepines and was subsequently stopped off of Xanax last week.   AMS at this time is of unclear etiology, but differential does include toxic metabolic encephalopathy in setting of UTI, Heel ulcer infections, PICC line replacements, vs. benzodiazapine withdrawal.   CIWA-B(Benzo) score of 9.   UTox ordered and to be drawn before giving benzos, follow up  Will give ativan IV 0.5mg to see if responds well, and can up-titrate after.   Will also continue treatment with Vancomycin and Zosyn.   Unclear when next due trough for Vanco is, so will have random level drawn at 10AM and dose accordingly.   Continue on Zosyn 3.375 q6hrs

## 2017-08-18 NOTE — H&P ADULT - HISTORY OF PRESENT ILLNESS
When examined, patient was AAOx1-2, but very anxious, and does not remember what brought him in. He keeps repeating "need to take care of things"     76 yo male with history of CLL, BPH, insomnia, JARED 2/2 obstructive uropathy(developed after operation for fracture, resolved, on chronic rodriguez follows Dr. Leigh urology ),  recent right femoral neck fracture s/p hemiarthroplasty (may 2017) complicated by PE (on Xarelto) and pneumonia (July 2017) presents from [facility] for altered mental status and fever of 102. While here, he has been acutely confused, tremulous, but pleasant. He denies any HA, CP, SOB, nausea/vomiting.     In ED: ICU Vital Signs Last 24 Hrs  T(C): 37.1 (18 Aug 2017 02:07), Max: 37.8 (17 Aug 2017 19:48)  T(F): 98.7 (18 Aug 2017 02:07), Max: 100 (17 Aug 2017 19:48)  HR: 114 (18 Aug 2017 02:07) (95 - 114)  BP: 174/78 (18 Aug 2017 02:07) (170/85 - 195/110)  BP(mean): --  ABP: --  ABP(mean): --  RR: 19 (18 Aug 2017 02:07) (16 - 20)  SpO2: 97% (18 Aug 2017 02:07) (94% - 98%)      He was given dose of Vanco/Zosyn. Norvasc, Tylenol.

## 2017-08-18 NOTE — DIETITIAN INITIAL EVALUATION ADULT. - PROBLEM SELECTOR PLAN 4
B/L heel ulcers, sent home from [facility] 8/15 with IV Vanc/Zosyn for these ulcers. He had PICC line placed, and then replaced yesterday due to clotting, in which he pulled out again today.  Continue on abx

## 2017-08-18 NOTE — H&P ADULT - NSHPSOCIALHISTORY_GEN_ALL_CORE
Lives alone in an apartment, non-smoker, no EtOH, denies any illicit drug use other than ambien from a friend

## 2017-08-18 NOTE — H&P ADULT - ASSESSMENT
Patient is a 76 yo male with history of CLL, BPH, insomnia, JARED 2/2 obstructive uropathy which developed after operation done for fracture which has now resolved, on chronic rodriguez,  recent right femoral neck fracture s/p hemiarthroplasty done in May 2017, complicated by PE (on Xarelto) Patient is a 78 yo male with history of CLL, BPH, insomnia, JARED 2/2 obstructive uropathy which developed after operation done for fracture which has now resolved, on chronic rodriguez,  recent right femoral neck fracture s/p hemiarthroplasty done in May 2017, complicated by PE (on Xarelto) admitted for AMS

## 2017-08-18 NOTE — PROGRESS NOTE ADULT - SUBJECTIVE AND OBJECTIVE BOX
CC/ HPI Patient is a 77y old male with history of pulmonary embolus following hip surgery, pneumonia, bilateral heel wounds, admitted with altered mental status, fever and hypotension, this morning without respiratory complaint.      PAST MEDICAL & SURGICAL HISTORY:  Hip fracture requiring operative repair, right, sequela  PE (pulmonary thromboembolism)  CLL (chronic lymphocytic leukemia)  S/P hip hemiarthroplasty    SOCHX:    -  alcohol    FMHX: FA/MO  - contributory     ROS reviewed below with positive findings marked (+) :  GEN:  fever, chills ENT: tracheostomy,   epistaxis,  sinusitis COR: CAD, CHF,  HTN, dysrhythmia PUL: COPD, ILD, asthma, pneumonia GI: PEG, dysphagia, hemorrhage, other HIMA: kidney disease, electrolyte disorder HEM:  anemia, +thrombus, coagulopathy, cancer ENDO:  thyroid disease, diabetes mellitus CNS:  dementia, stroke, seizure, PSY:  depression, anxiety, other      MEDICATIONS  (STANDING):  tamsulosin 0.8 milliGRAM(s) Oral at bedtime  lactobacillus acidophilus 1 Tablet(s) Oral daily  sodium chloride 0.9%. 1000 milliLiter(s) (100 mL/Hr) IV Continuous <Continuous>  pantoprazole  Injectable 40 milliGRAM(s) IV Push daily  heparin  Infusion 1500 Unit(s)/Hr (15 mL/Hr) IV Continuous <Continuous>  clonazePAM Tablet 1 milliGRAM(s) Oral every 12 hours    MEDICATIONS  (PRN):      Vital Signs Last 24 Hrs  T(C): 37.1 (18 Aug 2017 05:14), Max: 37.8 (17 Aug 2017 19:48)  T(F): 98.8 (18 Aug 2017 05:14), Max: 100 (17 Aug 2017 19:48)  HR: 112 (18 Aug 2017 07:08) (95 - 186)  BP: 140/79 (18 Aug 2017 07:08) (99/73 - 195/110)  BP(mean): --  RR: 55 (18 Aug 2017 07:08) (16 - 55)  SpO2: 93% (18 Aug 2017 07:08) (93% - 100%)    GENERAL:         comfortable,  - distress.  HEENT:            - trauma,  - icterus,  - injection,  - nasal discharge.  NECK:              - jugular venous distention, - thyromegaly.  LYMPH:           - lymphadenopathy, - masses.  RESP:              + crackles,   - rhonchi,   - wheezes.   COR:                S1S2 RRR  - murmurs,  - gallops,  - rubs.  ABD:                bowel sounds,   soft, - tender, - distended, - organomegaly.  EXT/MSC:         - cyanosis,  - clubbing,  - edema.    NEURO:             alert,   confused, responds to stimuli.                            13.0   17.4  )-----------( 194      ( 18 Aug 2017 04:07 )             39.2     08-18    139  |  102  |  20  ----------------------------<  123<H>  3.2<L>   |  24  |  1.00        Chest X ray (8.17.17)  Increased markings left lower lobe      ASSESSMENT/PLAN    1) Altered mental status  2) Rule out sepsis, pneumonia  3) Pulmonary embolus  4) CLL      Head CT, repeat chest radgiograph  Bronchodilators:  Albuterol q 4 -6 hours as needed  ID/Antibiotics: Zosyn, follow up cultures  Cardiac/HTN:  echocardiogram  GI: Rx/ prophylaxis c PPI/H2B  Heme: Rx/VT  on AC  Discussed with medical team

## 2017-08-18 NOTE — PROVIDER CONTACT NOTE (CHANGE IN STATUS NOTIFICATION) - BACKGROUND
Patient admitted with altered mental status and given medication for increasing agitation as documented on EMR.

## 2017-08-18 NOTE — PROGRESS NOTE ADULT - SUBJECTIVE AND OBJECTIVE BOX
7U to MICU RESIDENT TRANSFER NOTE  77M with PMH of CLL, BPH with chronic indwelling rodriguez, JARED 2/2 obstructive uropathy, recent right femoral neck fx s/p hemiarthroplasty () complicated by PE (on Xarelto) and PNA (2017), who presented from [facility] wi/ AMS and fever. Patient was recently discharged 3 days prior on Vanc and Zosyn for bilateral foot ulcers, had PICC line placed which was pulled out by patient 2 days ago and replaced in ED with d/c back to rehab, now pulled out again by patient during this hospitalization. Patient was admitted to [facility] for AMS, possibly related to benzodiazepine withdrawal vs infectious vs toxic metabolic encephalopathy with acute onset agitation, tachycardia to 180, relative hypotension (SBP 170s on admission, 90s currently), for which a rapid response was called. Patient was transferred to MICU in setting of tachycardia and hypotension.        ICU Vital Signs Last 24 Hrs  T(C): 36.6 (18 Aug 2017 03:50), Max: 37.8 (17 Aug 2017 19:48)  T(F): 97.9 (18 Aug 2017 03:50), Max: 100 (17 Aug 2017 19:48)  HR: 186 (18 Aug 2017 03:50) (95 - 186)  BP: 112/68 (18 Aug 2017 03:50) (112/68 - 195/110)  BP(mean): --  ABP: --  ABP(mean): --  RR: 24 (18 Aug 2017 03:50) (16 - 24)  SpO2: 95% (18 Aug 2017 03:50) (94% - 98%)          PHYSICAL EXAM:      Constitutional: NAD, lying in bed comfortably  HEENT: no eye discharge, no nasal discharge, no pharyngeal erythema  Neck: No lymphadenopathy, No JVD, No thyromegaly No carotid bruit  Cardiovascular: S1 and S2, RRR, non-displaced PMI, no M/R/G  Respiratory: CTAB, no wheezing, no crackles, no rhonchi, no cough  Gastrointestinal: BS+, soft, NT/ND  Extremities: No peripheral edema  Vascular: 2+ peripheral pulses  Neurological: AAO x 4, PERRLA, EOMI, no focal deficits  Psychiatric: Normal mood, normal affect  Musculoskeletal: 5/5 strength b/l upper and lower extremities  Skin: No rashes, no skin breakdown        MEDICATIONS  (STANDING):  tamsulosin 0.8 milliGRAM(s) Oral at bedtime  apixaban 5 milliGRAM(s) Oral every 12 hours  piperacillin/tazobactam IVPB. 3.375 Gram(s) IV Intermittent every 6 hours  lactobacillus acidophilus 1 Tablet(s) Oral daily  pantoprazole    Tablet 40 milliGRAM(s) Oral before breakfast  adenosine Injectable (ADENOCARD) 6 milliGRAM(s) IV Push once  LORazepam    IVPB 2 milliGRAM(s) IV Intermittent once  cefTRIAXone   IVPB 2 Gram(s) IV Intermittent once  ampicillin  IVPB 2000 milliGRAM(s) IV Intermittent every 4 hours  acyclovir IVPB   IV Intermittent   sodium chloride 0.9% Bolus 1000 milliLiter(s) IV Bolus once  acyclovir IVPB 800 milliGRAM(s) IV Intermittent once  acyclovir IVPB 800 milliGRAM(s) IV Intermittent every 8 hours  magnesium sulfate  IVPB 1 Gram(s) IV Intermittent once  heparin  Infusion 1500 Unit(s)/Hr (15 mL/Hr) IV Continuous <Continuous>  heparin  Injectable 5000 Unit(s) IV Push once    MEDICATIONS  (PRN):      Allergies    No Known Allergies    Intolerances        LABS:                        13.0   17.4  )-----------( 194      ( 18 Aug 2017 04:07 )             39.2     08-18    142  |  103  |  23  ----------------------------<  116<H>  3.5   |  20<L>  |  1.10    Ca    9.4      18 Aug 2017 04:07  Phos  3.9     08-18    TPro  6.5  /  Alb  3.6  /  TBili  0.6  /  DBili  x   /  AST  29  /  ALT  18  /  AlkPhos  85  08-18    PT/INR - ( 18 Aug 2017 04:10 )   PT: 14.9 sec;   INR: 1.33          PTT - ( 18 Aug 2017 04:10 )  PTT:29.0 sec  Urinalysis Basic - ( 17 Aug 2017 20:38 )    Color: OTHER / Appearance: x / S.020 / pH: x  Gluc: x / Ketone: NEGATIVE  / Bili: NEGATIVE / Urobili: 0.2 E.U./dL   Blood: x / Protein: Trace mg/dL / Nitrite: NEGATIVE   Leuk Esterase: Small / RBC: Many /HPF / WBC 5-10 /HPF   Sq Epi: x / Non Sq Epi: Few /HPF / Bacteria: Present /HPF        RADIOLOGY & ADDITIONAL TESTS:    . 7U to MICU RESIDENT TRANSFER NOTE  77M with PMH of CLL, BPH with chronic indwelling rodriguez, JARED 2/2 obstructive uropathy, recent right femoral neck fx s/p hemiarthroplasty () complicated by PE (on Xarelto) and PNA (2017), who presented from [facility] wi/ AMS and fever. Patient was recently discharged 3 days prior on Vanc and Zosyn for bilateral foot ulcers, had PICC line placed which was pulled out by patient 2 days ago and replaced in ED with d/c back to rehab, now pulled out again by patient during this hospitalization. Patient was admitted to [facility] for AMS, possibly related to benzodiazepine withdrawal vs infectious vs toxic metabolic encephalopathy with acute onset agitation, tachycardia to 180, relative hypotension (SBP 170s on admission, 90s currently), for which a rapid response was called. Patient was transferred to MICU in setting of tachycardia and hypotension.        ICU Vital Signs Last 24 Hrs  T(C): 36.6 (18 Aug 2017 03:50), Max: 37.8 (17 Aug 2017 19:48)  T(F): 97.9 (18 Aug 2017 03:50), Max: 100 (17 Aug 2017 19:48)  HR: 186 (18 Aug 2017 03:50) (95 - 186)  BP: 112/68 (18 Aug 2017 03:50) (112/68 - 195/110)  BP(mean): --  ABP: --  ABP(mean): --  RR: 24 (18 Aug 2017 03:50) (16 - 24)  SpO2: 95% (18 Aug 2017 03:50) (94% - 98%)          PHYSICAL EXAM:      Constitutional: altered, uncomfortable  HEENT: dry membranes, crusted d/c on eyes, clear conjunctive without pallor  Neck: No lymphadenopathy, No JVD, No thyromegaly No carotid bruit  Cardiovascular: S1 and S2, tachycardic, non-displaced PMI, no M/R/G  Respiratory: CTAB, no wheezing, no crackles, no rhonchi, no cough  Gastrointestinal: BS+, soft, non-distended  Extremities: No peripheral edema, warm to touch, drop foot b/l, b/l heel ulcers w/o discharge and minimal erythema  Vascular: 2+ peripheral pulses  Neurological: Patient not oriented at this time, PERRLA, eyes roll backward, left hand clenched, right hand is able to grasp, drop foot b/l, facial symmetry  Skin: skin breakdown near site of heel ulcers but over look non-toxic        MEDICATIONS  (STANDING):  tamsulosin 0.8 milliGRAM(s) Oral at bedtime  apixaban 5 milliGRAM(s) Oral every 12 hours  piperacillin/tazobactam IVPB. 3.375 Gram(s) IV Intermittent every 6 hours  lactobacillus acidophilus 1 Tablet(s) Oral daily  pantoprazole    Tablet 40 milliGRAM(s) Oral before breakfast  adenosine Injectable (ADENOCARD) 6 milliGRAM(s) IV Push once  LORazepam    IVPB 2 milliGRAM(s) IV Intermittent once  cefTRIAXone   IVPB 2 Gram(s) IV Intermittent once  ampicillin  IVPB 2000 milliGRAM(s) IV Intermittent every 4 hours  acyclovir IVPB   IV Intermittent   sodium chloride 0.9% Bolus 1000 milliLiter(s) IV Bolus once  acyclovir IVPB 800 milliGRAM(s) IV Intermittent once  acyclovir IVPB 800 milliGRAM(s) IV Intermittent every 8 hours  magnesium sulfate  IVPB 1 Gram(s) IV Intermittent once  heparin  Infusion 1500 Unit(s)/Hr (15 mL/Hr) IV Continuous <Continuous>  heparin  Injectable 5000 Unit(s) IV Push once    MEDICATIONS  (PRN):      Allergies    No Known Allergies    Intolerances        LABS:                        13.0   17.4  )-----------( 194      ( 18 Aug 2017 04:07 )             39.2     08-18    142  |  103  |  23  ----------------------------<  116<H>  3.5   |  20<L>  |  1.10    Ca    9.4      18 Aug 2017 04:07  Phos  3.9     08-18    TPro  6.5  /  Alb  3.6  /  TBili  0.6  /  DBili  x   /  AST  29  /  ALT  18  /  AlkPhos  85  08-18    PT/INR - ( 18 Aug 2017 04:10 )   PT: 14.9 sec;   INR: 1.33          PTT - ( 18 Aug 2017 04:10 )  PTT:29.0 sec  Urinalysis Basic - ( 17 Aug 2017 20:38 )    Color: OTHER / Appearance: x / S.020 / pH: x  Gluc: x / Ketone: NEGATIVE  / Bili: NEGATIVE / Urobili: 0.2 E.U./dL   Blood: x / Protein: Trace mg/dL / Nitrite: NEGATIVE   Leuk Esterase: Small / RBC: Many /HPF / WBC 5-10 /HPF   Sq Epi: x / Non Sq Epi: Few /HPF / Bacteria: Present /HPF        RADIOLOGY & ADDITIONAL TESTS:    . 7U to MICU RESIDENT TRANSFER NOTE  77M with PMH of CLL, BPH with chronic indwelling rodriguez, JARED 2/2 obstructive uropathy, recent right femoral neck fx s/p hemiarthroplasty () complicated by PE (on Xarelto) and PNA (2017), who presented from [facility] wi/ AMS and fever. Patient was recently discharged 3 days prior on Vanc and Zosyn for bilateral foot ulcers, had PICC line placed which was pulled out by patient 2 days ago and replaced in ED with d/c back to rehab, now pulled out again by patient during this hospitalization. Patient was admitted to [facility] for AMS, possibly related to benzodiazepine withdrawal vs infectious vs toxic metabolic encephalopathy with acute onset agitation, tachycardia to 180, relative hypotension (SBP 170s on admission, 90s currently), for which a rapid response was called. Patient was transferred to MICU in setting of tachycardia and hypotension.        ICU Vital Signs Last 24 Hrs  T(C): 36.6 (18 Aug 2017 03:50), Max: 37.8 (17 Aug 2017 19:48)  T(F): 97.9 (18 Aug 2017 03:50), Max: 100 (17 Aug 2017 19:48)  HR: 186 (18 Aug 2017 03:50) (95 - 186)  BP: 112/68 (18 Aug 2017 03:50) (112/68 - 195/110)  BP(mean): --  ABP: --  ABP(mean): --  RR: 24 (18 Aug 2017 03:50) (16 - 24)  SpO2: 95% (18 Aug 2017 03:50) (94% - 98%)          PHYSICAL EXAM:      Constitutional: altered, uncomfortable  HEENT: dry membranes, crusted d/c on eyes, clear conjunctive without pallor  Neck: No lymphadenopathy, No JVD, No thyromegaly No carotid bruit  Cardiovascular: S1 and S2, tachycardic, non-displaced PMI, no M/R/G  Respiratory: CTAB, no wheezing, no crackles, no rhonchi, no cough  Gastrointestinal: BS+, soft, non-distended  Extremities: No peripheral edema, warm to touch, drop foot b/l, b/l heel ulcers w/o discharge and minimal erythema  Vascular: 2+ peripheral pulses  Neurological: Patient not oriented at this time, PERRLA, eyes roll backward, left hand clenched, right hand is able to grasp, drop foot b/l, facial symmetry,no neck stiffness  Skin: skin breakdown near site of heel ulcers but over look non-toxic        MEDICATIONS  (STANDING):  tamsulosin 0.8 milliGRAM(s) Oral at bedtime  apixaban 5 milliGRAM(s) Oral every 12 hours  piperacillin/tazobactam IVPB. 3.375 Gram(s) IV Intermittent every 6 hours  lactobacillus acidophilus 1 Tablet(s) Oral daily  pantoprazole    Tablet 40 milliGRAM(s) Oral before breakfast  adenosine Injectable (ADENOCARD) 6 milliGRAM(s) IV Push once  LORazepam    IVPB 2 milliGRAM(s) IV Intermittent once  cefTRIAXone   IVPB 2 Gram(s) IV Intermittent once  ampicillin  IVPB 2000 milliGRAM(s) IV Intermittent every 4 hours  acyclovir IVPB   IV Intermittent   sodium chloride 0.9% Bolus 1000 milliLiter(s) IV Bolus once  acyclovir IVPB 800 milliGRAM(s) IV Intermittent once  acyclovir IVPB 800 milliGRAM(s) IV Intermittent every 8 hours  magnesium sulfate  IVPB 1 Gram(s) IV Intermittent once  heparin  Infusion 1500 Unit(s)/Hr (15 mL/Hr) IV Continuous <Continuous>  heparin  Injectable 5000 Unit(s) IV Push once    MEDICATIONS  (PRN):      Allergies    No Known Allergies    Intolerances        LABS:                        13.0   17.4  )-----------( 194      ( 18 Aug 2017 04:07 )             39.2     08-18    142  |  103  |  23  ----------------------------<  116<H>  3.5   |  20<L>  |  1.10    Ca    9.4      18 Aug 2017 04:07  Phos  3.9     08-18    TPro  6.5  /  Alb  3.6  /  TBili  0.6  /  DBili  x   /  AST  29  /  ALT  18  /  AlkPhos  85  08-18    PT/INR - ( 18 Aug 2017 04:10 )   PT: 14.9 sec;   INR: 1.33          PTT - ( 18 Aug 2017 04:10 )  PTT:29.0 sec  Urinalysis Basic - ( 17 Aug 2017 20:38 )    Color: OTHER / Appearance: x / S.020 / pH: x  Gluc: x / Ketone: NEGATIVE  / Bili: NEGATIVE / Urobili: 0.2 E.U./dL   Blood: x / Protein: Trace mg/dL / Nitrite: NEGATIVE   Leuk Esterase: Small / RBC: Many /HPF / WBC 5-10 /HPF   Sq Epi: x / Non Sq Epi: Few /HPF / Bacteria: Present /HPF        RADIOLOGY & ADDITIONAL TESTS:    .

## 2017-08-18 NOTE — H&P ADULT - NSHPPHYSICALEXAM_GEN_ALL_CORE
VITAL SIGNS:  T(C): 36.8 (08-17-17 @ 23:51), Max: 37.8 (08-17-17 @ 19:48)  T(F): 98.3 (08-17-17 @ 23:51), Max: 100 (08-17-17 @ 19:48)  HR: 106 (08-17-17 @ 23:51) (95 - 113)  BP: 181/93 (08-17-17 @ 23:51) (170/85 - 195/110)  BP(mean): --  RR: 19 (08-17-17 @ 23:51) (16 - 20)  SpO2: 94% (08-17-17 @ 23:51) (94% - 98%)  Wt(kg): --    PHYSICAL EXAM:    Constitutional: WDWN resting comfortably in bed; NAD  Head: NC/AT  Eyes: PERRL, EOMI, clear conjunctiva  ENT: no nasal discharge; uvula midline, no oropharyngeal erythema or exudates; MMD  Neck: supple; no JVD or thyromegaly  Respiratory: CTAL B/L  Cardiac: +S1/S2; RRR; no M/R/G;   Gastrointestinal: soft, NT/ND; no rebound or guarding; +BSx4  Extremities: necrotic ulcer on L and R. Heel base, L>R in size  Musculoskeletal: dec rom on R. leg, 2/2 recent operation, nl rom on left leg  Vascular: 2+ radial, femoral, DP/PT pulses B/L  Dermatologic: skin warm, dry and intact; no rashes, wounds, or scars  Lymphatic: no submandibular or cervical LAD  Neurologic: AAOx3; CNII-XII grossly intact; no focal deficits VITAL SIGNS:  T(C): 36.8 (08-17-17 @ 23:51), Max: 37.8 (08-17-17 @ 19:48)  T(F): 98.3 (08-17-17 @ 23:51), Max: 100 (08-17-17 @ 19:48)  HR: 106 (08-17-17 @ 23:51) (95 - 113)  BP: 181/93 (08-17-17 @ 23:51) (170/85 - 195/110)  BP(mean): --  RR: 19 (08-17-17 @ 23:51) (16 - 20)  SpO2: 94% (08-17-17 @ 23:51) (94% - 98%)  Wt(kg): --    Physical Exam: .  VITAL SIGNS:  T(C): 36.3 (08-11-17 @ 19:37), Max: 36.7 (08-11-17 @ 14:47)  T(F): 97.3 (08-11-17 @ 19:37), Max: 98.1 (08-11-17 @ 14:47)  HR: 85 (08-11-17 @ 19:37) (85 - 100)  BP: 148/78 (08-11-17 @ 19:37) (130/78 - 148/78)  BP(mean): --  RR: 16 (08-11-17 @ 19:37) (16 - 18)  SpO2: 98% (08-11-17 @ 19:37) (95% - 98%)  Wt(kg): --    PHYSICAL EXAM:    Constitutional: WDWN resting comfortably in bed; NAD  Head: NC/AT  Eyes: PERRL, EOMI, clear conjunctiva  ENT: no nasal discharge; uvula midline, no oropharyngeal erythema or exudates; MMD  Neck: supple; no JVD or thyromegaly  Respiratory: CTABL  Cardiac: +S1/S2; RRR; no M/R/G;   Gastrointestinal: soft, NT/ND; no rebound or guarding; +BSx4  Genitourinary: normal external genitalia  Back: spine midline, no bony tenderness or step-offs; no CVAT B/L  Extremities:3+ pedal edema, bilateral L>R, also has necrotic ulcer on L and R. Heel base, L>R in size, no active discharge  Musculoskeletal: dec rom on R. leg, 2/2 recent operation, nl rom on left leg  Vascular: 2+ radial, femoral, DP/PT pulses B/L  Dermatologic: skin warm, dry and intact; no rashes, wounds, or scars  Lymphatic: no submandibular or cervical LAD  Neurologic: AAOx3; CNII-XII grossly intact; no focal deficits  Psychiatric: affect and characteristics of appearance, verbalizations, behaviors are appropriate VITAL SIGNS:  T(C): 36.8 (08-17-17 @ 23:51), Max: 37.8 (08-17-17 @ 19:48)  T(F): 98.3 (08-17-17 @ 23:51), Max: 100 (08-17-17 @ 19:48)  HR: 106 (08-17-17 @ 23:51) (95 - 113)  BP: 181/93 (08-17-17 @ 23:51) (170/85 - 195/110)  BP(mean): --  RR: 19 (08-17-17 @ 23:51) (16 - 20)  SpO2: 94% (08-17-17 @ 23:51) (94% - 98%)  Wt(kg): --    Physical Exam: .  VITAL SIGNS:  T(C): 36.3 (08-11-17 @ 19:37), Max: 36.7 (08-11-17 @ 14:47)  T(F): 97.3 (08-11-17 @ 19:37), Max: 98.1 (08-11-17 @ 14:47)  HR: 85 (08-11-17 @ 19:37) (85 - 100)  BP: 148/78 (08-11-17 @ 19:37) (130/78 - 148/78)  BP(mean): --  RR: 16 (08-11-17 @ 19:37) (16 - 18)  SpO2: 98% (08-11-17 @ 19:37) (95% - 98%)  Wt(kg): --    PHYSICAL EXAM:    Constitutional: WDWN resting comfortably in bed; NAD  Head: NC/AT  Eyes: PERRL, EOMI, clear conjunctiva  ENT: no nasal discharge; uvula midline, no oropharyngeal erythema or exudates; MMD  Neck: supple; no JVD or thyromegaly  Respiratory: CTABL  Cardiac: +S1/S2; RRR; no M/R/G;   Gastrointestinal: soft, NT/ND; no rebound or guarding; +BSx4  Genitourinary: normal external genitalia  Back: spine midline, no bony tenderness or step-offs; no CVAT B/L  Extremities:3+ pedal edema, bilateral L>R, also has necrotic ulcer on L and R. Heel base, L>R in size, no active discharge  Musculoskeletal: dec rom on R. leg, 2/2 recent operation, nl rom on left leg  Vascular: 2+ radial, femoral, DP/PT pulses B/L  Dermatologic: skin warm, dry and intact; no rashes, wounds, or scars  Lymphatic: no submandibular or cervical LAD  Neurologic: AAOx2 (person, place); CNII-XII grossly intact; no focal deficits  Psychiatric: affect and characteristics of appearance, verbalizations, behaviors are appropriate

## 2017-08-18 NOTE — H&P ADULT - NSHPLABSRESULTS_GEN_ALL_CORE
LABS:                         13.2   17.8  )-----------( 212      ( 17 Aug 2017 19:48 )             41.1         140  |  101  |  25<H>  ----------------------------<  115<H>  4.0   |  24  |  1.30    Ca    9.7      17 Aug 2017 19:48    TPro  6.7  /  Alb  3.5  /  TBili  0.3  /  DBili  x   /  AST  25  /  ALT  19  /  AlkPhos  89        Urinalysis Basic - ( 17 Aug 2017 20:38 )    Color: OTHER / Appearance: x / S.020 / pH: x  Gluc: x / Ketone: NEGATIVE  / Bili: NEGATIVE / Urobili: 0.2 E.U./dL   Blood: x / Protein: Trace mg/dL / Nitrite: NEGATIVE   Leuk Esterase: Small / RBC: Many /HPF / WBC 5-10 /HPF   Sq Epi: x / Non Sq Epi: Few /HPF / Bacteria: Present /HPF      Lactate, Blood: 1.2 mmoL/L ( @ 21:32)  Lactate, Blood: 2.5 mmoL/L ( @ 19:58)    RADIOLOGY, EKG & ADDITIONAL TESTS: Reviewed.

## 2017-08-18 NOTE — PROVIDER CONTACT NOTE (CHANGE IN STATUS NOTIFICATION) - ACTION/TREATMENT ORDERED:
Monitor patient for respiratory distress and inability to protect airway.  Assess for improved mental status.

## 2017-08-18 NOTE — CONSULT NOTE ADULT - SUBJECTIVE AND OBJECTIVE BOX
ICU CONSULT NOTE    HPI: Patient is a 78 yo M with PMH of CLL, BPH with chronic indwelling rodriguez, JARED 2/2 obstructive uropathy, recent right femoral neck fracture s/p hemiarthroplasty (5/17) complicated by PE (on Xarelto) and PNA (July 2017), who presented from [facility] with altered mental status and reported fever of 102. Per documentation, patient was recently discharged 3 days prior on Vanc and Zosyn for bilateral foot ulcers, had PICC line placed which was pulled out by patient 2 days ago and replaced in ED with DC back to [facility], now pulled out again by patient during this hospitalization. Patient was admitted to [facility] for AMS, possibly related to benzodiazepine withdrawal vs infectious vs toxic metabolic encephalopathy with acute onset agitation, tachycardia to 180, relative hypotension (SBP 170s on admission, 90s currently), for which a rapid response was called. Currently at bedside patient is acutely agitated and not following commands, unable to assess subjectively.    ROS:    PMH:    PSH:    SOCIAL HISTORY:    ALLERGIES:    HOME MEDICATIONS:    VITALS:    PHYSICAL EXAM:    LABORATORY:    RADIOLOGY:    ASSESSMENT AND PLAN: ICU CONSULT NOTE    HPI: Patient is a 76 yo M with PMH of CLL, BPH with chronic indwelling rodriguez, JARED 2/2 obstructive uropathy, recent right femoral neck fracture s/p hemiarthroplasty () complicated by PE (on Xarelto) and PNA (2017), who presented from [facility] with altered mental status and reported fever of 102. Per documentation, patient was recently discharged 3 days prior on Vanc and Zosyn for bilateral foot ulcers, had PICC line placed which was pulled out by patient 2 days ago and replaced in ED with DC back to [facility], now pulled out again by patient during this hospitalization. Patient was admitted to [facility] for AMS, possibly related to benzodiazepine withdrawal vs infectious vs toxic metabolic encephalopathy with acute onset agitation, tachycardia to 180, relative hypotension (SBP 170s on admission, 90s currently), for which a rapid response was called. Currently at bedside patient is acutely agitated and not following commands, unable to assess subjectively.      ROS: Patient seen and examined at bedside. Unable to assess subjectively as patient not following commands or questioning.       Patient History:   Past Medical History:  CLL (chronic lymphocytic leukemia)    Hip fracture requiring operative repair, right, sequela    PE (pulmonary thromboembolism).      Past Surgical History:  S/P hip hemiarthroplasty.      Family History:  No pertinent family history in first degree relatives.      SOCIAL HISTORY:  per initial H&P: Lives alone in an apartment, non-smoker, no EtOH, denies any illicit drug use other than Ambien from a friend      ALLERGIES:  No Known Allergies      HOME MEDICATIONS:  apixaban 5 mg oral tablet: 1 tab(s) orally every 12 hours  senna oral tablet: 2 tab(s) orally once a day (at bedtime)  lactobacillus acidophilus oral capsule:  orally   guaiFENesin 100 mg/5 mL oral liquid: 5 milliliter(s) orally every 6 hours, As needed, Cough  tamsulosin 0.4 mg oral capsule: 2 cap(s) orally once a day (at bedtime)  pantoprazole 40 mg oral delayed release tablet: 1 tab(s) orally once a day  polyethylene glycol 3350 oral powder for reconstitution: 17 gram(s) orally once a day  Zosyn 3 g-0.375 g/50 mL intravenous solution:  intravenous every 6 hours  vancomycin 1 g/250 mL-D5% intravenous solution:  intravenous every 12 hours    VITALS:  ICU Vital Signs Last 24 Hrs  T(C): 36.6 (18 Aug 2017 03:50), Max: 37.8 (17 Aug 2017 19:48)  T(F): 97.9 (18 Aug 2017 03:50), Max: 100 (17 Aug 2017 19:48)  HR: 186 (18 Aug 2017 03:50) (95 - 186)  BP: 112/68 (18 Aug 2017 03:50) (112/68 - 195/110)  BP(mean): --  ABP: --  ABP(mean): --  RR: 24 (18 Aug 2017 03:50) (16 - 24)  SpO2: 95% (18 Aug 2017 03:50) (94% - 98%)      PHYSICAL EXAM:  Gen: elderly appearing gentleman in moderate agitation, pulling and thrashing in bed, interval improvement during examination to resting comfrtably babbling nonsensical words  HEENT: dry mucus membranes, EOMI, PERRLA  CV: tachycardic, +S1/S2  Resp: decreased breath sounds, mildly tachypneic without accessory muscle use  Abd: soft, NTTP, +BS  Extr: 2+ pitting edema lower extremities L>R, bilateral necrotic ulcers on heel bases  MSK: L foot drop noted      LABORATORY:                        13.0   17.4  )-----------( 194      ( 18 Aug 2017 04:07 )             39.2     08-18    142  |  103  |  23  ----------------------------<  116<H>  3.5   |  20<L>  |  1.10    Ca    9.4      18 Aug 2017 04:07  Phos  3.9     08-18    TPro  6.5  /  Alb  3.6  /  TBili  0.6  /  DBili  x   /  AST  29  /  ALT  18  /  AlkPhos  85  08-18    PT/INR - ( 18 Aug 2017 04:10 )   PT: 14.9 sec;   INR: 1.33     PTT - ( 18 Aug 2017 04:10 )  PTT:29.0 sec    Urinalysis Basic - ( 17 Aug 2017 20:38 )    Color: OTHER / Appearance: x / S.020 / pH: x  Gluc: x / Ketone: NEGATIVE  / Bili: NEGATIVE / Urobili: 0.2 E.U./dL   Blood: x / Protein: Trace mg/dL / Nitrite: NEGATIVE   Leuk Esterase: Small / RBC: Many /HPF / WBC 5-10 /HPF   Sq Epi: x / Non Sq Epi: Few /HPF / Bacteria: Present /HPF      CARDIAC MARKERS ( 18 Aug 2017 04:07 )  x     / 0.03 ng/mL / 417 U/L / x     / 6.7 ng/mL      Lactate, Blood: 3.7 mmoL/L ( @ 04:08)  Lactate, Blood: 1.2 mmoL/L ( @ 21:32)  Lactate, Blood: 2.5 mmoL/L ( @ 19:58)    RADIOLOGY:  CXR: development of ?LLL PNA    EKG: SVT with T wave depressions in lateral leads    ASSESSMENT AND PLAN: Patient is a 76 yo M with PMH of CLL, BPH with chronic indwelling rodriguez, JARED 2/2 obstructive uropathy, recent right femoral neck fracture s/p hemiarthroplasty () complicated by PE (on Xarelto) and PNA (2017), who presented from [facility] with altered mental status and reported fever of 102, with rapid decompensation on RMF notable for tachycardia to 180s, extreme agitation and hypotension. ICU consulted for these reasons.    1. Tachycardia - likely SVT given , narrow complex rhythm. Upon arrival to MICU, small mount of Levophed bolus given, rhythm spontaneously converted to NSR. Etiology is possible secondary to volume depletion, likely physiologic response to this. Unlikely worsening pulmonary embolism as bedside Echo not consistent with RV straining pattern, however Echo consistent with low intravascular volume. Now s/p 3L NS since arrival  -Continue IVF NS @ 100cc/hr  -If redevelopment of SVT refractory to Levophed, would consider Adenosine    2. AMS - unclear etiology, however need to consider infectious cause (foot ulcerations appear clinically stable, vs ?LLL, vs ?UTI, vs meningitis although no meningeal signs on exam) vs benzodiazapine withdrawal given history of abuse, vs toxic metabolic encephalopathy   -Will continue Vanco, however will broaden from Zosyn to Cefepime, add Gentamicin x 1     3. Hypotension - now improved with peripheral Levophed running, will continue with close monitoring    Dispo: MICU ICU CONSULT NOTE    HPI: Patient is a 78 yo M with PMH of CLL, BPH with chronic indwelling rodriguez, JARED 2/2 obstructive uropathy, recent right femoral neck fracture s/p hemiarthroplasty () complicated by PE (on Xarelto) and PNA (2017), who presented from [facility] with altered mental status and reported fever of 102. Per documentation, patient was recently discharged 3 days prior on Vanc and Zosyn for bilateral foot ulcers, had PICC line placed which was pulled out by patient 2 days ago and replaced in ED with DC back to [facility], now pulled out again by patient during this hospitalization. Patient was admitted to [facility] for AMS, possibly related to benzodiazepine withdrawal vs infectious vs toxic metabolic encephalopathy with acute onset agitation, tachycardia to 180, relative hypotension (SBP 170s on admission, 90s currently), for which a rapid response was called. Currently at bedside patient is acutely agitated and not following commands, unable to assess subjectively.      ROS: Patient seen and examined at bedside. Unable to assess subjectively as patient not following commands or questioning.       Patient History:   Past Medical History:  CLL (chronic lymphocytic leukemia)    Hip fracture requiring operative repair, right, sequela    PE (pulmonary thromboembolism).      Past Surgical History:  S/P hip hemiarthroplasty.      Family History:  No pertinent family history in first degree relatives.      SOCIAL HISTORY:  per initial H&P: Lives alone in an apartment, non-smoker, no EtOH, denies any illicit drug use other than Ambien from a friend      ALLERGIES:  No Known Allergies      HOME MEDICATIONS:  apixaban 5 mg oral tablet: 1 tab(s) orally every 12 hours  senna oral tablet: 2 tab(s) orally once a day (at bedtime)  lactobacillus acidophilus oral capsule:  orally   guaiFENesin 100 mg/5 mL oral liquid: 5 milliliter(s) orally every 6 hours, As needed, Cough  tamsulosin 0.4 mg oral capsule: 2 cap(s) orally once a day (at bedtime)  pantoprazole 40 mg oral delayed release tablet: 1 tab(s) orally once a day  polyethylene glycol 3350 oral powder for reconstitution: 17 gram(s) orally once a day  Zosyn 3 g-0.375 g/50 mL intravenous solution:  intravenous every 6 hours  vancomycin 1 g/250 mL-D5% intravenous solution:  intravenous every 12 hours    VITALS:  ICU Vital Signs Last 24 Hrs  T(C): 36.6 (18 Aug 2017 03:50), Max: 37.8 (17 Aug 2017 19:48)  T(F): 97.9 (18 Aug 2017 03:50), Max: 100 (17 Aug 2017 19:48)  HR: 186 (18 Aug 2017 03:50) (95 - 186)  BP: 112/68 (18 Aug 2017 03:50) (112/68 - 195/110)  BP(mean): --  ABP: --  ABP(mean): --  RR: 24 (18 Aug 2017 03:50) (16 - 24)  SpO2: 95% (18 Aug 2017 03:50) (94% - 98%)      PHYSICAL EXAM:  Gen: elderly appearing gentleman in moderate agitation, pulling and thrashing in bed, interval improvement during examination to resting comfrtably babbling nonsensical words  HEENT: dry mucus membranes, EOMI, PERRLA  CV: tachycardic, +S1/S2  Resp: decreased breath sounds, mildly tachypneic without accessory muscle use  Abd: soft, NTTP, +BS  Extr: 2+ pitting edema lower extremities L>R, bilateral necrotic ulcers on heel bases  MSK: L foot drop noted      LABORATORY:                        13.0   17.4  )-----------( 194      ( 18 Aug 2017 04:07 )             39.2     08-18    142  |  103  |  23  ----------------------------<  116<H>  3.5   |  20<L>  |  1.10    Ca    9.4      18 Aug 2017 04:07  Phos  3.9     08-18    TPro  6.5  /  Alb  3.6  /  TBili  0.6  /  DBili  x   /  AST  29  /  ALT  18  /  AlkPhos  85  08-18    PT/INR - ( 18 Aug 2017 04:10 )   PT: 14.9 sec;   INR: 1.33     PTT - ( 18 Aug 2017 04:10 )  PTT:29.0 sec    Urinalysis Basic - ( 17 Aug 2017 20:38 )    Color: OTHER / Appearance: x / S.020 / pH: x  Gluc: x / Ketone: NEGATIVE  / Bili: NEGATIVE / Urobili: 0.2 E.U./dL   Blood: x / Protein: Trace mg/dL / Nitrite: NEGATIVE   Leuk Esterase: Small / RBC: Many /HPF / WBC 5-10 /HPF   Sq Epi: x / Non Sq Epi: Few /HPF / Bacteria: Present /HPF      CARDIAC MARKERS ( 18 Aug 2017 04:07 )  x     / 0.03 ng/mL / 417 U/L / x     / 6.7 ng/mL      Lactate, Blood: 3.7 mmoL/L ( @ 04:08)  Lactate, Blood: 1.2 mmoL/L ( @ 21:32)  Lactate, Blood: 2.5 mmoL/L ( @ 19:58)    RADIOLOGY:  CXR: development of ?LLL PNA    EKG: SVT with T wave depressions in lateral leads    ASSESSMENT AND PLAN: Patient is a 78 yo M with PMH of CLL, BPH with chronic indwelling rodriguez, JARED 2/2 obstructive uropathy, recent right femoral neck fracture s/p hemiarthroplasty () complicated by PE (on Xarelto) and PNA (2017), who presented from [facility] with altered mental status and reported fever of 102, with rapid decompensation on RMF notable for tachycardia to 180s, extreme agitation and hypotension. ICU consulted for these reasons.    1. Tachycardia - likely SVT given , narrow complex rhythm. Upon arrival to MICU, small mount of Levophed bolus given, rhythm spontaneously converted to NSR. Etiology is possible secondary to volume depletion, likely physiologic response to this. Unlikely worsening pulmonary embolism as bedside Echo not consistent with RV straining pattern, however Echo consistent with low intravascular volume. Now s/p 3L NS since arrival  -Continue IVF NS @ 100cc/hr  -If redevelopment of SVT refractory to Levophed, would consider Adenosine    2. AMS - unclear etiology, however need to consider infectious cause (foot ulcerations appear clinically stable, vs ?LLL, vs ?UTI, vs meningitis although no meningeal signs on exam) vs benzodiazapine withdrawal given history of abuse, vs toxic metabolic encephalopathy   -Per prior documentation (Dr Sterling Pérez on 8/15/2017) reports of patient being alert and oriented x 3. His mental status currently is an acute change from this, warranting an urgent CT head without contrast to r/o intracranial pathology (PE/DVT - clots, vs hemorrhage as pt on AC)    3. Hypotension - now improved with peripheral Levophed running, improvement following aggressive fluid resuscitation  -IVF @ 100cc/hr  -Levophed at bedside on standby     Dispo: MICU

## 2017-08-18 NOTE — DIETITIAN INITIAL EVALUATION ADULT. - OTHER INFO
Patient is a 76 yo M with PMH of CLL, BPH with chronic indwelling rodriguez, JARED 2/2 obstructive uropathy, recent right femoral neck fracture s/p hemiarthroplasty (5/17) complicated by PE (on Xarelto) and PNA (July 2017), who presented from [facility] with altered mental status and reported fever of 102, with rapid decompensation on RMF notable for tachycardia to 180s, extreme agitation and hypotension. Currently NPO.

## 2017-08-18 NOTE — CONSULT NOTE ADULT - SUBJECTIVE AND OBJECTIVE BOX
Patient is a 77y old  Male who presents with a chief complaint of AMS from NH (18 Aug 2017 00:27)        HPI:  When examined, patient was AAOx1-2, but very anxious, and does not remember what brought him in. He keeps repeating "need to take care of things"     78 yo male with history of CLL, BPH, insomnia, JARED 2/2 obstructive uropathy(developed after operation for fracture, resolved, on chronic rodriguez follows Dr. Leigh urology ),  recent right femoral neck fracture s/p hemiarthroplasty (may 2017) complicated by PE (on Xarelto) and pneumonia (July 2017) presents from [facility] for altered mental status and fever of 102. While here, he has been acutely confused, tremulous, but pleasant. He denies any HA, CP, SOB, nausea/vomiting.     Slight agitated and confused- no clinical seizures    In ED: ICU Vital Signs Last 24 Hrs  T(C): 37.1 (18 Aug 2017 02:07), Max: 37.8 (17 Aug 2017 19:48)  T(F): 98.7 (18 Aug 2017 02:07), Max: 100 (17 Aug 2017 19:48)  HR: 114 (18 Aug 2017 02:07) (95 - 114)  BP: 174/78 (18 Aug 2017 02:07) (170/85 - 195/110)  BP(mean): --  ABP: --  ABP(mean): --  RR: 19 (18 Aug 2017 02:07) (16 - 20)  SpO2: 97% (18 Aug 2017 02:07) (94% - 98%)      He was given dose of Vanco/Zosyn. Norvasc, Tylenol. (18 Aug 2017 00:27)      Allergies  No Known Allergies      Health Issues  UTI  No h/o HF  No pertinent family history in first degree relatives  Handoff  MEWS Score  Hip fracture requiring operative repair, right, sequela  PE (pulmonary thromboembolism)  CLL (chronic lymphocytic leukemia)  UTI (urinary tract infection)  Altered mental status  Nutrition, metabolism, and development symptoms  Need for prophylactic measure  CLL (chronic lymphocytic leukemia)  PE (pulmonary thromboembolism)  Hip fracture requiring operative repair, right, sequela  Heel ulcer  UTI (urinary tract infection)  Sepsis  S/P hip hemiarthroplasty  FEVER  1  Fever        FAMILY HISTORY:  No pertinent family history in first degree relatives      MEDICATIONS  (STANDING):  tamsulosin 0.8 milliGRAM(s) Oral at bedtime  lactobacillus acidophilus 1 Tablet(s) Oral daily  heparin  Infusion 1500 Unit(s)/Hr (15 mL/Hr) IV Continuous <Continuous>  clonazePAM Tablet 1 milliGRAM(s) Oral every 12 hours  potassium chloride   Powder 40 milliEquivalent(s) Oral once  thiamine 100 milliGRAM(s) Oral daily  folic acid 1 milliGRAM(s) Oral daily  piperacillin/tazobactam IVPB. 3.375 Gram(s) IV Intermittent every 6 hours  potassium chloride   Powder 20 milliEquivalent(s) Oral once  multivitamin 1 Tablet(s) Oral daily  sodium chloride 0.9% 1000 milliLiter(s) (100 mL/Hr) IV Continuous <Continuous>  pantoprazole    Tablet 40 milliGRAM(s) Oral before breakfast    MEDICATIONS  (PRN):      PAST MEDICAL & SURGICAL HISTORY:  Hip fracture requiring operative repair, right, sequela  PE (pulmonary thromboembolism)  CLL (chronic lymphocytic leukemia)  S/P hip hemiarthroplasty      Labs                          12.5   18.5  )-----------( 190      ( 18 Aug 2017 08:30 )             39.3     08-18    139  |  102  |  20  ----------------------------<  123<H>  3.2<L>   |  24  |  1.00    Ca    8.9      18 Aug 2017 08:30  Phos  3.2     08-18  Mg     2.3     08-18    TPro  6.1  /  Alb  3.4  /  TBili  0.5  /  DBili  x   /  AST  32  /  ALT  17  /  AlkPhos  75  08-18      Radiology:    Physical Exam    MENTAL STATUS  -Level of Consciousness- awake and confused    Orientation- person only  Language- aphasia/ dysarthria  Memory- recent and remote poor      Cranial Nerve 1- 12  Pupils- equal and reactive  Eye movements-full  Facial - no asymmetry   Lower CN-nl    Gait and Station-n/a    MOTOR  Upper-no focal weakness  Lower-no foot drop    Reflexes- decreased    Sensation-n/a    Cerebellar- no dysmetria    vascular -no bruits    Assessment- Delirium- R/O toxic metabolic     Plan CT head- Routine EEG,    Rx multivitamins and thiamine
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
## 2017-08-18 NOTE — CHART NOTE - NSCHARTNOTEFT_GEN_A_CORE
PGY-2 EVENT NOTE  RAPID RESPONSE    At 2:00AM, patient was seen by myself after being told of increased agitation by RN. After examining the patient at that time, T 98.7, 114, 174/78, 19RR, 97% RA. Patient was AAOx1-2 (knew his name and where he was, just didn't know why, nor did he know the date), he was speaking to himself in full sentences- seemed to be responding to external stimuli, and tremulous, with + tongue fasciculations, diaphoresis. After thorough review of the medical record, it was noted that on last admission, a current history of benzodiazapine abuse was present. So in that setting, I attempted to calculate a modified CIWA-B (as he could not answer any of the specific questions at the time, although he was able to do serial additions, but was anxious about "taking care of things at home"), which scored out to 10 based only on physical exam questions. CIWA-Ar was also calculated as a surrogate for withdrawal and was also elevated at 20 (agitations, anxiety, nausea, tremulousness, tactile and auditory hallucinations). The decision at that time was to give Ativan 1mg IV. After reassessing the patient after the Ativan, his exam was essentially unchanged, although minimally more relaxed. His BP, although not recorded in the EMR, was 168/80  at that time.      At 3:50AM, called by RN for worsening AMS, and repeat vitals showing a  and . Shortly after arriving to see patient, RR was called.   EKG was done which showed normal to wide complex tachycardia, questioned for SVT with aberrant conduction, but unclear in setting of baseline RBBB and fast rate. 1L NS was given as BP continued to drop as the HR remained fast.   During RR, SBP ranged between . This is hypotensive for the patient as his previously recorded BPs were in 170s-180s.   As patient arrived altered with documented fever at NH, abx coverage was broadened for concern of acute bacterial meningitis, and he was ordered for Ceftriaxone, Ampicillin, and Acyclovir- of which he only received Ceftriaxone 2GM, before being transferred to MICU for rapidly declining clinical status and possible need for pressor support, cardioversion, closer monitoring.      Michael Rodarte, PGY2  [id number] PGY-2 EVENT NOTE  RAPID RESPONSE    At 2:00AM, patient was seen by myself after being told of increased agitation by RN. After examining the patient at that time, T 98.7, 114, 174/78, 19RR, 97% RA. Patient was AAOx1-2 (knew his name and where he was, just didn't know why, nor did he know the date), he was speaking to himself in full sentences- seemed to be responding to external stimuli, and tremulous, with + tongue fasciculations, diaphoresis. After thorough review of the medical record, it was noted that on last admission, a current history of benzodiazapine abuse was present. So in that setting, I attempted to calculate a modified CIWA-B (as he could not answer any of the specific questions at the time, although he was able to do serial additions, but was anxious about "taking care of things at home"), which scored out to 10 based only on physical exam questions. CIWA-Ar was also calculated as a surrogate for withdrawal and was also elevated at 20 (agitations, anxiety, nausea, tremulousness, tactile and auditory hallucinations). The decision at that time was to give Ativan 1mg IV. After reassessing the patient after the Ativan, his exam was essentially unchanged, although minimally more relaxed. His BP, although not recorded in the EMR, was 168/80  at that time.      At 3:50AM, called by RN for worsening AMS, and repeat vitals showing a  and . Shortly after arriving to see patient, RR was called.   EKG was done which showed narrow to normal complex, questioned for SVT, but unclear in setting of baseline RBBB and fast rate. 1L NS was given as BP continued to drop as the HR remained fast.   During RR, SBP ranged between . This is hypotensive for the patient as his previously recorded BPs were in 170s-180s.   As patient arrived altered with documented fever at NH, abx coverage was broadened for concern of acute bacterial meningitis, and he was ordered for Ceftriaxone, Ampicillin, and Acyclovir- of which he only received Ceftriaxone 2GM, before being transferred to MICU for rapidly declining clinical status and possible need for pressor support, cardioversion, closer monitoring.      Michael Rodarte, PGY2  [phone]

## 2017-08-18 NOTE — H&P ADULT - PROBLEM SELECTOR PLAN 4
B/L heel ulcers, sent home from [facility] 8/15 with IV Vanc/Zosyn for these ulcers. He had PICC line placed, and then replaced yesterday due to clotting, in which he pulled out again today.  Continue on abx

## 2017-08-18 NOTE — CONSULT NOTE ADULT - SUBJECTIVE AND OBJECTIVE BOX
REASON FOR CONSULT:    HISTORY OF PRESENT ILLNESS: 78 yo M with PMH of CLL, BPH with chronic indwelling rodriguez, JARED 2/2 obstructive uropathy, recent right femoral neck fracture s/p hemiarthroplasty (5/17) complicated by PE (on Xarelto) and PNA (July 2017), who presented from [facility] with altered mental status and reported fever of 102. Per documentation, patient was recently discharged 3 days prior on Vanc and Zosyn for bilateral foot ulcers, had PICC line placed which was pulled out by patient 2 days ago and replaced in ED with DC back to [facility], now pulled out again by patient during this hospitalization. Patient was admitted to [facility] for AMS, possibly related to benzodiazepine withdrawal vs infectious vs toxic metabolic encephalopathy with acute onset agitation, tachycardia to 180, relative hypotension (SBP 170s on admission, 90s currently), for which a rapid response was called. Currently at bedside patient is acutely agitated and not following commands, unable to assess subjectively.    PAST MEDICAL & SURGICAL HISTORY:  Hip fracture requiring operative repair, right, sequela  PE (pulmonary thromboembolism)  CLL (chronic lymphocytic leukemia)  S/P hip hemiarthroplasty      [ ] Diabetes   [ ] Hypertension  [ ] Hyperlipidemia  [ ] CAD  [ ] PCI  [ ] CABG    PREVIOUS DIAGNOSTIC TESTING:    [ ] Echocardiogram:  [ ]  Catheterization:  [ ] Stress Test:  	    MEDICATIONS:  tamsulosin 0.8 milliGRAM(s) Oral at bedtime    piperacillin/tazobactam IVPB. 3.375 Gram(s) IV Intermittent every 6 hours  vancomycin  IVPB 1000 milliGRAM(s) IV Intermittent every 12 hours        pantoprazole    Tablet 40 milliGRAM(s) Oral before breakfast      thiamine 100 milliGRAM(s) Oral daily  folic acid 1 milliGRAM(s) Oral daily  multivitamin 1 Tablet(s) Oral daily  sodium chloride 0.9% 1000 milliLiter(s) IV Continuous <Continuous>  heparin  Infusion 1200 Unit(s)/Hr IV Continuous <Continuous>      FAMILY HISTORY:  No pertinent family history in first degree relatives      SOCIAL HISTORY:    [ ] Non-smoker  [ ] Smoker  [ ] Alcohol    Allergies    No Known Allergies    Intolerances    	    REVIEW OF SYSTEMS:    [x] as per HPI  CONSTITUTIONAL: No fever, weight loss, or fatigue  ENT:  No difficulty hearing, tinnitus, vertigo; No sinus or throat pain  RESPIRATORY: No cough, wheezing, chills or hemoptysis; No Shortness of Breath  CARDIOVASCULAR: No chest pain, palpitations, dizziness, or leg swelling  GASTROINTESTINAL: No abdominal or epigastric pain. No nausea, vomiting, or hematemesis; No diarrhea or constipation. No melena or hematochezia.  GENITOURINARY: No dysuria, frequency, hematuria, or incontinence  NEUROLOGICAL: No headaches, memory loss, loss of strength, numbness, or tremors  MUSCULOSKELETAL: No joint pain or swelling; No muscle, back, or extremity pain  [x] All others negative	  [ ] Unable to obtain    PHYSICAL EXAM:  T(C): 37.4 (08-18-17 @ 13:01), Max: 37.8 (08-17-17 @ 19:48)  HR: 70 (08-18-17 @ 14:00) (70 - 186)  BP: 128/73 (08-18-17 @ 14:00) (99/73 - 195/110)  RR: 23 (08-18-17 @ 14:00) (16 - 55)  SpO2: 99% (08-18-17 @ 14:00) (93% - 100%)  Wt(kg): --  I&O's Summary    17 Aug 2017 07:01  -  18 Aug 2017 07:00  --------------------------------------------------------  IN: 678 mL / OUT: 325 mL / NET: 353 mL    18 Aug 2017 07:01  -  18 Aug 2017 15:09  --------------------------------------------------------  IN: 1002 mL / OUT: 450 mL / NET: 552 mL        Appearance: Normal	  HEENT:   Normal oral mucosa, PERRL, EOMI	  Lymphatic: No lymphadenopathy  Cardiovascular: Normal S1 S2, No JVD, No murmurs, No edema  Respiratory: Lungs clear to auscultation	  Psychiatry: A & O x 3, Mood & affect appropriate  Gastrointestinal:  Soft, Non-tender, + BS	  Skin: No rashes, No ecchymoses, No cyanosis	  Neurologic: Non-focal  Extremities: Normal range of motion, No clubbing, cyanosis or edema  Vascular: Peripheral pulses palpable 2+ bilaterally    TELEMETRY: 	  SVT, sinus tach, NSR  ECG:  < from: 12 Lead ECG (08.18.17 @ 09:28) >  Diagnosis Line Normal sinus rhythm  Left axis deviation  Inferior infarct , age undetermined  Possible Anterolateral infarct , age undetermined    < end of copied text >    ECHO:< from: Echocardiogram (06.05.17 @ 16:18) >   ventricular size and wall thickness. The left ventricular wall motion is   normal.  The left ventricular ejection fraction is normal. The left   ventricular ejection fraction is 65%.  The left atrial size is normal.   Right   atrium not well visualized.The right ventricle is not well visualized.   Probably normal right ventricular size and function.  No evidence for any   hemodynamically significant valvular disease.There is no   echocardiographic   evidence for pulmonary hypertension. The pulmonary artery systolic   pressure is   estimated to be 20 mmHg.  The inferior vena cava is normal in size (<2.1   cm)   with normal inspiratory collapse (>50%) consistent with normal right   atrial   pressure.  No aortic root dilatation.Left pleural effusion noted. There   is no   pericardial effusion.    < end of copied text >    STRESS:  CATH:  	  RADIOLOGY:  CXR:  CT:  US:   	  	  LABS:	 	    CARDIAC MARKERS:                                  12.5   18.5  )-----------( 190      ( 18 Aug 2017 08:30 )             39.3     08-18    139  |  102  |  20  ----------------------------<  123<H>  3.2<L>   |  24  |  1.00    Ca    8.9      18 Aug 2017 08:30  Phos  3.2     08-18  Mg     2.3     08-18    TPro  6.1  /  Alb  3.4  /  TBili  0.5  /  DBili  x   /  AST  32  /  ALT  17  /  AlkPhos  75  08-18    proBNP:   Lipid Profile:   HgA1c:   TSH:     ASSESSMENT/PLAN: 	  tachyarrhythmia - likely AT given strips, no afib, now in NSR.     mall mount of Levophed bolus given, rhythm spontaneously converted to NSR. Etiology is possible secondary to volume depletion, likely physiologic response to this. Unlikely worsening pulmonary embolism as bedside Echo not consistent with RV straining pattern, however Echo consistent with low intravascular volume. Now s/p 3L NS since arrival  -Continue IVF NS @ 100cc/hr  -If redevelopment of SVT refractory to Levophed, would consider Adenosine

## 2017-08-18 NOTE — H&P ADULT - PROBLEM SELECTOR PLAN 1
Vanco trouigh AM  Zosyn   UA +  Could be bacteremia from PICC line  Unclear source Patient presented with fever 102 (has not been febrile here) and altered mental status. After searching through previous admission, patient had been found to have tolerance and was abusing benzodiazepines and was subsequently stopped off of Xanax last week.   AMS at this time is of unclear etiology, but differential does include toxic metabolic encephalopathy in setting of UTI, Heel ulcer infections, PICC line replacements, vs. benzodiazapine withdrawal.   CIWA-B(Benzo) score of 9.   UTox ordered and to be drawn before giving benzos, follow up  Will give ativan IV 0.5mg to see if responds well, and can up-titrate after.   Will also continue treatment with Vancomycin and Zosyn.   Unclear when next due trough for Vanco is, so will have random level drawn at 10AM and dose accordingly.   Continue on Zosyn 3.375 q6hrs

## 2017-08-18 NOTE — PROVIDER CONTACT NOTE (CHANGE IN STATUS NOTIFICATION) - ASSESSMENT
Patient obtunded.  Arousing to vigorous painful stimulation and only grunting when stimulated.  Received 1mg Clonopin, 25mg of Seroquel and 1mg Ativan IV to be still for CT scan.  Maintaining O2 saturation and not in respiratory distress.

## 2017-08-19 DIAGNOSIS — I47.1 SUPRAVENTRICULAR TACHYCARDIA: ICD-10-CM

## 2017-08-19 DIAGNOSIS — N17.9 ACUTE KIDNEY FAILURE, UNSPECIFIED: ICD-10-CM

## 2017-08-19 DIAGNOSIS — R41.82 ALTERED MENTAL STATUS, UNSPECIFIED: ICD-10-CM

## 2017-08-19 DIAGNOSIS — R41.0 DISORIENTATION, UNSPECIFIED: ICD-10-CM

## 2017-08-19 LAB
ANION GAP SERPL CALC-SCNC: 13 MMOL/L — SIGNIFICANT CHANGE UP (ref 5–17)
BUN SERPL-MCNC: 14 MG/DL — SIGNIFICANT CHANGE UP (ref 7–23)
CALCIUM SERPL-MCNC: 9.1 MG/DL — SIGNIFICANT CHANGE UP (ref 8.4–10.5)
CHLORIDE SERPL-SCNC: 101 MMOL/L — SIGNIFICANT CHANGE UP (ref 96–108)
CO2 SERPL-SCNC: 23 MMOL/L — SIGNIFICANT CHANGE UP (ref 22–31)
CREAT SERPL-MCNC: 0.8 MG/DL — SIGNIFICANT CHANGE UP (ref 0.5–1.3)
GLUCOSE SERPL-MCNC: 87 MG/DL — SIGNIFICANT CHANGE UP (ref 70–99)
HCT VFR BLD CALC: 35.7 % — LOW (ref 39–50)
HGB BLD-MCNC: 11.4 G/DL — LOW (ref 13–17)
MAGNESIUM SERPL-MCNC: 2 MG/DL — SIGNIFICANT CHANGE UP (ref 1.6–2.6)
MCHC RBC-ENTMCNC: 27.7 PG — SIGNIFICANT CHANGE UP (ref 27–34)
MCHC RBC-ENTMCNC: 31.9 G/DL — LOW (ref 32–36)
MCV RBC AUTO: 86.9 FL — SIGNIFICANT CHANGE UP (ref 80–100)
PLATELET # BLD AUTO: 141 K/UL — LOW (ref 150–400)
POTASSIUM SERPL-MCNC: 3.6 MMOL/L — SIGNIFICANT CHANGE UP (ref 3.5–5.3)
POTASSIUM SERPL-SCNC: 3.6 MMOL/L — SIGNIFICANT CHANGE UP (ref 3.5–5.3)
RBC # BLD: 4.11 M/UL — LOW (ref 4.2–5.8)
RBC # FLD: 15.9 % — SIGNIFICANT CHANGE UP (ref 10.3–16.9)
SODIUM SERPL-SCNC: 137 MMOL/L — SIGNIFICANT CHANGE UP (ref 135–145)
WBC # BLD: 9.6 K/UL — SIGNIFICANT CHANGE UP (ref 3.8–10.5)
WBC # FLD AUTO: 9.6 K/UL — SIGNIFICANT CHANGE UP (ref 3.8–10.5)

## 2017-08-19 PROCEDURE — 99232 SBSQ HOSP IP/OBS MODERATE 35: CPT

## 2017-08-19 PROCEDURE — 71010: CPT | Mod: 26

## 2017-08-19 RX ORDER — TRAZODONE HCL 50 MG
50 TABLET ORAL EVERY 24 HOURS
Qty: 0 | Refills: 0 | Status: DISCONTINUED | OUTPATIENT
Start: 2017-08-19 | End: 2017-08-22

## 2017-08-19 RX ORDER — POTASSIUM CHLORIDE 20 MEQ
40 PACKET (EA) ORAL ONCE
Qty: 0 | Refills: 0 | Status: COMPLETED | OUTPATIENT
Start: 2017-08-19 | End: 2017-08-19

## 2017-08-19 RX ORDER — APIXABAN 2.5 MG/1
5 TABLET, FILM COATED ORAL EVERY 12 HOURS
Qty: 0 | Refills: 0 | Status: DISCONTINUED | OUTPATIENT
Start: 2017-08-19 | End: 2017-08-22

## 2017-08-19 RX ORDER — TRAZODONE HCL 50 MG
50 TABLET ORAL AT BEDTIME
Qty: 0 | Refills: 0 | Status: DISCONTINUED | OUTPATIENT
Start: 2017-08-19 | End: 2017-08-19

## 2017-08-19 RX ADMIN — Medication 100 MILLIGRAM(S): at 13:34

## 2017-08-19 RX ADMIN — Medication 1 TABLET(S): at 13:34

## 2017-08-19 RX ADMIN — Medication 40 MILLIEQUIVALENT(S): at 13:34

## 2017-08-19 RX ADMIN — APIXABAN 5 MILLIGRAM(S): 2.5 TABLET, FILM COATED ORAL at 18:05

## 2017-08-19 RX ADMIN — Medication 50 MILLIGRAM(S): at 23:09

## 2017-08-19 RX ADMIN — Medication 50 MILLIGRAM(S): at 21:12

## 2017-08-19 RX ADMIN — PANTOPRAZOLE SODIUM 40 MILLIGRAM(S): 20 TABLET, DELAYED RELEASE ORAL at 07:16

## 2017-08-19 RX ADMIN — APIXABAN 5 MILLIGRAM(S): 2.5 TABLET, FILM COATED ORAL at 07:16

## 2017-08-19 RX ADMIN — Medication 1 MILLIGRAM(S): at 09:53

## 2017-08-19 RX ADMIN — TAMSULOSIN HYDROCHLORIDE 0.8 MILLIGRAM(S): 0.4 CAPSULE ORAL at 21:12

## 2017-08-19 RX ADMIN — Medication 1 MILLIGRAM(S): at 13:34

## 2017-08-19 NOTE — PROGRESS NOTE ADULT - PROBLEM SELECTOR PLAN 3
-Hx of PE on eqliuis switched to Hep during admission given AMS   - Last PTT 73, pt on Hep 12ml/hr on arrival to unit  - Pt stable, AOx3 and passed dysphagia screening. Will stop Hep drip and switch to home Xarelto 5mg BID -Hx of PE on Eliquis, switched to Hep during admission given AMS   - Last PTT 73, pt on Hep 12ml/hr on arrival to unit  - Pt stable, AOx3 and passed dysphagia screening. Will stop Hep drip and switch to home Eliquis 5mg BID

## 2017-08-19 NOTE — BEHAVIORAL HEALTH ASSESSMENT NOTE - NSBHREFERDETAILS_PSY_A_CORE_FT
Patient with history of daily xanax use -with s/sx of benzo withdrawal (inc tachy, tremor, tongue fasciculation); asked for consult to help with taper regimen and for issue insomnia

## 2017-08-19 NOTE — PROGRESS NOTE ADULT - SUBJECTIVE AND OBJECTIVE BOX
Chief Complaint/Reason for Consult: cv mgmt  INTERVAL HPI: events noted for change to oral AC and step down to regional, tolerating po  	  MEDICATIONS:  tamsulosin 0.8 milliGRAM(s) Oral at bedtime        clonazePAM Tablet 1 milliGRAM(s) Oral every 12 hours    pantoprazole    Tablet 40 milliGRAM(s) Oral before breakfast      thiamine 100 milliGRAM(s) Oral daily  folic acid 1 milliGRAM(s) Oral daily  multivitamin 1 Tablet(s) Oral daily  apixaban 5 milliGRAM(s) Oral every 12 hours      REVIEW OF SYSTEMS:  [x] As per HPI  CONSTITUTIONAL: No fever, weight loss, or fatigue  RESPIRATORY: No cough, wheezing, chills or hemoptysis; No Shortness of Breath  CARDIOVASCULAR: No chest pain, palpitations, dizziness, or leg swelling  GASTROINTESTINAL: No abdominal or epigastric pain. No nausea, vomiting, or hematemesis; No diarrhea or constipation. No melena or hematochezia.  MUSCULOSKELETAL: No joint pain or swelling; No muscle, back, or extremity pain  [x] All others negative	  [ ] Unable to obtain    PHYSICAL EXAM:  T(C): 36.4 (08-19-17 @ 09:19), Max: 37.4 (08-18-17 @ 13:01)  HR: 91 (08-19-17 @ 09:19) (66 - 96)  BP: 142/67 (08-19-17 @ 09:19) (104/61 - 165/74)  RR: 16 (08-19-17 @ 09:19) (16 - 33)  SpO2: 95% (08-19-17 @ 09:19) (94% - 100%)  Wt(kg): --  I&O's Summary    18 Aug 2017 07:01  -  19 Aug 2017 07:00  --------------------------------------------------------  IN: 1898 mL / OUT: 2800 mL / NET: -902 mL          Appearance: Normal	  HEENT:   Normal oral mucosa  Cardiovascular: Normal S1 S2, No JVD, No murmurs, No edema  Respiratory: Lungs clear to auscultation	  Gastrointestinal:  Soft, Non-tender, + BS	  Extremities: Normal range of motion, No clubbing, cyanosis or edema  Vascular: Peripheral pulses palpable 2+ bilaterally    TELEMETRY: 	    ECG:    	  RADIOLOGY:   CXR:  CT:  US:    CARDIAC TESTING:  Echocardiogram:  Catheterization:  Stress Test:      LABS:	 	    CARDIAC MARKERS:                                  11.4   9.6   )-----------( 141      ( 19 Aug 2017 06:33 )             35.7     08-19    137  |  101  |  14  ----------------------------<  87  3.6   |  23  |  0.80    Ca    9.1      19 Aug 2017 06:35  Phos  3.2     08-18  Mg     2.0     08-19    TPro  6.1  /  Alb  3.4  /  TBili  0.5  /  DBili  x   /  AST  32  /  ALT  17  /  AlkPhos  75  08-18    proBNP:   Lipid Profile:   HgA1c:   TSH:     ASSESSMENT/PLAN: 	    Problem: Narrow complex tachycardia.  Plan: -Pt tachycardic to 180s on floor with low BP. s.p IVF Bolus and Levophed. Stable in MICU expect tachycardia during agitation episodes.   - Will monitor.     Problem: PE (pulmonary thromboembolism).  Plan: -Hx of PE on Eliquis, switched to Hep during admission given AMS   - Last PTT 73, pt on Hep 12ml/hr on arrival to unit  - Pt stable, AOx3 and passed dysphagia screening. Will stop Hep drip and switch to home Eliquis 5mg BID.

## 2017-08-19 NOTE — BEHAVIORAL HEALTH ASSESSMENT NOTE - NSBHCHARTREVIEWIMAGING_PSY_A_CORE FT
CT head 8/18:  IMPRESSION:      1. No CT evidence of acute intracranial hemorrhage and no apparent acute   abnormality.  2. Chronic microangiopathic disease and age-appropriate volume loss.

## 2017-08-19 NOTE — BEHAVIORAL HEALTH ASSESSMENT NOTE - HPI (INCLUDE ILLNESS QUALITY, SEVERITY, DURATION, TIMING, CONTEXT, MODIFYING FACTORS, ASSOCIATED SIGNS AND SYMPTOMS)
76yo M with no past psychiatric history, with med history of CLL, BPH, s/p Rt femoral neck fx (with PE and PNA) who presented from nursing home with AMS with fever, JARED - noted to have tachycardia, with tremors, tongue fasciculation - suspected for benzo withdrawal (given reported history of daily xanax use for insomnia and given responsiveness to benzo).     Patient was seen and evaluated at bedside. Patient reported lifelong issue of insomnia - falling asleep and maintaining sleep. Patient reported that about 3 months ago he started to get xanax from a friend - would take 2 pills of xanax nightly (unsure of strengths - believed to have been either 1mg or 2mg total). Patient denied any depressive mood or symptoms including issues of appetite, energy, attention, concentration, hopelessness, helplessness. He denied any SI/intent or plan. He denied issues with anxiety (including current state) inc issues of panic attacks. Patient denied any manic symptoms. He denied any HI/AVH.      PRN: received on 8/18: total of ativan 3mg IVP - was started on klonopin 1mg q12hr, received 2 doses (one dose in the am of 8/19)

## 2017-08-19 NOTE — BEHAVIORAL HEALTH ASSESSMENT NOTE - NSBHSUICPROTECTFACT_PSY_A_CORE
Supportive social network or family/Future oriented/Identifies reasons for living/Responsibility to family and others

## 2017-08-19 NOTE — BEHAVIORAL HEALTH ASSESSMENT NOTE - ADDITIONAL DETAILS / COMMENTS
AAOx3, 3/3 reg, 3/3 recall, intact calc, intact attention/conc with mos of the year backwards, intact fund of knowledge

## 2017-08-19 NOTE — BEHAVIORAL HEALTH ASSESSMENT NOTE - NSBHCHARTREVIEWINVESTIGATE_PSY_A_CORE FT
EK/18  Diagnosis Line Normal sinus rhythm  Left axis deviation  Inferior infarct , age undetermined  Possible Anterolateral infarct , age undetermined

## 2017-08-19 NOTE — PROGRESS NOTE ADULT - ASSESSMENT
77M PMH CLL, BPH (chronic Rodriguez POA), JARED 2/2 obstructive uropathy, recent right femoral neck hip fx (s/p hemiarthroplasty 5/17) c/b PE (on Xarelto) and PNA (July 2017) admitted with AMS and s/p MICU transfer for increased agitation w/ narrow complex tachycardia >180 and hypotension. Etiology of AMS likely Benzo withdrawal from unprescribed Xanax intake.

## 2017-08-19 NOTE — PROGRESS NOTE ADULT - PROBLEM SELECTOR PLAN 2
-Pt tachycardic to 180s on floor with low BP. Stable in MICU expect tachycardia during agitation episodes.   - Will monitor -Pt tachycardic to 180s on floor with low BP. s.p IVF Bolus and Levophed. Stable in MICU expect tachycardia during agitation episodes.   - Will monitor

## 2017-08-19 NOTE — BEHAVIORAL HEALTH ASSESSMENT NOTE - SUMMARY
76yo M with no past psychiatric history (no hospitalization, no medication, no SA) with med issues including CLL, BPH, recent Rt femur fx who presented from physical rehab with AMS. Patient was suspected to be undergoing benzo withdraw as exhibited s/sx inc tachycardic, tremors and tongue fasciculation - responded to IV ativan. Patient's currently noted to be euthymic, with appropriate affect with linear thought process without any reported or observed s/sx of depression, aaron, anxiety or psychosis. Patient's cognitive intact without s/sx of acute delirium. On exam, patient is noted to have very slight tremor of his hands but no tongue fasciculation and no VS abnormalities - no strong s/sx of current benzo withdrawal. For now, given he's been started on klonopin 1mg bid - will recommend switch to librium taper regimen with prn for s/sx of breakthrough w/d. It cannot be fully ruled out that his presenting AMS might have other medical etiologies given his medical comorbidities (including presenting JARED, fever). For his ongoing reported insomnia, will recommend trial of trazodone to address his insomnia.

## 2017-08-19 NOTE — PROGRESS NOTE ADULT - SUBJECTIVE AND OBJECTIVE BOX
HOSPITAL COURSE: 77M with PMH of CLL, BPH with chronic indwelling rodriguez, JARED 2/2 obstructive uropathy, recent right femoral neck fx s/p hemiarthroplasty () complicated by PE (on Xarelto) and PNA (2017), who presented from [facility] w/ AMS and fever. Patient was recently discharged 3 days prior on Vanc and Zosyn for bilateral foot ulcers, had PICC line placed which was pulled out by patient 2 days ago and replaced in ED with d/c back to rehab, now pulled out again by patient during this hospitalization. Patient was admitted to [facility] for AMS, possibly related to benzodiazepine withdrawal vs infectious vs toxic metabolic encephalopathy with acute onset agitation, tachycardia to 180, relative hypotension (SBP 170s on admission, 90s currently), for which a rapid response was called. Patient was transferred to MICU in setting of tachycardia and hypotension. He was started on Klonopin 1mg PO Q12 hours resulting in improvement in mental status. Discontinued antibiotics  Pt hemodynamically stable and stepped down to 7[place]. AO x3. Passed dysphagia screen. Switched from Heparin drip to Eliquis     SUBJECTIVE / INTERVAL HPI: Patient seen and examined at bedside. AOx3. No acute complaints. He was counselled regarding benzodiazepine use but currently is not ready to stop use of benzodiazepines.  Denies, Headaches, SOB, Chest pain, n/v/abd pain, diarrhea or constipation     VITAL SIGNS:  Vital Signs Last 24 Hrs  T(C): 36.7 (19 Aug 2017 01:26), Max: 37.4 (18 Aug 2017 13:01)  T(F): 98 (19 Aug 2017 01:26), Max: 99.3 (18 Aug 2017 13:01)  HR: 66 (19 Aug 2017 01:00) (66 - 186)  BP: 138/63 (19 Aug 2017 01:00) (99/73 - 164/75)  BP(mean): 92 (18 Aug 2017 22:00) (74 - 106)  RR: 20 (19 Aug 2017 01:00) (20 - 55)  SpO2: 98% (19 Aug 2017 01:00) (93% - 100%)    PHYSICAL EXAM:    General: NAD AOx3  HEENT: NC/AT; PERRL,Neck: supple  Cardiovascular: +S1/S2; RRR  Respiratory: CTA B/L; no W/R/R  Gastrointestinal: soft, NT/ND; +BSx4  Extremities:  no edema, ulcers on ankles b/l with scab formation, No purulence surrounding erythema    Vascular: 2+ radial, DP/PT pulses B/L  Neurological: AAOx3, mild tremor with hands extended    MEDICATIONS:  MEDICATIONS  (STANDING):  tamsulosin 0.8 milliGRAM(s) Oral at bedtime  lactobacillus acidophilus 1 Tablet(s) Oral daily  thiamine 100 milliGRAM(s) Oral daily  folic acid 1 milliGRAM(s) Oral daily  multivitamin 1 Tablet(s) Oral daily  pantoprazole    Tablet 40 milliGRAM(s) Oral before breakfast  heparin  Infusion 1200 Unit(s)/Hr (12 mL/Hr) IV Continuous <Continuous>  clonazePAM Tablet 1 milliGRAM(s) Oral every 12 hours    MEDICATIONS  (PRN):      ALLERGIES:  Allergies    No Known Allergies    Intolerances        LABS:                        12.5   18.5  )-----------( 190      ( 18 Aug 2017 08:30 )             39.3     08-18    139  |  102  |  20  ----------------------------<  123<H>  3.2<L>   |  24  |  1.00    Ca    8.9      18 Aug 2017 08:30  Phos  3.2     08-18  Mg     2.3     -18    TPro  6.1  /  Alb  3.4  /  TBili  0.5  /  DBili  x   /  AST  32  /  ALT  17  /  AlkPhos  75  08-18    PT/INR - ( 18 Aug 2017 04:10 )   PT: 14.9 sec;   INR: 1.33          PTT - ( 18 Aug 2017 18:13 )  PTT:73.8 sec  Urinalysis Basic - ( 17 Aug 2017 20:38 )    Color: OTHER / Appearance: x / S.020 / pH: x  Gluc: x / Ketone: NEGATIVE  / Bili: NEGATIVE / Urobili: 0.2 E.U./dL   Blood: x / Protein: Trace mg/dL / Nitrite: NEGATIVE   Leuk Esterase: Small / RBC: Many /HPF / WBC 5-10 /HPF   Sq Epi: x / Non Sq Epi: Few /HPF / Bacteria: Present /HPF      CAPILLARY BLOOD GLUCOSE  108 (18 Aug 2017 02:35)

## 2017-08-19 NOTE — PROGRESS NOTE ADULT - PROBLEM SELECTOR PLAN 2
-Pt tachycardic to 180s on floor with low BP. s.p IVF Bolus and Levophed. Stable in MICU expect tachycardia during agitation episodes.   - Will monitor

## 2017-08-19 NOTE — PROGRESS NOTE ADULT - SUBJECTIVE AND OBJECTIVE BOX
BRIGITTE ATTENDING FOR DR JOHNSON    INTERVAL HPI/OVERNIGHT EVENTS:    was in MICU    PAST MEDICAL & SURGICAL HISTORY:  Hip fracture requiring operative repair, right, sequela  PE (pulmonary thromboembolism)  CLL (chronic lymphocytic leukemia)  S/P hip hemiarthroplasty    FAMILY HISTORY:  No pertinent family history in first degree relatives    SOCIAL HISTORY:    REVIEW OF SYSTEMS:  + CP,  now resolved  - SOB  + cough    Vital Signs Last 24 Hrs  T(C): 36.7 (19 Aug 2017 16:40), Max: 37 (18 Aug 2017 22:00)  T(F): 98.1 (19 Aug 2017 16:40), Max: 98.6 (18 Aug 2017 22:00)  HR: 73 (19 Aug 2017 16:40) (66 - 91)  BP: 130/70 (19 Aug 2017 16:40) (130/70 - 165/74)  BP(mean): 107 (19 Aug 2017 04:37) (87 - 107)  RR: 17 (19 Aug 2017 16:40) (16 - 33)  SpO2: 98% (19 Aug 2017 16:40) (95% - 99%)    I&O's Detail    18 Aug 2017 07:01  -  19 Aug 2017 07:00  --------------------------------------------------------  IN:    heparin Infusion: 28 mL    heparin Infusion: 120 mL    IV PiggyBack: 450 mL    sodium chloride 0.9%: 1100 mL    sodium chloride 0.9%: 200 mL  Total IN: 1898 mL    OUT:    Indwelling Catheter - Urethral: 2800 mL  Total OUT: 2800 mL    Total NET: -902 mL      19 Aug 2017 07:01  -  19 Aug 2017 18:17  --------------------------------------------------------  IN:  Total IN: 0 mL    OUT:    Indwelling Catheter - Urethral: 1200 mL  Total OUT: 1200 mL    Total NET: -1200 mL    PHYSICAL EXAM:  in bed, confused - mildly  Well nourished, well developed, uncomfortable WITH POSITION, + acute distress; vital signs are monitored   Eyes: PERRLA, EOMI, -conjunctivitis, -scleritis   Head: no focal deficit, normocephalic,  no trauma  ENMT: moist tongue, no thrush, -nasal discharge, -hoarseness, normal hearing, -cough, -hemoptysis, trachea midline  Neck: supple, - lymphadenopathy,  -masses, -JVD  Respiratory: bilateral diminished breath sounds, + wheezing WITH COUGH, -rhonchi, -rales, -crackles  Chest: -accessory muscle use, -paradoxical breathing  Cardiovascular: regular rate and sinus rhythm, S1 S2 normal, -S3, -S4, -murmur, -gallop, -rub  Gastrointestinal: soft, nontender, nondistended, normal bowel sounds, no hepatosplenomegaly  Genitourinary: -flank pain, -dysuria  RODRIGUEZ  Extremities: -clubbing, -cyanosis, -edema    Vascular: peripheral pulses palpable 2+ bilaterally  Neurological: alert, oriented x 3, no focal deficit, -tremor   Skin: warm, dry, -erythema, iv site intact  Lymph nodes; no cervical, supraclavicular or axillary adenopathy  Psychiatric: cooperative, appropriate mood    MEDICATIONS  (STANDING):  tamsulosin 0.8 milliGRAM(s) Oral at bedtime  lactobacillus acidophilus 1 Tablet(s) Oral daily  thiamine 100 milliGRAM(s) Oral daily  folic acid 1 milliGRAM(s) Oral daily  multivitamin 1 Tablet(s) Oral daily  pantoprazole    Tablet 40 milliGRAM(s) Oral before breakfast  apixaban 5 milliGRAM(s) Oral every 12 hours  traZODone 50 milliGRAM(s) Oral every 24 hours  chlordiazePOXIDE 50 milliGRAM(s) Oral every 12 hours    MEDICATIONS  (PRN):  chlordiazePOXIDE 25 milliGRAM(s) Oral every 4 hours PRN Anxiety and/or Alcohol Withdrawal Symptoms      Allergies    No Known Allergies    Intolerances        LABS:                        11.4   9.6   )-----------( 141      ( 19 Aug 2017 06:33 )             35.7     -    137  |  101  |  14  ----------------------------<  87  3.6   |  23  |  0.80    Ca    9.1      19 Aug 2017 06:35  Phos  3.2     08-18  Mg     2.0     08-19    TPro  6.1  /  Alb  3.4  /  TBili  0.5  /  DBili  x   /  AST  32  /  ALT  17  /  AlkPhos  75  08-18    PT/INR - ( 18 Aug 2017 04:10 )   PT: 14.9 sec;   INR: 1.33     PTT - ( 18 Aug 2017 18:13 )  PTT:73.8 sec  Urinalysis Basic - ( 17 Aug 2017 20:38 )    Color: OTHER / Appearance: x / S.020 / pH: x  Gluc: x / Ketone: NEGATIVE  / Bili: NEGATIVE / Urobili: 0.2 E.U./dL   Blood: x / Protein: Trace mg/dL / Nitrite: NEGATIVE   Leuk Esterase: Small / RBC: Many /HPF / WBC 5-10 /HPF   Sq Epi: x / Non Sq Epi: Few /HPF / Bacteria: Present /HPF      +DVT prophylaxis OA  +Sleep  NOT TOO GOOD  +Nutrition goals ORAL  -Pain DENIED  -Decubital ulcer  +GI prophylaxis (PPV, coagulopathy, Hx)   PPI  +Aspiration prophylaxis (45 degrees)  +Sedation/analgesia stopped once  +ID (phos, CH, mupi, SB)  -Delirium  +Cardiac   +Prevention  HE REFUSES  RODRIGUEZ REMOVAL  +Education  +Medication reviewed (drug-drug interactions, PDA)  Medical devices  Discussed with ICU, PGY, CCRN, RN    RADIOLOGY & ADDITIONAL STUDIES:    < from: CT Chest No Cont (17 @ 13:02) >  INTERPRETATION:  CT of the CHEST, ABDOMEN and PELVIS without intravenous   contrast dated 2017 1:02 PM    INDICATION: ARF     TECHNIQUE: CT of the chest, abdomen and pelvis was performed .   Intravenous contrast was not used as requested.  Oral contrast was not   used. Axial and coronal and sagittal images were produced and reviewed.    PRIOR STUDIES: CT angiogram chest 2017.    FINDINGS: The heart is normal in size.  No pericardial effusion is seen.   Evaluation for adenopathy is limited without intravenous contrast. Within   that limitation, no mediastinal, hilar or axillary lymphadenopathy is   seen. Evaluation of the vasculature is limited without IV contrast.   Within that limitation, no gross vascular abnormalities are noted.   Endotracheal tube noted with tip just proximal to the tim. Possible   mucus plug distal left main stembronchus.    Trace right basal pleural effusion. Small  left pleural effusion.   Evaluation of the pulmonary parenchyma demonstrates near total   atelectasis left lung with mediastinal shift to the left.  Passive   atelectasis right lower lobe. 0.4 cm calcified granuloma right lower lobe   image 171 series 5.    Images of the upper abdomen demonstrate no focal hepatic abnormalities.   The liver is nonenlarged.  No radiopaque stones are seen in the   gallbladder.  The pancreas shows fatty infiltration of the head. No   splenic abnormalities are seen.    The adrenal glands are unremarkable. Mild right hydronephrosis and right   hydroureter. Mild-to-moderate left hydronephrosis and left hydroureter.   Left perinephric fat infiltration and fluid. Two 0.1 cm nonobstructing   calculi lower pole left kidney.. Bilateral hydroureter. A ureteric   calculus is not definitively identified.     No abdominal aortic aneurysm is seen. No lymphadenopathy is seen.     Evaluation of the bowel demonstrates NG tube with tip overlying the   gastric antrum. Normal appendix.. No ascites is seen. Colonic   interposition between right lobe of liver and right hemidiaphragm.    Images of the pelvis demonstrate a Rodriguez catheter in a partially   decompressed bladder. 1.6 cm dependent radiopaque  in the posterior   bladder could represent contrast material or calculus. There are a few   other sub 0.5 cm probable bladder calculi. The prostate is enlarged   measuring 6.0 x 4.8 x 4.6 cm. Hardware in the right proximal femur is   causing extensive artifact projected over the pelvis. Bilateral   fat-containing direct inguinal hernias.    Evaluation of the osseous structures demonstrates right hip   hemiarthroplasty. Osteopenia.      IMPRESSION:  1. Bilateral mild hydronephrosis and hydroureter left greater than right.   No definite obstructing ureteric calculus. Left perinephric fat   infiltration and fluid. Findings could  have  resulted from a prior   passage of a left ureteric calculus or spontaneous left perinephric   hemorrhage..  2. Near total atelectasis left lung. Small bilateral pleural effusions.  3. Endotracheal tube in situ.

## 2017-08-19 NOTE — PROGRESS NOTE ADULT - SUBJECTIVE AND OBJECTIVE BOX
coverage for Dr. Cornell  keeps asking for sleeping meds    Pt seen and examined     REVIEW OF SYSTEMS:  Constitutional: No fever, weight loss or fatigue  Cardiovascular: No chest pain, palpitations, dizziness or leg swelling  Gastrointestinal: No abdominal or epigastric pain. No nausea, vomiting or hematemesis; No diarrhea or constipation. No melena or hematochezia.  Skin: No itching, burning, rashes or lesions       MEDICATIONS:  MEDICATIONS  (STANDING):  tamsulosin 0.8 milliGRAM(s) Oral at bedtime  lactobacillus acidophilus 1 Tablet(s) Oral daily  thiamine 100 milliGRAM(s) Oral daily  folic acid 1 milliGRAM(s) Oral daily  multivitamin 1 Tablet(s) Oral daily  pantoprazole    Tablet 40 milliGRAM(s) Oral before breakfast  clonazePAM Tablet 1 milliGRAM(s) Oral every 12 hours  apixaban 5 milliGRAM(s) Oral every 12 hours  potassium chloride    Tablet ER 40 milliEquivalent(s) Oral once    MEDICATIONS  (PRN):      Allergies    No Known Allergies    Intolerances        Vital Signs Last 24 Hrs  T(C): 36.4 (19 Aug 2017 09:19), Max: 37.4 (18 Aug 2017 13:01)  T(F): 97.6 (19 Aug 2017 09:19), Max: 99.3 (18 Aug 2017 13:01)  HR: 91 (19 Aug 2017 09:19) (66 - 91)  BP: 142/67 (19 Aug 2017 09:19) (104/61 - 165/74)  BP(mean): 107 (19 Aug 2017 04:37) (74 - 107)  RR: 16 (19 Aug 2017 09:19) (16 - 33)  SpO2: 95% (19 Aug 2017 09:19) (95% - 100%)     @ 07:  -   @ 07:00  --------------------------------------------------------  IN: 1898 mL / OUT: 2800 mL / NET: -902 mL     @ 07:  -   @ 12:35  --------------------------------------------------------  IN: 0 mL / OUT: 600 mL / NET: -600 mL        PHYSICAL EXAM:    General:  in no acute distress  HEENT: MMM, conjunctiva and sclera clear  Lungs: clear  Heart: regular  Gastrointestinal: Soft non-tender non-distended; Normal bowel sounds; No hepatosplenomegaly  Skin: Warm and dry. No obvious rash  Neuro: no deficits    LABS:      CBC Full  -  ( 19 Aug 2017 06:33 )  WBC Count : 9.6 K/uL  Hemoglobin : 11.4 g/dL  Hematocrit : 35.7 %  Platelet Count - Automated : 141 K/uL  Mean Cell Volume : 86.9 fL  Mean Cell Hemoglobin : 27.7 pg  Mean Cell Hemoglobin Concentration : 31.9 g/dL  Auto Neutrophil # : x  Auto Lymphocyte # : x  Auto Monocyte # : x  Auto Eosinophil # : x  Auto Basophil # : x  Auto Neutrophil % : x  Auto Lymphocyte % : x  Auto Monocyte % : x  Auto Eosinophil % : x  Auto Basophil % : x    08-    137  |  101  |  14  ----------------------------<  87  3.6   |  23  |  0.80    Ca    9.1      19 Aug 2017 06:35  Phos  3.2     08-  Mg     2.0     -    TPro  6.1  /  Alb  3.4  /  TBili  0.5  /  DBili  x   /  AST  32  /  ALT  17  /  AlkPhos  75  08-18    PT/INR - ( 18 Aug 2017 04:10 )   PT: 14.9 sec;   INR: 1.33          PTT - ( 18 Aug 2017 18:13 )  PTT:73.8 sec      Urinalysis Basic - ( 17 Aug 2017 20:38 )    Color: OTHER / Appearance: x / S.020 / pH: x  Gluc: x / Ketone: NEGATIVE  / Bili: NEGATIVE / Urobili: 0.2 E.U./dL   Blood: x / Protein: Trace mg/dL / Nitrite: NEGATIVE   Leuk Esterase: Small / RBC: Many /HPF / WBC 5-10 /HPF   Sq Epi: x / Non Sq Epi: Few /HPF / Bacteria: Present /HPF                RADIOLOGY & ADDITIONAL STUDIES (The following images were personally reviewed):

## 2017-08-19 NOTE — PROGRESS NOTE ADULT - SUBJECTIVE AND OBJECTIVE BOX
HOSPITAL COURSE: 77M with PMH of CLL, BPH with chronic indwelling rodriguez, JARED 2/2 obstructive uropathy, recent right femoral neck fx s/p hemiarthroplasty () complicated by PE (on Xarelto) and PNA (2017), who presented from [facility] w/ AMS and fever. Patient was recently discharged 3 days prior on Vanc and Zosyn for bilateral foot ulcers, had PICC line placed which was pulled out by patient 2 days ago and replaced in ED with d/c back to rehab, now pulled out again by patient during this hospitalization. Patient was admitted to [facility] for AMS, possibly related to benzodiazepine withdrawal vs infectious vs toxic metabolic encephalopathy with acute onset agitation, tachycardia to 180, relative hypotension (SBP 170s on admission, 90s currently), for which a rapid response was called. Patient was transferred to MICU in setting of tachycardia and hypotension.  Pt hemodynamically stable and stepped down to [facility]     OVERNIGHT EVENTS: Transferred to [facility]    SUBJECTIVE / INTERVAL HPI: Patient seen and examined at bedside. AOx3. No acute complaints. Denies, Headaches, SOB, Chest pain, n/v/abd pain, diarrhea or constipation     VITAL SIGNS:  Vital Signs Last 24 Hrs  T(C): 36.7 (19 Aug 2017 01:26), Max: 37.4 (18 Aug 2017 13:01)  T(F): 98 (19 Aug 2017 01:26), Max: 99.3 (18 Aug 2017 13:01)  HR: 66 (19 Aug 2017 01:00) (66 - 186)  BP: 138/63 (19 Aug 2017 01:00) (99/73 - 164/75)  BP(mean): 92 (18 Aug 2017 22:00) (74 - 106)  RR: 20 (19 Aug 2017 01:00) (20 - 55)  SpO2: 98% (19 Aug 2017 01:00) (93% - 100%)    PHYSICAL EXAM:    General: NAD AOx3  HEENT: NC/AT; PERRL,Neck: supple  Cardiovascular: +S1/S2; RRR  Respiratory: CTA B/L; no W/R/R  Gastrointestinal: soft, NT/ND; +BSx4  Extremities:  no edema, ulcers on ankles b/l with scab formation, No purulence surrounding erythema   Vascular: 2+ radial, DP/PT pulses B/L  Neurological: AAOx3    MEDICATIONS:  MEDICATIONS  (STANDING):  tamsulosin 0.8 milliGRAM(s) Oral at bedtime  lactobacillus acidophilus 1 Tablet(s) Oral daily  thiamine 100 milliGRAM(s) Oral daily  folic acid 1 milliGRAM(s) Oral daily  multivitamin 1 Tablet(s) Oral daily  pantoprazole    Tablet 40 milliGRAM(s) Oral before breakfast  heparin  Infusion 1200 Unit(s)/Hr (12 mL/Hr) IV Continuous <Continuous>  clonazePAM Tablet 1 milliGRAM(s) Oral every 12 hours    MEDICATIONS  (PRN):      ALLERGIES:  Allergies    No Known Allergies    Intolerances        LABS:                        12.5   18.5  )-----------( 190      ( 18 Aug 2017 08:30 )             39.3         139  |  102  |  20  ----------------------------<  123<H>  3.2<L>   |  24  |  1.00    Ca    8.9      18 Aug 2017 08:30  Phos  3.2       Mg     2.3         TPro  6.1  /  Alb  3.4  /  TBili  0.5  /  DBili  x   /  AST  32  /  ALT  17  /  AlkPhos  75  08-18    PT/INR - ( 18 Aug 2017 04:10 )   PT: 14.9 sec;   INR: 1.33          PTT - ( 18 Aug 2017 18:13 )  PTT:73.8 sec  Urinalysis Basic - ( 17 Aug 2017 20:38 )    Color: OTHER / Appearance: x / S.020 / pH: x  Gluc: x / Ketone: NEGATIVE  / Bili: NEGATIVE / Urobili: 0.2 E.U./dL   Blood: x / Protein: Trace mg/dL / Nitrite: NEGATIVE   Leuk Esterase: Small / RBC: Many /HPF / WBC 5-10 /HPF   Sq Epi: x / Non Sq Epi: Few /HPF / Bacteria: Present /HPF      CAPILLARY BLOOD GLUCOSE  108 (18 Aug 2017 02:35)          RADIOLOGY & ADDITIONAL TESTS: Reviewed.    ASSESSMENT:    PLAN: HOSPITAL COURSE: 77M with PMH of CLL, BPH with chronic indwelling rodriguez, JARED 2/2 obstructive uropathy, recent right femoral neck fx s/p hemiarthroplasty () complicated by PE (on Xarelto) and PNA (2017), who presented from [facility] w/ AMS and fever. Patient was recently discharged 3 days prior on Vanc and Zosyn for bilateral foot ulcers, had PICC line placed which was pulled out by patient 2 days ago and replaced in ED with d/c back to rehab, now pulled out again by patient during this hospitalization. Patient was admitted to [facility] for AMS, possibly related to benzodiazepine withdrawal vs infectious vs toxic metabolic encephalopathy with acute onset agitation, tachycardia to 180, relative hypotension (SBP 170s on admission, 90s currently), for which a rapid response was called. Patient was transferred to MICU in setting of tachycardia and hypotension. He was started on Klonopin 1mg PO Q12 hours resulting in improvement in mental status. Discontinued antibiotics  Pt hemodynamically stable and stepped down to [facility]     OVERNIGHT EVENTS: Transferred to [facility]    SUBJECTIVE / INTERVAL HPI: Patient seen and examined at bedside. AOx3. No acute complaints. Denies, Headaches, SOB, Chest pain, n/v/abd pain, diarrhea or constipation     VITAL SIGNS:  Vital Signs Last 24 Hrs  T(C): 36.7 (19 Aug 2017 01:26), Max: 37.4 (18 Aug 2017 13:01)  T(F): 98 (19 Aug 2017 01:26), Max: 99.3 (18 Aug 2017 13:01)  HR: 66 (19 Aug 2017 01:00) (66 - 186)  BP: 138/63 (19 Aug 2017 01:00) (99/73 - 164/75)  BP(mean): 92 (18 Aug 2017 22:00) (74 - 106)  RR: 20 (19 Aug 2017 01:00) (20 - 55)  SpO2: 98% (19 Aug 2017 01:00) (93% - 100%)    PHYSICAL EXAM:    General: NAD AOx3  HEENT: NC/AT; PERRL,Neck: supple  Cardiovascular: +S1/S2; RRR  Respiratory: CTA B/L; no W/R/R  Gastrointestinal: soft, NT/ND; +BSx4  Extremities:  no edema, ulcers on ankles b/l with scab formation, No purulence surrounding erythema   Vascular: 2+ radial, DP/PT pulses B/L  Neurological: AAOx3    MEDICATIONS:  MEDICATIONS  (STANDING):  tamsulosin 0.8 milliGRAM(s) Oral at bedtime  lactobacillus acidophilus 1 Tablet(s) Oral daily  thiamine 100 milliGRAM(s) Oral daily  folic acid 1 milliGRAM(s) Oral daily  multivitamin 1 Tablet(s) Oral daily  pantoprazole    Tablet 40 milliGRAM(s) Oral before breakfast  heparin  Infusion 1200 Unit(s)/Hr (12 mL/Hr) IV Continuous <Continuous>  clonazePAM Tablet 1 milliGRAM(s) Oral every 12 hours    MEDICATIONS  (PRN):      ALLERGIES:  Allergies    No Known Allergies    Intolerances        LABS:                        12.5   18.5  )-----------( 190      ( 18 Aug 2017 08:30 )             39.3     -18    139  |  102  |  20  ----------------------------<  123<H>  3.2<L>   |  24  |  1.00    Ca    8.9      18 Aug 2017 08:30  Phos  3.2     -  Mg     2.3         TPro  6.1  /  Alb  3.4  /  TBili  0.5  /  DBili  x   /  AST  32  /  ALT  17  /  AlkPhos  75  08-18    PT/INR - ( 18 Aug 2017 04:10 )   PT: 14.9 sec;   INR: 1.33          PTT - ( 18 Aug 2017 18:13 )  PTT:73.8 sec  Urinalysis Basic - ( 17 Aug 2017 20:38 )    Color: OTHER / Appearance: x / S.020 / pH: x  Gluc: x / Ketone: NEGATIVE  / Bili: NEGATIVE / Urobili: 0.2 E.U./dL   Blood: x / Protein: Trace mg/dL / Nitrite: NEGATIVE   Leuk Esterase: Small / RBC: Many /HPF / WBC 5-10 /HPF   Sq Epi: x / Non Sq Epi: Few /HPF / Bacteria: Present /HPF      CAPILLARY BLOOD GLUCOSE  108 (18 Aug 2017 02:35)          RADIOLOGY & ADDITIONAL TESTS: Reviewed.    ASSESSMENT:    PLAN: HOSPITAL COURSE: 77M with PMH of CLL, BPH with chronic indwelling rodriguez, JARED 2/2 obstructive uropathy, recent right femoral neck fx s/p hemiarthroplasty () complicated by PE (on Xarelto) and PNA (2017), who presented from [facility] w/ AMS and fever. Patient was recently discharged 3 days prior on Vanc and Zosyn for bilateral foot ulcers, had PICC line placed which was pulled out by patient 2 days ago and replaced in ED with d/c back to rehab, now pulled out again by patient during this hospitalization. Patient was admitted to [facility] for AMS, possibly related to benzodiazepine withdrawal vs infectious vs toxic metabolic encephalopathy with acute onset agitation, tachycardia to 180, relative hypotension (SBP 170s on admission, 90s currently), for which a rapid response was called. Patient was transferred to MICU in setting of tachycardia and hypotension. He was started on Klonopin 1mg PO Q12 hours resulting in improvement in mental status. Discontinued antibiotics  Pt hemodynamically stable and stepped down to [facility]. AO x3. Passed dysphagia screen. Switched from Heparin drip to Eliquis     OVERNIGHT EVENTS: Transferred to [facility]    SUBJECTIVE / INTERVAL HPI: Patient seen and examined at bedside. AOx3. No acute complaints. Denies, Headaches, SOB, Chest pain, n/v/abd pain, diarrhea or constipation     VITAL SIGNS:  Vital Signs Last 24 Hrs  T(C): 36.7 (19 Aug 2017 01:26), Max: 37.4 (18 Aug 2017 13:01)  T(F): 98 (19 Aug 2017 01:26), Max: 99.3 (18 Aug 2017 13:01)  HR: 66 (19 Aug 2017 01:00) (66 - 186)  BP: 138/63 (19 Aug 2017 01:00) (99/73 - 164/75)  BP(mean): 92 (18 Aug 2017 22:00) (74 - 106)  RR: 20 (19 Aug 2017 01:00) (20 - 55)  SpO2: 98% (19 Aug 2017 01:00) (93% - 100%)    PHYSICAL EXAM:    General: NAD AOx3  HEENT: NC/AT; PERRL,Neck: supple  Cardiovascular: +S1/S2; RRR  Respiratory: CTA B/L; no W/R/R  Gastrointestinal: soft, NT/ND; +BSx4  Extremities:  no edema, ulcers on ankles b/l with scab formation, No purulence surrounding erythema   Vascular: 2+ radial, DP/PT pulses B/L  Neurological: AAOx3    MEDICATIONS:  MEDICATIONS  (STANDING):  tamsulosin 0.8 milliGRAM(s) Oral at bedtime  lactobacillus acidophilus 1 Tablet(s) Oral daily  thiamine 100 milliGRAM(s) Oral daily  folic acid 1 milliGRAM(s) Oral daily  multivitamin 1 Tablet(s) Oral daily  pantoprazole    Tablet 40 milliGRAM(s) Oral before breakfast  heparin  Infusion 1200 Unit(s)/Hr (12 mL/Hr) IV Continuous <Continuous>  clonazePAM Tablet 1 milliGRAM(s) Oral every 12 hours    MEDICATIONS  (PRN):      ALLERGIES:  Allergies    No Known Allergies    Intolerances        LABS:                        12.5   18.5  )-----------( 190      ( 18 Aug 2017 08:30 )             39.3     08-18    139  |  102  |  20  ----------------------------<  123<H>  3.2<L>   |  24  |  1.00    Ca    8.9      18 Aug 2017 08:30  Phos  3.2     08-18  Mg     2.3     08-18    TPro  6.1  /  Alb  3.4  /  TBili  0.5  /  DBili  x   /  AST  32  /  ALT  17  /  AlkPhos  75  08-18    PT/INR - ( 18 Aug 2017 04:10 )   PT: 14.9 sec;   INR: 1.33          PTT - ( 18 Aug 2017 18:13 )  PTT:73.8 sec  Urinalysis Basic - ( 17 Aug 2017 20:38 )    Color: OTHER / Appearance: x / S.020 / pH: x  Gluc: x / Ketone: NEGATIVE  / Bili: NEGATIVE / Urobili: 0.2 E.U./dL   Blood: x / Protein: Trace mg/dL / Nitrite: NEGATIVE   Leuk Esterase: Small / RBC: Many /HPF / WBC 5-10 /HPF   Sq Epi: x / Non Sq Epi: Few /HPF / Bacteria: Present /HPF      CAPILLARY BLOOD GLUCOSE  108 (18 Aug 2017 02:35)          RADIOLOGY & ADDITIONAL TESTS: Reviewed.    ASSESSMENT:    PLAN:

## 2017-08-19 NOTE — PROGRESS NOTE ADULT - PROBLEM SELECTOR PLAN 3
-Hx of PE on Eliquis, switched to Hep during admission given AMS   - Last PTT 73, pt on Hep 12ml/hr on arrival to unit  - Pt stable, AOx3 and passed dysphagia screening. Will stop Hep drip and switch to home Eliquis 5mg BID

## 2017-08-19 NOTE — BEHAVIORAL HEALTH ASSESSMENT NOTE - PROBLEM SELECTOR PLAN 1
-d/c klonopin  -librium taper: 50mg q12hr x 1 day -> 50mg po qhs -> D/C  -Monitor for s/sx of breakthrough w/d with CIWA - can use prn: librium 25mg po q4hr prn s/sx of w/d   -can add trazodone 50mg po qhs for insomnia  -outpatient referral: sleep study referral in outpatient setting -can get referral via PCP  -enhanced supervision given h/x of AMS; consider more stringent observation if the friend who was supplying him with xanax presents for visit  -patient advised regarding risks of continued use of benzo and advised about stopping  -he was advised about potential side effects of trazodone and was amenable to trial

## 2017-08-19 NOTE — BEHAVIORAL HEALTH ASSESSMENT NOTE - NSBHCONSULTFOLLOWAFTERCARE_PSY_A_CORE FT
No indication for outpatient psychiatric follow up  Outpatient sleep study referral  Patient declines referral for possible substance treatment referrals

## 2017-08-19 NOTE — PROGRESS NOTE ADULT - SUBJECTIVE AND OBJECTIVE BOX
~Patient is a 63y old  Male who presents with a chief complaint of SOB (17 Aug 2017 01:54)        INTERVAL HPI/OVERNIGHT EVENTS: none    SYMPTOMS awake alert comfortable without agitation    DRIPS non prms        ICU Vital Signs Last 24 Hrs  T(C): 36.3 (19 Aug 2017 05:50), Max: 37.1 (18 Aug 2017 14:28)  T(F): 97.4 (19 Aug 2017 05:50), Max: 98.8 (18 Aug 2017 14:28)  HR: 76 (19 Aug 2017 04:47) (74 - 100)  BP: 106/62 (19 Aug 2017 04:47) (98/63 - 116/67)  BP(mean): 78 (19 Aug 2017 04:47) (76 - 83)  ABP: --  ABP(mean): --  RR: 16 (19 Aug 2017 04:47) (14 - 18)  SpO2: 99% (19 Aug 2017 01:24) (92% - 99%)      I&O's Summary    17 Aug 2017 07:01  -  18 Aug 2017 07:00  --------------------------------------------------------  IN: 100 mL / OUT: 2225 mL / NET: -2125 mL        EXAM    Chest clear    Heart rr    Abdomen soft nontender    Extremities trace edema    Neuro awake and alert      LABS:  CAPILLARY BLOOD GLUCOSE  326 (18 Aug 2017 21:38)  119 (18 Aug 2017 17:00)  284 (18 Aug 2017 13:04)  287 (18 Aug 2017 09:08)                            13.0   8.2   )-----------( 216      ( 18 Aug 2017 07:21 )             38.6     08-18    143  |  107  |  16  ----------------------------<  68<L>  3.6   |  25  |  0.70    Ca    8.6      18 Aug 2017 07:21  Phos  2.9     08-17  Mg     2.2     08-18                RADIOLOGY & ADDITIONAL STUDIES:    CRITICAL CARE TIME SPENT:    A - enchephalopathy/CLL/history of PE/hyperglycemia\    Sugest:    DC retraints  continue clonazepam  check labs glucose, may need RX, ? hisotry of DM  advance diet   mobilize  to the floor ~Patient is a 63y old  Male who presents with a chief complaint of SOB (17 Aug 2017 01:54)        INTERVAL HPI/OVERNIGHT EVENTS: none    SYMPTOMS awake alert comfortable without agitation    DRIPS non prms        ICU Vital Signs Last 24 Hrs  T(C): 36.3 (19 Aug 2017 05:50), Max: 37.1 (18 Aug 2017 14:28)  T(F): 97.4 (19 Aug 2017 05:50), Max: 98.8 (18 Aug 2017 14:28)  HR: 76 (19 Aug 2017 04:47) (74 - 100)  BP: 106/62 (19 Aug 2017 04:47) (98/63 - 116/67)  BP(mean): 78 (19 Aug 2017 04:47) (76 - 83)  ABP: --  ABP(mean): --  RR: 16 (19 Aug 2017 04:47) (14 - 18)  SpO2: 99% (19 Aug 2017 01:24) (92% - 99%)      I&O's Summary    17 Aug 2017 07:01  -  18 Aug 2017 07:00  --------------------------------------------------------  IN: 100 mL / OUT: 2225 mL / NET: -2125 mL        EXAM    Chest clear    Heart rr    Abdomen soft nontender    Extremities trace edema    Neuro awake and alert      LABS:  CAPILLARY BLOOD GLUCOSE  326 (18 Aug 2017 21:38)  119 (18 Aug 2017 17:00)  284 (18 Aug 2017 13:04)  287 (18 Aug 2017 09:08)                            13.0   8.2   )-----------( 216      ( 18 Aug 2017 07:21 )             38.6     08-18    143  |  107  |  16  ----------------------------<  68<L>  3.6   |  25  |  0.70    Ca    8.6      18 Aug 2017 07:21  Phos  2.9     08-17  Mg     2.2     08-18                RADIOLOGY & ADDITIONAL STUDIES:    CRITICAL CARE TIME SPENT:    A - enchephalopathy/CLL/history of PE/hyperglycemia\    Sugest:    DC retraints  continue clonazepam  continue eliquis  check labs glucose, ? FS correct or patients  advance diet   mobilize  to the floor

## 2017-08-20 LAB
ANION GAP SERPL CALC-SCNC: 11 MMOL/L — SIGNIFICANT CHANGE UP (ref 5–17)
BUN SERPL-MCNC: 17 MG/DL — SIGNIFICANT CHANGE UP (ref 7–23)
CALCIUM SERPL-MCNC: 8.9 MG/DL — SIGNIFICANT CHANGE UP (ref 8.4–10.5)
CHLORIDE SERPL-SCNC: 103 MMOL/L — SIGNIFICANT CHANGE UP (ref 96–108)
CO2 SERPL-SCNC: 25 MMOL/L — SIGNIFICANT CHANGE UP (ref 22–31)
CREAT SERPL-MCNC: 0.8 MG/DL — SIGNIFICANT CHANGE UP (ref 0.5–1.3)
GLUCOSE SERPL-MCNC: 89 MG/DL — SIGNIFICANT CHANGE UP (ref 70–99)
HCT VFR BLD CALC: 35.1 % — LOW (ref 39–50)
HGB BLD-MCNC: 11.2 G/DL — LOW (ref 13–17)
MCHC RBC-ENTMCNC: 27.7 PG — SIGNIFICANT CHANGE UP (ref 27–34)
MCHC RBC-ENTMCNC: 31.9 G/DL — LOW (ref 32–36)
MCV RBC AUTO: 86.7 FL — SIGNIFICANT CHANGE UP (ref 80–100)
PLATELET # BLD AUTO: 151 K/UL — SIGNIFICANT CHANGE UP (ref 150–400)
POTASSIUM SERPL-MCNC: 3.7 MMOL/L — SIGNIFICANT CHANGE UP (ref 3.5–5.3)
POTASSIUM SERPL-SCNC: 3.7 MMOL/L — SIGNIFICANT CHANGE UP (ref 3.5–5.3)
RBC # BLD: 4.05 M/UL — LOW (ref 4.2–5.8)
RBC # FLD: 15.7 % — SIGNIFICANT CHANGE UP (ref 10.3–16.9)
SODIUM SERPL-SCNC: 139 MMOL/L — SIGNIFICANT CHANGE UP (ref 135–145)
WBC # BLD: 9.6 K/UL — SIGNIFICANT CHANGE UP (ref 3.8–10.5)
WBC # FLD AUTO: 9.6 K/UL — SIGNIFICANT CHANGE UP (ref 3.8–10.5)

## 2017-08-20 PROCEDURE — 99232 SBSQ HOSP IP/OBS MODERATE 35: CPT

## 2017-08-20 RX ADMIN — Medication 50 MILLIGRAM(S): at 23:03

## 2017-08-20 RX ADMIN — TAMSULOSIN HYDROCHLORIDE 0.8 MILLIGRAM(S): 0.4 CAPSULE ORAL at 21:41

## 2017-08-20 RX ADMIN — Medication 1 TABLET(S): at 11:00

## 2017-08-20 RX ADMIN — Medication 50 MILLIGRAM(S): at 09:40

## 2017-08-20 RX ADMIN — Medication 100 MILLIGRAM(S): at 11:00

## 2017-08-20 RX ADMIN — Medication 1 MILLIGRAM(S): at 11:00

## 2017-08-20 RX ADMIN — APIXABAN 5 MILLIGRAM(S): 2.5 TABLET, FILM COATED ORAL at 17:08

## 2017-08-20 RX ADMIN — Medication 50 MILLIGRAM(S): at 21:41

## 2017-08-20 RX ADMIN — PANTOPRAZOLE SODIUM 40 MILLIGRAM(S): 20 TABLET, DELAYED RELEASE ORAL at 07:05

## 2017-08-20 RX ADMIN — APIXABAN 5 MILLIGRAM(S): 2.5 TABLET, FILM COATED ORAL at 07:05

## 2017-08-20 NOTE — PHYSICAL THERAPY INITIAL EVALUATION ADULT - PHYSICAL ASSIST/NONPHYSICAL ASSIST: SIT/STAND, REHAB EVAL
2 person assist/verbal cues/nonverbal cues (demo/gestures)/performed 2 trials; unable to achieve complete erect posture despite max manual cueing from therapists

## 2017-08-20 NOTE — PHYSICAL THERAPY INITIAL EVALUATION ADULT - PHYSICAL ASSIST/NONPHYSICAL ASSIST: GAIT, REHAB EVAL
nonverbal cues (demo/gestures)/2 person assist/manual assist to advance rolling walker to (L) side with side-stepping/verbal cues

## 2017-08-20 NOTE — PROGRESS NOTE ADULT - SUBJECTIVE AND OBJECTIVE BOX
BRIGITTE ATTENDING FOR DR JOHNSON    INTERVAL HPI/OVERNIGHT EVENTS:    was in MICU  in bed, unable to walk, 2 assist for 2 steps    PAST MEDICAL & SURGICAL HISTORY:  Hip fracture requiring operative repair, right, sequela  PE (pulmonary thromboembolism)  CLL (chronic lymphocytic leukemia)  S/P hip hemiarthroplasty    FAMILY HISTORY:  No pertinent family history in first degree relatives    SOCIAL HISTORY:    REVIEW OF SYSTEMS:  + CP,  now resolved  - SOB  + cough    Vital Signs Last 24 Hrs  T(C): 36.7 (19 Aug 2017 16:40), Max: 37 (18 Aug 2017 22:00)  T(F): 98.1 (19 Aug 2017 16:40), Max: 98.6 (18 Aug 2017 22:00)  HR: 73 (19 Aug 2017 16:40) (66 - 91)  BP: 130/70 (19 Aug 2017 16:40) (130/70 - 165/74)  BP(mean): 107 (19 Aug 2017 04:37) (87 - 107)  RR: 17 (19 Aug 2017 16:40) (16 - 33)  SpO2: 98% (19 Aug 2017 16:40) (95% - 99%)    I&O's Detail    18 Aug 2017 07:01  -  19 Aug 2017 07:00  --------------------------------------------------------  IN:    heparin Infusion: 28 mL    heparin Infusion: 120 mL    IV PiggyBack: 450 mL    sodium chloride 0.9%: 1100 mL    sodium chloride 0.9%: 200 mL  Total IN: 1898 mL    OUT:    Indwelling Catheter - Urethral: 2800 mL  Total OUT: 2800 mL    Total NET: -902 mL      19 Aug 2017 07:01  -  19 Aug 2017 18:17  --------------------------------------------------------  IN:  Total IN: 0 mL    OUT:    Indwelling Catheter - Urethral: 1200 mL  Total OUT: 1200 mL    Total NET: -1200 mL    PHYSICAL EXAM:  in bed, confused mildly  Well nourished, well developed, uncomfortable ON/OFF, - acute distress; vital signs are monitored   Eyes: PERRLA, EOMI, -conjunctivitis, -scleritis   Head: no focal deficit, normocephalic,  no trauma  ENMT: moist tongue, no thrush, -nasal discharge, -hoarseness, normal hearing, -cough, -hemoptysis, trachea midline  Neck: supple, - lymphadenopathy,  -masses, -JVD  Respiratory: bilateral diminished breath sounds, + wheezing WITH COUGH, + rhonchi, -rales, -crackles  Chest: -accessory muscle use, -paradoxical breathing  Cardiovascular: regular rate and sinus rhythm, S1 S2 normal, -S3, -S4, -murmur, -gallop, -rub  Gastrointestinal: soft, nontender, nondistended, normal bowel sounds, no hepatosplenomegaly  Genitourinary: -flank pain, -dysuria  RODRIGUEZ  Extremities: -clubbing, -cyanosis, -edema    Vascular: peripheral pulses palpable 2+ bilaterally  Neurological: alert, oriented x 3, no focal deficit, -tremor   Skin: warm, dry, -erythema, iv sites intact  Lymph nodes; no cervical, supraclavicular or axillary adenopathy  Psychiatric: cooperative, appropriate mood    MEDICATIONS  (STANDING):  tamsulosin 0.8 milliGRAM(s) Oral at bedtime  lactobacillus acidophilus 1 Tablet(s) Oral daily  thiamine 100 milliGRAM(s) Oral daily  folic acid 1 milliGRAM(s) Oral daily  multivitamin 1 Tablet(s) Oral daily  pantoprazole    Tablet 40 milliGRAM(s) Oral before breakfast  apixaban 5 milliGRAM(s) Oral every 12 hours  traZODone 50 milliGRAM(s) Oral every 24 hours  chlordiazePOXIDE 50 milliGRAM(s) Oral every 12 hours    MEDICATIONS  (PRN):  chlordiazePOXIDE 25 milliGRAM(s) Oral every 4 hours PRN Anxiety and/or Alcohol Withdrawal Symptoms    Allergies    No Known Allergies    Intolerances        LABS:                        11.4   9.6   )-----------( 141      ( 19 Aug 2017 06:33 )             35.7     08-19    137  |  101  |  14  ----------------------------<  87  3.6   |  23  |  0.80    Ca    9.1      19 Aug 2017 06:35  Phos  3.2     08-18  Mg     2.0     08-19    TPro  6.1  /  Alb  3.4  /  TBili  0.5  /  DBili  x   /  AST  32  /  ALT  17  /  AlkPhos  75  08-18    PT/INR - ( 18 Aug 2017 04:10 )   PT: 14.9 sec;   INR: 1.33     PTT - ( 18 Aug 2017 18:13 )  PTT:73.8 sec  Urinalysis Basic - ( 17 Aug 2017 20:38 )    Color: OTHER / Appearance: x / S.020 / pH: x  Gluc: x / Ketone: NEGATIVE  / Bili: NEGATIVE / Urobili: 0.2 E.U./dL   Blood: x / Protein: Trace mg/dL / Nitrite: NEGATIVE   Leuk Esterase: Small / RBC: Many /HPF / WBC 5-10 /HPF   Sq Epi: x / Non Sq Epi: Few /HPF / Bacteria: Present /HPF      +DVT prophylaxis OA  +Sleep  NOT TOO GOOD  +Nutrition goals ORAL  -Pain DENIED  -Decubital ulcer  +GI prophylaxis (PPV, coagulopathy, Hx)   PPI  +Aspiration prophylaxis (45 degrees)  +Sedation/analgesia stopped once  +ID (phos, CH, mupi, SB)  -Delirium  +Cardiac   +Prevention  HE REFUSES  RODRIGUEZ REMOVAL  +Education  +Medication reviewed (drug-drug interactions, PDA)  Medical devices  Discussed with ICU, PGY, CCRN, RN    RADIOLOGY & ADDITIONAL STUDIES:    < from: CT Chest No Cont (17 @ 13:02) >  INTERPRETATION:  CT of the CHEST, ABDOMEN and PELVIS without intravenous   contrast dated 2017 1:02 PM    INDICATION: ARF     TECHNIQUE: CT of the chest, abdomen and pelvis was performed .   Intravenous contrast was not used as requested.  Oral contrast was not   used. Axial and coronal and sagittal images were produced and reviewed.    PRIOR STUDIES: CT angiogram chest 2017.    FINDINGS: The heart is normal in size.  No pericardial effusion is seen.   Evaluation for adenopathy is limited without intravenous contrast. Within   that limitation, no mediastinal, hilar or axillary lymphadenopathy is   seen. Evaluation of the vasculature is limited without IV contrast.   Within that limitation, no gross vascular abnormalities are noted.   Endotracheal tube noted with tip just proximal to the tim. Possible   mucus plug distal left main stembronchus.    Trace right basal pleural effusion. Small  left pleural effusion.   Evaluation of the pulmonary parenchyma demonstrates near total   atelectasis left lung with mediastinal shift to the left.  Passive   atelectasis right lower lobe. 0.4 cm calcified granuloma right lower lobe   image 171 series 5.    Images of the upper abdomen demonstrate no focal hepatic abnormalities.   The liver is nonenlarged.  No radiopaque stones are seen in the   gallbladder.  The pancreas shows fatty infiltration of the head. No   splenic abnormalities are seen.    The adrenal glands are unremarkable. Mild right hydronephrosis and right   hydroureter. Mild-to-moderate left hydronephrosis and left hydroureter.   Left perinephric fat infiltration and fluid. Two 0.1 cm nonobstructing   calculi lower pole left kidney.. Bilateral hydroureter. A ureteric   calculus is not definitively identified.     No abdominal aortic aneurysm is seen. No lymphadenopathy is seen.     Evaluation of the bowel demonstrates NG tube with tip overlying the   gastric antrum. Normal appendix.. No ascites is seen. Colonic   interposition between right lobe of liver and right hemidiaphragm.    Images of the pelvis demonstrate a Rodriguez catheter in a partially   decompressed bladder. 1.6 cm dependent radiopaque  in the posterior   bladder could represent contrast material or calculus. There are a few   other sub 0.5 cm probable bladder calculi. The prostate is enlarged   measuring 6.0 x 4.8 x 4.6 cm. Hardware in the right proximal femur is   causing extensive artifact projected over the pelvis. Bilateral   fat-containing direct inguinal hernias.    Evaluation of the osseous structures demonstrates right hip   hemiarthroplasty. Osteopenia.      IMPRESSION:  1. Bilateral mild hydronephrosis and hydroureter left greater than right.   No definite obstructing ureteric calculus. Left perinephric fat   infiltration and fluid. Findings could  have  resulted from a prior   passage of a left ureteric calculus or spontaneous left perinephric   hemorrhage..  2. Near total atelectasis left lung. Small bilateral pleural effusions.  3. Endotracheal tube in situ.

## 2017-08-20 NOTE — PHYSICAL THERAPY INITIAL EVALUATION ADULT - GAIT DEVIATIONS NOTED, PT EVAL
fairly steady however significantly flexed forward posture with inability to correct despite max manual cueing; denies complaints of dizziness/decreased velocity of limb motion/decreased weight-shifting ability/decreased step length/decreased justo

## 2017-08-20 NOTE — PHYSICAL THERAPY INITIAL EVALUATION ADULT - IMPAIRMENTS FOUND, PT EVAL
gait, locomotion, and balance/joint integrity and mobility/muscle strength/integumentary integrity/aerobic capacity/endurance

## 2017-08-20 NOTE — PHYSICAL THERAPY INITIAL EVALUATION ADULT - PHYSICAL ASSIST/NONPHYSICAL ASSIST: SUPINE/SIT, REHAB EVAL
Assist to bring B/L LE to edge of bed; able to push up from bed rail with 25% effort from patient; dangled EOB x 5 minutes with close supervision (feet unsupported)/2 person assist

## 2017-08-20 NOTE — PROGRESS NOTE ADULT - SUBJECTIVE AND OBJECTIVE BOX
coverage for Dr. Galeano    Pt seen and examined   no complaints  Psych eval appreciated    REVIEW OF SYSTEMS:  Constitutional: No fever, weight loss or fatigue  Cardiovascular: No chest pain, palpitations, dizziness or leg swelling  Gastrointestinal: No abdominal or epigastric pain. No nausea, vomiting or hematemesis; No diarrhea or constipation. No melena or hematochezia.  Skin: No itching, burning, rashes or lesions       MEDICATIONS:  MEDICATIONS  (STANDING):  tamsulosin 0.8 milliGRAM(s) Oral at bedtime  lactobacillus acidophilus 1 Tablet(s) Oral daily  thiamine 100 milliGRAM(s) Oral daily  folic acid 1 milliGRAM(s) Oral daily  multivitamin 1 Tablet(s) Oral daily  pantoprazole    Tablet 40 milliGRAM(s) Oral before breakfast  apixaban 5 milliGRAM(s) Oral every 12 hours  traZODone 50 milliGRAM(s) Oral every 24 hours  chlordiazePOXIDE 50 milliGRAM(s) Oral every 12 hours    MEDICATIONS  (PRN):  chlordiazePOXIDE 25 milliGRAM(s) Oral every 4 hours PRN Anxiety and/or Alcohol Withdrawal Symptoms      Allergies    No Known Allergies    Intolerances        Vital Signs Last 24 Hrs  T(C): 36.4 (20 Aug 2017 08:18), Max: 36.9 (19 Aug 2017 20:30)  T(F): 97.5 (20 Aug 2017 08:18), Max: 98.4 (19 Aug 2017 20:30)  HR: 78 (20 Aug 2017 08:18) (68 - 81)  BP: 138/75 (20 Aug 2017 08:18) (122/61 - 147/76)  BP(mean): --  RR: 16 (20 Aug 2017 08:18) (16 - 18)  SpO2: 98% (20 Aug 2017 08:18) (94% - 98%)    08-19 @ 07:01  -  08-20 @ 07:00  --------------------------------------------------------  IN: 0 mL / OUT: 2350 mL / NET: -2350 mL    08-20 @ 07:01  -  08-20 @ 15:08  --------------------------------------------------------  IN: 0 mL / OUT: 400 mL / NET: -400 mL        PHYSICAL EXAM:    General: Well developed; well nourished; in no acute distress  HEENT: MMM, conjunctiva and sclera clear  Lungs: clear  Heart: regular  Gastrointestinal: Soft non-tender non-distended; Normal bowel sounds; No hepatosplenomegaly  Skin: Warm and dry. No obvious rash    LABS:      CBC Full  -  ( 20 Aug 2017 06:31 )  WBC Count : 9.6 K/uL  Hemoglobin : 11.2 g/dL  Hematocrit : 35.1 %  Platelet Count - Automated : 151 K/uL  Mean Cell Volume : 86.7 fL  Mean Cell Hemoglobin : 27.7 pg  Mean Cell Hemoglobin Concentration : 31.9 g/dL  Auto Neutrophil # : x  Auto Lymphocyte # : x  Auto Monocyte # : x  Auto Eosinophil # : x  Auto Basophil # : x  Auto Neutrophil % : x  Auto Lymphocyte % : x  Auto Monocyte % : x  Auto Eosinophil % : x  Auto Basophil % : x    08-20    139  |  103  |  17  ----------------------------<  89  3.7   |  25  |  0.80    Ca    8.9      20 Aug 2017 06:31  Mg     2.0     08-19      PTT - ( 18 Aug 2017 18:13 )  PTT:73.8 sec                  RADIOLOGY & ADDITIONAL STUDIES (The following images were personally reviewed):

## 2017-08-20 NOTE — PHYSICAL THERAPY INITIAL EVALUATION ADULT - IMPAIRED TRANSFERS: SIT/STAND, REHAB EVAL
decreased flexibility/impaired coordination/decreased strength/impaired postural control/impaired balance

## 2017-08-20 NOTE — PROGRESS NOTE ADULT - SUBJECTIVE AND OBJECTIVE BOX
Chief Complaint/Reason for Consult: cv mgmt  INTERVAL HPI: events noted for change to oral AC and step down to regional, tolerating po  	  MEDICATIONS:  tamsulosin 0.8 milliGRAM(s) Oral at bedtime        traZODone 50 milliGRAM(s) Oral every 24 hours  chlordiazePOXIDE 50 milliGRAM(s) Oral every 12 hours  chlordiazePOXIDE 25 milliGRAM(s) Oral every 4 hours PRN    pantoprazole    Tablet 40 milliGRAM(s) Oral before breakfast      thiamine 100 milliGRAM(s) Oral daily  folic acid 1 milliGRAM(s) Oral daily  multivitamin 1 Tablet(s) Oral daily  apixaban 5 milliGRAM(s) Oral every 12 hours      REVIEW OF SYSTEMS:  [x] As per HPI  CONSTITUTIONAL: No fever, weight loss, or fatigue  RESPIRATORY: No cough, wheezing, chills or hemoptysis; No Shortness of Breath  CARDIOVASCULAR: No chest pain, palpitations, dizziness, or leg swelling  GASTROINTESTINAL: No abdominal or epigastric pain. No nausea, vomiting, or hematemesis; No diarrhea or constipation. No melena or hematochezia.  MUSCULOSKELETAL: No joint pain or swelling; No muscle, back, or extremity pain  [x] All others negative	  [ ] Unable to obtain    PHYSICAL EXAM:  T(C): 36.4 (08-20-17 @ 08:18), Max: 36.9 (08-19-17 @ 20:30)  HR: 78 (08-20-17 @ 08:18) (68 - 81)  BP: 138/75 (08-20-17 @ 08:18) (122/61 - 147/76)  RR: 16 (08-20-17 @ 08:18) (16 - 18)  SpO2: 98% (08-20-17 @ 08:18) (94% - 98%)  Wt(kg): --  I&O's Summary    19 Aug 2017 07:01  -  20 Aug 2017 07:00  --------------------------------------------------------  IN: 0 mL / OUT: 2350 mL / NET: -2350 mL          Appearance: Normal	  HEENT:   Normal oral mucosa  Cardiovascular: Normal S1 S2, No JVD, No murmurs, No edema  Respiratory: Lungs clear to auscultation	  Gastrointestinal:  Soft, Non-tender, + BS	  Extremities: Normal range of motion, No clubbing, cyanosis or edema  Vascular: Peripheral pulses palpable 2+ bilaterally    TELEMETRY: 	    ECG:    	  RADIOLOGY:   CXR:  CT:  US:    CARDIAC TESTING:  Echocardiogram:  Catheterization:  Stress Test:      LABS:	 	    CARDIAC MARKERS:                                  11.2   9.6   )-----------( 151      ( 20 Aug 2017 06:31 )             35.1     08-20    139  |  103  |  17  ----------------------------<  89  3.7   |  25  |  0.80    Ca    8.9      20 Aug 2017 06:31  Mg     2.0     08-19      proBNP:   Lipid Profile:   HgA1c:   TSH:     ASSESSMENT/PLAN: 	    Problem: Narrow complex tachycardia.  Plan: -Pt tachycardic to 180s on floor with low BP. s.p IVF Bolus and Levophed. Stable in MICU expect tachycardia during agitation episodes.   - Will monitor.     Problem: PE (pulmonary thromboembolism).  Plan: -Hx of PE on Eliquis, switched to Hep during admission given AMS   - Last PTT 73, pt on Hep 12ml/hr on arrival to unit  - Pt stable, AOx3 and passed dysphagia screening. Will stop Hep drip and switch to home Eliquis 5mg BID.

## 2017-08-20 NOTE — PHYSICAL THERAPY INITIAL EVALUATION ADULT - PERTINENT HX OF CURRENT PROBLEM, REHAB EVAL
76 yo male with history of CLL, BPH, insomnia, JARED 2/2 obstructive uropathy(developed after operation for fracture, resolved, on chronic rodriguez follows Dr. Leigh urology ),  recent right femoral neck fracture s/p hemiarthroplasty (may 2017) complicated by PE (on Xarelto) and pneumonia (July 2017) presents from [facility] for altered mental status and fever of 102.

## 2017-08-21 DIAGNOSIS — G92 TOXIC ENCEPHALOPATHY: ICD-10-CM

## 2017-08-21 LAB
ANION GAP SERPL CALC-SCNC: 11 MMOL/L — SIGNIFICANT CHANGE UP (ref 5–17)
BUN SERPL-MCNC: 24 MG/DL — HIGH (ref 7–23)
CALCIUM SERPL-MCNC: 9.2 MG/DL — SIGNIFICANT CHANGE UP (ref 8.4–10.5)
CHLORIDE SERPL-SCNC: 102 MMOL/L — SIGNIFICANT CHANGE UP (ref 96–108)
CO2 SERPL-SCNC: 26 MMOL/L — SIGNIFICANT CHANGE UP (ref 22–31)
CREAT SERPL-MCNC: 1 MG/DL — SIGNIFICANT CHANGE UP (ref 0.5–1.3)
GLUCOSE SERPL-MCNC: 99 MG/DL — SIGNIFICANT CHANGE UP (ref 70–99)
HCT VFR BLD CALC: 38.7 % — LOW (ref 39–50)
HGB BLD-MCNC: 12.4 G/DL — LOW (ref 13–17)
MCHC RBC-ENTMCNC: 28.1 PG — SIGNIFICANT CHANGE UP (ref 27–34)
MCHC RBC-ENTMCNC: 32 G/DL — SIGNIFICANT CHANGE UP (ref 32–36)
MCV RBC AUTO: 87.8 FL — SIGNIFICANT CHANGE UP (ref 80–100)
PLATELET # BLD AUTO: 143 K/UL — LOW (ref 150–400)
POTASSIUM SERPL-MCNC: 4.1 MMOL/L — SIGNIFICANT CHANGE UP (ref 3.5–5.3)
POTASSIUM SERPL-SCNC: 4.1 MMOL/L — SIGNIFICANT CHANGE UP (ref 3.5–5.3)
RBC # BLD: 4.41 M/UL — SIGNIFICANT CHANGE UP (ref 4.2–5.8)
RBC # FLD: 16.1 % — SIGNIFICANT CHANGE UP (ref 10.3–16.9)
SODIUM SERPL-SCNC: 139 MMOL/L — SIGNIFICANT CHANGE UP (ref 135–145)
WBC # BLD: 11.3 K/UL — HIGH (ref 3.8–10.5)
WBC # FLD AUTO: 11.3 K/UL — HIGH (ref 3.8–10.5)

## 2017-08-21 PROCEDURE — 99232 SBSQ HOSP IP/OBS MODERATE 35: CPT

## 2017-08-21 RX ADMIN — TAMSULOSIN HYDROCHLORIDE 0.8 MILLIGRAM(S): 0.4 CAPSULE ORAL at 22:20

## 2017-08-21 RX ADMIN — PANTOPRAZOLE SODIUM 40 MILLIGRAM(S): 20 TABLET, DELAYED RELEASE ORAL at 06:04

## 2017-08-21 RX ADMIN — Medication 100 MILLIGRAM(S): at 13:49

## 2017-08-21 RX ADMIN — Medication 1 TABLET(S): at 13:49

## 2017-08-21 RX ADMIN — APIXABAN 5 MILLIGRAM(S): 2.5 TABLET, FILM COATED ORAL at 16:16

## 2017-08-21 RX ADMIN — Medication 1 MILLIGRAM(S): at 13:49

## 2017-08-21 RX ADMIN — APIXABAN 5 MILLIGRAM(S): 2.5 TABLET, FILM COATED ORAL at 06:04

## 2017-08-21 RX ADMIN — Medication 50 MILLIGRAM(S): at 22:20

## 2017-08-21 RX ADMIN — Medication 25 MILLIGRAM(S): at 16:53

## 2017-08-21 NOTE — ADVANCED PRACTICE NURSE CONSULT - ASSESSMENT
CC/ HPI Patient is a 77y old male with history of pulmonary embolus following hip surgery, admitted with bilateral heel wounds. Bilat heels with unstageable ulcers, no drainage or erythema noted. Right heel wound approx 3x3 cm with black scab covering 100% of site. Left heel wound smaller, approx 2.5x1.5 cm, also completely covered with scab. Scattered scabs noted over right lateral ankle. Pt states he is assisted at home by an aide. Educated him on using the boots bilat when he is in bed, do not try to walk with them on.

## 2017-08-21 NOTE — PROGRESS NOTE ADULT - SUBJECTIVE AND OBJECTIVE BOX
O/N Events: ANNETTA   Subjective/ROS: Patient has no complaints Denies HA, CP, SOB, n/v, changes in bowel/urinary habits.  12pt ROS otherwise negative.    VITALS  Vital Signs Last 24 Hrs  T(C): 36.8 (21 Aug 2017 08:38), Max: 36.8 (21 Aug 2017 08:38)  T(F): 98.2 (21 Aug 2017 08:38), Max: 98.2 (21 Aug 2017 08:38)  HR: 83 (21 Aug 2017 08:38) (68 - 83)  BP: 149/78 (21 Aug 2017 08:38) (118/74 - 149/78)  BP(mean): --  RR: 17 (21 Aug 2017 08:38) (16 - 18)  SpO2: 96% (21 Aug 2017 08:38) (95% - 99%)    CAPILLARY BLOOD GLUCOSE  174 (21 Aug 2017 10:19)  107 (21 Aug 2017 02:22)  110 (20 Aug 2017 17:40)          PHYSICAL EXAM  General: NAD AOx3  HEENT: NC/AT; PERRL,Neck: supple  Cardiovascular: +S1/S2; RRR  Respiratory: CTA B/L; no W/R/R  Gastrointestinal: soft, NT/ND; +BSx4  Extremities:  no edema, ulcers on ankles b/l with scab formation, No purulence surrounding erythema    Vascular: 2+ radial, DP/PT pulses B/L  Neurological: AAOx3, mild tremor with hands extended    MEDICATIONS  (STANDING):  tamsulosin 0.8 milliGRAM(s) Oral at bedtime  lactobacillus acidophilus 1 Tablet(s) Oral daily  thiamine 100 milliGRAM(s) Oral daily  folic acid 1 milliGRAM(s) Oral daily  multivitamin 1 Tablet(s) Oral daily  pantoprazole    Tablet 40 milliGRAM(s) Oral before breakfast  apixaban 5 milliGRAM(s) Oral every 12 hours  traZODone 50 milliGRAM(s) Oral every 24 hours  chlordiazePOXIDE 25 milliGRAM(s) Oral every 12 hours    MEDICATIONS  (PRN):  chlordiazePOXIDE 25 milliGRAM(s) Oral every 4 hours PRN Anxiety and/or Alcohol Withdrawal Symptoms      No Known Allergies      LABS                        12.4   11.3  )-----------( 143      ( 21 Aug 2017 06:14 )             38.7     08-21    139  |  102  |  24<H>  ----------------------------<  99  4.1   |  26  |  1.00    Ca    9.2      21 Aug 2017 06:14

## 2017-08-21 NOTE — PROGRESS NOTE ADULT - SUBJECTIVE AND OBJECTIVE BOX
CC/ HPI Patient is a 77y old male with history of pulmonary embolus following hip surgery, admitted with bilateral heel wounds, today without respiratory complaint.      PAST MEDICAL & SURGICAL HISTORY:  Hip fracture requiring operative repair, right, sequela  PE (pulmonary thromboembolism)  CLL (chronic lymphocytic leukemia)  S/P hip hemiarthroplasty    SOCHX:    -  alcohol    FMHX: FA/MO  - contributory     ROS reviewed below with positive findings marked (+) :  GEN:  fever, chills ENT: tracheostomy,   epistaxis,  sinusitis COR: CAD, CHF,  HTN, dysrhythmia PUL: COPD, ILD, asthma, pneumonia GI: PEG, dysphagia, hemorrhage, other HIMA: kidney disease, electrolyte disorder HEM:  anemia, +thrombus, coagulopathy, cancer ENDO:  thyroid disease, diabetes mellitus CNS:  dementia, stroke, seizure, PSY:  depression, anxiety, other         MEDICATIONS  (STANDING):  tamsulosin 0.8 milliGRAM(s) Oral at bedtime  lactobacillus acidophilus 1 Tablet(s) Oral daily  thiamine 100 milliGRAM(s) Oral daily  folic acid 1 milliGRAM(s) Oral daily  multivitamin 1 Tablet(s) Oral daily  pantoprazole    Tablet 40 milliGRAM(s) Oral before breakfast  apixaban 5 milliGRAM(s) Oral every 12 hours  traZODone 50 milliGRAM(s) Oral every 24 hours  chlordiazePOXIDE 25 milliGRAM(s) Oral every 12 hours    MEDICATIONS  (PRN):  chlordiazePOXIDE 25 milliGRAM(s) Oral every 4 hours PRN Anxiety and/or Alcohol Withdrawal Symptoms      Vital Signs Last 24 Hrs  T(C): 36.8 (21 Aug 2017 08:38), Max: 36.8 (21 Aug 2017 08:38)  T(F): 98.2 (21 Aug 2017 08:38), Max: 98.2 (21 Aug 2017 08:38)  HR: 83 (21 Aug 2017 08:38) (68 - 83)  BP: 149/78 (21 Aug 2017 08:38) (118/74 - 149/78)  BP(mean): --  RR: 17 (21 Aug 2017 08:38) (16 - 18)  SpO2: 96% (21 Aug 2017 08:38) (95% - 99%)    GENERAL:         comfortable,  - distress.  HEENT:            - trauma,  - icterus,  - injection,  - nasal discharge.  NECK:              - jugular venous distention, - thyromegaly.  LYMPH:           - lymphadenopathy, - masses.  RESP:              + clear,   - rales,   - rhonchi,   - wheezes.   COR:                S1S2  - gallops,  - rubs.  ABD:                bowel sounds,   soft, - tender, - distended, - organomegaly.  EXT/MSC:         - cyanosis,  - clubbing, + edema.    NEURO:             alert,   responds to stimuli.                            12.4   11.3  )-----------( 143      ( 21 Aug 2017 06:14 )             38.7     08-21    139  |  102  |  24<H>  ----------------------------<  99  4.1   |  26  |  1.00    Ca    9.2      21 Aug 2017 06:14              ASSESSMENT/PLAN    1)  Heel wounds  2)  Pulmonary embolus  3)  CLL    Bronchodilators:  Albuterol q 4 -6 hours as needed  ID/Antibiotics: status post Vanco/Zosyn  Cardiac/HTN:  BP stable  GI: Rx/ prophylaxis c PPI/H2B  Heme: Rx/VT  on AC  Discuss with medical team CC/ HPI Patient is a 77y old male with history of pulmonary embolus following hip surgery, bilateral heel wounds, admitted with altered mental status, without respiratory complaint.      PAST MEDICAL & SURGICAL HISTORY:  Hip fracture requiring operative repair, right, sequela  PE (pulmonary thromboembolism)  CLL (chronic lymphocytic leukemia)  S/P hip hemiarthroplasty    SOCHX:    -  alcohol    FMHX: FA/MO  - contributory     ROS reviewed below with positive findings marked (+) :  GEN:  fever, chills ENT: tracheostomy,   epistaxis,  sinusitis COR: CAD, CHF,  HTN, dysrhythmia PUL: COPD, ILD, asthma, pneumonia GI: PEG, dysphagia, hemorrhage, other HIMA: kidney disease, electrolyte disorder HEM:  anemia, +thrombus, coagulopathy, cancer ENDO:  thyroid disease, diabetes mellitus CNS:  dementia, stroke, seizure, PSY:  depression, anxiety, other         MEDICATIONS  (STANDING):  tamsulosin 0.8 milliGRAM(s) Oral at bedtime  lactobacillus acidophilus 1 Tablet(s) Oral daily  thiamine 100 milliGRAM(s) Oral daily  folic acid 1 milliGRAM(s) Oral daily  multivitamin 1 Tablet(s) Oral daily  pantoprazole    Tablet 40 milliGRAM(s) Oral before breakfast  apixaban 5 milliGRAM(s) Oral every 12 hours  traZODone 50 milliGRAM(s) Oral every 24 hours  chlordiazePOXIDE 25 milliGRAM(s) Oral every 12 hours    MEDICATIONS  (PRN):  chlordiazePOXIDE 25 milliGRAM(s) Oral every 4 hours PRN Anxiety and/or Alcohol Withdrawal Symptoms      Vital Signs Last 24 Hrs  T(C): 36.8 (21 Aug 2017 08:38), Max: 36.8 (21 Aug 2017 08:38)  T(F): 98.2 (21 Aug 2017 08:38), Max: 98.2 (21 Aug 2017 08:38)  HR: 83 (21 Aug 2017 08:38) (68 - 83)  BP: 149/78 (21 Aug 2017 08:38) (118/74 - 149/78)  BP(mean): --  RR: 17 (21 Aug 2017 08:38) (16 - 18)  SpO2: 96% (21 Aug 2017 08:38) (95% - 99%)    GENERAL:         comfortable,  - distress.  HEENT:            - trauma,  - icterus,  - injection,  - nasal discharge.  NECK:              - jugular venous distention, - thyromegaly.  LYMPH:           - lymphadenopathy, - masses.  RESP:              + clear,   - rales,   - rhonchi,   - wheezes.   COR:                S1S2  - gallops,  - rubs.  ABD:                bowel sounds,   soft, - tender, - distended, - organomegaly.  EXT/MSC:         - cyanosis,  - clubbing, + edema.    NEURO:             alert,   responds to stimuli.                            12.4   11.3  )-----------( 143      ( 21 Aug 2017 06:14 )             38.7     08-21    139  |  102  |  24<H>  ----------------------------<  99  4.1   |  26  |  1.00    Ca    9.2      21 Aug 2017 06:14          ASSESSMENT/PLAN    1)  Status post altered mental status  2)  Pulmonary embolus  3)  CLL    AC for history pulmonary embolus  Cardiac/HTN:  BP stable  GI: Rx/ prophylaxis c PPI/H2B  Discuss with medical team

## 2017-08-21 NOTE — PROGRESS NOTE ADULT - SUBJECTIVE AND OBJECTIVE BOX
Chief Complaint/Reason for Consult: cv mgmt  INTERVAL HPI: events noted for change to oral AC and step down to regional, tolerating po  	  MEDICATIONS:  tamsulosin 0.8 milliGRAM(s) Oral at bedtime        traZODone 50 milliGRAM(s) Oral every 24 hours  chlordiazePOXIDE 25 milliGRAM(s) Oral every 4 hours PRN  chlordiazePOXIDE 25 milliGRAM(s) Oral every 12 hours    pantoprazole    Tablet 40 milliGRAM(s) Oral before breakfast      thiamine 100 milliGRAM(s) Oral daily  folic acid 1 milliGRAM(s) Oral daily  multivitamin 1 Tablet(s) Oral daily  apixaban 5 milliGRAM(s) Oral every 12 hours      REVIEW OF SYSTEMS:  [x] As per HPI  CONSTITUTIONAL: No fever, weight loss, or fatigue  RESPIRATORY: No cough, wheezing, chills or hemoptysis; No Shortness of Breath  CARDIOVASCULAR: No chest pain, palpitations, dizziness, or leg swelling  GASTROINTESTINAL: No abdominal or epigastric pain. No nausea, vomiting, or hematemesis; No diarrhea or constipation. No melena or hematochezia.  MUSCULOSKELETAL: No joint pain or swelling; No muscle, back, or extremity pain  [x] All others negative	  [ ] Unable to obtain    PHYSICAL EXAM:  T(C): 36.7 (08-21-17 @ 06:03), Max: 36.7 (08-21-17 @ 00:07)  HR: 82 (08-21-17 @ 06:03) (68 - 82)  BP: 137/76 (08-21-17 @ 06:03) (118/74 - 145/85)  RR: 16 (08-21-17 @ 06:03) (16 - 18)  SpO2: 95% (08-21-17 @ 06:03) (95% - 99%)  Wt(kg): --  I&O's Summary    20 Aug 2017 07:01  -  21 Aug 2017 07:00  --------------------------------------------------------  IN: 0 mL / OUT: 1600 mL / NET: -1600 mL          Appearance: Normal	  HEENT:   Normal oral mucosa  Cardiovascular: Normal S1 S2, No JVD, No murmurs, No edema  Respiratory: Lungs clear to auscultation	  Gastrointestinal:  Soft, Non-tender, + BS	  Extremities: Normal range of motion, No clubbing, cyanosis or edema  Vascular: Peripheral pulses palpable 2+ bilaterally    TELEMETRY: 	    ECG:    	  RADIOLOGY:   CXR:  CT:  US:    CARDIAC TESTING:  Echocardiogram:  Catheterization:  Stress Test:      LABS:	 	    CARDIAC MARKERS:                                  12.4   11.3  )-----------( 143      ( 21 Aug 2017 06:14 )             38.7     08-21    139  |  102  |  24<H>  ----------------------------<  99  4.1   |  26  |  1.00    Ca    9.2      21 Aug 2017 06:14      proBNP:   Lipid Profile:   HgA1c:   TSH:     ASSESSMENT/PLAN: 	  Problem: Narrow complex tachycardia.  Plan: -Pt tachycardic to 180s on floor with low BP. s.p IVF Bolus and Levophed. Stable in MICU expect tachycardia during agitation episodes.   - Will monitor.     Problem: PE (pulmonary thromboembolism).  Plan: -Hx of PE on Eliquis, switched to Hep during admission given AMS   - Last PTT 73, pt on Hep 12ml/hr on arrival to unit  - Pt stable, AOx3 and passed dysphagia screening. Will stop Hep drip and switch to home Eliquis 5mg BID.

## 2017-08-21 NOTE — PROGRESS NOTE ADULT - SUBJECTIVE AND OBJECTIVE BOX
Neurology Follow up note    Name  SNOW GUARDADO    HPI:  When examined, patient was AAOx1-2, but very anxious, and does not remember what brought him in. He keeps repeating "need to take care of things"     76 yo male with history of CLL, BPH, insomnia, JARED 2/2 obstructive uropathy(developed after operation for fracture, resolved, on chronic rodriguez follows Dr. Leigh urology ),  recent right femoral neck fracture s/p hemiarthroplasty (may 2017) complicated by PE (on Xarelto) and pneumonia (July 2017) presents from [facility] for altered mental status and fever of 102. While here, he has been acutely confused, tremulous, but pleasant. He denies any HA, CP, SOB, nausea/vomiting.     In ED: ICU Vital Signs Last 24 Hrs  T(C): 37.1 (18 Aug 2017 02:07), Max: 37.8 (17 Aug 2017 19:48)  T(F): 98.7 (18 Aug 2017 02:07), Max: 100 (17 Aug 2017 19:48)  HR: 114 (18 Aug 2017 02:07) (95 - 114)  BP: 174/78 (18 Aug 2017 02:07) (170/85 - 195/110)  BP(mean): --  ABP: --  ABP(mean): --  RR: 19 (18 Aug 2017 02:07) (16 - 20)  SpO2: 97% (18 Aug 2017 02:07) (94% - 98%)      He was given dose of Vanco/Zosyn. Norvasc, Tylenol. (18 Aug 2017 00:27)      Interval History - no headaches dizziness and no reports or weakness        REVIEW OF SYSTEMS    Vital Signs Last 24 Hrs  T(C): 36.8 (21 Aug 2017 08:38), Max: 36.8 (21 Aug 2017 08:38)  T(F): 98.2 (21 Aug 2017 08:38), Max: 98.2 (21 Aug 2017 08:38)  HR: 83 (21 Aug 2017 08:38) (68 - 83)  BP: 149/78 (21 Aug 2017 08:38) (118/74 - 149/78)  BP(mean): --  RR: 17 (21 Aug 2017 08:38) (16 - 18)  SpO2: 96% (21 Aug 2017 08:38) (95% - 99%)    Physical Exam-     Mental Status- oriented to morena and place    Cranial Nerves- full EOM    Gait and station-n/a    Motor- no focal weakness    Reflexes- decreased    Sensation-no sensory level    Coordination- mild tremors    Vascular -no bruits    Medications  tamsulosin 0.8 milliGRAM(s) Oral at bedtime  lactobacillus acidophilus 1 Tablet(s) Oral daily  thiamine 100 milliGRAM(s) Oral daily  folic acid 1 milliGRAM(s) Oral daily  multivitamin 1 Tablet(s) Oral daily  pantoprazole    Tablet 40 milliGRAM(s) Oral before breakfast  apixaban 5 milliGRAM(s) Oral every 12 hours  traZODone 50 milliGRAM(s) Oral every 24 hours  chlordiazePOXIDE 25 milliGRAM(s) Oral every 4 hours PRN  chlordiazePOXIDE 25 milliGRAM(s) Oral every 12 hours      Lab      Radiology    Assessment- With CT head showing no acute pathology- no indication for further neuro testing    Plan- I will see if reconsulted

## 2017-08-22 ENCOUNTER — TRANSCRIPTION ENCOUNTER (OUTPATIENT)
Age: 77
End: 2017-08-22

## 2017-08-22 VITALS
HEART RATE: 92 BPM | RESPIRATION RATE: 16 BRPM | DIASTOLIC BLOOD PRESSURE: 81 MMHG | SYSTOLIC BLOOD PRESSURE: 132 MMHG | TEMPERATURE: 98 F | OXYGEN SATURATION: 95 %

## 2017-08-22 LAB
ANION GAP SERPL CALC-SCNC: 11 MMOL/L — SIGNIFICANT CHANGE UP (ref 5–17)
BUN SERPL-MCNC: 26 MG/DL — HIGH (ref 7–23)
CALCIUM SERPL-MCNC: 9.2 MG/DL — SIGNIFICANT CHANGE UP (ref 8.4–10.5)
CHLORIDE SERPL-SCNC: 102 MMOL/L — SIGNIFICANT CHANGE UP (ref 96–108)
CO2 SERPL-SCNC: 26 MMOL/L — SIGNIFICANT CHANGE UP (ref 22–31)
CREAT SERPL-MCNC: 1.1 MG/DL — SIGNIFICANT CHANGE UP (ref 0.5–1.3)
GLUCOSE SERPL-MCNC: 94 MG/DL — SIGNIFICANT CHANGE UP (ref 70–99)
HCT VFR BLD CALC: 40 % — SIGNIFICANT CHANGE UP (ref 39–50)
HGB BLD-MCNC: 12.5 G/DL — LOW (ref 13–17)
MCHC RBC-ENTMCNC: 27.7 PG — SIGNIFICANT CHANGE UP (ref 27–34)
MCHC RBC-ENTMCNC: 31.3 G/DL — LOW (ref 32–36)
MCV RBC AUTO: 88.7 FL — SIGNIFICANT CHANGE UP (ref 80–100)
PLATELET # BLD AUTO: 161 K/UL — SIGNIFICANT CHANGE UP (ref 150–400)
POTASSIUM SERPL-MCNC: 3.9 MMOL/L — SIGNIFICANT CHANGE UP (ref 3.5–5.3)
POTASSIUM SERPL-SCNC: 3.9 MMOL/L — SIGNIFICANT CHANGE UP (ref 3.5–5.3)
RBC # BLD: 4.51 M/UL — SIGNIFICANT CHANGE UP (ref 4.2–5.8)
RBC # FLD: 16.3 % — SIGNIFICANT CHANGE UP (ref 10.3–16.9)
SODIUM SERPL-SCNC: 139 MMOL/L — SIGNIFICANT CHANGE UP (ref 135–145)
WBC # BLD: 11.9 K/UL — HIGH (ref 3.8–10.5)
WBC # FLD AUTO: 11.9 K/UL — HIGH (ref 3.8–10.5)

## 2017-08-22 PROCEDURE — 70450 CT HEAD/BRAIN W/O DYE: CPT

## 2017-08-22 PROCEDURE — 99285 EMERGENCY DEPT VISIT HI MDM: CPT | Mod: 25

## 2017-08-22 PROCEDURE — 82553 CREATINE MB FRACTION: CPT

## 2017-08-22 PROCEDURE — 85652 RBC SED RATE AUTOMATED: CPT

## 2017-08-22 PROCEDURE — 84484 ASSAY OF TROPONIN QUANT: CPT

## 2017-08-22 PROCEDURE — 82550 ASSAY OF CK (CPK): CPT

## 2017-08-22 PROCEDURE — 51702 INSERT TEMP BLADDER CATH: CPT

## 2017-08-22 PROCEDURE — 85610 PROTHROMBIN TIME: CPT

## 2017-08-22 PROCEDURE — 81001 URINALYSIS AUTO W/SCOPE: CPT

## 2017-08-22 PROCEDURE — 87086 URINE CULTURE/COLONY COUNT: CPT

## 2017-08-22 PROCEDURE — 99232 SBSQ HOSP IP/OBS MODERATE 35: CPT

## 2017-08-22 PROCEDURE — 97116 GAIT TRAINING THERAPY: CPT

## 2017-08-22 PROCEDURE — 96375 TX/PRO/DX INJ NEW DRUG ADDON: CPT | Mod: XU

## 2017-08-22 PROCEDURE — 97161 PT EVAL LOW COMPLEX 20 MIN: CPT

## 2017-08-22 PROCEDURE — 80307 DRUG TEST PRSMV CHEM ANLYZR: CPT

## 2017-08-22 PROCEDURE — 36415 COLL VENOUS BLD VENIPUNCTURE: CPT

## 2017-08-22 PROCEDURE — 85730 THROMBOPLASTIN TIME PARTIAL: CPT

## 2017-08-22 PROCEDURE — 86140 C-REACTIVE PROTEIN: CPT

## 2017-08-22 PROCEDURE — 85027 COMPLETE CBC AUTOMATED: CPT

## 2017-08-22 PROCEDURE — 87040 BLOOD CULTURE FOR BACTERIA: CPT

## 2017-08-22 PROCEDURE — 86701 HIV-1ANTIBODY: CPT

## 2017-08-22 PROCEDURE — 83605 ASSAY OF LACTIC ACID: CPT

## 2017-08-22 PROCEDURE — 83735 ASSAY OF MAGNESIUM: CPT

## 2017-08-22 PROCEDURE — 80053 COMPREHEN METABOLIC PANEL: CPT

## 2017-08-22 PROCEDURE — 93005 ELECTROCARDIOGRAM TRACING: CPT | Mod: XU

## 2017-08-22 PROCEDURE — 71045 X-RAY EXAM CHEST 1 VIEW: CPT

## 2017-08-22 PROCEDURE — 87389 HIV-1 AG W/HIV-1&-2 AB AG IA: CPT

## 2017-08-22 PROCEDURE — 80048 BASIC METABOLIC PNL TOTAL CA: CPT

## 2017-08-22 PROCEDURE — 96374 THER/PROPH/DIAG INJ IV PUSH: CPT | Mod: XU

## 2017-08-22 PROCEDURE — 84100 ASSAY OF PHOSPHORUS: CPT

## 2017-08-22 PROCEDURE — 85025 COMPLETE CBC W/AUTO DIFF WBC: CPT

## 2017-08-22 RX ORDER — TRAZODONE HCL 50 MG
1 TABLET ORAL
Qty: 10 | Refills: 0 | OUTPATIENT
Start: 2017-08-22 | End: 2017-09-01

## 2017-08-22 RX ORDER — PIPERACILLIN AND TAZOBACTAM 4; .5 G/20ML; G/20ML
0 INJECTION, POWDER, LYOPHILIZED, FOR SOLUTION INTRAVENOUS
Qty: 0 | Refills: 0 | COMMUNITY

## 2017-08-22 RX ORDER — VANCOMYCIN HCL 1 G
0 VIAL (EA) INTRAVENOUS
Qty: 0 | Refills: 0 | COMMUNITY

## 2017-08-22 RX ORDER — THIAMINE MONONITRATE (VIT B1) 100 MG
1 TABLET ORAL
Qty: 0 | Refills: 0 | COMMUNITY
Start: 2017-08-22

## 2017-08-22 RX ORDER — FOLIC ACID 0.8 MG
1 TABLET ORAL
Qty: 0 | Refills: 0 | COMMUNITY
Start: 2017-08-22

## 2017-08-22 RX ADMIN — APIXABAN 5 MILLIGRAM(S): 2.5 TABLET, FILM COATED ORAL at 07:00

## 2017-08-22 RX ADMIN — Medication 1 TABLET(S): at 11:58

## 2017-08-22 RX ADMIN — PANTOPRAZOLE SODIUM 40 MILLIGRAM(S): 20 TABLET, DELAYED RELEASE ORAL at 07:00

## 2017-08-22 RX ADMIN — Medication 1 MILLIGRAM(S): at 11:58

## 2017-08-22 RX ADMIN — Medication 25 MILLIGRAM(S): at 07:00

## 2017-08-22 RX ADMIN — Medication 100 MILLIGRAM(S): at 11:58

## 2017-08-22 NOTE — PROGRESS NOTE ADULT - PROBLEM SELECTOR PLAN 5
still confused; decrease librium
-does not appear to be in blast crisis  -trend leukocyte count, as pt has leukocytosis
-does not appear to be in blast crisis  -trend leukocyte count, now below 10k, initial leukocytosis most likely reactive to UTI and not from CLL.
-does not appear to be in blast crisis  -trend leukocyte count, now below 10k, initial leukocytosis most likely reactive to UTI and not from CLL.
still confused
-does not appear to be in blast crisis  -trend leukocyte count, as pt has leukocytosis

## 2017-08-22 NOTE — PROGRESS NOTE ADULT - PROBLEM SELECTOR PLAN 4
improved
-Cr 1.3 on admission, downtrended to 1.0  -trend BMP  -monitor UOP  -c/w Flomax
Resolved  -Continue to monitor BMP
improved
-Cr 1.3 on admission, downtrended to 1.0  -trend BMP  -monitor UOP  -c/w Flomax
-Cr 1.3 on admission, downtrended to 1.0  -trend BMP  -monitor UOP  -c/w Flomax

## 2017-08-22 NOTE — PROGRESS NOTE ADULT - PROVIDER SPECIALTY LIST ADULT
Cardiology
Critical Care
Internal Medicine
MICU
MICU
Neurology
Neurology
Pulmonology

## 2017-08-22 NOTE — PROGRESS NOTE ADULT - SUBJECTIVE AND OBJECTIVE BOX
CC/ HPI Patient is a 77y old male with history of pulmonary embolus following hip surgery, admitted with altered mental status, today without respiratory complaint.      PAST MEDICAL & SURGICAL HISTORY:  Hip fracture requiring operative repair, right, sequela  PE (pulmonary thromboembolism)  CLL (chronic lymphocytic leukemia)  S/P hip hemiarthroplasty    SOCHX:    -  alcohol    FMHX: FA/MO  - contributory     ROS reviewed below with positive findings marked (+) :  GEN:  fever, chills ENT: tracheostomy,   epistaxis,  sinusitis COR: CAD, CHF,  HTN, dysrhythmia PUL: COPD, ILD, asthma, pneumonia GI: PEG, dysphagia, hemorrhage, other HIMA: kidney disease, electrolyte disorder HEM:  anemia, +thrombus, coagulopathy, cancer ENDO:  thyroid disease, diabetes mellitus CNS:  dementia, stroke, seizure, PSY:  depression, anxiety, other          MEDICATIONS  (STANDING):  tamsulosin 0.8 milliGRAM(s) Oral at bedtime  lactobacillus acidophilus 1 Tablet(s) Oral daily  thiamine 100 milliGRAM(s) Oral daily  folic acid 1 milliGRAM(s) Oral daily  multivitamin 1 Tablet(s) Oral daily  pantoprazole    Tablet 40 milliGRAM(s) Oral before breakfast  apixaban 5 milliGRAM(s) Oral every 12 hours  traZODone 50 milliGRAM(s) Oral every 24 hours    MEDICATIONS  (PRN):      Vital Signs Last 24 Hrs  T(C): 36.3 (22 Aug 2017 04:59), Max: 36.8 (21 Aug 2017 16:12)  T(F): 97.3 (22 Aug 2017 04:59), Max: 98.2 (21 Aug 2017 16:12)  HR: 77 (22 Aug 2017 04:59) (77 - 97)  BP: 152/74 (22 Aug 2017 04:59) (113/68 - 152/74)  BP(mean): --  RR: 16 (22 Aug 2017 04:59) (16 - 18)  SpO2: 97% (22 Aug 2017 04:59) (93% - 97%)    GENERAL:         comfortable,  - distress.  HEENT:            - trauma,  - icterus,  - injection,  - nasal discharge.  NECK:              - jugular venous distention, - thyromegaly.  LYMPH:           - lymphadenopathy, - masses.  RESP:              + clear,   - rales,   - rhonchi,   - wheezes.   COR:                S1S2 RRR  - murmurs,  - gallops,  - rubs.  ABD:                bowel sounds,   soft, - tender, - distended, - organomegaly.  EXT/MSC:         - cyanosis,  - clubbing,  - edema.    NEURO:             alert,   responds to stimuli.                            12.5   11.9  )-----------( 161      ( 22 Aug 2017 06:37 )             40.0     08-22    139  |  102  |  26<H>  ----------------------------<  94  3.9   |  26  |  1.10    Ca    9.2      22 Aug 2017 06:37              ASSESSMENT/PLAN    1)  Status post altered mental status  2)  Pulmonary embolus  3)  CLL    AC for history pulmonary embolus  Cardiac/HTN:  BP stable  GI: Rx/ prophylaxis c PPI/H2B  Discuss with medical team

## 2017-08-22 NOTE — PROGRESS NOTE ADULT - SUBJECTIVE AND OBJECTIVE BOX
Neurology Follow up note    Name  SNOW GUARDADO    HPI:  When examined, patient was AAOx1-2, but very anxious, and does not remember what brought him in. He keeps repeating "need to take care of things"     76 yo male with history of CLL, BPH, insomnia, JARED 2/2 obstructive uropathy(developed after operation for fracture, resolved, on chronic rodriguez follows Dr. Leigh urology ),  recent right femoral neck fracture s/p hemiarthroplasty (may 2017) complicated by PE (on Xarelto) and pneumonia (July 2017) presents from [facility] for altered mental status and fever of 102. While here, he has been acutely confused, tremulous, but pleasant. He denies any HA, CP, SOB, nausea/vomiting.     In ED: ICU Vital Signs Last 24 Hrs  T(C): 37.1 (18 Aug 2017 02:07), Max: 37.8 (17 Aug 2017 19:48)  T(F): 98.7 (18 Aug 2017 02:07), Max: 100 (17 Aug 2017 19:48)  HR: 114 (18 Aug 2017 02:07) (95 - 114)  BP: 174/78 (18 Aug 2017 02:07) (170/85 - 195/110)  BP(mean): --  ABP: --  ABP(mean): --  RR: 19 (18 Aug 2017 02:07) (16 - 20)  SpO2: 97% (18 Aug 2017 02:07) (94% - 98%)      He was given dose of Vanco/Zosyn. Norvasc, Tylenol. (18 Aug 2017 00:27)      Interval History - no new changes in the LE- more awake- no new weakness in UE/ LE        REVIEW OF SYSTEMS    Vital Signs Last 24 Hrs  T(C): 36.3 (22 Aug 2017 04:59), Max: 36.8 (21 Aug 2017 08:38)  T(F): 97.3 (22 Aug 2017 04:59), Max: 98.2 (21 Aug 2017 08:38)  HR: 77 (22 Aug 2017 04:59) (77 - 97)  BP: 152/74 (22 Aug 2017 04:59) (113/68 - 152/74)  BP(mean): --  RR: 16 (22 Aug 2017 04:59) (16 - 18)  SpO2: 97% (22 Aug 2017 04:59) (93% - 97%)    Physical Exam-     Mental Status- poor memory- awake    Cranial Nerves- nl EOM    Gait and station- unsteady    Motor- no focal weakness    Reflexes- decreased    Sensation- no sensory level    Coordination- no tremors    Vascular -    Medications  tamsulosin 0.8 milliGRAM(s) Oral at bedtime  lactobacillus acidophilus 1 Tablet(s) Oral daily  thiamine 100 milliGRAM(s) Oral daily  folic acid 1 milliGRAM(s) Oral daily  multivitamin 1 Tablet(s) Oral daily  pantoprazole    Tablet 40 milliGRAM(s) Oral before breakfast  apixaban 5 milliGRAM(s) Oral every 12 hours  traZODone 50 milliGRAM(s) Oral every 24 hours  chlordiazePOXIDE 25 milliGRAM(s) Oral every 4 hours PRN  chlordiazePOXIDE 25 milliGRAM(s) Oral every 12 hours      Lab      Radiology    Assessment- No evidence of new focal weakness- no new neuro w/o needed    Plan- Rehab

## 2017-08-22 NOTE — PROGRESS NOTE ADULT - PROBLEM SELECTOR PROBLEM 4
JARED (acute kidney injury)

## 2017-08-22 NOTE — DISCHARGE NOTE ADULT - PLAN OF CARE
To resolve You were admitted to the hospital due to a complicated Urinary tract infection. During your stay you required a stay in the Intensive Care Unit for pressure support. You were given fluids and Ceftriaxone with resolution of symptoms. If you have increased urinary frequency, pain on urination, or blood in urine please see your Primary Care Physician Dr. Pérez. No changes were made in the care of your CLL. Your White Blood Cell count was stable during your admission. You previously were admitted due to cellulitis with ulceration of your lower heels. You completed 10days antibiotics as an outpatient. On re-evaluation of your heels they are now healing. There is no further indication for antibiotics. Please continue to work with PT to walk with air boots while at your rehab center. If you have any discharge, spreading redness of the skin, or sudden change in pain on the feet please see Dr. Pérez or return to [facility] You previously were admitted due to cellulitis with ulceration of your lower heels. You completed 10days antibiotics as an outpatient. On re-evaluation of your heels they are now healing. There is no further indication for antibiotics. Please continue to work with PT to walk with air boots while at your rehab center. If you have any discharge, spreading redness of the skin, or sudden change in pain on the feet please see Dr. Pérez or return to [facility] During your stay you suffered from a condition called Delirium. This was secondary to benzodiazepine withdrawal. You will be given Trazodone 50mg as a sleeping aid as an out patient. Please refrain from taking medications no prescribed by a physician. If you have a need in the future for sleeping aids please contact your PCP for a referral for a psychiatrist who can help.

## 2017-08-22 NOTE — DISCHARGE NOTE ADULT - CARE PROVIDER_API CALL
Sterling Pérez (MD), Internal Medicine  [address] [address]  Phone: [phone]  Fax: [phone]

## 2017-08-22 NOTE — PROGRESS NOTE ADULT - PROBLEM SELECTOR PROBLEM 1
PE (pulmonary thromboembolism)
Altered mental status
Encephalopathy, toxic
Encephalopathy, toxic
PE (pulmonary thromboembolism)
Altered mental status

## 2017-08-22 NOTE — DISCHARGE NOTE ADULT - CARE PLAN
Principal Discharge DX:	Acute cystitis without hematuria  Goal:	To resolve  Instructions for follow-up, activity and diet:	You were admitted to the hospital due to a complicated Urinary tract infection. During your stay you required a stay in the Intensive Care Unit for pressure support. You were given fluids and Ceftriaxone with resolution of symptoms. If you have increased urinary frequency, pain on urination, or blood in urine please see your Primary Care Physician Dr. Pérez.  Secondary Diagnosis:	CLL (chronic lymphocytic leukemia)  Instructions for follow-up, activity and diet:	No changes were made in the care of your CLL. Your White Blood Cell count was stable during your admission.  Secondary Diagnosis:	Ulcer of heel, right, with unspecified severity  Instructions for follow-up, activity and diet:	You previously were admitted due to cellulitis with ulceration of your lower heels. You completed 10days antibiotics as an outpatient. On re-evaluation of your heels they are now healing. There is no further indication for antibiotics. Please continue to work with PT to walk with air boots while at your rehab center. If you have any discharge, spreading redness of the skin, or sudden change in pain on the feet please see Dr. Pérez or return to [facility]  Secondary Diagnosis:	Ulcer of heel, left, with unspecified severity  Instructions for follow-up, activity and diet:	You previously were admitted due to cellulitis with ulceration of your lower heels. You completed 10days antibiotics as an outpatient. On re-evaluation of your heels they are now healing. There is no further indication for antibiotics. Please continue to work with PT to walk with air boots while at your rehab center. If you have any discharge, spreading redness of the skin, or sudden change in pain on the feet please see Dr. Pérez or return to [facility]  Secondary Diagnosis:	Delirium  Instructions for follow-up, activity and diet:	During your stay you suffered from a condition called Delirium. This was secondary to benzodiazepine withdrawal. You will be given Trazodone 50mg as a sleeping aid as an out patient. Please refrain from taking medications no prescribed by a physician. If you have a need in the future for sleeping aids please contact your PCP for a referral for a psychiatrist who can help. Principal Discharge DX:	Acute cystitis without hematuria  Goal:	To resolve  Instructions for follow-up, activity and diet:	You were admitted to the hospital due to a complicated Urinary tract infection. During your stay you required a stay in the Intensive Care Unit for pressure support. You were given fluids and Ceftriaxone with resolution of symptoms. If you have increased urinary frequency, pain on urination, or blood in urine please see your Primary Care Physician Dr. Pérez.  Secondary Diagnosis:	CLL (chronic lymphocytic leukemia)  Instructions for follow-up, activity and diet:	No changes were made in the care of your CLL. Your White Blood Cell count was stable during your admission.  Secondary Diagnosis:	Ulcer of heel, right, with unspecified severity  Instructions for follow-up, activity and diet:	You previously were admitted due to cellulitis with ulceration of your lower heels. You completed 10days antibiotics as an outpatient. On re-evaluation of your heels they are now healing. There is no further indication for antibiotics. Please continue to work with PT to walk with air boots while at your rehab center. If you have any discharge, spreading redness of the skin, or sudden change in pain on the feet please see Dr. Pérez or return to [facility]  Secondary Diagnosis:	Ulcer of heel, left, with unspecified severity  Instructions for follow-up, activity and diet:	You previously were admitted due to cellulitis with ulceration of your lower heels. You completed 10days antibiotics as an outpatient. On re-evaluation of your heels they are now healing. There is no further indication for antibiotics. Please continue to work with PT to walk with air boots while at your rehab center. If you have any discharge, spreading redness of the skin, or sudden change in pain on the feet please see Dr. Pérez or return to [facility]  Secondary Diagnosis:	Delirium  Instructions for follow-up, activity and diet:	During your stay you suffered from a condition called Delirium. This was secondary to benzodiazepine withdrawal. You will be given Trazodone 50mg as a sleeping aid as an out patient. Please refrain from taking medications no prescribed by a physician. If you have a need in the future for sleeping aids please contact your PCP for a referral for a psychiatrist who can help.

## 2017-08-22 NOTE — PROGRESS NOTE ADULT - PROBLEM SELECTOR PLAN 6
F: No IVF  E: Maintain k>4, Mg>2  N: Dysphagia Diet  Prophylaxis: Protonix 40daily   Full Code   Dispo: Accepted to [facility] under Dr. Cornell
F: No IVF  E: Maintain k>4, Mg>2  N: Dysphagia Diet  Prophylaxis: Protonix 40daily   Full Code   Dispo: Continue on RMF under Dr. Cornell
F: No IVF  E: Maintain k>4, Mg>2  N: Dysphagia Diet  Prophylaxis: Protonix 40daily   Full Code   Dispo: Continue on RMF under Dr. Cornell, patient ready for discharge pending V[facility]I placement.
F: No IVF  E: Maintain k>4, Mg>2  N: Dysphagia Diet  Prophylaxis: Protonix 40daily     Full Code   7Lach

## 2017-08-22 NOTE — DISCHARGE NOTE ADULT - SECONDARY DIAGNOSIS.
CLL (chronic lymphocytic leukemia) Ulcer of heel, right, with unspecified severity Ulcer of heel, left, with unspecified severity Delirium

## 2017-08-22 NOTE — DISCHARGE NOTE ADULT - HOSPITAL COURSE
77M with PMH of CLL, BPH with chronic indwelling rodriguez, JARED 2/2 obstructive uropathy, recent right femoral neck fx s/p hemiarthroplasty (5/17) complicated by PE (on Xarelto) and PNA (July 2017), who presented from [facility] w/ AMS and fever. Patient was recently discharged 3 days prior on Vanc and Zosyn for bilateral foot ulcers, had PICC line placed which was pulled out by patient 2 days ago and replaced in ED with d/c back to rehab, now pulled out again by patient during this hospitalization. Patient was admitted to [facility] for AMS with acute onset agitation, tachycardia to 180, relative hypotension. On the floor a rapid response was called due to acute drop in blood pressure to Systolic of 90. Patient was transferred to MICU in setting of tachycardia and hypotension. He was started on pressure support with Levophed and given Ceftriaxone. Following there were still baseline changes from mental status and patient was started on Klonopin. Following Klonopin he had a return to baseline and it was believed he was also suffering from benzodiazepine withdrawal on top of complicated UTI. Patient was tapered from Benzodiazepines with no signs of withdrawal. On day of discharge he was hemodynamically stable, was afebrile, with no change in urinary habits. Plans were made to discharge the patient to [facility] for physical therapy in good condition. 77M with PMH of CLL, BPH with chronic indwelling rodriguez, JARED 2/2 obstructive uropathy, recent right femoral neck fx s/p hemiarthroplasty (5/17) complicated by PE (on Xarelto) and PNA (July 2017), who presented from [facility] w/ AMS and fever. Patient was recently discharged 3 days prior on Vanc and Zosyn for bilateral foot ulcers, had PICC line placed which was pulled out by patient 2 days ago and replaced in ED with d/c back to rehab, now pulled out again by patient during this hospitalization. Patient was admitted to [facility] for AMS with acute onset agitation, tachycardia to 180, relative hypotension. On the floor a rapid response was called due to acute drop in blood pressure to Systolic of 90. Patient was transferred to MICU in setting of tachycardia and hypotension. He was started on pressure support with Levophed and given Ceftriaxone. Following there were still baseline changes from mental status and patient was started on Klonopin. Following Klonopin he had a return to baseline and it was believed he was also suffering from benzodiazepine withdrawal on top of complicated UTI possibly secondary to Chronic Rodriguez. Patient was tapered from Benzodiazepines with no signs of withdrawal. On day of discharge he was hemodynamically stable, was afebrile, with no change in urinary habits. Plans were made to discharge the patient to [facility] for physical therapy in good condition.

## 2017-08-22 NOTE — DISCHARGE NOTE ADULT - PATIENT PORTAL LINK FT
“You can access the Follow[facility] Patient Portal, offered by [facility], by registering with the following website: [email or web address]”

## 2017-08-22 NOTE — DISCHARGE NOTE ADULT - MEDICATION SUMMARY - MEDICATIONS TO TAKE
I will START or STAY ON the medications listed below when I get home from the hospital:    tamsulosin 0.4 mg oral capsule  -- 2 cap(s) by mouth once a day (at bedtime)  -- Indication: For JARED (acute kidney injury)    apixaban 5 mg oral tablet  -- 1 tab(s) by mouth every 12 hours  -- Indication: For PE (pulmonary thromboembolism)    guaiFENesin 100 mg/5 mL oral liquid  -- 5 milliliter(s) by mouth every 6 hours, As needed, Cough  -- Indication: For Need for prophylactic measure    polyethylene glycol 3350 oral powder for reconstitution  -- 17 gram(s) by mouth once a day  -- Indication: For Need for prophylactic measure    senna oral tablet  -- 2 tab(s) by mouth once a day (at bedtime)  -- Indication: For Need for prophylactic measure    pantoprazole 40 mg oral delayed release tablet  -- 1 tab(s) by mouth once a day  -- Indication: For Need for prophylactic measure    Multiple Vitamins oral tablet  -- 1 tab(s) by mouth once a day  -- Indication: For Nutrition, metabolism, and development symptoms    folic acid 1 mg oral tablet  -- 1 tab(s) by mouth once a day  -- Indication: For Nutrition, metabolism, and development symptoms    thiamine 100 mg oral tablet  -- 1 tab(s) by mouth once a day  -- Indication: For Nutrition, metabolism, and development symptoms

## 2017-08-22 NOTE — PROGRESS NOTE ADULT - SUBJECTIVE AND OBJECTIVE BOX
77M PMH CLL, BPH (chronic Rodriguez POA), JARED 2/2 obstructive uropathy, recent right femoral neck hip fx (s/p hemiarthroplasty 5/17) c/b PE (on Xarelto) and PNA (July 2017) admitted with AMS and s/p MICU transfer for increased agitation w/ narrow complex tachycardia >180 and hypotension. Etiology of AMS likely complicated UTI/Severe sepsis further complicated by Benzo withdrawal from unprescribed Xanax intake.      O/N Events: ANNETTA  Subjective/ROS: No complaints today. Denies HA, CP, SOB, n/v, changes in bowel/urinary habits.  12pt ROS otherwise negative.    VITALS  Vital Signs Last 24 Hrs  T(C): 36.7 (22 Aug 2017 10:00), Max: 36.8 (21 Aug 2017 16:12)  T(F): 98 (22 Aug 2017 10:00), Max: 98.2 (21 Aug 2017 16:12)  HR: 92 (22 Aug 2017 10:00) (77 - 97)  BP: 132/81 (22 Aug 2017 10:00) (113/68 - 152/74)  BP(mean): --  RR: 16 (22 Aug 2017 10:00) (16 - 18)  SpO2: 95% (22 Aug 2017 10:00) (93% - 97%)    CAPILLARY BLOOD GLUCOSE    PHYSICAL EXAM (no change from yesterday)  General: NAD AOx3  HEENT: NC/AT; PERRL,Neck: supple  Cardiovascular: +S1/S2; RRR  Respiratory: CTA B/L; no W/R/R  Gastrointestinal: soft, NT/ND; +BSx4  Extremities:  no edema, ulcers on ankles b/l with scab formation, No purulence surrounding erythema    Vascular: 2+ radial, DP/PT pulses B/L  Neurological: AAOx3, mild tremor with hands extended    MEDICATIONS  (STANDING):  tamsulosin 0.8 milliGRAM(s) Oral at bedtime  lactobacillus acidophilus 1 Tablet(s) Oral daily  thiamine 100 milliGRAM(s) Oral daily  folic acid 1 milliGRAM(s) Oral daily  multivitamin 1 Tablet(s) Oral daily  pantoprazole    Tablet 40 milliGRAM(s) Oral before breakfast  apixaban 5 milliGRAM(s) Oral every 12 hours  traZODone 50 milliGRAM(s) Oral every 24 hours    MEDICATIONS  (PRN):      No Known Allergies      LABS                        12.5   11.9  )-----------( 161      ( 22 Aug 2017 06:37 )             40.0     08-22    139  |  102  |  26<H>  ----------------------------<  94  3.9   |  26  |  1.10    Ca    9.2      22 Aug 2017 06:37                IMAGING/EKG/ETC  EKG:  Xray:  CT:  MRI:

## 2017-08-22 NOTE — DISCHARGE NOTE ADULT - MEDICATION SUMMARY - MEDICATIONS TO STOP TAKING
I will STOP taking the medications listed below when I get home from the hospital:    lactobacillus acidophilus oral capsule  --  by mouth    Zosyn 3 g-0.375 g/50 mL intravenous solution  --  intravenous every 6 hours    vancomycin 1 g/250 mL-D5% intravenous solution  --  intravenous every 12 hours

## 2017-08-22 NOTE — PROGRESS NOTE ADULT - PROBLEM SELECTOR PROBLEM 5
Altered mental status
CLL (chronic lymphocytic leukemia)
Altered mental status
CLL (chronic lymphocytic leukemia)

## 2017-08-22 NOTE — PROGRESS NOTE ADULT - ASSESSMENT
77M PMH CLL, BPH (chronic Rodriguez POA), JARED 2/2 obstructive uropathy, recent right femoral neck hip fx (s/p hemiarthroplasty 5/17) c/b PE (on Xarelto) and PNA (July 2017) admitted with AMS and s/p MICU transfer for increased agitation w/ narrow complex tachycardia >180 and hypotension. Etiology of AMS likely complicated UTI/Severe sepsis further complicated by Benzo withdrawal from unprescribed Xanax intake.

## 2017-08-22 NOTE — PROGRESS NOTE ADULT - PROBLEM SELECTOR PROBLEM 3
CLL (chronic lymphocytic leukemia)
PE (pulmonary thromboembolism)
CLL (chronic lymphocytic leukemia)
PE (pulmonary thromboembolism)

## 2017-08-22 NOTE — PROGRESS NOTE ADULT - SUBJECTIVE AND OBJECTIVE BOX
Chief Complaint/Reason for Consult: cv mgmt  INTERVAL HPI: events noted  	  MEDICATIONS:  tamsulosin 0.8 milliGRAM(s) Oral at bedtime        traZODone 50 milliGRAM(s) Oral every 24 hours  chlordiazePOXIDE 25 milliGRAM(s) Oral every 4 hours PRN  chlordiazePOXIDE 25 milliGRAM(s) Oral every 12 hours    pantoprazole    Tablet 40 milliGRAM(s) Oral before breakfast      thiamine 100 milliGRAM(s) Oral daily  folic acid 1 milliGRAM(s) Oral daily  multivitamin 1 Tablet(s) Oral daily  apixaban 5 milliGRAM(s) Oral every 12 hours      REVIEW OF SYSTEMS:  [x] As per HPI  CONSTITUTIONAL: No fever, weight loss, or fatigue  RESPIRATORY: No cough, wheezing, chills or hemoptysis; No Shortness of Breath  CARDIOVASCULAR: No chest pain, palpitations, dizziness, or leg swelling  GASTROINTESTINAL: No abdominal or epigastric pain. No nausea, vomiting, or hematemesis; No diarrhea or constipation. No melena or hematochezia.  MUSCULOSKELETAL: No joint pain or swelling; No muscle, back, or extremity pain  [x] All others negative	  [ ] Unable to obtain    PHYSICAL EXAM:  T(C): 36.3 (08-22-17 @ 04:59), Max: 36.8 (08-21-17 @ 16:12)  HR: 77 (08-22-17 @ 04:59) (77 - 97)  BP: 152/74 (08-22-17 @ 04:59) (113/68 - 152/74)  RR: 16 (08-22-17 @ 04:59) (16 - 18)  SpO2: 97% (08-22-17 @ 04:59) (93% - 97%)  Wt(kg): --  I&O's Summary    21 Aug 2017 07:01  -  22 Aug 2017 07:00  --------------------------------------------------------  IN: 0 mL / OUT: 1850 mL / NET: -1850 mL          Appearance: Normal	  HEENT:   Normal oral mucosa  Cardiovascular: Normal S1 S2, No JVD, No murmurs, No edema  Respiratory: Lungs clear to auscultation	  Gastrointestinal:  Soft, Non-tender, + BS	  Extremities: Normal range of motion, No clubbing, cyanosis or edema  Vascular: Peripheral pulses palpable 2+ bilaterally    TELEMETRY: 	    ECG:    	  RADIOLOGY:   CXR:  CT:  US:    CARDIAC TESTING:  Echocardiogram:  Catheterization:  Stress Test:      LABS:	 	    CARDIAC MARKERS:                                  12.5   11.9  )-----------( 161      ( 22 Aug 2017 06:37 )             40.0     08-22    139  |  102  |  26<H>  ----------------------------<  94  3.9   |  26  |  1.10    Ca    9.2      22 Aug 2017 06:37      proBNP:   Lipid Profile:   HgA1c:   TSH:     ASSESSMENT/PLAN: 	  Problem: Narrow complex tachycardia.  Plan: -Pt tachycardic to 180s on floor with low BP. s.p IVF Bolus and Levophed. Stable in MICU expect tachycardia during agitation episodes.   - now on regional HR stable    Problem: PE (pulmonary thromboembolism).  Plan: - Eliquis 5mg BID.

## 2017-08-22 NOTE — PROGRESS NOTE ADULT - PROBLEM/PLAN-1
DISPLAY PLAN FREE TEXT
(2) assistive person

## 2017-08-22 NOTE — PROGRESS NOTE ADULT - PROBLEM SELECTOR PLAN 1
apixaban
Pt altered on presentation with audio and visual hallucinations as well as tremors during this admission that respond to Ativan/Klonopin. Likely 2/2 benzo withdrawal, as pt has been taking Xanax not prescribed to him. CIWA currently 2  -Less likely sepsis as and pt is afebrile w/o obvious source of infection  -will f/u cx's to r/o infectious etiology, c/w outpatient IV Vanc/Zosyn pending cultures  -lactate downtrended  -CT head negative for intracranial bleed or acute intracranial pathology   -will supplement with thiamine/folate and multivitamin   -Ativan and Seroquel as needed for agitation/withdrawal sx  -monitor for signs/sx of seizures in the setting of likely benzo withdrawal  -C/W Klonopin, will taper down.
Pt altered on presentation, with elevated white count and tachypneia. Original WBC attributed to PMH of CLL but now below 10k so likely initial count was 2/2 to infectious etiology. Patient had positive UA on admission with chronic Rodriguez severe sepsis (AMS + 2/4 SIRS) most likely secondary to that. Patient given Ceftriaxone prior to UCx so no clear organism seen. Unlikely complete encephalopathic picture due to Benzo withdrawal; however, subsequent problems could be associated with it due to audio and visual hallucinations as well as tremors during this admission that respond to Ativan/Klonopin. Likely 2/2 benzo withdrawal, as pt has been taking Xanax not prescribed to him. CIWA currently 2  -Patient had WBC, with positive UA on admission and required pressor support at one time. Could not solely be due to benzodiazepine withdrawal.  -Consult ID (Dr. Matias) regarding any potential treatment for Bilateral Ulcers as they appear healed +/- treatment for complicated UTI.  -will f/u cx's to r/o infectious etiology  -lactate downtrended  -CT head negative for intracranial bleed or acute intracranial pathology   -will supplement with thiamine/folate and multivitamin   -Ativan and Seroquel as needed for agitation/withdrawal sx  -monitor for signs/sx of seizures in the setting of likely benzo withdrawal  -C/W Librium Taper, will have Librium for any sxs. Will complete taper tomorrow
Resolved, patient completed Librium taper today.
apixaban
-Pt altered on presentation with audio and visual hallucinations as well as tremors during this admission that respond to Ativan/Klonopin  -Likely 2/2 benzo withdrawal, as pt has been taking Xanax not prescribed to him and last took last night,  -Less likely sepsis as and pt is afebrile w/o obvious source of infection  -will f/u cx's to r/o infectious etiology, c/w outpatient IV Vanc/Zosyn pending cultures  -lactate downtrended  -CT head negative for intracranial bleed or acute intracranial pathology   -will supplement with thiamine/folate and multivitamin   -Ativan and Seroquel as needed for agitation/withdrawal sx  -monitor for signs/sx of seizures in the setting of likely benzo withdrawal

## 2017-08-24 DIAGNOSIS — N13.9 OBSTRUCTIVE AND REFLUX UROPATHY, UNSPECIFIED: ICD-10-CM

## 2017-08-24 DIAGNOSIS — N30.00 ACUTE CYSTITIS WITHOUT HEMATURIA: ICD-10-CM

## 2017-08-24 DIAGNOSIS — L97.419 NON-PRESSURE CHRONIC ULCER OF RIGHT HEEL AND MIDFOOT WITH UNSPECIFIED SEVERITY: ICD-10-CM

## 2017-08-24 DIAGNOSIS — J18.9 PNEUMONIA, UNSPECIFIED ORGANISM: ICD-10-CM

## 2017-08-24 DIAGNOSIS — I47.1 SUPRAVENTRICULAR TACHYCARDIA: ICD-10-CM

## 2017-08-24 DIAGNOSIS — G92 TOXIC ENCEPHALOPATHY: ICD-10-CM

## 2017-08-24 DIAGNOSIS — L97.429 NON-PRESSURE CHRONIC ULCER OF LEFT HEEL AND MIDFOOT WITH UNSPECIFIED SEVERITY: ICD-10-CM

## 2017-08-24 DIAGNOSIS — R65.20 SEVERE SEPSIS WITHOUT SEPTIC SHOCK: ICD-10-CM
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
## 2017-08-24 DIAGNOSIS — T42.4X5A ADVERSE EFFECT OF BENZODIAZEPINES, INITIAL ENCOUNTER: ICD-10-CM

## 2017-08-24 DIAGNOSIS — Z79.01 LONG TERM (CURRENT) USE OF ANTICOAGULANTS: ICD-10-CM

## 2017-08-24 DIAGNOSIS — Z86.711 PERSONAL HISTORY OF PULMONARY EMBOLISM: ICD-10-CM

## 2017-08-24 DIAGNOSIS — N39.0 URINARY TRACT INFECTION, SITE NOT SPECIFIED: ICD-10-CM

## 2017-08-24 DIAGNOSIS — T83.511A INFECTION AND INFLAMMATORY REACTION DUE TO INDWELLING URETHRAL CATHETER, INITIAL ENCOUNTER: ICD-10-CM

## 2017-08-24 DIAGNOSIS — N40.0 BENIGN PROSTATIC HYPERPLASIA WITHOUT LOWER URINARY TRACT SYMPTOMS: ICD-10-CM

## 2017-08-24 DIAGNOSIS — Z78.1 PHYSICAL RESTRAINT STATUS: ICD-10-CM

## 2017-08-24 DIAGNOSIS — C91.10 CHRONIC LYMPHOCYTIC LEUKEMIA OF B-CELL TYPE NOT HAVING ACHIEVED REMISSION: ICD-10-CM

## 2017-08-24 DIAGNOSIS — F13.239 SEDATIVE, HYPNOTIC OR ANXIOLYTIC DEPENDENCE WITH WITHDRAWAL, UNSPECIFIED: ICD-10-CM

## 2017-09-07 ENCOUNTER — APPOINTMENT (OUTPATIENT)
Dept: NEUROLOGY | Facility: CLINIC | Age: 77
End: 2017-09-07

## 2017-09-26 ENCOUNTER — EMERGENCY (EMERGENCY)
Facility: HOSPITAL | Age: 77
LOS: 1 days | Discharge: HOME | End: 2017-09-26
Attending: EMERGENCY MEDICINE | Admitting: EMERGENCY MEDICINE
Payer: MEDICARE

## 2017-09-26 VITALS
DIASTOLIC BLOOD PRESSURE: 73 MMHG | WEIGHT: 179.9 LBS | HEART RATE: 104 BPM | OXYGEN SATURATION: 98 % | SYSTOLIC BLOOD PRESSURE: 131 MMHG | RESPIRATION RATE: 20 BRPM | TEMPERATURE: 98 F

## 2017-09-26 VITALS
OXYGEN SATURATION: 96 % | SYSTOLIC BLOOD PRESSURE: 134 MMHG | RESPIRATION RATE: 16 BRPM | TEMPERATURE: 98 F | DIASTOLIC BLOOD PRESSURE: 72 MMHG | HEART RATE: 92 BPM
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
## 2017-09-26 DIAGNOSIS — S09.90XA UNSPECIFIED INJURY OF HEAD, INITIAL ENCOUNTER: ICD-10-CM

## 2017-09-26 DIAGNOSIS — Z79.899 OTHER LONG TERM (CURRENT) DRUG THERAPY: ICD-10-CM

## 2017-09-26 DIAGNOSIS — Z96.649 PRESENCE OF UNSPECIFIED ARTIFICIAL HIP JOINT: Chronic | ICD-10-CM

## 2017-09-26 DIAGNOSIS — Z79.2 LONG TERM (CURRENT) USE OF ANTIBIOTICS: ICD-10-CM
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
## 2017-09-26 PROCEDURE — 99284 EMERGENCY DEPT VISIT MOD MDM: CPT | Mod: 25

## 2017-09-26 PROCEDURE — 70450 CT HEAD/BRAIN W/O DYE: CPT

## 2017-09-26 PROCEDURE — 99284 EMERGENCY DEPT VISIT MOD MDM: CPT

## 2017-09-26 PROCEDURE — 70450 CT HEAD/BRAIN W/O DYE: CPT | Mod: 26

## 2017-09-26 NOTE — ED ADULT TRIAGE NOTE - CHIEF COMPLAINT QUOTE
"unwitnessed fall yesterday" "unwitnessed fall yesterday"-- sent by nursing home for CT head after fall- baseline intermittent confusion

## 2017-09-26 NOTE — ED ADULT TRIAGE NOTE - OTHER COMPLAINTS
zamzam from [facility] for evaluation of fall that happened yesterday, per ems pt is confused. pt denies any fall. denies any pain.

## 2017-09-26 NOTE — ED ADULT NURSE REASSESSMENT NOTE - NS ED NURSE REASSESS COMMENT FT1
PCP Elisa- comes form [facility] for CT of head- client denies fall last night -
POA- Rodriguez to leg bag draining clear yellow urine

## 2017-09-26 NOTE — ED ADULT NURSE NOTE - OTHER COMPLAINTS
zamzam from [facility] for evaluation of fall that happened yesterday, per ems pt is confused. pt denies any fall. denies any pain.

## 2017-09-26 NOTE — ED PROVIDER NOTE - OBJECTIVE STATEMENT
77 M with hx of dementia- reported fall out of bed, on apixiban  px denies fall- he has no complaints no ha no n/v  tolerating po today  feels well  no exac/allev factors

## 2017-09-26 NOTE — ED ADULT NURSE NOTE - CHIEF COMPLAINT QUOTE
"unwitnessed fall yesterday"-- sent by nursing home for CT head after fall- baseline intermittent confusion

## 2017-10-18 ENCOUNTER — INPATIENT (INPATIENT)
Facility: HOSPITAL | Age: 77
LOS: 6 days | Discharge: NURSING, REHAB OR LONG-TERM CARE | DRG: 698 | End: 2017-10-25
Attending: INTERNAL MEDICINE | Admitting: INTERNAL MEDICINE
Payer: MEDICARE

## 2017-10-18 VITALS
OXYGEN SATURATION: 98 % | RESPIRATION RATE: 16 BRPM | SYSTOLIC BLOOD PRESSURE: 147 MMHG | TEMPERATURE: 98 F | WEIGHT: 179.9 LBS | HEART RATE: 116 BPM | DIASTOLIC BLOOD PRESSURE: 78 MMHG | HEIGHT: 71 IN

## 2017-10-18 DIAGNOSIS — I26.99 OTHER PULMONARY EMBOLISM WITHOUT ACUTE COR PULMONALE: ICD-10-CM

## 2017-10-18 DIAGNOSIS — G47.00 INSOMNIA, UNSPECIFIED: ICD-10-CM

## 2017-10-18 DIAGNOSIS — R63.8 OTHER SYMPTOMS AND SIGNS CONCERNING FOOD AND FLUID INTAKE: ICD-10-CM

## 2017-10-18 DIAGNOSIS — L97.509 NON-PRESSURE CHRONIC ULCER OF OTHER PART OF UNSPECIFIED FOOT WITH UNSPECIFIED SEVERITY: ICD-10-CM

## 2017-10-18 DIAGNOSIS — A41.9 SEPSIS, UNSPECIFIED ORGANISM: ICD-10-CM

## 2017-10-18 DIAGNOSIS — Z29.9 ENCOUNTER FOR PROPHYLACTIC MEASURES, UNSPECIFIED: ICD-10-CM

## 2017-10-18 DIAGNOSIS — Z96.649 PRESENCE OF UNSPECIFIED ARTIFICIAL HIP JOINT: Chronic | ICD-10-CM

## 2017-10-18 DIAGNOSIS — C91.10 CHRONIC LYMPHOCYTIC LEUKEMIA OF B-CELL TYPE NOT HAVING ACHIEVED REMISSION: ICD-10-CM

## 2017-10-18 LAB
ALBUMIN SERPL ELPH-MCNC: 3.6 G/DL — SIGNIFICANT CHANGE UP (ref 3.3–5)
ALP SERPL-CCNC: 84 U/L — SIGNIFICANT CHANGE UP (ref 40–120)
ALT FLD-CCNC: 32 U/L — SIGNIFICANT CHANGE UP (ref 10–45)
ANION GAP SERPL CALC-SCNC: 11 MMOL/L — SIGNIFICANT CHANGE UP (ref 5–17)
APPEARANCE UR: CLEAR — SIGNIFICANT CHANGE UP
AST SERPL-CCNC: 20 U/L — SIGNIFICANT CHANGE UP (ref 10–40)
BASOPHILS NFR BLD AUTO: 1 % — SIGNIFICANT CHANGE UP (ref 0–2)
BILIRUB SERPL-MCNC: 0.3 MG/DL — SIGNIFICANT CHANGE UP (ref 0.2–1.2)
BILIRUB UR-MCNC: NEGATIVE — SIGNIFICANT CHANGE UP
BUN SERPL-MCNC: 19 MG/DL — SIGNIFICANT CHANGE UP (ref 7–23)
CALCIUM SERPL-MCNC: 9.6 MG/DL — SIGNIFICANT CHANGE UP (ref 8.4–10.5)
CHLORIDE SERPL-SCNC: 96 MMOL/L — SIGNIFICANT CHANGE UP (ref 96–108)
CO2 SERPL-SCNC: 29 MMOL/L — SIGNIFICANT CHANGE UP (ref 22–31)
COLOR SPEC: YELLOW — SIGNIFICANT CHANGE UP
CREAT SERPL-MCNC: 1 MG/DL — SIGNIFICANT CHANGE UP (ref 0.5–1.3)
DIFF PNL FLD: (no result)
EOSINOPHIL NFR BLD AUTO: 2 % — SIGNIFICANT CHANGE UP (ref 0–6)
GLUCOSE SERPL-MCNC: 150 MG/DL — HIGH (ref 70–99)
GLUCOSE UR QL: NEGATIVE — SIGNIFICANT CHANGE UP
HCT VFR BLD CALC: 40.2 % — SIGNIFICANT CHANGE UP (ref 39–50)
HGB BLD-MCNC: 13.3 G/DL — SIGNIFICANT CHANGE UP (ref 13–17)
KETONES UR-MCNC: NEGATIVE — SIGNIFICANT CHANGE UP
LACTATE SERPL-SCNC: 1.7 MMOL/L — SIGNIFICANT CHANGE UP (ref 0.5–2)
LACTATE SERPL-SCNC: 2.4 MMOL/L — HIGH (ref 0.5–2)
LEUKOCYTE ESTERASE UR-ACNC: (no result)
LYMPHOCYTES # BLD AUTO: 41 % — SIGNIFICANT CHANGE UP (ref 13–44)
MCHC RBC-ENTMCNC: 28.8 PG — SIGNIFICANT CHANGE UP (ref 27–34)
MCHC RBC-ENTMCNC: 33.1 G/DL — SIGNIFICANT CHANGE UP (ref 32–36)
MCV RBC AUTO: 87 FL — SIGNIFICANT CHANGE UP (ref 80–100)
MONOCYTES NFR BLD AUTO: 4 % — SIGNIFICANT CHANGE UP (ref 2–14)
NEUTROPHILS NFR BLD AUTO: 52 % — SIGNIFICANT CHANGE UP (ref 43–77)
NITRITE UR-MCNC: POSITIVE
PH UR: 7 — SIGNIFICANT CHANGE UP (ref 5–8)
PLATELET # BLD AUTO: 141 K/UL — LOW (ref 150–400)
POTASSIUM SERPL-MCNC: 4.2 MMOL/L — SIGNIFICANT CHANGE UP (ref 3.5–5.3)
POTASSIUM SERPL-SCNC: 4.2 MMOL/L — SIGNIFICANT CHANGE UP (ref 3.5–5.3)
PROT SERPL-MCNC: 6.8 G/DL — SIGNIFICANT CHANGE UP (ref 6–8.3)
PROT UR-MCNC: 30 MG/DL
RBC # BLD: 4.62 M/UL — SIGNIFICANT CHANGE UP (ref 4.2–5.8)
RBC # FLD: 15.8 % — SIGNIFICANT CHANGE UP (ref 10.3–16.9)
SODIUM SERPL-SCNC: 136 MMOL/L — SIGNIFICANT CHANGE UP (ref 135–145)
SP GR SPEC: 1.02 — SIGNIFICANT CHANGE UP (ref 1–1.03)
UROBILINOGEN FLD QL: 0.2 E.U./DL — SIGNIFICANT CHANGE UP
WBC # BLD: 14.2 K/UL — HIGH (ref 3.8–10.5)
WBC # FLD AUTO: 14.2 K/UL — HIGH (ref 3.8–10.5)

## 2017-10-18 PROCEDURE — 99285 EMERGENCY DEPT VISIT HI MDM: CPT | Mod: 25

## 2017-10-18 PROCEDURE — 71010: CPT | Mod: 26

## 2017-10-18 PROCEDURE — 99222 1ST HOSP IP/OBS MODERATE 55: CPT

## 2017-10-18 PROCEDURE — 93010 ELECTROCARDIOGRAM REPORT: CPT

## 2017-10-18 RX ORDER — FUROSEMIDE 40 MG
20 TABLET ORAL DAILY
Qty: 0 | Refills: 0 | Status: DISCONTINUED | OUTPATIENT
Start: 2017-10-18 | End: 2017-10-25

## 2017-10-18 RX ORDER — ZOLPIDEM TARTRATE 10 MG/1
5 TABLET ORAL AT BEDTIME
Qty: 0 | Refills: 0 | Status: DISCONTINUED | OUTPATIENT
Start: 2017-10-18 | End: 2017-10-19

## 2017-10-18 RX ORDER — LANOLIN ALCOHOL/MO/W.PET/CERES
3 CREAM (GRAM) TOPICAL AT BEDTIME
Qty: 0 | Refills: 0 | Status: DISCONTINUED | OUTPATIENT
Start: 2017-10-18 | End: 2017-10-25

## 2017-10-18 RX ORDER — PIPERACILLIN AND TAZOBACTAM 4; .5 G/20ML; G/20ML
3.38 INJECTION, POWDER, LYOPHILIZED, FOR SOLUTION INTRAVENOUS EVERY 6 HOURS
Qty: 0 | Refills: 0 | Status: DISCONTINUED | OUTPATIENT
Start: 2017-10-18 | End: 2017-10-22

## 2017-10-18 RX ORDER — ZOLPIDEM TARTRATE 10 MG/1
5 TABLET ORAL AT BEDTIME
Qty: 0 | Refills: 0 | Status: DISCONTINUED | OUTPATIENT
Start: 2017-10-18 | End: 2017-10-21

## 2017-10-18 RX ORDER — DIPHENHYDRAMINE HCL 50 MG
0 CAPSULE ORAL
Qty: 0 | Refills: 0 | COMMUNITY

## 2017-10-18 RX ORDER — DEXLANSOPRAZOLE 30 MG/1
0 CAPSULE, DELAYED RELEASE ORAL
Qty: 0 | Refills: 0 | COMMUNITY

## 2017-10-18 RX ORDER — MOXIFLOXACIN HYDROCHLORIDE TABLETS, 400 MG 400 MG/1
1 TABLET, FILM COATED ORAL
Qty: 0 | Refills: 0 | COMMUNITY

## 2017-10-18 RX ORDER — SODIUM CHLORIDE 9 MG/ML
1500 INJECTION INTRAMUSCULAR; INTRAVENOUS; SUBCUTANEOUS ONCE
Qty: 0 | Refills: 0 | Status: COMPLETED | OUTPATIENT
Start: 2017-10-18 | End: 2017-10-18

## 2017-10-18 RX ORDER — TAMSULOSIN HYDROCHLORIDE 0.4 MG/1
1 CAPSULE ORAL
Qty: 0 | Refills: 0 | COMMUNITY

## 2017-10-18 RX ORDER — SODIUM CHLORIDE 9 MG/ML
1000 INJECTION INTRAMUSCULAR; INTRAVENOUS; SUBCUTANEOUS ONCE
Qty: 0 | Refills: 0 | Status: COMPLETED | OUTPATIENT
Start: 2017-10-18 | End: 2017-10-18

## 2017-10-18 RX ORDER — SENNA PLUS 8.6 MG/1
2 TABLET ORAL AT BEDTIME
Qty: 0 | Refills: 0 | Status: DISCONTINUED | OUTPATIENT
Start: 2017-10-18 | End: 2017-10-25

## 2017-10-18 RX ORDER — APIXABAN 2.5 MG/1
5 TABLET, FILM COATED ORAL EVERY 12 HOURS
Qty: 0 | Refills: 0 | Status: DISCONTINUED | OUTPATIENT
Start: 2017-10-18 | End: 2017-10-25

## 2017-10-18 RX ORDER — PIPERACILLIN AND TAZOBACTAM 4; .5 G/20ML; G/20ML
3.38 INJECTION, POWDER, LYOPHILIZED, FOR SOLUTION INTRAVENOUS ONCE
Qty: 0 | Refills: 0 | Status: COMPLETED | OUTPATIENT
Start: 2017-10-18 | End: 2017-10-18

## 2017-10-18 RX ADMIN — SODIUM CHLORIDE 750 MILLILITER(S): 9 INJECTION INTRAMUSCULAR; INTRAVENOUS; SUBCUTANEOUS at 13:35

## 2017-10-18 RX ADMIN — PIPERACILLIN AND TAZOBACTAM 200 GRAM(S): 4; .5 INJECTION, POWDER, LYOPHILIZED, FOR SOLUTION INTRAVENOUS at 11:42

## 2017-10-18 RX ADMIN — SENNA PLUS 2 TABLET(S): 8.6 TABLET ORAL at 22:24

## 2017-10-18 RX ADMIN — PIPERACILLIN AND TAZOBACTAM 200 GRAM(S): 4; .5 INJECTION, POWDER, LYOPHILIZED, FOR SOLUTION INTRAVENOUS at 22:31

## 2017-10-18 RX ADMIN — APIXABAN 5 MILLIGRAM(S): 2.5 TABLET, FILM COATED ORAL at 22:24

## 2017-10-18 RX ADMIN — PIPERACILLIN AND TAZOBACTAM 200 GRAM(S): 4; .5 INJECTION, POWDER, LYOPHILIZED, FOR SOLUTION INTRAVENOUS at 17:57

## 2017-10-18 RX ADMIN — Medication 20 MILLIGRAM(S): at 17:57

## 2017-10-18 RX ADMIN — SODIUM CHLORIDE 1000 MILLILITER(S): 9 INJECTION INTRAMUSCULAR; INTRAVENOUS; SUBCUTANEOUS at 11:42

## 2017-10-18 RX ADMIN — Medication 1 TABLET(S): at 17:57

## 2017-10-18 NOTE — ED PROVIDER NOTE - MEDICAL DECISION MAKING DETAILS
78 yo male with chronic rodriguez, urine blood tinged today.  will get labs, urine ro infection 78 yo male with chronic rodriguez, urine blood tinged today.  will get labs, urine ro infection.  lactate elevated and wbc, given zosyn and fluids

## 2017-10-18 NOTE — ED PROVIDER NOTE - OBJECTIVE STATEMENT
patient and aide noticed urine was blood tinged and darker today  no pain or fever  rodriguez placed in August for urinary retention

## 2017-10-18 NOTE — H&P ADULT - ASSESSMENT
78yo M with chronic rodriguez presenting with blood-tinged urine, found to be septic likely 2/2 UTI.    ED Vitals: T97.5, , /78, RR 16, O2 98%  Labs: WBC 14.2, lactate 2.4  Given in ED: Zosyn x1, NaCl 1L bolus, NaCl 1.5L bolus 76yo M with chronic rodriguez presenting with blood-tinged urine, found to be septic likely 2/2 UTI.

## 2017-10-18 NOTE — ED ADULT TRIAGE NOTE - CHIEF COMPLAINT QUOTE
pt c/o hematuria that started this morning with foul odor to urine. pt has FC in place, denies pain or fever.

## 2017-10-18 NOTE — CONSULT NOTE ADULT - SUBJECTIVE AND OBJECTIVE BOX
REASON FOR CONSULT:    HISTORY OF PRESENT ILLNESS:  78yo M with PMHx CLL dx 16yrs ago, obstructive uropathy with chronic rodriguez placed 8/2017, R hip fracture (5/2017), h/o PE, pneumonia, presenting with dark blood-tinged urine in rodriguez bag. Pt recently discharged 1 week ago from [facility]. Pt states that HHA was unable to change rodriguez bag yesterday with resultant urinary retention possibly leading to blood-tinged urine. HHA usually empties rodriguez bag twice a day. Pt denies any fatigue, dysuria, fevers, chills, weight loss. Further denies CP, SOB, abdominal pain, diarrhea/constipation.     PAST MEDICAL & SURGICAL HISTORY:  Hip fracture requiring operative repair, right, sequela  PE (pulmonary thromboembolism)  CLL (chronic lymphocytic leukemia)  S/P hip hemiarthroplasty      [ ] Diabetes   [ ] Hypertension  [ ] Hyperlipidemia  [ ] CAD  [ ] PCI  [ ] CABG    PREVIOUS DIAGNOSTIC TESTING:    [ ] Echocardiogram:  [ ]  Catheterization:  [ ] Stress Test:  	    MEDICATIONS:  furosemide    Tablet 20 milliGRAM(s) Oral daily    piperacillin/tazobactam IVPB. 3.375 Gram(s) IV Intermittent every 8 hours      zolpidem 5 milliGRAM(s) Oral at bedtime PRN  zolpidem 5 milliGRAM(s) Oral at bedtime PRN    senna 2 Tablet(s) Oral at bedtime      apixaban 5 milliGRAM(s) Oral every 12 hours  multivitamin 1 Tablet(s) Oral daily      FAMILY HISTORY:  No pertinent family history in first degree relatives      SOCIAL HISTORY:    [ ] Non-smoker  [ ] Smoker  [ ] Alcohol    Allergies    No Known Allergies    Intolerances    	    REVIEW OF SYSTEMS:    [x] as per HPI  CONSTITUTIONAL: No fever, weight loss, or fatigue  ENT:  No difficulty hearing, tinnitus, vertigo; No sinus or throat pain  RESPIRATORY: No cough, wheezing, chills or hemoptysis; No Shortness of Breath  CARDIOVASCULAR: No chest pain, palpitations, dizziness, or leg swelling  GASTROINTESTINAL: No abdominal or epigastric pain. No nausea, vomiting, or hematemesis; No diarrhea or constipation. No melena or hematochezia.  GENITOURINARY: No dysuria, frequency, hematuria, or incontinence  NEUROLOGICAL: No headaches, memory loss, loss of strength, numbness, or tremors  MUSCULOSKELETAL: No joint pain or swelling; No muscle, back, or extremity pain  [x] All others negative	  [ ] Unable to obtain    PHYSICAL EXAM:  T(C): 36.3 (10-18-17 @ 14:57), Max: 36.8 (10-18-17 @ 10:57)  HR: 87 (10-18-17 @ 14:57) (86 - 116)  BP: 147/84 (10-18-17 @ 14:57) (146/76 - 148/71)  RR: 20 (10-18-17 @ 14:57) (16 - 20)  SpO2: 97% (10-18-17 @ 14:57) (97% - 98%)  Wt(kg): --  I&O's Summary    18 Oct 2017 07:01  -  18 Oct 2017 15:19  --------------------------------------------------------  IN: 500 mL / OUT: 0 mL / NET: 500 mL        Appearance: Normal	  HEENT:   Normal oral mucosa, PERRL, EOMI	  Lymphatic: No lymphadenopathy  Cardiovascular: Normal S1 S2, No JVD, No murmurs, No edema  Respiratory: Lungs clear to auscultation	  Psychiatry: A & O x 3, Mood & affect appropriate  Gastrointestinal:  Soft, Non-tender, + BS	  Skin: No rashes, No ecchymoses, No cyanosis	  Neurologic: Non-focal  Extremities: Normal range of motion, No clubbing, cyanosis or edema  Vascular: Peripheral pulses palpable 2+ bilaterally    TELEMETRY: 	    ECG:  < from: 12 Lead ECG (08.18.17 @ 09:28) >  Diagnosis Line Normal sinus rhythm  Left axis deviation  Inferior infarct , age undetermined  Possible Anterolateral infarct , age undetermined    < end of copied text >    ECHO:< from: Echocardiogram (06.05.17 @ 16:18) >  A complete two-dimensional transthoracic echocardiogram was performed (2D,   M-mode, spectral and color flow doppler). Study Quality: Fair.  Normal   left   ventricular size and wall thickness. The left ventricular wall motion is   normal.  The left ventricular ejection fraction is normal. The left   ventricular ejection fraction is 65%.  The left atrial size is normal.   Right   atrium not well visualized.The right ventricle is not well visualized.   Probably normal right ventricular size and function.  No evidence for any   hemodynamically significant valvular disease.There is no   echocardiographic   evidence for pulmonary hypertension. The pulmonary artery systolic   pressure is   estimated to be 20 mmHg.  The inferior vena cava is normal in size (<2.1   cm)   with normal inspiratory collapse (>50%) consistent with normal right   atrial   pressure.  No aortic root dilatation.Left pleural effusion noted. There   is no   pericardial effusion.    < end of copied text >    STRESS:  CATH:  	  RADIOLOGY:  CXR:  CT:  US:   	  	  LABS:	 	    CARDIAC MARKERS:                                  13.3   14.2  )-----------( 141      ( 18 Oct 2017 11:22 )             40.2     10-18    136  |  96  |  19  ----------------------------<  150<H>  4.2   |  29  |  1.00    Ca    9.6      18 Oct 2017 11:22    TPro  6.8  /  Alb  3.6  /  TBili  0.3  /  DBili  x   /  AST  20  /  ALT  32  /  AlkPhos  84  10-18    proBNP:   Lipid Profile:   HgA1c:   TSH:     ASSESSMENT/PLAN: 	    #Chronic diastolic CHF - consider IV lasix 20 x 1 while getting ABX IV,  continue po home dose po check BNP    #CAD - no cp no sob    #HTN - goals SBP<140mmHg as pth as diastolic heart disease    #CV Prevention  q3mo Fasting Lipid Profile, Goal LDL<100, statin as tolerated  q6week TSH  q3mo 25-OH Vitamin D Level, Goal 50, supplement as tolerated

## 2017-10-18 NOTE — H&P ADULT - HISTORY OF PRESENT ILLNESS
76yo M with PMHx CLL dx 16yrs ago, obstructive uropathy with chronic rodriguez placed 8/2017, R hip fracture (5/2017), h/o PE, pneumonia, presenting with dark blood-tinged urine in rodriguez bag. Pt states that HHA was unable to change rodriguez bag yesterday with resultant urinary retention possibly leading to blood-tinged urine. HHA usually empties rodriguez bag twice a day. Pt denies any fatigue, dysuria, fevers, chills, weight loss. Further denies CP, SOB, abdominal pain, diarrhea/constipation. 76yo M with PMHx CLL dx 16yrs ago, obstructive uropathy with chronic rodriguez placed 8/2017, R hip fracture (5/2017), h/o PE, pneumonia, presenting with dark blood-tinged urine in rodriguez bag. Pt recently discharged 1 week ago from [facility]. Pt states that HHA was unable to change rodriguez bag yesterday with resultant urinary retention possibly leading to blood-tinged urine. HHA usually empties rodriguez bag twice a day. Pt denies any fatigue, dysuria, fevers, chills, weight loss. Further denies CP, SOB, abdominal pain, diarrhea/constipation. 76yo M with PMHx CLL dx 16yrs ago, obstructive uropathy with chronic rodriguez placed 8/2017, R hip fracture (5/2017), h/o PE, pneumonia, presenting with dark blood-tinged urine in rodriguez bag. Pt recently discharged 1 week ago from [facility]. Pt states that HHA was unable to change rodriguez bag yesterday with resultant urinary retention possibly leading to blood-tinged urine. HHA usually empties rodriguez bag twice a day. Pt denies any fatigue, dysuria, fevers, chills, weight loss. Further denies CP, SOB, abdominal pain, diarrhea/constipation.     ED Vitals: T97.5, , /78, RR 16, O2 98%  Labs: WBC 14.2, lactate 2.4  Given in ED: Zosyn x1, NaCl 1L bolus, NaCl 1.5L bolus

## 2017-10-18 NOTE — H&P ADULT - PROBLEM SELECTOR PLAN 5
F: IVF bolus prn  E: replete prn for K<4, Mg<2  N: regular diet - c/w ambien 10mg prn Pt with h/o CLL, stable and not on any medications

## 2017-10-18 NOTE — H&P ADULT - NSHPPHYSICALEXAM_GEN_ALL_CORE
.  VITAL SIGNS:  T(C): 36.7 (10-18-17 @ 13:47), Max: 36.8 (10-18-17 @ 10:57)  T(F): 98.1 (10-18-17 @ 13:47), Max: 98.2 (10-18-17 @ 10:57)  HR: 86 (10-18-17 @ 13:47) (86 - 116)  BP: 148/71 (10-18-17 @ 13:47) (146/76 - 148/71)  BP(mean): --  RR: 19 (10-18-17 @ 13:47) (16 - 19)  SpO2: 97% (10-18-17 @ 13:47) (97% - 98%)  Wt(kg): --    PHYSICAL EXAM:    Constitutional: WDWN resting comfortably in bed; NAD  Head: NC/AT  Eyes: PERRL, EOMI, anicteric sclera  Neck: supple; no JVD or thyromegaly  Respiratory: CTA B/L; no W/R/R, no retractions  Cardiac: +S1/S2; RRR; no M/R/G; PMI non-displaced  Gastrointestinal: abdomen soft, NT/ND; no rebound or guarding; +BSx4  Genitourinary: normal external genitalia, rodriguez  Extremities: WWP, no clubbing or cyanosis; edema 2+ pitting b/l to knees  Musculoskeletal: no joint swelling, tenderness or erythema  Vascular: 2+ radial, femoral, DP/PT pulses B/L  Dermatologic: skin warm, dry; Left foot heel ulcer 3x3cm with packing, R foot heel healing ulcer  Lymphatic: no submandibular or cervical LAD  Neurologic: AAOx3; CNII-XII grossly intact; no focal deficits  Psychiatric: affect and characteristics of appearance, verbalizations, behaviors are appropriate

## 2017-10-18 NOTE — H&P ADULT - PROBLEM SELECTOR PLAN 2
Pt and HHA state wound care nurse examines b/l foot ulcers daily L worse than R. Nonerythematous and nontender on exam.   - wound care consult

## 2017-10-18 NOTE — PROGRESS NOTE ADULT - SUBJECTIVE AND OBJECTIVE BOX
Interventional, Pulmonary, Critical, Chest Special Procedures.    Pt was seen and fully examined by myself.     Time spent with patient in minutes:    Patient is a 77y old  Male who presents with a chief complaint of sepsis 2/2 UTI (18 Oct 2017 14:05)    HPI:  78yo M with PMHx CLL dx 16yrs ago, obstructive uropathy with chronic rodriguez placed 8/2017, R hip fracture (5/2017), h/o PE, pneumonia, presenting with dark blood-tinged urine in rodriguez bag. Pt recently discharged 1 week ago from [facility]. Pt states that HHA was unable to change rodriguez bag yesterday with resultant urinary retention possibly leading to blood-tinged urine. HHA usually empties rodriguez bag twice a day. Pt denies any fatigue, dysuria, fevers, chills, weight loss. Further denies CP, SOB, abdominal pain, diarrhea/constipation.     ED Vitals: T97.5, , /78, RR 16, O2 98%  Labs: WBC 14.2, lactate 2.4  Given in ED: Zosyn x1, NaCl 1L bolus, NaCl 1.5L bolus (18 Oct 2017 14:05)    REVIEW OF SYSTEMS:  Constitutional: No fever, weight loss, chills or fatigue  Eyes: No eye pain, visual disturbances, or discharge  ENMT:  No difficulty hearing, tinnitus, vertigo; No sinus or throat pain. No epistaxis, dysphagia, dysphonia, hoarseness or odynophagia  Neck: No pain, stiffness or neck swelling.  No masses or deformities  Respiratory: No cough, wheezing, chills or hemoptysis  - COPD  - ILD   - PE   - ASTHMA     - PNEUMONIA  Cardiovascular: No chest pain, dysrhythmia, palpitations, dizziness or edema   - COPD     - CAD   - CHF   - HTN  Gastrointestinal: No abdominal or epigastric pain. No nausea, vomiting or hematemesis; No diarrhea or constipation. No melena or hematochezia. No dysphagia or Icterus.          Genitourinary: No dysuria, frequency, hematuria or incontinence   - CKD/JARED      - ESRD  Neurological: No headaches, memory loss, loss of strength, numbness or tremors      -DEMENTIA     - STROKE    - SEIZURE  Skin: No itching, burning, rashes or lesions   Lymph Nodes: No enlarged glands  Endocrine: No heat or cold intolerance; No hair loss       - DM     - THYROID DISORDER  Musculoskeletal: No joint pain or swelling; No muscle, back or extremity pain  Psychiatric: No depression, anxiety, mood swings or difficulty sleeping  Heme/Lymph: No easy bruising or bleeding gums         - ANEMIA      - CANCER   -COAGULOPATHY  Allergy and Immunologic: No hives or eczema    PAST MEDICAL & SURGICAL HISTORY:  Hip fracture requiring operative repair, right, sequela  PE (pulmonary thromboembolism)  CLL (chronic lymphocytic leukemia)  S/P hip hemiarthroplasty    FAMILY HISTORY:  No pertinent family history in first degree relatives    SOCIAL HISTORY:      - Tobacco     - ETOH    Allergies    No Known Allergies    Intolerances      Vital Signs Last 24 Hrs  T(C): 36.3 (18 Oct 2017 14:57), Max: 36.8 (18 Oct 2017 10:57)  T(F): 97.4 (18 Oct 2017 14:57), Max: 98.2 (18 Oct 2017 10:57)  HR: 87 (18 Oct 2017 14:57) (86 - 116)  BP: 147/84 (18 Oct 2017 14:57) (146/76 - 148/71)  BP(mean): --  RR: 20 (18 Oct 2017 14:57) (16 - 20)  SpO2: 97% (18 Oct 2017 14:57) (97% - 98%)    10-18 @ 07:01  -  10-18 @ 16:40  --------------------------------------------------------  IN: 500 mL / OUT: 0 mL / NET: 500 mL        PHYSICAL EXAM:  Comfortable, no distress  Eyes: PERRL, EOM intact; conjunctiva and sclera clear  Head: Normocephalic;  No Trauma  ENMT: No nasal discharge, hoarseness, cough or hemoptysis.  Airway clear  Neck: Supple; non tender; no masses or deformities.    No JVD  Respiratory: CTA,  - WHEEZING   - RHONCHI  - RALES  - CRACKLES.  Diminished breath sounds  BILATERAL  RIGHT  LEFT  Cardiovascular: Regular rate and rhythm. S1 and S2 Normal; No murmurs, gallops or rubs     - PPM/AICD  Gastrointestinal: Soft non-tender, non-distended; Normal bowel sounds; No hepatosplenomegaly.     -PEG    -  GT   - RODRIGUEZ  Genitourinary: No costovertebral angle tenderness. No dysuria  Extremities: AROM, No clubbing, cyanosis or edema    Vascular: Peripheral pulses palpable 2+ bilaterally  Neurological: Alert and responisve to stimuli   Skin: Warm and dry. No obvious rash  Lymph Nodes: No acute cervical or supraclavicular adenopathy  Psychiatric: Cooperative and appropriate mood    DEVICES:  - DENTURES   +IV R / L     - ETUBE   -TRACH   -CTUBE  R / L      LABS:                          13.3   14.2  )-----------( 141      ( 18 Oct 2017 11:22 )             40.2     10-18    136  |  96  |  19  ----------------------------<  150<H>  4.2   |  29  |  1.00    Ca    9.6      18 Oct 2017 11:22    TPro  6.8  /  Alb  3.6  /  TBili  0.3  /  DBili  x   /  AST  20  /  ALT  32  /  AlkPhos  84  10-18      RADIOLOGY & ADDITIONAL STUDIES (The following images were personally reviewed): Interventional, Pulmonary, Critical, Chest Special Procedures.    Pt was seen and fully examined by myself.     Time spent with patient in minutes:77    Patient is a 77y old  Male who presents with a chief complaint of sepsis 2/2 UTI (18 Oct 2017 14:05)Events leading to this admission reviewed. The patient ill appearing, anxious.    HPI:  78yo M with PMHx CLL dx 16yrs ago, obstructive uropathy with chronic rodriguez placed 8/2017, R hip fracture (5/2017), h/o PE, pneumonia, presenting with dark blood-tinged urine in rodriguez bag. Pt recently discharged 1 week ago from [facility]. Pt states that HHA was unable to change rodriguez bag yesterday with resultant urinary retention possibly leading to blood-tinged urine. HHA usually empties rodriguez bag twice a day. Pt denies any fatigue, dysuria, fevers, chills, weight loss. Further denies CP, SOB, abdominal pain, diarrhea/constipation.     ED Vitals: T97.5, , /78, RR 16, O2 98%  Labs: WBC 14.2, lactate 2.4  Given in ED: Zosyn x1, NaCl 1L bolus, NaCl 1.5L bolus (18 Oct 2017 14:05)    REVIEW OF SYSTEMS:  done    PAST MEDICAL & SURGICAL HISTORY:  Hip fracture requiring operative repair, right, sequela  PE (pulmonary thromboembolism)  CLL (chronic lymphocytic leukemia)  S/P hip hemiarthroplasty    FAMILY HISTORY:  No pertinent family history in first degree relatives    SOCIAL HISTORY:      - Tobacco     - ETOH    Allergies    No Known Allergies    Intolerances      Vital Signs Last 24 Hrs  T(C): 36.3 (18 Oct 2017 14:57), Max: 36.8 (18 Oct 2017 10:57)  T(F): 97.4 (18 Oct 2017 14:57), Max: 98.2 (18 Oct 2017 10:57)  HR: 87 (18 Oct 2017 14:57) (86 - 116)  BP: 147/84 (18 Oct 2017 14:57) (146/76 - 148/71)  BP(mean): --  RR: 20 (18 Oct 2017 14:57) (16 - 20)  SpO2: 97% (18 Oct 2017 14:57) (97% - 98%)    10-18 @ 07:01  -  10-18 @ 16:40  --------------------------------------------------------  IN: 500 mL / OUT: 0 mL / NET: 500 mL        PHYSICAL EXAM:  UnComfortable, no distress  Eyes: PERRL, EOM intact; conjunctiva and sclera clear  Head: Normocephalic;  No Trauma  ENMT: No nasal discharge, hoarseness, cough or hemoptysis.  Airway clear  Neck: Supple; non tender; no masses or deformities.    No JVD  Respiratory: CTA,  - WHEEZING   - RHONCHI  - RALES  + CRACKLES.  Diminished breath sounds  BILATERAL  RIGHT  LEFT bases  Cardiovascular: Regular rate and rhythm. S1 and S2 Normal; No murmurs, gallops or rubs     - PPM/AICD  Gastrointestinal: Soft non-tender, non-distended; Normal bowel sounds; No hepatosplenomegaly.     -PEG    -  GT   + RODRIGUEZ  Genitourinary: No costovertebral angle tenderness. No dysuria  Extremities: AROM, No clubbing, cyanosis or edema    Vascular: Peripheral pulses palpable 2+ bilaterally  Neurological: Alert and responisve to stimuli   Skin: Warm and dry. No obvious rash  Lymph Nodes: No acute cervical or supraclavicular adenopathy  Psychiatric: Cooperative and appropriate mood    DEVICES:  - DENTURES   +IV R / L     - ETUBE   -TRACH   -CTUBE  R / L      LABS:                          13.3   14.2  )-----------( 141      ( 18 Oct 2017 11:22 )             40.2     10-18    136  |  96  |  19  ----------------------------<  150<H>  4.2   |  29  |  1.00    Ca    9.6      18 Oct 2017 11:22    TPro  6.8  /  Alb  3.6  /  TBili  0.3  /  DBili  x   /  AST  20  /  ALT  32  /  AlkPhos  84  10-18      < from: Xray Chest 1 View AP- PORTABLE-Urgent (08.17.17 @ 21:19) >    EXAM:  XR CHEST 1 VIEW PORT URGENT                          PROCEDURE DATE:  08/17/2017                     INTERPRETATION:  INDICATION: Fever.    TECHNIQUE: Chest, single portable AP view on  8/17/2017 9:19 PM     COMPARISON: Chest radiograph 8: 8/15/2017    FINDINGS:  The right PICC line has been removed. There is no focal airspace   consolidation in either lung.  Costophrenic angles remain sharp   bilaterally, without sizable pleural effusions. No pneumothorax is seen.   Cardiomediastinal silhouette and hilar contours are grossly unchanged in   appearance. Visualized bony thorax demonstrates no gross abnormality.       IMPRESSION:   No focal airspace disease. Interval removal of right PICC line.    < end of copied text >  RADIOLOGY & ADDITIONAL STUDIES (The following images were personally reviewed):

## 2017-10-18 NOTE — ED ADULT NURSE REASSESSMENT NOTE - SKIN INTEGRITY
stage 2 , 2 cm round with packing in place ; on right heel  small pecil tip size dark spot/pressure ulcer(s)

## 2017-10-18 NOTE — H&P ADULT - NSHPLABSRESULTS_GEN_ALL_CORE
.  LABS:                         13.3   14.2  )-----------( 141      ( 18 Oct 2017 11:22 )             40.2     10-18    136  |  96  |  19  ----------------------------<  150<H>  4.2   |  29  |  1.00    Ca    9.6      18 Oct 2017 11:22    TPro  6.8  /  Alb  3.6  /  TBili  0.3  /  DBili  x   /  AST  20  /  ALT  32  /  AlkPhos  84  10-18      Urinalysis Basic - ( 18 Oct 2017 12:04 )    Color: Yellow / Appearance: Clear / S.020 / pH: x  Gluc: x / Ketone: NEGATIVE  / Bili: Negative / Urobili: 0.2 E.U./dL   Blood: x / Protein: 30 mg/dL / Nitrite: POSITIVE   Leuk Esterase: Large / RBC: Many /HPF / WBC Many /HPF   Sq Epi: x / Non Sq Epi: 0-5 /HPF / Bacteria: Many /HPF            Lactate, Blood: 2.4 mmoL/L (10-18 @ 11:21)      RADIOLOGY, EKG & ADDITIONAL TESTS: Reviewed.

## 2017-10-18 NOTE — H&P ADULT - NSHPSOCIALHISTORY_GEN_ALL_CORE
Lives at home, has HHA. Lives at home, has HHA. Denies smoking, alcohol or illicit drug use. Ambulates with a rolling walker.

## 2017-10-18 NOTE — H&P ADULT - PROBLEM SELECTOR PLAN 1
Pt meeting sepsis criteria with , WBC 14.2, and lactate 2.4 on admission likely 2/2 UTI (UA with many bacteria and WBCs). Given total of 2.5L NaCl bolus in ED and IV Zosyn 3.375g x1.  - consider IV ceftazidime or ciprofloxacin in setting of chronic rodriguez  - f/u Urine culture  - f/u blood culture  - f/u rpt lactate  - f/u CXR as pt with chronic cough but unlikely in setting of clear lung exam Pt meeting sepsis criteria with , WBC 14.2, and lactate 2.4 on admission likely 2/2 UTI (UA with many bacteria and WBCs). Given total of 2.5L NaCl bolus in ED and IV Zosyn 3.375g x1.  - c/w IV zosyn for complicated UTI in setting of recent hospitalization, VIVI, and chronic rodriguez  - f/u Urine culture  - f/u blood culture x2  - f/u rpt lactate  - f/u CXR as pt with chronic cough but unlikely in setting of clear lung exam Pt meeting sepsis criteria with , WBC 14.2, and lactate 2.4 on admission likely 2/2 UTI. Given total of 2.5L NaCl bolus in ED and IV Zosyn 3.375g x1. UA positive due to chronic rodriguez; however urine remains the most likely source of infection  - c/w IV zosyn for complicated UTI in setting of recent hospitalization, VIVI, and chronic rodriguez  - f/u Urine culture  - f/u blood culture x2  - f/u rpt lactate  - f/u CXR as pt with chronic cough but unlikely in setting of clear lung exam Pt meeting sepsis criteria with , WBC 14.2, and lactate 2.4 on admission likely 2/2 UTI. Given total of 2.5L NaCl bolus in ED and IV Zosyn 3.375g x1. UA positive due to chronic rodriguez; however urine remains the most likely source of infection  - c/w IV zosyn for complicated UTI in setting of recent hospitalization, VIVI, and chronic rodriguez  - f/u Urine culture  - f/u blood culture x2  - f/u rpt lactate  - f/u CXR as pt with chronic cough but unlikely in setting of clear lung exam  - replace rodriguez

## 2017-10-18 NOTE — ED ADULT NURSE REASSESSMENT NOTE - EENT WDL
Eyes with no visual disturbances.- wears glasses  Ears clean and dry and no hearing difficulties. Nose with pink mucosa and no drainage.  Mouth mucous membranes moist and pink.  No tenderness or swelling to throat or neck.

## 2017-10-18 NOTE — H&P ADULT - PROBLEM SELECTOR PLAN 4
Pt with h/o CLL, stable and not on any medications  - obtain collateral with Dr. Prosper Nuñez regarding meds for platelets, as per pt - c/w ambien 10mg prn

## 2017-10-19 ENCOUNTER — TRANSCRIPTION ENCOUNTER (OUTPATIENT)
Age: 77
End: 2017-10-19

## 2017-10-19 DIAGNOSIS — I50.9 HEART FAILURE, UNSPECIFIED: ICD-10-CM

## 2017-10-19 LAB
ANION GAP SERPL CALC-SCNC: 13 MMOL/L — SIGNIFICANT CHANGE UP (ref 5–17)
BUN SERPL-MCNC: 18 MG/DL — SIGNIFICANT CHANGE UP (ref 7–23)
CALCIUM SERPL-MCNC: 9 MG/DL — SIGNIFICANT CHANGE UP (ref 8.4–10.5)
CHLORIDE SERPL-SCNC: 95 MMOL/L — LOW (ref 96–108)
CO2 SERPL-SCNC: 26 MMOL/L — SIGNIFICANT CHANGE UP (ref 22–31)
CREAT SERPL-MCNC: 1.06 MG/DL — SIGNIFICANT CHANGE UP (ref 0.5–1.3)
GLUCOSE SERPL-MCNC: 102 MG/DL — HIGH (ref 70–99)
HCT VFR BLD CALC: 35.5 % — LOW (ref 39–50)
HGB BLD-MCNC: 11.8 G/DL — LOW (ref 13–17)
MCHC RBC-ENTMCNC: 28.6 PG — SIGNIFICANT CHANGE UP (ref 27–34)
MCHC RBC-ENTMCNC: 33.2 G/DL — SIGNIFICANT CHANGE UP (ref 32–36)
MCV RBC AUTO: 86.2 FL — SIGNIFICANT CHANGE UP (ref 80–100)
NT-PROBNP SERPL-SCNC: 217 PG/ML — SIGNIFICANT CHANGE UP (ref 0–300)
PLATELET # BLD AUTO: 127 K/UL — LOW (ref 150–400)
POTASSIUM SERPL-MCNC: 3.8 MMOL/L — SIGNIFICANT CHANGE UP (ref 3.5–5.3)
POTASSIUM SERPL-SCNC: 3.8 MMOL/L — SIGNIFICANT CHANGE UP (ref 3.5–5.3)
RBC # BLD: 4.12 M/UL — LOW (ref 4.2–5.8)
RBC # FLD: 15.8 % — SIGNIFICANT CHANGE UP (ref 10.3–16.9)
SODIUM SERPL-SCNC: 134 MMOL/L — LOW (ref 135–145)
WBC # BLD: 15.5 K/UL — HIGH (ref 3.8–10.5)
WBC # FLD AUTO: 15.5 K/UL — HIGH (ref 3.8–10.5)

## 2017-10-19 PROCEDURE — 99232 SBSQ HOSP IP/OBS MODERATE 35: CPT

## 2017-10-19 RX ORDER — POTASSIUM CHLORIDE 20 MEQ
20 PACKET (EA) ORAL ONCE
Qty: 0 | Refills: 0 | Status: COMPLETED | OUTPATIENT
Start: 2017-10-19 | End: 2017-10-19

## 2017-10-19 RX ADMIN — Medication 20 MILLIEQUIVALENT(S): at 10:23

## 2017-10-19 RX ADMIN — PIPERACILLIN AND TAZOBACTAM 200 GRAM(S): 4; .5 INJECTION, POWDER, LYOPHILIZED, FOR SOLUTION INTRAVENOUS at 23:04

## 2017-10-19 RX ADMIN — PIPERACILLIN AND TAZOBACTAM 200 GRAM(S): 4; .5 INJECTION, POWDER, LYOPHILIZED, FOR SOLUTION INTRAVENOUS at 17:44

## 2017-10-19 RX ADMIN — APIXABAN 5 MILLIGRAM(S): 2.5 TABLET, FILM COATED ORAL at 10:23

## 2017-10-19 RX ADMIN — Medication 1 TABLET(S): at 11:15

## 2017-10-19 RX ADMIN — SENNA PLUS 2 TABLET(S): 8.6 TABLET ORAL at 22:48

## 2017-10-19 RX ADMIN — PIPERACILLIN AND TAZOBACTAM 200 GRAM(S): 4; .5 INJECTION, POWDER, LYOPHILIZED, FOR SOLUTION INTRAVENOUS at 10:23

## 2017-10-19 RX ADMIN — PIPERACILLIN AND TAZOBACTAM 200 GRAM(S): 4; .5 INJECTION, POWDER, LYOPHILIZED, FOR SOLUTION INTRAVENOUS at 06:11

## 2017-10-19 RX ADMIN — Medication 20 MILLIGRAM(S): at 06:11

## 2017-10-19 RX ADMIN — APIXABAN 5 MILLIGRAM(S): 2.5 TABLET, FILM COATED ORAL at 22:48

## 2017-10-19 NOTE — PROGRESS NOTE ADULT - SUBJECTIVE AND OBJECTIVE BOX
INTERVAL HPI/OVERNIGHT EVENTS:  Patient was seen and examined at bedside. Pt states that he feels better than admission, and is no longer having hematuria. He notes that his belly is no longer tender and he has not had any recent fever. Patient denies: fever, chills, dizziness, weakness, HA, Changes in vision, CP, palpitations, SOB, cough, N/V/D/C, dysuria, changes in bowel movements, LE edema.    VITAL SIGNS:  T(F): 98.3 (10-19-17 @ 13:46)  HR: 98 (10-19-17 @ 13:46)  BP: 148/73 (10-19-17 @ 13:46)  RR: 19 (10-19-17 @ 09:02)  SpO2: 93% (10-19-17 @ 09:02)  Wt(kg): --    PHYSICAL EXAM:    Constitutional: WDWN, NAD  Eyes: PERRL, EOMI, sclera non-icteric  Neck: supple, trachea midline, no masses, no JVD  Respiratory: CTA b/l, good air entry b/l, no wheezing, no rhonchi, no rales, without accessory muscle use and no intercostal retractions  Cardiovascular: RRR, normal S1S2, no M/R/G  Gastrointestinal: soft, NTND, no masses palpable, BS normal  : Rodriguez in place, draining clear yellow urine  Extremities: Warm, well perfused, pulses equal bilateral upper and lower extremities, no edema, no clubbing  Neurological: AAOx3, CN Grossly intact  Skin: Normal temperature, warm, dry    MEDICATIONS  (STANDING):  apixaban 5 milliGRAM(s) Oral every 12 hours  furosemide    Tablet 20 milliGRAM(s) Oral daily  multivitamin 1 Tablet(s) Oral daily  piperacillin/tazobactam IVPB. 3.375 Gram(s) IV Intermittent every 6 hours  senna 2 Tablet(s) Oral at bedtime    MEDICATIONS  (PRN):  melatonin 3 milliGRAM(s) Oral at bedtime PRN Insomnia  zolpidem 5 milliGRAM(s) Oral at bedtime PRN Insomnia  zolpidem 5 milliGRAM(s) Oral at bedtime PRN Insomnia      Allergies    No Known Allergies    Intolerances        LABS:                        11.8   15.5  )-----------( 127      ( 19 Oct 2017 06:19 )             35.5     10-19    134<L>  |  95<L>  |  18  ----------------------------<  102<H>  3.8   |  26  |  1.06    Ca    9.0      19 Oct 2017 06:19    TPro  6.8  /  Alb  3.6  /  TBili  0.3  /  DBili  x   /  AST  20  /  ALT  32  /  AlkPhos  84  10-18      Urinalysis Basic - ( 18 Oct 2017 12:04 )    Color: Yellow / Appearance: Clear / S.020 / pH: x  Gluc: x / Ketone: NEGATIVE  / Bili: Negative / Urobili: 0.2 E.U./dL   Blood: x / Protein: 30 mg/dL / Nitrite: POSITIVE   Leuk Esterase: Large / RBC: Many /HPF / WBC Many /HPF   Sq Epi: x / Non Sq Epi: 0-5 /HPF / Bacteria: Many /HPF        RADIOLOGY & ADDITIONAL TESTS:

## 2017-10-19 NOTE — DISCHARGE NOTE ADULT - CARE PLAN
Principal Discharge DX:	Sepsis due to urinary tract infection  Goal:	Discharge to.... Resolving.  Instructions for follow-up, activity and diet:	You presented with blood in your urine at home. On admission, you were found to have an elevated white blood cell count, increased heart rate and positive for a urinary tract infection. Your urine culture was positive for Staphylococcus Aureus and Pseudomonas. You were started on an antibiotic called Zosyn and require 14 days of therapy.  Secondary Diagnosis:	CHF, left ventricular  Goal:	Stable.  Instructions for follow-up, activity and diet:	Please continue with your home medications to prevent future exacerbations.  Secondary Diagnosis:	Foot ulcer  Goal:	To maintain clean and dry and improve healing.  Instructions for follow-up, activity and diet:	You were seen by wound care in the hospital in regard to your heel ulcer. You will require wound care to the site of the ulcer on your heel as such: Apply iodosorb and madipore dressing to your Left heel and cleanse with saline. Use heel protectors on both feet when you are laying in bed.  Secondary Diagnosis:	PE (pulmonary thromboembolism)  Goal:	Stable.  Instructions for follow-up, activity and diet:	Please continue taking Eliquis 5mg twice a day.  Secondary Diagnosis:	CLL (chronic lymphocytic leukemia)  Goal:	Stable.  Instructions for follow-up, activity and diet:	You have a history of CLL for which you are not currently on treatment for. please follow up with your primary care doctor for continued management. Principal Discharge DX:	Sepsis due to urinary tract infection  Goal:	Discharge to.... Resolving.  Instructions for follow-up, activity and diet:	You presented with blood in your urine at home. On admission, you were found to have an elevated white blood cell count, increased heart rate and positive for a urinary tract infection. Your urine culture was positive for Staphylococcus Aureus and Pseudomonas. You were started on an antibiotic called Zosyn and require 14 days of therapy. A PICC line was placed for you to receive your antibiotics as an outpatient. Please continue with Zosyn 4 times a day to end on 10/31.  Secondary Diagnosis:	CHF, left ventricular  Goal:	Stable.  Instructions for follow-up, activity and diet:	Please continue with your home medications to prevent future exacerbations.  Secondary Diagnosis:	Foot ulcer  Goal:	To maintain clean and dry and improve healing.  Instructions for follow-up, activity and diet:	You were seen by wound care in the hospital in regard to your heel ulcer. You will require wound care to the site of the ulcer on your heel as such: Apply iodosorb and madipore dressing to your left heel and cleanse with saline. Use heel protectors on both feet when you are laying in bed.  Secondary Diagnosis:	PE (pulmonary thromboembolism)  Goal:	Stable.  Instructions for follow-up, activity and diet:	Please continue taking Eliquis 5mg twice a day.  Secondary Diagnosis:	CLL (chronic lymphocytic leukemia)  Goal:	Stable.  Instructions for follow-up, activity and diet:	You have a history of CLL for which you are not currently on treatment for. Please follow up with your primary care doctor for continued management.

## 2017-10-19 NOTE — PROGRESS NOTE ADULT - SUBJECTIVE AND OBJECTIVE BOX
Patient is a 77y old  Male who presents with a chief complaint of sepsis 2/2 UTI (19 Oct 2017 14:15)      HPI:  78yo M with PMHx CLL dx 16yrs ago, obstructive uropathy with chronic rodriguez placed 2017, R hip fracture (2017), h/o PE, pneumonia, presenting with dark blood-tinged urine in rodriguez bag. Pt recently discharged 1 week ago from [facility]. Pt states that HHA was unable to change rodriguez bag yesterday with resultant urinary retention possibly leading to blood-tinged urine. HHA usually empties rodriguez bag twice a day. Pt denies any fatigue, dysuria, fevers, chills, weight loss. Further denies CP, SOB, abdominal pain, diarrhea/constipation.     ED Vitals: T97.5, , /78, RR 16, O2 98%  Labs: WBC 14.2, lactate 2.4  Given in ED: Zosyn x1, NaCl 1L bolus, NaCl 1.5L bolus (18 Oct 2017 14:05)    INTERVAL HPI/OVERNIGHT EVENTS:::w rodriguez urine clear  ON AB    HEALTH ISSUES - PROBLEM Dx:  Insomnia: Insomnia  Prophylactic measure: Prophylactic measure  Nutrition, metabolism, and development symptoms: Nutrition, metabolism, and development symptoms  CLL (chronic lymphocytic leukemia): CLL (chronic lymphocytic leukemia)  PE (pulmonary thromboembolism): PE (pulmonary thromboembolism)  Foot ulcer: Foot ulcer  Sepsis due to urinary tract infection: Sepsis due to urinary tract infection          PAST MEDICAL & SURGICAL HISTORY:  Hip fracture requiring operative repair, right, sequela  PE (pulmonary thromboembolism)  CLL (chronic lymphocytic leukemia)  S/P hip hemiarthroplasty          Consultant NOTE  REVIEWED  (   )    REVIEW OF SYSTEMS:  [x] As per HPI  CONSTITUTIONAL: weakness  RESPIRATORY: No cough, wheezing, chills or hemoptysis; No Shortness of Breath  CARDIOVASCULAR: No chest pain, palpitations, dizziness, or leg swelling  GASTROINTESTINAL: No abdominal or epigastric pain. No nausea, vomiting, or hematemesis; No diarrhea or constipation. No melena or hematochezia.  MUSCULOSKELETAL: No joint pain or swelling; No muscle, back, or extremity pain   rodriguez  PSYCH    awake, alert       [x] All others negative	  [ ] Unable to obtain          Vital Signs Last 24 Hrs  T(C): 36.8 (19 Oct 2017 13:46), Max: 36.8 (19 Oct 2017 13:46)  T(F): 98.3 (19 Oct 2017 13:46), Max: 98.3 (19 Oct 2017 13:46)  HR: 98 (19 Oct 2017 13:46) (85 - 100)  BP: 148/73 (19 Oct 2017 13:46) (127/81 - 148/79)  BP(mean): --  RR: 19 (19 Oct 2017 09:02) (18 - 19)  SpO2: 93% (19 Oct 2017 09:02) (92% - 95%)        10-18 @ 07:01  -  10-19 @ 07:00  --------------------------------------------------------  IN: 500 mL / OUT: 2650 mL / NET: -2150 mL    10-19 @ 07:01  -  10-19 @ 15:21  --------------------------------------------------------  IN: 0 mL / OUT: 2150 mL / NET: -2150 mL      PHYSICAL EXAMINATION:                                    (    )  NO CHANGE  Appearance: Normal	  HEENT:   Normal oral mucosa, PERRL, EOMI	  Neck: Supple, + JVD/ - JVD; Carotid Bruit   Cardiovascular: Normal S1 S2, No JVD, No murmurs,   Respiratory: Lungs clear to auscultation/Decreased Breath Sounds/No Rales, Rhonchi, Wheezing	  Gastrointestinal:  Soft, Non-tender, + BS	  Skin: No rashes, No ecchymoses, No cyanosis  Extremities: Normal range of motion, No clubbing, cyanosis or edema  Vascular: Peripheral pulses palpable 2+ bilaterally  Neurologic: Non-focal  Psychiatry: A & O x 3, Mood & affect appropriate    apixaban 5 milliGRAM(s) Oral every 12 hours  furosemide    Tablet 20 milliGRAM(s) Oral daily  melatonin 3 milliGRAM(s) Oral at bedtime PRN  multivitamin 1 Tablet(s) Oral daily  piperacillin/tazobactam IVPB. 3.375 Gram(s) IV Intermittent every 6 hours  senna 2 Tablet(s) Oral at bedtime  zolpidem 5 milliGRAM(s) Oral at bedtime PRN  zolpidem 5 milliGRAM(s) Oral at bedtime PRN                                      11.8   15.5  )-----------( 127      ( 19 Oct 2017 06:19 )             35.5     10-19    134<L>  |  95<L>  |  18  ----------------------------<  102<H>  3.8   |  26  |  1.06    Ca    9.0      19 Oct 2017 06:19    TPro  6.8  /  Alb  3.6  /  TBili  0.3  /  DBili  x   /  AST  20  /  ALT  32  /  AlkPhos  84  10-18      CAPILLARY BLOOD GLUCOSE        Urinalysis Basic - ( 18 Oct 2017 12:04 )    Color: Yellow / Appearance: Clear / S.020 / pH: x  Gluc: x / Ketone: NEGATIVE  / Bili: Negative / Urobili: 0.2 E.U./dL   Blood: x / Protein: 30 mg/dL / Nitrite: POSITIVE   Leuk Esterase: Large / RBC: Many /HPF / WBC Many /HPF   Sq Epi: x / Non Sq Epi: 0-5 /HPF / Bacteria: Many /HPF

## 2017-10-19 NOTE — DISCHARGE NOTE ADULT - PLAN OF CARE
Discharge to.... Resolving. You presented with blood in your urine at home. On admission, you were found to have an elevated white blood cell count, increased heart rate and positive for a urinary tract infection. Your urine culture was positive for Staphylococcus Aureus and Pseudomonas. You were started on an antibiotic called Zosyn and require 14 days of therapy. Stable. Please continue with your home medications to prevent future exacerbations. To maintain clean and dry and improve healing. You were seen by wound care in the hospital in regard to your heel ulcer. You will require wound care to the site of the ulcer on your heel as such: Apply iodosorb and madipore dressing to your Left heel and cleanse with saline. Use heel protectors on both feet when you are laying in bed. Please continue taking Eliquis 5mg twice a day. You have a history of CLL for which you are not currently on treatment for. please follow up with your primary care doctor for continued management. You presented with blood in your urine at home. On admission, you were found to have an elevated white blood cell count, increased heart rate and positive for a urinary tract infection. Your urine culture was positive for Staphylococcus Aureus and Pseudomonas. You were started on an antibiotic called Zosyn and require 14 days of therapy. A PICC line was placed for you to receive your antibiotics as an outpatient. Please continue with Zosyn 4 times a day to end on 10/31. You were seen by wound care in the hospital in regard to your heel ulcer. You will require wound care to the site of the ulcer on your heel as such: Apply iodosorb and madipore dressing to your left heel and cleanse with saline. Use heel protectors on both feet when you are laying in bed. You have a history of CLL for which you are not currently on treatment for. Please follow up with your primary care doctor for continued management.

## 2017-10-19 NOTE — ADVANCED PRACTICE NURSE CONSULT - REASON FOR CONSULT
[facility] nurse consult to assess bilateral heel ulcers. Patient is a 78yo M with PMHx CLL dx 16yrs ago, obstructive uropathy with chronic rodriguez placed 8/2017, R hip fracture (5/2017), h/o PE, pneumonia, presenting with dark blood-tinged urine in rodriguez bag. Pt recently discharged 1 week ago from [facility]. Pt states that HHA was unable to change rodriguez bag yesterday with resultant urinary retention possibly leading to blood-tinged urine. HHA usually empties rodriguez bag twice a day. Pt denies any fatigue, dysuria, fevers, chills, weight loss. Further denies CP, SOB, abdominal pain, diarrhea/constipation.

## 2017-10-19 NOTE — CONSULT NOTE ADULT - SUBJECTIVE AND OBJECTIVE BOX
HPI:  78yo M with PMHx CLL dx 16yrs ago, obstructive uropathy with chronic rodriguez 2/2 BPH placed 8/2017, R hip fracture (5/2017), h/o PE, pneumonia, presented with dark blood-tinged urine in rodriguez bag. Pt recently discharged 1 week ago from [facility]. Pt states that HHA was unable to change rodriguez bag yesterday with resultant urinary retention possibly leading to blood-tinged urine. HHA usually empties rodriguez bag twice a day. Pt denies any fatigue, dysuria, fevers, chills, weight loss. Further denies CP, SOB, abdominal pain, diarrhea/constipation.     Rodriguez changed by nurse in 4 uris 10/18 with no issues. 10/19am chronic rodriguez removed by primary team.  called 10/19pm for urinary retention 1L. 16F coude placed with no difficulty. Pt was seen by Dr Rizvi in the office and was deemed a poor surgical candidate for a TURP.       ED Vitals: T97.5, , /78, RR 16, O2 98%  Labs: WBC 14.2, lactate 2.4  Given in ED: Zosyn x1, NaCl 1L bolus, NaCl 1.5L bolus (18 Oct 2017 14:05)      Vital Signs Last 24 Hrs  T(C): 36.9 (19 Oct 2017 15:56), Max: 36.9 (19 Oct 2017 15:56)  T(F): 98.4 (19 Oct 2017 15:56), Max: 98.4 (19 Oct 2017 15:56)  HR: 99 (19 Oct 2017 15:56) (85 - 100)  BP: 178/82 (19 Oct 2017 15:56) (127/81 - 178/82)  BP(mean): --  RR: 18 (19 Oct 2017 15:56) (18 - 19)  SpO2: 92% (19 Oct 2017 15:56) (92% - 95%)  I&O's Summary    18 Oct 2017 07:01  -  19 Oct 2017 07:00  --------------------------------------------------------  IN: 500 mL / OUT: 2650 mL / NET: -2150 mL    19 Oct 2017 07:01  -  19 Oct 2017 17:09  --------------------------------------------------------  IN: 0 mL / OUT: 2150 mL / NET: -2150 mL        PE:  Gen: in distress due to pain (since rodriguez placement lying in bed comfortably)  Abd: NTND  : rodriguez now draining clear     LABS:                        11.8   15.5  )-----------( 127      ( 19 Oct 2017 06:19 )             35.5     10-19    134<L>  |  95<L>  |  18  ----------------------------<  102<H>  3.8   |  26  |  1.06    Ca    9.0      19 Oct 2017 06:19    TPro  6.8  /  Alb  3.6  /  TBili  0.3  /  DBili  x   /  AST  20  /  ALT  32  /  AlkPhos  84  10-18      Cultures  Culture Results:   60,000 CFU/ml Gram Negative Rods  >100,000 CFU/ml Staphylococcus aureus  Identification and susceptibility to follow. (10-18 @ 14:30)  Culture Results:   No growth at 1 day. (10-18 @ 14:19)  Culture Results:   No growth at 1 day. (10-18 @ 14:19)      A/P: 77M hx BPH with chronic rodriguez came to [facility] with sepsis 2/2 UTI. Rodriguez catheter changed by nurse 10/18, and removed by primary team 10/19. 16F coude Rodriguez replaced by GIANNA PA 10/19, drained 1L clear urine  1- Rodriguez- DO NOT remove. Please send home with chronic rodriguez and pt will followup with Dr Rizvi  2- Manage per primary team  3- D/w Gu team

## 2017-10-19 NOTE — PROGRESS NOTE ADULT - SUBJECTIVE AND OBJECTIVE BOX
Chief Complaint/Reason for Consult: chf  INTERVAL HPI: sob improved no palp no cp  	  MEDICATIONS:  furosemide    Tablet 20 milliGRAM(s) Oral daily    piperacillin/tazobactam IVPB. 3.375 Gram(s) IV Intermittent every 6 hours      melatonin 3 milliGRAM(s) Oral at bedtime PRN  zolpidem 5 milliGRAM(s) Oral at bedtime PRN  zolpidem 5 milliGRAM(s) Oral at bedtime PRN    senna 2 Tablet(s) Oral at bedtime      apixaban 5 milliGRAM(s) Oral every 12 hours  multivitamin 1 Tablet(s) Oral daily      REVIEW OF SYSTEMS:  [x] As per HPI  CONSTITUTIONAL: No fever, weight loss, or fatigue  RESPIRATORY: No cough, wheezing, chills or hemoptysis; No Shortness of Breath  CARDIOVASCULAR: No chest pain, palpitations, dizziness, or leg swelling  GASTROINTESTINAL: No abdominal or epigastric pain. No nausea, vomiting, or hematemesis; No diarrhea or constipation. No melena or hematochezia.  MUSCULOSKELETAL: No joint pain or swelling; No muscle, back, or extremity pain  [x] All others negative	  [ ] Unable to obtain    PHYSICAL EXAM:  T(C): 36.7 (10-19-17 @ 09:02), Max: 36.7 (10-18-17 @ 13:47)  HR: 100 (10-19-17 @ 09:02) (85 - 100)  BP: 136/81 (10-19-17 @ 09:02) (127/81 - 148/79)  RR: 19 (10-19-17 @ 09:02) (18 - 20)  SpO2: 93% (10-19-17 @ 09:02) (92% - 97%)  Wt(kg): --  I&O's Summary    18 Oct 2017 07:01  -  19 Oct 2017 07:00  --------------------------------------------------------  IN: 500 mL / OUT: 2650 mL / NET: -2150 mL    19 Oct 2017 07:01  -  19 Oct 2017 13:37  --------------------------------------------------------  IN: 0 mL / OUT: 2150 mL / NET: -2150 mL      Height (cm): 180.34 (10-18 @ 14:57)  Weight (kg): 88.7 (10-18 @ 14:57)  BMI (kg/m2): 27.3 (10-18 @ 14:57)  BSA (m2): 2.09 (10-18 @ 14:57)    Appearance: Normal	  HEENT:   Normal oral mucosa  Cardiovascular: Normal S1 S2, No JVD, No murmurs, No edema  Respiratory: Lungs clear to auscultation	  Gastrointestinal:  Soft, Non-tender, + BS	  Extremities: Normal range of motion, No clubbing, cyanosis or edema  Vascular: Peripheral pulses palpable 2+ bilaterally    TELEMETRY: 	    ECG:   	  RADIOLOGY:   CXR:  CT:  US:    CARDIAC TESTING:  Echocardiogram:  Catheterization:  Stress Test:      LABS:	 	    CARDIAC MARKERS:                                  11.8   15.5  )-----------( 127      ( 19 Oct 2017 06:19 )             35.5     10-19    134<L>  |  95<L>  |  18  ----------------------------<  102<H>  3.8   |  26  |  1.06    Ca    9.0      19 Oct 2017 06:19    TPro  6.8  /  Alb  3.6  /  TBili  0.3  /  DBili  x   /  AST  20  /  ALT  32  /  AlkPhos  84  10-18    proBNP: Serum Pro-Brain Natriuretic Peptide: 217 pg/mL (10-19 @ 06:19)    Lipid Profile:   HgA1c:   TSH:     ASSESSMENT/PLAN: 	    #Chronic diastolic CHF - continue home Lasix  po ome dose BNP normal check CXR    CAD - no cp no sob    #HTN - goals SBP<140mmHg as pth as diastolic heart disease    #CV Prevention  q3mo Fasting Lipid Profile, Goal LDL<100, statin as tolerated  q6week TSH  q3mo 25-OH Vitamin D Level, Goal 50, supplement as tolerated

## 2017-10-19 NOTE — PROGRESS NOTE ADULT - PROBLEM SELECTOR PLAN 1
Pt meeting sepsis criteria with , WBC 14.2, and lactate 2.4 on admission likely 2/2 UTI. Given total of 2.5L NaCl bolus in ED and IV Zosyn 3.375g x1. UA positive due to chronic rodriguez; however urine remains the most likely source of infection  - c/w IV zosyn for complicated UTI in setting of recent hospitalization, VIVI, and chronic rodriguez  - f/u Urine culture  - f/u blood culture x2  - f/u rpt lactate  - f/u CXR as pt with chronic cough but unlikely in setting of clear lung exam  - Replace rodriguez with TOV at 6pm.   - Consult urology regarding need for rodriguez vs SBT if retaining

## 2017-10-19 NOTE — DISCHARGE NOTE ADULT - HOSPITAL COURSE
76yo M with PMHx CLL dx 16yrs ago, obstructive uropathy with chronic rodriguez placed 8/2017, R hip fracture (5/2017), h/o PE, pneumonia, presenting with dark blood-tinged urine in rodriguez bag. ED Vitals: T97.5, , /78, RR 16, O2 98%, Labs significant: WBC 14.2, lactate 2.4. Met SIRS criteria and admitted for sepsis 2/2 UTI. Given in ED: Zosyn x1, NaCl 1L bolus, NaCl 1.5L bolus and admitted to [facility]. Pt blood cx were positive for S. Aureus and pseudomonas that were susceptible to Zosyn. Pt started on zosyn treatment for..... Wound consult was placed for heel ulcers and pt was given recommendations on wound care outlined above. Pt remained HDS and was stable for discharge to.... 78yo M with PMHx CLL dx 16yrs ago, obstructive uropathy with chronic rodriguez placed 8/2017, R hip fracture (5/2017), h/o PE, pneumonia, presenting with dark blood-tinged urine in rodriguez bag. ED Vitals: T97.5, , /78, RR 16, O2 98%, Labs significant: WBC 14.2, lactate 2.4. Met SIRS criteria and admitted for sepsis 2/2 UTI. Given in ED: Zosyn x1, NaCl 1L bolus, NaCl 1.5L bolus and admitted to [facility]. Pt blood cx were positive for S. Aureus and pseudomonas that were susceptible to Zosyn. Pt started on zosyn treatment for a total of 14 days of therapy. This required a L PICC line to be placed to continue with infusions every six hours. Zosyn therapy to end 10/21. Wound consult was placed for heel ulcers and pt was given recommendations on wound care outlined above. Pt remained HDS and was stable for discharge to [facility] ([facility])

## 2017-10-19 NOTE — DISCHARGE NOTE ADULT - MEDICATION SUMMARY - MEDICATIONS TO TAKE
I will START or STAY ON the medications listed below when I get home from the hospital:    Eliquis 5 mg oral tablet  -- 1 tab(s) by mouth 2 times a day  -- Indication: For PE (pulmonary thromboembolism)    furosemide 20 mg oral tablet  -- 1 tab(s) by mouth once a day  -- Indication: For CHF, left ventricular    senna oral tablet  -- 2 tab(s) by mouth once a day (at bedtime)  -- Indication: For Constipation    piperacillin-tazobactam 2 g-0.25 g intravenous injection  -- 3.375 milligram(s) intravenous every 6 hours to end 10/31  -- Indication: For Urinary Tract Infection     Multiple Vitamins oral tablet  -- 1 tab(s) by mouth once a day  -- Indication: For Prophylactic measure

## 2017-10-19 NOTE — DISCHARGE NOTE ADULT - CARE PROVIDER_API CALL
Sterling Pérez (MD), Internal Medicine  [address] [address]  Phone: [phone]  Fax: [phone]

## 2017-10-19 NOTE — PROGRESS NOTE ADULT - SUBJECTIVE AND OBJECTIVE BOX
CC/ HPI Patient is a 77y old male with CLL, diastolic cardiomyopathy who was admitted with sepsis secondary to urinary tract infection, without respiratory complaint.      PAST MEDICAL & SURGICAL HISTORY:  Hip fracture requiring operative repair, right, sequela  PE (pulmonary thromboembolism)  CLL (chronic lymphocytic leukemia)  S/P hip hemiarthroplasty    SOCHX:  - tobacco,  -  alcohol    FMHX: FA/MO  - contributory     ROS reviewed below with positive findings marked (+) :  GEN:  fever, chills ENT: tracheostomy,   epistaxis,  sinusitis COR: CAD, CHF,  HTN, dysrhythmia PUL: COPD, ILD, asthma, pneumonia GI: PEG, dysphagia, hemorrhage, other HIMA: kidney disease, electrolyte disorder HEM:  anemia, thrombus, coagulopathy, cancer ENDO:  thyroid disease, diabetes mellitus CNS:  dementia, stroke, seizure, PSY:  depression, anxiety, other      MEDICATIONS  (STANDING):  apixaban 5 milliGRAM(s) Oral every 12 hours  furosemide    Tablet 20 milliGRAM(s) Oral daily  multivitamin 1 Tablet(s) Oral daily  piperacillin/tazobactam IVPB. 3.375 Gram(s) IV Intermittent every 6 hours  senna 2 Tablet(s) Oral at bedtime    MEDICATIONS  (PRN):  melatonin 3 milliGRAM(s) Oral at bedtime PRN Insomnia  zolpidem 5 milliGRAM(s) Oral at bedtime PRN Insomnia      Vital Signs Last 24 Hrs  T(C): 36.9 (19 Oct 2017 15:56), Max: 36.9 (19 Oct 2017 15:56)  T(F): 98.4 (19 Oct 2017 15:56), Max: 98.4 (19 Oct 2017 15:56)  HR: 90 (19 Oct 2017 18:42) (85 - 100)  BP: 150/70 (19 Oct 2017 18:42) (127/81 - 178/82)  RR: 18 (19 Oct 2017 15:56) (18 - 19)  SpO2: 92% (19 Oct 2017 15:56) (92% - 95%)    GENERAL:         comfortable,  - distress.  HEENT:            - trauma,  - icterus,  - injection,  - nasal discharge.  NECK:              - jugular venous distention, - thyromegaly.  LYMPH:           - lymphadenopathy, - masses.  RESP:               + crackles,   - rhonchi,   - wheezes.   COR:                S1S2 RRR  - murmurs,  - gallops,  - rubs.  ABD:                bowel sounds,   soft, - tender, - distended, - organomegaly.  EXT/MSC:         - cyanosis,  - clubbing,  - edema.    NEURO:             alert,   responds to stimuli.                        11.8   15.5  )-----------( 127      ( 19 Oct 2017 06:19 )             35.5       X ray Chest (08.19.17) No acute infiltrates        ASSESSMENT/PLAN    1)  Sepsis secondary to urinary tract infection  2)  Cardiomyopathy  3)  CLL with history pulmonary embolus      Bronchodilators:  Atrovent/ albuterol q 4 – 6 hours as needed  ID/Antibiotics:  Zosyn  Cardiac/HTN: furosemide  GI: Rx/ prophylaxis c PPI/H2B  Heme: Rx/VT c AC  Discuss with medical team

## 2017-10-19 NOTE — ADVANCED PRACTICE NURSE CONSULT - ASSESSMENT
Patient presented with left heel dry, non-fluctuant scab measuring 0.3 cm x 0.3 cm and right heel unstageable pressure ulcer (injury) measuring 1.5 cm x 3 cm with moderate amount of serosanguinous exudate; wound edges macerated. Bilateral feet warm to touch with palpable dorsalis pedis pulses.  Patient reported he sleeps on his back all night and is in bed 1/2 of the day. Heel protectors on bilateral LEs to offload heels. Patient reported receiving homecare services. Spoke with patient's daughter in-law, Gifty via telephone, who reported homecare services were with [facility].   Left heel - cleansed with normal saline, applied Iodosorb and covered with Medipore dressing.  Instructed patient and Gifty to continue use of heel protectors at home and turn and reposition while in bed to offload heels. Patient/Gifty verbalized understanding. Provided patient with wound care supplies pending ordering by homecare nurse.

## 2017-10-19 NOTE — DISCHARGE NOTE ADULT - PATIENT PORTAL LINK FT
“You can access the [employer] Patient Portal, offered by [facility], by registering with the following website: [email or web address]”

## 2017-10-20 LAB
-  AZTREONAM: SIGNIFICANT CHANGE UP
-  CEFAZOLIN: SIGNIFICANT CHANGE UP
-  CEFTAZIDIME: SIGNIFICANT CHANGE UP
-  CIPROFLOXACIN: SIGNIFICANT CHANGE UP
-  GENTAMICIN: SIGNIFICANT CHANGE UP
-  LINEZOLID: SIGNIFICANT CHANGE UP
-  OXACILLIN: SIGNIFICANT CHANGE UP
-  PIPERACILLIN/TAZOBACTAM: SIGNIFICANT CHANGE UP
-  RIFAMPIN: SIGNIFICANT CHANGE UP
-  TOBRAMYCIN: SIGNIFICANT CHANGE UP
-  TRIMETHOPRIM/SULFAMETHOXAZOLE: SIGNIFICANT CHANGE UP
-  VANCOMYCIN: SIGNIFICANT CHANGE UP
ANION GAP SERPL CALC-SCNC: 15 MMOL/L — SIGNIFICANT CHANGE UP (ref 5–17)
BUN SERPL-MCNC: 19 MG/DL — SIGNIFICANT CHANGE UP (ref 7–23)
CALCIUM SERPL-MCNC: 9.5 MG/DL — SIGNIFICANT CHANGE UP (ref 8.4–10.5)
CHLORIDE SERPL-SCNC: 96 MMOL/L — SIGNIFICANT CHANGE UP (ref 96–108)
CO2 SERPL-SCNC: 27 MMOL/L — SIGNIFICANT CHANGE UP (ref 22–31)
CREAT SERPL-MCNC: 1.15 MG/DL — SIGNIFICANT CHANGE UP (ref 0.5–1.3)
CULTURE RESULTS: SIGNIFICANT CHANGE UP
GLUCOSE SERPL-MCNC: 105 MG/DL — HIGH (ref 70–99)
HCT VFR BLD CALC: 38 % — LOW (ref 39–50)
HGB BLD-MCNC: 13 G/DL — SIGNIFICANT CHANGE UP (ref 13–17)
MAGNESIUM SERPL-MCNC: 2.1 MG/DL — SIGNIFICANT CHANGE UP (ref 1.6–2.6)
MCHC RBC-ENTMCNC: 29.3 PG — SIGNIFICANT CHANGE UP (ref 27–34)
MCHC RBC-ENTMCNC: 34.2 G/DL — SIGNIFICANT CHANGE UP (ref 32–36)
MCV RBC AUTO: 85.6 FL — SIGNIFICANT CHANGE UP (ref 80–100)
METHOD TYPE: SIGNIFICANT CHANGE UP
METHOD TYPE: SIGNIFICANT CHANGE UP
ORGANISM # SPEC MICROSCOPIC CNT: SIGNIFICANT CHANGE UP
PLATELET # BLD AUTO: 139 K/UL — LOW (ref 150–400)
POTASSIUM SERPL-MCNC: 3.7 MMOL/L — SIGNIFICANT CHANGE UP (ref 3.5–5.3)
POTASSIUM SERPL-SCNC: 3.7 MMOL/L — SIGNIFICANT CHANGE UP (ref 3.5–5.3)
RBC # BLD: 4.44 M/UL — SIGNIFICANT CHANGE UP (ref 4.2–5.8)
RBC # FLD: 15.8 % — SIGNIFICANT CHANGE UP (ref 10.3–16.9)
SODIUM SERPL-SCNC: 138 MMOL/L — SIGNIFICANT CHANGE UP (ref 135–145)
SPECIMEN SOURCE: SIGNIFICANT CHANGE UP
WBC # BLD: 14.6 K/UL — HIGH (ref 3.8–10.5)
WBC # FLD AUTO: 14.6 K/UL — HIGH (ref 3.8–10.5)

## 2017-10-20 PROCEDURE — 99232 SBSQ HOSP IP/OBS MODERATE 35: CPT

## 2017-10-20 RX ORDER — FUROSEMIDE 40 MG
0 TABLET ORAL
Qty: 0 | Refills: 0 | COMMUNITY

## 2017-10-20 RX ORDER — VANCOMYCIN HCL 1 G
1250 VIAL (EA) INTRAVENOUS EVERY 12 HOURS
Qty: 0 | Refills: 0 | Status: DISCONTINUED | OUTPATIENT
Start: 2017-10-20 | End: 2017-10-20

## 2017-10-20 RX ORDER — ZOLPIDEM TARTRATE 10 MG/1
10 TABLET ORAL ONCE
Qty: 0 | Refills: 0 | Status: DISCONTINUED | OUTPATIENT
Start: 2017-10-20 | End: 2017-10-20

## 2017-10-20 RX ORDER — FUROSEMIDE 40 MG
1 TABLET ORAL
Qty: 0 | Refills: 0 | COMMUNITY
Start: 2017-10-20

## 2017-10-20 RX ADMIN — Medication 166.67 MILLIGRAM(S): at 09:47

## 2017-10-20 RX ADMIN — PIPERACILLIN AND TAZOBACTAM 200 GRAM(S): 4; .5 INJECTION, POWDER, LYOPHILIZED, FOR SOLUTION INTRAVENOUS at 05:55

## 2017-10-20 RX ADMIN — APIXABAN 5 MILLIGRAM(S): 2.5 TABLET, FILM COATED ORAL at 22:47

## 2017-10-20 RX ADMIN — ZOLPIDEM TARTRATE 10 MILLIGRAM(S): 10 TABLET ORAL at 00:45

## 2017-10-20 RX ADMIN — Medication 3 MILLIGRAM(S): at 01:30

## 2017-10-20 RX ADMIN — APIXABAN 5 MILLIGRAM(S): 2.5 TABLET, FILM COATED ORAL at 10:13

## 2017-10-20 RX ADMIN — PIPERACILLIN AND TAZOBACTAM 200 GRAM(S): 4; .5 INJECTION, POWDER, LYOPHILIZED, FOR SOLUTION INTRAVENOUS at 11:28

## 2017-10-20 RX ADMIN — PIPERACILLIN AND TAZOBACTAM 200 GRAM(S): 4; .5 INJECTION, POWDER, LYOPHILIZED, FOR SOLUTION INTRAVENOUS at 22:47

## 2017-10-20 RX ADMIN — PIPERACILLIN AND TAZOBACTAM 200 GRAM(S): 4; .5 INJECTION, POWDER, LYOPHILIZED, FOR SOLUTION INTRAVENOUS at 17:19

## 2017-10-20 RX ADMIN — Medication 20 MILLIGRAM(S): at 06:08

## 2017-10-20 RX ADMIN — Medication 1 TABLET(S): at 11:28

## 2017-10-20 RX ADMIN — SENNA PLUS 2 TABLET(S): 8.6 TABLET ORAL at 22:47

## 2017-10-20 NOTE — PROGRESS NOTE ADULT - SUBJECTIVE AND OBJECTIVE BOX
Chief Complaint/Reason for Consult: chf  INTERVAL HPI: sob improved no palp no cp  	  MEDICATIONS:  furosemide    Tablet 20 milliGRAM(s) Oral daily    piperacillin/tazobactam IVPB. 3.375 Gram(s) IV Intermittent every 6 hours  vancomycin  IVPB 1250 milliGRAM(s) IV Intermittent every 12 hours      melatonin 3 milliGRAM(s) Oral at bedtime PRN  zolpidem 5 milliGRAM(s) Oral at bedtime PRN    senna 2 Tablet(s) Oral at bedtime      apixaban 5 milliGRAM(s) Oral every 12 hours  multivitamin 1 Tablet(s) Oral daily      REVIEW OF SYSTEMS:  [x] As per HPI  CONSTITUTIONAL: No fever, weight loss, or fatigue  RESPIRATORY: No cough, wheezing, chills or hemoptysis; No Shortness of Breath  CARDIOVASCULAR: No chest pain, palpitations, dizziness, or leg swelling  GASTROINTESTINAL: No abdominal or epigastric pain. No nausea, vomiting, or hematemesis; No diarrhea or constipation. No melena or hematochezia.  MUSCULOSKELETAL: No joint pain or swelling; No muscle, back, or extremity pain  [x] All others negative	  [ ] Unable to obtain    PHYSICAL EXAM:  T(C): 36.7 (10-20-17 @ 08:54), Max: 37.1 (10-19-17 @ 20:56)  HR: 85 (10-20-17 @ 08:54) (81 - 99)  BP: 112/71 (10-20-17 @ 08:54) (112/71 - 178/82)  RR: 18 (10-20-17 @ 08:54) (18 - 19)  SpO2: 92% (10-20-17 @ 08:54) (92% - 99%)  Wt(kg): --  I&O's Summary    19 Oct 2017 07:01  -  20 Oct 2017 07:00  --------------------------------------------------------  IN: 100 mL / OUT: 4950 mL / NET: -4850 mL          Appearance: Normal	  HEENT:   Normal oral mucosa  Cardiovascular: Normal S1 S2, No JVD, No murmurs, No edema  Respiratory: Lungs clear to auscultation	  Gastrointestinal:  Soft, Non-tender, + BS	  Extremities: Normal range of motion, No clubbing, cyanosis or edema  Vascular: Peripheral pulses palpable 2+ bilaterally    TELEMETRY: 	    ECG:   	  RADIOLOGY:   CXR:  CT:  US:    CARDIAC TESTING:  Echocardiogram:  Catheterization:  Stress Test:      LABS:	 	    CARDIAC MARKERS:                                  13.0   14.6  )-----------( 139      ( 20 Oct 2017 08:09 )             38.0     10-20    138  |  96  |  19  ----------------------------<  105<H>  3.7   |  27  |  1.15    Ca    9.5      20 Oct 2017 08:09  Mg     2.1     10-20      proBNP:   Lipid Profile:   HgA1c:   TSH:     ASSESSMENT/PLAN: 	    #Chronic diastolic CHF - continue home Lasix  po ome dose BNP normal check CXR    CAD - no cp no sob    #HTN - goals SBP<140mmHg as pth as diastolic heart disease    #CV Prevention  q3mo Fasting Lipid Profile, Goal LDL<100, statin as tolerated  q6week TSH  q3mo 25-OH Vitamin D Level, Goal 50, supplement as tolerated

## 2017-10-20 NOTE — PROGRESS NOTE ADULT - SUBJECTIVE AND OBJECTIVE BOX
INTERVAL HPI/OVERNIGHT EVENTS:  Patient was seen and examined at bedside. Feels better after the rodriguez was replaced. Pt denies any more abdominal tenderness or discomfort. Patient denies: fever, chills, dizziness, weakness, HA, Changes in vision, CP, palpitations, SOB, cough, N/V/D/C, dysuria, changes in bowel movements, LE edema.    VITAL SIGNS:  T(F): 98 (10-20-17 @ 08:54)  HR: 85 (10-20-17 @ 08:54)  BP: 112/71 (10-20-17 @ 08:54)  RR: 18 (10-20-17 @ 08:54)  SpO2: 92% (10-20-17 @ 08:54)  Wt(kg): --    PHYSICAL EXAM:    Constitutional: WDWN, NAD  Eyes: PERRL, EOMI, sclera non-icteric  Neck: supple, trachea midline, no masses, no JVD  Respiratory: CTA b/l, good air entry b/l, no wheezing, no rhonchi, no rales, without accessory muscle use and no intercostal retractions  Cardiovascular: RRR, normal S1S2, no M/R/G  Gastrointestinal: soft, NTND, no masses palpable, BS normal  : Rodriguez in place, draining clear yellow urine  Extremities: Warm, well perfused, pulses equal bilateral upper and lower extremities, no edema, no clubbing  Neurological: AAOx3, CN Grossly intact  Skin: Normal temperature, warm, dry    MEDICATIONS  (STANDING):  apixaban 5 milliGRAM(s) Oral every 12 hours  furosemide    Tablet 20 milliGRAM(s) Oral daily  multivitamin 1 Tablet(s) Oral daily  piperacillin/tazobactam IVPB. 3.375 Gram(s) IV Intermittent every 6 hours  senna 2 Tablet(s) Oral at bedtime  vancomycin  IVPB 1250 milliGRAM(s) IV Intermittent every 12 hours    MEDICATIONS  (PRN):  melatonin 3 milliGRAM(s) Oral at bedtime PRN Insomnia  zolpidem 5 milliGRAM(s) Oral at bedtime PRN Insomnia      Allergies    No Known Allergies    Intolerances        LABS:                        13.0   14.6  )-----------( 139      ( 20 Oct 2017 08:09 )             38.0     10-20    138  |  96  |  19  ----------------------------<  105<H>  3.7   |  27  |  1.15    Ca    9.5      20 Oct 2017 08:09  Mg     2.1     10-20    TPro  6.8  /  Alb  3.6  /  TBili  0.3  /  DBili  x   /  AST  20  /  ALT  32  /  AlkPhos  84  10-18      Urinalysis Basic - ( 18 Oct 2017 12:04 )    Color: Yellow / Appearance: Clear / S.020 / pH: x  Gluc: x / Ketone: NEGATIVE  / Bili: Negative / Urobili: 0.2 E.U./dL   Blood: x / Protein: 30 mg/dL / Nitrite: POSITIVE   Leuk Esterase: Large / RBC: Many /HPF / WBC Many /HPF   Sq Epi: x / Non Sq Epi: 0-5 /HPF / Bacteria: Many /HPF        RADIOLOGY & ADDITIONAL TESTS:

## 2017-10-20 NOTE — PROGRESS NOTE ADULT - PROBLEM SELECTOR PLAN 1
Pt meeting sepsis criteria with , WBC 14.2, and lactate 2.4 on admission likely 2/2 UTI. Given total of 2.5L NaCl bolus in ED and IV Zosyn 3.375g x1. UA positive due to chronic rodriguez; however urine remains the most likely source of infection  - c/w IV zosyn for complicated UTI in setting of recent hospitalization, VIVI, and chronic rodriguez  - Urine cx positive for S. Aureus, started on vancomycin   - fBlood cx NGTD

## 2017-10-20 NOTE — PROGRESS NOTE ADULT - SUBJECTIVE AND OBJECTIVE BOX
CC/ HPI Patient is a 77y old male with CLL, diastolic cardiomyopathy who was admitted with sepsis secondary to urinary tract infection, without respiratory complaint.      PAST MEDICAL & SURGICAL HISTORY:  Hip fracture requiring operative repair, right, sequela  PE (pulmonary thromboembolism)  CLL (chronic lymphocytic leukemia)  S/P hip hemiarthroplasty    SOCHX:  - tobacco,  -  alcohol    FMHX: FA/MO  - contributory     ROS reviewed below with positive findings marked (+) :  GEN:  fever, chills ENT: tracheostomy,   epistaxis,  sinusitis COR: CAD, CHF,  HTN, dysrhythmia PUL: COPD, ILD, asthma, pneumonia GI: PEG, dysphagia, hemorrhage, other HIMA: kidney disease, electrolyte disorder HEM:  anemia, thrombus, coagulopathy, cancer ENDO:  thyroid disease, diabetes mellitus CNS:  dementia, stroke, seizure, PSY:  depression, anxiety, other      MEDICATIONS  (STANDING):  apixaban 5 milliGRAM(s) Oral every 12 hours  furosemide    Tablet 20 milliGRAM(s) Oral daily  multivitamin 1 Tablet(s) Oral daily  piperacillin/tazobactam IVPB. 3.375 Gram(s) IV Intermittent every 6 hours  senna 2 Tablet(s) Oral at bedtime  vancomycin  IVPB 1250 milliGRAM(s) IV Intermittent every 12 hours    MEDICATIONS  (PRN):  melatonin 3 milliGRAM(s) Oral at bedtime PRN Insomnia  zolpidem 5 milliGRAM(s) Oral at bedtime PRN Insomnia      Vital Signs Last 24 Hrs  T(C): 36.7 (20 Oct 2017 08:54), Max: 37.1 (19 Oct 2017 20:56)  T(F): 98 (20 Oct 2017 08:54), Max: 98.7 (19 Oct 2017 20:56)  HR: 85 (20 Oct 2017 08:54) (81 - 99)  BP: 112/71 (20 Oct 2017 08:54) (112/71 - 178/82)  RR: 18 (20 Oct 2017 08:54) (18 - 19)  SpO2: 92% (20 Oct 2017 08:54) (92% - 99%)    GENERAL:         comfortable,  - distress.  HEENT:            - trauma,  - icterus,  - injection,  - nasal discharge.  NECK:              - jugular venous distention, - thyromegaly.  LYMPH:           - lymphadenopathy, - masses.  RESP:               + clear,   - rales,   - rhonchi,   - wheezes.   COR:                S1S2 RRR  - murmurs,  - gallops,  - rubs.  ABD:                bowel sounds,   soft, - tender, - distended, - organomegaly.  EXT/MSC:         - cyanosis,  - clubbing,  - edema.    NEURO:             alert,   responds to stimuli.                            13.0   14.6  )-----------( 139      ( 20 Oct 2017 08:09 )             38.0     10-20    138  |  96  |  19  ----------------------------<  105<H>  3.7   |  27  |  1.15    Ca    9.5      20 Oct 2017 08:09  Mg     2.1     10-20    TPro  6.8  /  Alb  3.6  /  TBili  0.3  /  DBili  x   /  AST  20  /  ALT  32  /  AlkPhos  84  10-18        X ray Chest (08.19.17) No acute infiltrates        ASSESSMENT/PLAN    1)  Urinary tract infection  2)  Cardiomyopathy  3)  CLL with history pulmonary embolus      Bronchodilators:  Atrovent/ albuterol q 4 – 6 hours as needed  ID/Antibiotics:  Zosyn  Cardiac/HTN: furosemide  GI: Rx/ prophylaxis c PPI/H2B  Heme: Rx/VT c AC  Discuss with medical team

## 2017-10-21 LAB
ANION GAP SERPL CALC-SCNC: 16 MMOL/L — SIGNIFICANT CHANGE UP (ref 5–17)
BUN SERPL-MCNC: 24 MG/DL — HIGH (ref 7–23)
CALCIUM SERPL-MCNC: 9.6 MG/DL — SIGNIFICANT CHANGE UP (ref 8.4–10.5)
CHLORIDE SERPL-SCNC: 95 MMOL/L — LOW (ref 96–108)
CO2 SERPL-SCNC: 26 MMOL/L — SIGNIFICANT CHANGE UP (ref 22–31)
CREAT SERPL-MCNC: 1.2 MG/DL — SIGNIFICANT CHANGE UP (ref 0.5–1.3)
GLUCOSE SERPL-MCNC: 100 MG/DL — HIGH (ref 70–99)
HCT VFR BLD CALC: 39 % — SIGNIFICANT CHANGE UP (ref 39–50)
HGB BLD-MCNC: 13.1 G/DL — SIGNIFICANT CHANGE UP (ref 13–17)
MAGNESIUM SERPL-MCNC: 2.2 MG/DL — SIGNIFICANT CHANGE UP (ref 1.6–2.6)
MCHC RBC-ENTMCNC: 29 PG — SIGNIFICANT CHANGE UP (ref 27–34)
MCHC RBC-ENTMCNC: 33.6 G/DL — SIGNIFICANT CHANGE UP (ref 32–36)
MCV RBC AUTO: 86.3 FL — SIGNIFICANT CHANGE UP (ref 80–100)
PLATELET # BLD AUTO: 155 K/UL — SIGNIFICANT CHANGE UP (ref 150–400)
POTASSIUM SERPL-MCNC: 3.7 MMOL/L — SIGNIFICANT CHANGE UP (ref 3.5–5.3)
POTASSIUM SERPL-SCNC: 3.7 MMOL/L — SIGNIFICANT CHANGE UP (ref 3.5–5.3)
RBC # BLD: 4.52 M/UL — SIGNIFICANT CHANGE UP (ref 4.2–5.8)
RBC # FLD: 15.9 % — SIGNIFICANT CHANGE UP (ref 10.3–16.9)
SODIUM SERPL-SCNC: 137 MMOL/L — SIGNIFICANT CHANGE UP (ref 135–145)
WBC # BLD: 17.3 K/UL — HIGH (ref 3.8–10.5)
WBC # FLD AUTO: 17.3 K/UL — HIGH (ref 3.8–10.5)

## 2017-10-21 RX ORDER — POTASSIUM CHLORIDE 20 MEQ
40 PACKET (EA) ORAL ONCE
Qty: 0 | Refills: 0 | Status: COMPLETED | OUTPATIENT
Start: 2017-10-21 | End: 2017-10-21

## 2017-10-21 RX ORDER — ZOLPIDEM TARTRATE 10 MG/1
10 TABLET ORAL AT BEDTIME
Qty: 0 | Refills: 0 | Status: DISCONTINUED | OUTPATIENT
Start: 2017-10-21 | End: 2017-10-22

## 2017-10-21 RX ADMIN — PIPERACILLIN AND TAZOBACTAM 200 GRAM(S): 4; .5 INJECTION, POWDER, LYOPHILIZED, FOR SOLUTION INTRAVENOUS at 04:34

## 2017-10-21 RX ADMIN — SENNA PLUS 2 TABLET(S): 8.6 TABLET ORAL at 21:55

## 2017-10-21 RX ADMIN — PIPERACILLIN AND TAZOBACTAM 200 GRAM(S): 4; .5 INJECTION, POWDER, LYOPHILIZED, FOR SOLUTION INTRAVENOUS at 10:54

## 2017-10-21 RX ADMIN — APIXABAN 5 MILLIGRAM(S): 2.5 TABLET, FILM COATED ORAL at 10:34

## 2017-10-21 RX ADMIN — Medication 40 MILLIEQUIVALENT(S): at 10:34

## 2017-10-21 RX ADMIN — Medication 1 TABLET(S): at 11:57

## 2017-10-21 RX ADMIN — ZOLPIDEM TARTRATE 10 MILLIGRAM(S): 10 TABLET ORAL at 01:31

## 2017-10-21 RX ADMIN — Medication 20 MILLIGRAM(S): at 06:31

## 2017-10-21 RX ADMIN — APIXABAN 5 MILLIGRAM(S): 2.5 TABLET, FILM COATED ORAL at 21:56

## 2017-10-21 RX ADMIN — PIPERACILLIN AND TAZOBACTAM 200 GRAM(S): 4; .5 INJECTION, POWDER, LYOPHILIZED, FOR SOLUTION INTRAVENOUS at 18:18

## 2017-10-21 NOTE — PROGRESS NOTE ADULT - SUBJECTIVE AND OBJECTIVE BOX
INTERVAL HPI/OVERNIGHT EVENTS:  Patient was seen and examined at bedside. As per nurse, no o/n events, patient resting comfortable. Patient denies: fever, chills, dizziness, weakness, HA, Changes in vision, CP, palpitations, SOB, cough, N/V/D/C, , changes in bowel movements, LE edema.    VITAL SIGNS:  T(F): 97.6 (10-21-17 @ 10:12)  HR: 91 (10-21-17 @ 10:12)  BP: 150/83 (10-21-17 @ 10:12)  RR: 17 (10-21-17 @ 10:12)  SpO2: 94% (10-21-17 @ 05:44)  Wt(kg): --    PHYSICAL EXAM:    Constitutional: WDWN, NAD  Eyes: PERRL, EOMI, sclera non-icteric  Neck: supple, trachea midline, no masses, no JVD  Respiratory: CTA b/l, good air entry b/l, no wheezing, no rhonchi, no rales, without accessory muscle use and no intercostal retractions  Cardiovascular: RRR, normal S1S2, no M/R/G  Gastrointestinal: soft, NTND, no masses palpable, BS normal  : Rodriguez in place, draining clear yellow urine  Extremities: Warm, well perfused, pulses equal bilateral upper and lower extremities, no edema, no clubbing  Neurological: AAOx3, CN Grossly intact  Skin: Normal temperature, warm, dry, patchy, erythematous patch on back that is chronic in nature, no excoriations present     MEDICATIONS  (STANDING):  apixaban 5 milliGRAM(s) Oral every 12 hours  furosemide    Tablet 20 milliGRAM(s) Oral daily  multivitamin 1 Tablet(s) Oral daily  piperacillin/tazobactam IVPB. 3.375 Gram(s) IV Intermittent every 6 hours  senna 2 Tablet(s) Oral at bedtime    MEDICATIONS  (PRN):  melatonin 3 milliGRAM(s) Oral at bedtime PRN Insomnia  zolpidem 10 milliGRAM(s) Oral at bedtime PRN Insomnia      Allergies    No Known Allergies    Intolerances        LABS:                        13.1   17.3  )-----------( 155      ( 21 Oct 2017 07:50 )             39.0     10-21    137  |  95<L>  |  24<H>  ----------------------------<  100<H>  3.7   |  26  |  1.20    Ca    9.6      21 Oct 2017 07:50  Mg     2.2     10-21            RADIOLOGY & ADDITIONAL TESTS:

## 2017-10-21 NOTE — PROGRESS NOTE ADULT - PROBLEM SELECTOR PLAN 1
Pt meeting sepsis criteria with , WBC 14.2, and lactate 2.4 on admission likely 2/2 UTI. Given total of 2.5L NaCl bolus in ED and IV Zosyn 3.375g x1. UA positive due to chronic rodriguez; however urine remains the most likely source of infection  - c/w IV zosyn for complicated UTI in setting of recent hospitalization, VIVI, and chronic rodriguez  - Urine cx positive for S. Aureus and Pseudomonas, pt started on Zosyn   - C/w Zosyn (day 4/14)   - Blood cx NGTD

## 2017-10-21 NOTE — PROGRESS NOTE ADULT - SUBJECTIVE AND OBJECTIVE BOX
Interventional, Pulmonary, Critical, Chest Special Procedures.    Pt was seen and fully examined by myself.     Time spent with patient in minutes:37    Patient is a 77y old  Male who presents with a chief complaint of sepsis 2/2 UTI (19 Oct 2017 14:15)The patient anxious, ill appeasring, eupneic on RA when seen, Rodriguez cath discomfort. no gross hematuria.    HPI:  78yo M with PMHx CLL dx 16yrs ago, obstructive uropathy with chronic rodriguez placed 8/2017, R hip fracture (5/2017), h/o PE, pneumonia, presenting with dark blood-tinged urine in ordriguez bag. Pt recently discharged 1 week ago from [facility]. Pt states that HHA was unable to change rodriguez bag yesterday with resultant urinary retention possibly leading to blood-tinged urine. HHA usually empties rodriguez bag twice a day. Pt denies any fatigue, dysuria, fevers, chills, weight loss. Further denies CP, SOB, abdominal pain, diarrhea/constipation.     ED Vitals: T97.5, , /78, RR 16, O2 98%  Labs: WBC 14.2, lactate 2.4  Given in ED: Zosyn x1, NaCl 1L bolus, NaCl 1.5L bolus (18 Oct 2017 14:05)    REVIEW OF SYSTEMS:  ROS reviewed below with positive findings marked (+) :  GEN:  fever, chills ENT: tracheostomy,   epistaxis,  sinusitis COR: CAD, CHF,  HTN, dysrhythmia PUL: COPD, ILD, asthma, pneumonia GI: PEG, dysphagia, hemorrhage, other HIMA: kidney disease, electrolyte disorder HEM:  anemia, thrombus, coagulopathy, cancer ENDO:  thyroid disease, diabetes mellitus CNS:  dementia, stroke, seizure, PSY:  depression, anxiety, other    PAST MEDICAL & SURGICAL HISTORY:  Hip fracture requiring operative repair, right, sequela  PE (pulmonary thromboembolism)  CLL (chronic lymphocytic leukemia)  S/P hip hemiarthroplasty    FAMILY HISTORY:  No pertinent family history in first degree relatives    SOCIAL HISTORY:      - Tobacco     - ETOH    Allergies    No Known Allergies    Intolerances      Vital Signs Last 24 Hrs  T(C): 36.4 (21 Oct 2017 10:12), Max: 36.8 (20 Oct 2017 15:34)  T(F): 97.6 (21 Oct 2017 10:12), Max: 98.2 (20 Oct 2017 15:34)  HR: 91 (21 Oct 2017 10:12) (80 - 91)  BP: 150/83 (21 Oct 2017 10:12) (114/80 - 160/75)  BP(mean): --  RR: 17 (21 Oct 2017 10:12) (17 - 19)  SpO2: 94% (21 Oct 2017 05:44) (93% - 94%)    10-20 @ 07:01  -  10-21 @ 07:00  --------------------------------------------------------  IN: 300 mL / OUT: 2450 mL / NET: -2150 mL    10-21 @ 07:01  -  10-21 @ 15:08  --------------------------------------------------------  IN: 0 mL / OUT: 1200 mL / NET: -1200 mL        PHYSICAL EXAM:  GENERAL:         uncomfortable,  - distress.  HEENT:            - trauma,  - icterus,  - injection,  - nasal discharge.  NECK:              - jugular venous distention, - thyromegaly.  LYMPH:           - lymphadenopathy, - masses.  RESP:               + clear,   - rales,   - rhonchi,   - wheezes.   COR:                S1S2 RRR  - murmurs,  - gallops,  - rubs.  ABD:                bowel sounds,   soft, - tender, - distended, - organomegaly.  EXT/MSC:         - cyanosis,  - clubbing,  - edema.    NEURO:             alert,   responds to stimuli.  DEVICES:  - DENTURES   +IV R / L     - ETUBE   -TRACH   -CTUBE  R / L      LABS:                          13.1   17.3  )-----------( 155      ( 21 Oct 2017 07:50 )             39.0     10-21    137  |  95<L>  |  24<H>  ----------------------------<  100<H>  3.7   |  26  |  1.20    Ca    9.6      21 Oct 2017 07:50  Mg     2.2     10-21        < from: Xray Chest 1 View AP/PA (10.18.17 @ 12:08) >    EXAM:  XR CHEST-FRONTAL                          PROCEDURE DATE:  10/18/2017                     INTERPRETATION:    Portable AP Radiograph dated 10/18/2017 12:08 PM    CLINICAL INFORMATION: 77 years, Male, Fever    PRIOR STUDIES: August 19, 2017    FINDINGS: The heart is normal in size. Aortic arch calcification noted.   There is no focal consolidation or pleural effusion. There is no   pneumothorax. Osseous structures are intact.    IMPRESSION:  No acute disease.    < end of copied text >  RADIOLOGY & ADDITIONAL STUDIES (The following images were personally reviewed):

## 2017-10-21 NOTE — PROGRESS NOTE ADULT - SUBJECTIVE AND OBJECTIVE BOX
INTERVAL HPI/OVERNIGHT EVENTS:  Patient was seen and examined at bedside. As per nurse, no o/n events, patient resting comfortably. Pt states that he had difficulty sleeping since he did not get 20mg of the ambien however he feels better than admission. He notes that he has a rash on his back that is itchy, but has been chronic in nature. Patient denies: fever, chills, dizziness, weakness, HA, Changes in vision, CP, palpitations, SOB, cough, N/V/D/C, , changes in bowel movements, LE edema.    VITAL SIGNS:  T(F): 97.6 (10-21-17 @ 10:12)  HR: 91 (10-21-17 @ 10:12)  BP: 150/83 (10-21-17 @ 10:12)  RR: 17 (10-21-17 @ 10:12)  SpO2: 94% (10-21-17 @ 05:44)  Wt(kg): --    PHYSICAL EXAM:    Constitutional: WDWN, NAD  Eyes: PERRL, EOMI, sclera non-icteric  Neck: supple, trachea midline, no masses, no JVD  Respiratory: CTA b/l, good air entry b/l, no wheezing, no rhonchi, no rales, without accessory muscle use and no intercostal retractions  Cardiovascular: RRR, normal S1S2, no M/R/G  Gastrointestinal: soft, NTND, no masses palpable, BS normal  : Rodriguez in place, draining clear yellow urine  Extremities: Warm, well perfused, pulses equal bilateral upper and lower extremities, no edema, no clubbing  Neurological: AAOx3, CN Grossly intact  Skin: Normal temperature, warm, dry, patchy, erythematous patch on back that is chronic in nature, no excoriations present     MEDICATIONS  (STANDING):  apixaban 5 milliGRAM(s) Oral every 12 hours  furosemide    Tablet 20 milliGRAM(s) Oral daily  multivitamin 1 Tablet(s) Oral daily  piperacillin/tazobactam IVPB. 3.375 Gram(s) IV Intermittent every 6 hours  senna 2 Tablet(s) Oral at bedtime    MEDICATIONS  (PRN):  melatonin 3 milliGRAM(s) Oral at bedtime PRN Insomnia  zolpidem 10 milliGRAM(s) Oral at bedtime PRN Insomnia      Allergies    No Known Allergies    Intolerances        LABS:                        13.1   17.3  )-----------( 155      ( 21 Oct 2017 07:50 )             39.0     10-21    137  |  95<L>  |  24<H>  ----------------------------<  100<H>  3.7   |  26  |  1.20    Ca    9.6      21 Oct 2017 07:50  Mg     2.2     10-21            RADIOLOGY & ADDITIONAL TESTS:

## 2017-10-21 NOTE — PROGRESS NOTE ADULT - PROBLEM SELECTOR PLAN 1
Pt meeting sepsis criteria with , WBC 14.2, and lactate 2.4 on admission likely 2/2 UTI. Given total of 2.5L NaCl bolus in ED and IV Zosyn 3.375g x1. UA positive due to chronic rodriguez; however urine remains the most likely source of infection  - c/w IV zosyn for complicated UTI in setting of recent hospitalization, [facility], and chronic rodriguez  - Urine cx positive for S. Aureus, started on vancomycin   - fBlood cx NGTD Pt meeting sepsis criteria with , WBC 14.2, and lactate 2.4 on admission likely 2/2 UTI. Given total of 2.5L NaCl bolus in ED and IV Zosyn 3.375g x1. UA positive due to chronic rodriguez; however urine remains the most likely source of infection  - c/w IV zosyn for complicated UTI in setting of recent hospitalization, [facility], and chronic rodriguez  - Urine cx positive for S. Aureus and Pseudomonas, pt started on Zosyn   - C/w Zosyn (day 4/14)   - Blood cx NGTD

## 2017-10-22 LAB
ANION GAP SERPL CALC-SCNC: 14 MMOL/L — SIGNIFICANT CHANGE UP (ref 5–17)
BUN SERPL-MCNC: 27 MG/DL — HIGH (ref 7–23)
CALCIUM SERPL-MCNC: 9.5 MG/DL — SIGNIFICANT CHANGE UP (ref 8.4–10.5)
CHLORIDE SERPL-SCNC: 99 MMOL/L — SIGNIFICANT CHANGE UP (ref 96–108)
CO2 SERPL-SCNC: 26 MMOL/L — SIGNIFICANT CHANGE UP (ref 22–31)
CREAT SERPL-MCNC: 1.16 MG/DL — SIGNIFICANT CHANGE UP (ref 0.5–1.3)
GLUCOSE SERPL-MCNC: 96 MG/DL — SIGNIFICANT CHANGE UP (ref 70–99)
HCT VFR BLD CALC: 37.5 % — LOW (ref 39–50)
HGB BLD-MCNC: 12.4 G/DL — LOW (ref 13–17)
MAGNESIUM SERPL-MCNC: 2.2 MG/DL — SIGNIFICANT CHANGE UP (ref 1.6–2.6)
MCHC RBC-ENTMCNC: 29 PG — SIGNIFICANT CHANGE UP (ref 27–34)
MCHC RBC-ENTMCNC: 33.1 G/DL — SIGNIFICANT CHANGE UP (ref 32–36)
MCV RBC AUTO: 87.8 FL — SIGNIFICANT CHANGE UP (ref 80–100)
PLATELET # BLD AUTO: 149 K/UL — LOW (ref 150–400)
POTASSIUM SERPL-MCNC: 3.9 MMOL/L — SIGNIFICANT CHANGE UP (ref 3.5–5.3)
POTASSIUM SERPL-SCNC: 3.9 MMOL/L — SIGNIFICANT CHANGE UP (ref 3.5–5.3)
RBC # BLD: 4.27 M/UL — SIGNIFICANT CHANGE UP (ref 4.2–5.8)
RBC # FLD: 16.2 % — SIGNIFICANT CHANGE UP (ref 10.3–16.9)
SODIUM SERPL-SCNC: 139 MMOL/L — SIGNIFICANT CHANGE UP (ref 135–145)
WBC # BLD: 16.2 K/UL — HIGH (ref 3.8–10.5)
WBC # FLD AUTO: 16.2 K/UL — HIGH (ref 3.8–10.5)

## 2017-10-22 RX ORDER — ZOLPIDEM TARTRATE 10 MG/1
10 TABLET ORAL AT BEDTIME
Qty: 0 | Refills: 0 | Status: DISCONTINUED | OUTPATIENT
Start: 2017-10-22 | End: 2017-10-23

## 2017-10-22 RX ORDER — PIPERACILLIN AND TAZOBACTAM 4; .5 G/20ML; G/20ML
3.38 INJECTION, POWDER, LYOPHILIZED, FOR SOLUTION INTRAVENOUS EVERY 6 HOURS
Qty: 0 | Refills: 0 | Status: DISCONTINUED | OUTPATIENT
Start: 2017-10-22 | End: 2017-10-25

## 2017-10-22 RX ADMIN — SENNA PLUS 2 TABLET(S): 8.6 TABLET ORAL at 21:17

## 2017-10-22 RX ADMIN — Medication 20 MILLIGRAM(S): at 06:58

## 2017-10-22 RX ADMIN — Medication 1 TABLET(S): at 11:45

## 2017-10-22 RX ADMIN — APIXABAN 5 MILLIGRAM(S): 2.5 TABLET, FILM COATED ORAL at 11:00

## 2017-10-22 RX ADMIN — PIPERACILLIN AND TAZOBACTAM 200 GRAM(S): 4; .5 INJECTION, POWDER, LYOPHILIZED, FOR SOLUTION INTRAVENOUS at 11:45

## 2017-10-22 RX ADMIN — APIXABAN 5 MILLIGRAM(S): 2.5 TABLET, FILM COATED ORAL at 21:17

## 2017-10-22 RX ADMIN — PIPERACILLIN AND TAZOBACTAM 200 GRAM(S): 4; .5 INJECTION, POWDER, LYOPHILIZED, FOR SOLUTION INTRAVENOUS at 00:06

## 2017-10-22 RX ADMIN — ZOLPIDEM TARTRATE 10 MILLIGRAM(S): 10 TABLET ORAL at 00:56

## 2017-10-22 RX ADMIN — PIPERACILLIN AND TAZOBACTAM 200 GRAM(S): 4; .5 INJECTION, POWDER, LYOPHILIZED, FOR SOLUTION INTRAVENOUS at 06:58

## 2017-10-22 RX ADMIN — PIPERACILLIN AND TAZOBACTAM 200 GRAM(S): 4; .5 INJECTION, POWDER, LYOPHILIZED, FOR SOLUTION INTRAVENOUS at 23:54

## 2017-10-22 RX ADMIN — PIPERACILLIN AND TAZOBACTAM 200 GRAM(S): 4; .5 INJECTION, POWDER, LYOPHILIZED, FOR SOLUTION INTRAVENOUS at 18:25

## 2017-10-22 NOTE — PROGRESS NOTE ADULT - SUBJECTIVE AND OBJECTIVE BOX
Interventional, Pulmonary, Critical, Chest Special Procedures.    Pt was seen and fully examined by myself.     Time spent with patient in minutes:37    Patient is a 77y old  Male who presents with a chief complaint of sepsis 2/2 UTI (19 Oct 2017 14:15)The patient overall feels better, eupneic on RA when seen, pain free.    HPI:  76yo M with PMHx CLL dx 16yrs ago, obstructive uropathy with chronic rodriguez placed 8/2017, R hip fracture (5/2017), h/o PE, pneumonia, presenting with dark blood-tinged urine in rodriguez bag. Pt recently discharged 1 week ago from [facility]. Pt states that HHA was unable to change rodriguez bag yesterday with resultant urinary retention possibly leading to blood-tinged urine. HHA usually empties rodriguez bag twice a day. Pt denies any fatigue, dysuria, fevers, chills, weight loss. Further denies CP, SOB, abdominal pain, diarrhea/constipation.     ED Vitals: T97.5, , /78, RR 16, O2 98%  Labs: WBC 14.2, lactate 2.4  Given in ED: Zosyn x1, NaCl 1L bolus, NaCl 1.5L bolus (18 Oct 2017 14:05)    REVIEW OF SYSTEMS:  ROS reviewed below with positive findings marked (+) :  GEN:  fever, chills ENT: tracheostomy,   epistaxis,  sinusitis COR: CAD, CHF,  HTN, dysrhythmia PUL: COPD, ILD, asthma, pneumonia GI: PEG, dysphagia, hemorrhage, other HIMA: kidney disease, electrolyte disorder HEM:  anemia, thrombus, coagulopathy, cancer ENDO:  thyroid disease, diabetes mellitus CNS:  dementia, stroke, seizure, PSY:  depression, anxiety, other  PAST MEDICAL & SURGICAL HISTORY:  Hip fracture requiring operative repair, right, sequela  PE (pulmonary thromboembolism)  CLL (chronic lymphocytic leukemia)  S/P hip hemiarthroplasty    FAMILY HISTORY:  No pertinent family history in first degree relatives    SOCIAL HISTORY:      - Tobacco     - ETOH    Allergies    No Known Allergies    Intolerances      Vital Signs Last 24 Hrs  T(C): 36.6 (22 Oct 2017 10:59), Max: 37 (22 Oct 2017 05:24)  T(F): 97.8 (22 Oct 2017 10:59), Max: 98.6 (22 Oct 2017 05:24)  HR: 85 (22 Oct 2017 10:59) (85 - 93)  BP: 131/69 (22 Oct 2017 10:59) (126/66 - 141/80)  BP(mean): --  RR: 18 (22 Oct 2017 10:59) (18 - 22)  SpO2: 95% (22 Oct 2017 10:59) (93% - 95%)    10-21 @ 07:01  -  10-22 @ 07:00  --------------------------------------------------------  IN: 200 mL / OUT: 1900 mL / NET: -1700 mL    10-22 @ 07:01  -  10-22 @ 14:34  --------------------------------------------------------  IN: 0 mL / OUT: 300 mL / NET: -300 mL        PHYSICAL EXAM:  GENERAL:         uncomfortable,  - distress.  HEENT:            - trauma,  - icterus,  - injection,  - nasal discharge.  NECK:              - jugular venous distention, - thyromegaly.  LYMPH:           - lymphadenopathy, - masses.  RESP:               + clear,   - rales,   - rhonchi,   - wheezes.   COR:                S1S2 RRR  - murmurs,  - gallops,  - rubs.  ABD:                bowel sounds,   soft, - tender, - distended, - organomegaly.  EXT/MSC:         - cyanosis,  - clubbing,  - edema.    NEURO:             alert,   responds to stimuli.    DEVICES:  - DENTURES   +IV R / L     - ETUBE   -TRACH   -CTUBE  R / L      LABS:                          12.4   16.2  )-----------( 149      ( 22 Oct 2017 06:56 )             37.5     10-22    139  |  99  |  27<H>  ----------------------------<  96  3.9   |  26  |  1.16    Ca    9.5      22 Oct 2017 06:56  Mg     2.2     10-22        RADIOLOGY & ADDITIONAL STUDIES (The following images were personally reviewed):

## 2017-10-22 NOTE — PROGRESS NOTE ADULT - SUBJECTIVE AND OBJECTIVE BOX
INTERVAL HPI/OVERNIGHT EVENTS:. Patient denies: fever, chills, dizziness, weakness, HA, Changes in vision, CP, palpitations, SOB, cough, N/V/D/C, , changes in bowel movements, LE edema.    VITAL SIGNS:  T(F): 97.6 (10-21-17 @ 10:12)  HR: 91 (10-21-17 @ 10:12)  BP: 150/83 (10-21-17 @ 10:12)  RR: 17 (10-21-17 @ 10:12)  SpO2: 94% (10-21-17 @ 05:44)  Wt(kg): --    PHYSICAL EXAM:    Constitutional: WDWN, NAD  Eyes: PERRL, EOMI, sclera non-icteric  Neck: supple, trachea midline, no masses, no JVD  Respiratory: CTA b/l, good air entry b/l, no wheezing, no rhonchi, no rales, without accessory muscle use and no intercostal retractions  Cardiovascular: RRR, normal S1S2, no M/R/G  Gastrointestinal: soft, NTND, no masses palpable, BS normal  : Rodriguez in place, draining clear yellow urine  Extremities: Warm, well perfused, pulses equal bilateral upper and lower extremities, no edema, no clubbing  Neurological: AAOx3, CN Grossly intact  Skin: Normal temperature, warm, dry, patchy, erythematous patch on back that is chronic in nature, no excoriations present     MEDICATIONS  (STANDING):  apixaban 5 milliGRAM(s) Oral every 12 hours  furosemide    Tablet 20 milliGRAM(s) Oral daily  multivitamin 1 Tablet(s) Oral daily  piperacillin/tazobactam IVPB. 3.375 Gram(s) IV Intermittent every 6 hours  senna 2 Tablet(s) Oral at bedtime    MEDICATIONS  (PRN):  melatonin 3 milliGRAM(s) Oral at bedtime PRN Insomnia  zolpidem 10 milliGRAM(s) Oral at bedtime PRN Insomnia      Allergies    No Known Allergies    Intolerances        LABS:                        13.1   17.3  )-----------( 155      ( 21 Oct 2017 07:50 )             39.0     10-21    137  |  95<L>  |  24<H>  ----------------------------<  100<H>  3.7   |  26  |  1.20    Ca    9.6      21 Oct 2017 07:50  Mg     2.2     10-21            RADIOLOGY & ADDITIONAL TESTS:

## 2017-10-23 DIAGNOSIS — Z02.9 ENCOUNTER FOR ADMINISTRATIVE EXAMINATIONS, UNSPECIFIED: ICD-10-CM

## 2017-10-23 LAB
ANION GAP SERPL CALC-SCNC: 14 MMOL/L — SIGNIFICANT CHANGE UP (ref 5–17)
BUN SERPL-MCNC: 32 MG/DL — HIGH (ref 7–23)
CALCIUM SERPL-MCNC: 9.4 MG/DL — SIGNIFICANT CHANGE UP (ref 8.4–10.5)
CHLORIDE SERPL-SCNC: 97 MMOL/L — SIGNIFICANT CHANGE UP (ref 96–108)
CO2 SERPL-SCNC: 26 MMOL/L — SIGNIFICANT CHANGE UP (ref 22–31)
CREAT SERPL-MCNC: 1.18 MG/DL — SIGNIFICANT CHANGE UP (ref 0.5–1.3)
CULTURE RESULTS: SIGNIFICANT CHANGE UP
CULTURE RESULTS: SIGNIFICANT CHANGE UP
GLUCOSE SERPL-MCNC: 110 MG/DL — HIGH (ref 70–99)
HCT VFR BLD CALC: 37.9 % — LOW (ref 39–50)
HGB BLD-MCNC: 12.4 G/DL — LOW (ref 13–17)
MAGNESIUM SERPL-MCNC: 2.2 MG/DL — SIGNIFICANT CHANGE UP (ref 1.6–2.6)
MCHC RBC-ENTMCNC: 28.8 PG — SIGNIFICANT CHANGE UP (ref 27–34)
MCHC RBC-ENTMCNC: 32.7 G/DL — SIGNIFICANT CHANGE UP (ref 32–36)
MCV RBC AUTO: 88.1 FL — SIGNIFICANT CHANGE UP (ref 80–100)
PLATELET # BLD AUTO: 161 K/UL — SIGNIFICANT CHANGE UP (ref 150–400)
POTASSIUM SERPL-MCNC: 4 MMOL/L — SIGNIFICANT CHANGE UP (ref 3.5–5.3)
POTASSIUM SERPL-SCNC: 4 MMOL/L — SIGNIFICANT CHANGE UP (ref 3.5–5.3)
RBC # BLD: 4.3 M/UL — SIGNIFICANT CHANGE UP (ref 4.2–5.8)
RBC # FLD: 16.1 % — SIGNIFICANT CHANGE UP (ref 10.3–16.9)
SODIUM SERPL-SCNC: 137 MMOL/L — SIGNIFICANT CHANGE UP (ref 135–145)
SPECIMEN SOURCE: SIGNIFICANT CHANGE UP
SPECIMEN SOURCE: SIGNIFICANT CHANGE UP
WBC # BLD: 16.4 K/UL — HIGH (ref 3.8–10.5)
WBC # FLD AUTO: 16.4 K/UL — HIGH (ref 3.8–10.5)

## 2017-10-23 PROCEDURE — 99232 SBSQ HOSP IP/OBS MODERATE 35: CPT

## 2017-10-23 RX ORDER — ZOLPIDEM TARTRATE 10 MG/1
10 TABLET ORAL AT BEDTIME
Qty: 0 | Refills: 0 | Status: DISCONTINUED | OUTPATIENT
Start: 2017-10-23 | End: 2017-10-25

## 2017-10-23 RX ORDER — ZOLPIDEM TARTRATE 10 MG/1
10 TABLET ORAL AT BEDTIME
Qty: 0 | Refills: 0 | Status: DISCONTINUED | OUTPATIENT
Start: 2017-10-23 | End: 2017-10-23

## 2017-10-23 RX ADMIN — PIPERACILLIN AND TAZOBACTAM 200 GRAM(S): 4; .5 INJECTION, POWDER, LYOPHILIZED, FOR SOLUTION INTRAVENOUS at 12:48

## 2017-10-23 RX ADMIN — ZOLPIDEM TARTRATE 10 MILLIGRAM(S): 10 TABLET ORAL at 01:29

## 2017-10-23 RX ADMIN — PIPERACILLIN AND TAZOBACTAM 200 GRAM(S): 4; .5 INJECTION, POWDER, LYOPHILIZED, FOR SOLUTION INTRAVENOUS at 06:26

## 2017-10-23 RX ADMIN — PIPERACILLIN AND TAZOBACTAM 200 GRAM(S): 4; .5 INJECTION, POWDER, LYOPHILIZED, FOR SOLUTION INTRAVENOUS at 18:44

## 2017-10-23 RX ADMIN — Medication 1 TABLET(S): at 12:49

## 2017-10-23 RX ADMIN — APIXABAN 5 MILLIGRAM(S): 2.5 TABLET, FILM COATED ORAL at 23:18

## 2017-10-23 RX ADMIN — APIXABAN 5 MILLIGRAM(S): 2.5 TABLET, FILM COATED ORAL at 09:25

## 2017-10-23 RX ADMIN — Medication 20 MILLIGRAM(S): at 06:26

## 2017-10-23 RX ADMIN — SENNA PLUS 2 TABLET(S): 8.6 TABLET ORAL at 23:18

## 2017-10-23 NOTE — ADVANCED PRACTICE NURSE CONSULT - REASON FOR CONSULT
Duplicate WOC nurse consult to assess left heel ulcer. Patient assessed by WOCN on 10/19. Informed RN, Marilyn of the same. Consult not required at this time.

## 2017-10-23 NOTE — DIETITIAN INITIAL EVALUATION ADULT. - OTHER INFO
Pt  admitted with sepsis due to urinary tract infection. Pt Was recently D/C'd from [facility]. Pt reports good PO intake, and actually believe he may have gained wt over the past 6 months 2/2 decreased actovoty/hospitalizations. No n/v/d/c. Pain controlled. Pt does not follow a therapeutic diet at home. Discussed low sodium diet for management of CHF.

## 2017-10-23 NOTE — PHYSICAL THERAPY INITIAL EVALUATION ADULT - ADDITIONAL COMMENTS
Patient states that he lives in an elevator building with a 24/7 aide. Uses a rolling walker at baseline for ambulation.

## 2017-10-23 NOTE — PROGRESS NOTE ADULT - SUBJECTIVE AND OBJECTIVE BOX
INTERVAL HPI/OVERNIGHT EVENTS:  Patient was seen and examined at bedside. As per nurse and patient, no o/n events, patient resting comfortably. Pt states that he wants to go home and wants more sleep aid to help him fall asleep here. Patient denies: fever, chills, dizziness, weakness, HA, Changes in vision, CP, palpitations, SOB, cough, N/V/D/C, dysuria, changes in bowel movements, LE edema.    VITAL SIGNS:  T(F): 97.5 (10-23-17 @ 09:24)  HR: 98 (10-23-17 @ 09:24)  BP: 132/75 (10-23-17 @ 09:24)  RR: 17 (10-23-17 @ 09:24)  SpO2: 93% (10-23-17 @ 09:24)  Wt(kg): --    PHYSICAL EXAM:    Constitutional: WDWN, NAD  Eyes: PERRL, EOMI, sclera non-icteric  Neck: supple, trachea midline, no masses, no JVD  Respiratory: CTA b/l, good air entry b/l, no wheezing, no rhonchi, no rales, without accessory muscle use and no intercostal retractions  Cardiovascular: RRR, normal S1S2, no M/R/G  Gastrointestinal: soft, NTND, no masses palpable, BS normal  Extremities: Warm, well perfused, pulses equal bilateral upper and lower extremities, no edema, no clubbing  Neurological: AAOx3, CN Grossly intact  Skin: Normal temperature, warm, dry    MEDICATIONS  (STANDING):  apixaban 5 milliGRAM(s) Oral every 12 hours  furosemide    Tablet 20 milliGRAM(s) Oral daily  multivitamin 1 Tablet(s) Oral daily  piperacillin/tazobactam IVPB. 3.375 Gram(s) IV Intermittent every 6 hours  senna 2 Tablet(s) Oral at bedtime    MEDICATIONS  (PRN):  melatonin 3 milliGRAM(s) Oral at bedtime PRN Insomnia      Allergies    No Known Allergies    Intolerances        LABS:                        12.4   16.4  )-----------( 161      ( 23 Oct 2017 08:16 )             37.9     10-23    137  |  97  |  32<H>  ----------------------------<  110<H>  4.0   |  26  |  1.18    Ca    9.4      23 Oct 2017 08:16  Mg     2.2     10-23            RADIOLOGY & ADDITIONAL TESTS: INTERVAL HPI/OVERNIGHT EVENTS:  Patient was seen and examined at bedside. As per nurse and patient, no o/n events, patient resting comfortably. Pt states that he wants to go home and wants more sleep aid to help him fall asleep here. Patient denies: fever, chills, dizziness, weakness, HA, Changes in vision, CP, palpitations, SOB, cough, N/V/D/C, dysuria, changes in bowel movements, LE edema.    VITAL SIGNS:  T(F): 97.5 (10-23-17 @ 09:24)  HR: 98 (10-23-17 @ 09:24)  BP: 132/75 (10-23-17 @ 09:24)  RR: 17 (10-23-17 @ 09:24)  SpO2: 93% (10-23-17 @ 09:24)  Wt(kg): --    PHYSICAL EXAM:    Constitutional: WDWN, NAD  Eyes: PERRL, EOMI, sclera non-icteric  Neck: supple, trachea midline, no masses, no JVD  Respiratory: CTA b/l, good air entry b/l, no wheezing, no rhonchi, no rales, without accessory muscle use and no intercostal retractions  Cardiovascular: RRR, normal S1S2, no M/R/G  Gastrointestinal: soft, NTND, no masses palpable, BS normal  : Rodriguez in place, draining clear yellow urine  Extremities: Warm, well perfused, pulses equal bilateral upper and lower extremities, no edema, no clubbing  Neurological: AAOx3, CN Grossly intact  Skin: Normal temperature, warm, dry, patchy, erythematous patch on back that is chronic in nature, no excoriations present     MEDICATIONS  (STANDING):  apixaban 5 milliGRAM(s) Oral every 12 hours  furosemide    Tablet 20 milliGRAM(s) Oral daily  multivitamin 1 Tablet(s) Oral daily  piperacillin/tazobactam IVPB. 3.375 Gram(s) IV Intermittent every 6 hours  senna 2 Tablet(s) Oral at bedtime    MEDICATIONS  (PRN):  melatonin 3 milliGRAM(s) Oral at bedtime PRN Insomnia      Allergies    No Known Allergies    Intolerances        LABS:                        12.4   16.4  )-----------( 161      ( 23 Oct 2017 08:16 )             37.9     10-23    137  |  97  |  32<H>  ----------------------------<  110<H>  4.0   |  26  |  1.18    Ca    9.4      23 Oct 2017 08:16  Mg     2.2     10-23            RADIOLOGY & ADDITIONAL TESTS:

## 2017-10-23 NOTE — DIETITIAN INITIAL EVALUATION ADULT. - NS AS NUTRI INTERV ED CONTENT
Educated on low sodium diet for management of CHF. Discussed healthier options and avoiding the use of salt./Purpose of the nutrition education

## 2017-10-23 NOTE — ADVANCED PRACTICE NURSE CONSULT - RECOMMEDATIONS
Follow up as needed.
Right heel - no dressing required.   Left heel - cleanse with normal saline, applied Iodosorb and covered with Medipore dressing every other day.  Discussed assessment with patient, GENI Durbin Angela and house staff.

## 2017-10-23 NOTE — DIETITIAN INITIAL EVALUATION ADULT. - ENERGY NEEDS
Height: 71" Weight: 195lbs, IBW 172lbs+/-10%, %%, BMI - 27.3  ABW used to calculate energy needs due to pt's current body weight within % IBW   Nutrient needs based on [facility] standards of care for maintenance in adults, increased protein 2/2 foot ulcer  Fluids per MD 2/2 CHF

## 2017-10-23 NOTE — DIETITIAN INITIAL EVALUATION ADULT. - PROBLEM SELECTOR PLAN 1
Pt meeting sepsis criteria with , WBC 14.2, and lactate 2.4 on admission likely 2/2 UTI. Given total of 2.5L NaCl bolus in ED and IV Zosyn 3.375g x1. UA positive due to chronic rodriguez; however urine remains the most likely source of infection  - c/w IV zosyn for complicated UTI in setting of recent hospitalization, [facility], and chronic rodriguez  - f/u Urine culture  - f/u blood culture x2  - f/u rpt lactate  - f/u CXR as pt with chronic cough but unlikely in setting of clear lung exam  - replace rodriguez

## 2017-10-23 NOTE — CONSULT NOTE ADULT - SUBJECTIVE AND OBJECTIVE BOX
Vascular Access Service Consult Note    [facility] - [facility]OBLEM Dx:  CHF, left ventricular: CHF, left ventricular  Insomnia: Insomnia  Prophylactic measure: Prophylactic measure  Nutrition, metabolism, and development symptoms: Nutrition, metabolism, and development symptoms  CLL (chronic lymphocytic leukemia): CLL (chronic lymphocytic leukemia)  PE (pulmonary thromboembolism): PE (pulmonary thromboembolism)  Foot ulcer: Foot ulcer  Sepsis due to urinary tract infection: Sepsis due to urinary tract infection             Diagnosis: UTI    Indications for Vascular Access (Check all that apply)  [  X]  Antibiotic Therapy       Antibiotic Prescribed: zosyn                                                                            Expected Duration of Therapy: 7 days               [  ]  IV Hydration  [  ]  Total Parenteral Nutrition  [  ]  Chemotherapy  [  ]  Difficult Venous Access  [  ]  CVP monitoring  [  ]  Medications with high potential for tissue necrosis on extravasation  [  ]  Other    Screening (Check all that apply)  Previous Radiation to chest  [  ] Yes      [  X]  No  Breast Cancer                          [  ] Left     [  ]  Right    [ X ]  No  Lymph Node Dissection         [  ] Left     [  ]  Right    [X  ]  No  Pacemaker or ICD                   [  ] Left     [  ]  Right    [X  ]  No  Upper Extremity DVT             [  ] Left     [  ]  Right    [  X]  No  Chronic Kidney Disease         [  ]  Yes     [  X]  No  Hemodialysis                           [  ]  Yes     [X  ]  No  AV Fistula/ Graft                     [  ]  Left    [  ]  Right    X[  ]  No  Temp>101F in past 24 H       [  ]  Yes     [ X ]  No  H/O PICC/Midline                   [ X ]  Yes     [  ]  No    Lab data:                        12.4   16.4  )-----------( 161      ( 23 Oct 2017 08:16 )             37.9     10-23    137  |  97  |  32<H>  ----------------------------<  110<H>  4.0   |  26  |  1.18    Ca    9.4      23 Oct 2017 08:16  Mg     2.2     10-23                I have reviewed the chart, interviewed and examined the patient and determined that this patient:  [  ] Is a candidate for a PICC line  [X  ] Is a candidate for a Midline  [  ] Is not a candidate for vascular access device (reason)    Lumens:    [X  ] Single  [  ] Double

## 2017-10-23 NOTE — PROGRESS NOTE ADULT - SUBJECTIVE AND OBJECTIVE BOX
CC/ HPI Patient is a 77y old male with CLL, diastolic cardiomyopathy who was admitted with sepsis secondary to urinary tract infection, without respiratory complaint.      PAST MEDICAL & SURGICAL HISTORY:  Hip fracture requiring operative repair, right, sequela  PE (pulmonary thromboembolism)  CLL (chronic lymphocytic leukemia)  S/P hip hemiarthroplasty    SOCHX:  - tobacco,  -  alcohol      FMHX: FA/MO  - contributory     ROS reviewed below with positive findings marked (+) :  GEN:  fever, chills ENT: tracheostomy,   epistaxis,  sinusitis COR: CAD, CHF,  HTN, dysrhythmia PUL: COPD, ILD, asthma, pneumonia GI: PEG, dysphagia, hemorrhage, other HIMA: kidney disease, electrolyte disorder HEM:  anemia, thrombus, coagulopathy, cancer ENDO:  thyroid disease, diabetes mellitus CNS:  dementia, stroke, seizure, PSY:  depression, anxiety, other        MEDICATIONS  (STANDING):  apixaban 5 milliGRAM(s) Oral every 12 hours  furosemide    Tablet 20 milliGRAM(s) Oral daily  multivitamin 1 Tablet(s) Oral daily  piperacillin/tazobactam IVPB. 3.375 Gram(s) IV Intermittent every 6 hours  senna 2 Tablet(s) Oral at bedtime    MEDICATIONS  (PRN):  melatonin 3 milliGRAM(s) Oral at bedtime PRN Insomnia      Vital Signs Last 24 Hrs  T(C): 36.2 (23 Oct 2017 16:29), Max: 36.8 (22 Oct 2017 21:13)  T(F): 97.2 (23 Oct 2017 16:29), Max: 98.3 (23 Oct 2017 05:24)  HR: 79 (23 Oct 2017 16:29) (76 - 98)  BP: 136/74 (23 Oct 2017 16:29) (132/75 - 140/86)  RR: 18 (23 Oct 2017 16:29) (17 - 20)  SpO2: 94% (23 Oct 2017 16:29) (93% - 94%)    GENERAL:         comfortable,  - distress.  HEENT:            - trauma,  - icterus,  - injection,  - nasal discharge.  NECK:              - jugular venous distention, - thyromegaly.  LYMPH:           - lymphadenopathy, - masses.  RESP:               + clear,   - rales,   - rhonchi,   - wheezes.   COR:                S1S2 RRR  - murmurs,  - gallops,  - rubs.  ABD:                bowel sounds,   soft, - tender, - distended, - organomegaly.  EXT/MSC:         - cyanosis,  - clubbing,  - edema.    NEURO:             alert,   responds to stimuli.                          12.4   16.4  )-----------( 161      ( 23 Oct 2017 08:16 )             37.9     10-23    137  |  97  |  32<H>  ----------------------------<  110<H>  4.0   |  26  |  1.18    Ca    9.4      23 Oct 2017 08:16  Mg     2.2     10-23      X ray Chest (08.19.17) No acute infiltrates        ASSESSMENT/PLAN    1)  Urinary tract infection  2)  Cardiomyopathy  3)  CLL with history pulmonary embolus    ID/Antibiotics:  Zosyn  Cardiac/HTN: furosemide  GI: Rx/ prophylaxis c PPI/H2B  Heme: Rx/VT c AC  Discuss with medical team

## 2017-10-23 NOTE — PROGRESS NOTE ADULT - SUBJECTIVE AND OBJECTIVE BOX
Chief Complaint/Reason for Consult: chf  INTERVAL HPI: sob improved no palp no cp  	  MEDICATIONS:  furosemide    Tablet 20 milliGRAM(s) Oral daily    piperacillin/tazobactam IVPB. 3.375 Gram(s) IV Intermittent every 6 hours      melatonin 3 milliGRAM(s) Oral at bedtime PRN    senna 2 Tablet(s) Oral at bedtime      apixaban 5 milliGRAM(s) Oral every 12 hours  multivitamin 1 Tablet(s) Oral daily      REVIEW OF SYSTEMS:  [x] As per HPI  CONSTITUTIONAL: No fever, weight loss, or fatigue  RESPIRATORY: No cough, wheezing, chills or hemoptysis; No Shortness of Breath  CARDIOVASCULAR: No chest pain, palpitations, dizziness, or leg swelling  GASTROINTESTINAL: No abdominal or epigastric pain. No nausea, vomiting, or hematemesis; No diarrhea or constipation. No melena or hematochezia.  MUSCULOSKELETAL: No joint pain or swelling; No muscle, back, or extremity pain  [x] All others negative	  [ ] Unable to obtain    PHYSICAL EXAM:  T(C): 36.4 (10-23-17 @ 09:24), Max: 36.8 (10-22-17 @ 21:13)  HR: 98 (10-23-17 @ 09:24) (76 - 98)  BP: 132/75 (10-23-17 @ 09:24) (115/72 - 140/86)  RR: 17 (10-23-17 @ 09:24) (17 - 20)  SpO2: 93% (10-23-17 @ 09:24) (92% - 93%)  Wt(kg): --  I&O's Summary    22 Oct 2017 07:01  -  23 Oct 2017 07:00  --------------------------------------------------------  IN: 200 mL / OUT: 1350 mL / NET: -1150 mL    23 Oct 2017 07:01  -  23 Oct 2017 13:52  --------------------------------------------------------  IN: 0 mL / OUT: 2 mL / NET: -2 mL          Appearance: Normal	  HEENT:   Normal oral mucosa  Cardiovascular: Normal S1 S2, No JVD, No murmurs, No edema  Respiratory: Lungs clear to auscultation	  Gastrointestinal:  Soft, Non-tender, + BS	  Extremities: Normal range of motion, No clubbing, cyanosis or edema  Vascular: Peripheral pulses palpable 2+ bilaterally    TELEMETRY: 	    ECG:   	  RADIOLOGY:   CXR:< from: Xray Chest 1 View AP/PA (10.18.17 @ 12:08) >  IMPRESSION:  No acute disease.    < end of copied text >    CT:  US:    CARDIAC TESTING:  Echocardiogram:  Catheterization:  Stress Test:      LABS:	 	    CARDIAC MARKERS:                                  12.4   16.4  )-----------( 161      ( 23 Oct 2017 08:16 )             37.9     10-23    137  |  97  |  32<H>  ----------------------------<  110<H>  4.0   |  26  |  1.18    Ca    9.4      23 Oct 2017 08:16  Mg     2.2     10-23      proBNP:   Lipid Profile:   HgA1c:   TSH:     ASSESSMENT/PLAN: 	    #Chronic diastolic CHF - continue home Lasix  po ome dose BNP normal CXR clear    CAD - no cp no sob    #HTN - goals SBP<140mmHg as pth as diastolic heart disease    #CV Prevention  q3mo Fasting Lipid Profile, Goal LDL<100, statin as tolerated  q6week TSH  q3mo 25-OH Vitamin D Level, Goal 50, supplement as tolerated

## 2017-10-23 NOTE — PROGRESS NOTE ADULT - SUBJECTIVE AND OBJECTIVE BOX
INTERVAL HPI/OVERNIGHT EVENTS:  Patient was seen and examined at bedside. As per nurse and patient, no o/n events, patient resting comfortably. Pt states that he wants to go home and wants more sleep aid to help him fall asleep here. Patient denies: fever, chills, dizziness, weakness, HA, Changes in vision, CP, palpitations, SOB, cough, N/V/D/C, dysuria, changes in bowel movements, LE edema.    VITAL SIGNS:  T(F): 97.5 (10-23-17 @ 09:24)  HR: 98 (10-23-17 @ 09:24)  BP: 132/75 (10-23-17 @ 09:24)  RR: 17 (10-23-17 @ 09:24)  SpO2: 93% (10-23-17 @ 09:24)  Wt(kg): --    PHYSICAL EXAM:    Constitutional: WDWN, NAD  Eyes: PERRL, EOMI, sclera non-icteric  Neck: supple, trachea midline, no masses, no JVD  Respiratory: CTA b/l, good air entry b/l, no wheezing, no rhonchi, no rales, without accessory muscle use and no intercostal retractions  Cardiovascular: RRR, normal S1S2, no M/R/G  Gastrointestinal: soft, NTND, no masses palpable, BS normal  : Rodriguez in place, draining clear yellow urine  Extremities: Warm, well perfused, pulses equal bilateral upper and lower extremities, no edema, no clubbing  Neurological: AAOx3, CN Grossly intact  Skin: Normal temperature, warm, dry, patchy, erythematous patch on back that is chronic in nature, no excoriations present     MEDICATIONS  (STANDING):  apixaban 5 milliGRAM(s) Oral every 12 hours  furosemide    Tablet 20 milliGRAM(s) Oral daily  multivitamin 1 Tablet(s) Oral daily  piperacillin/tazobactam IVPB. 3.375 Gram(s) IV Intermittent every 6 hours  senna 2 Tablet(s) Oral at bedtime    MEDICATIONS  (PRN):  melatonin 3 milliGRAM(s) Oral at bedtime PRN Insomnia      Allergies    No Known Allergies    Intolerances        LABS:                        12.4   16.4  )-----------( 161      ( 23 Oct 2017 08:16 )             37.9     10-23    137  |  97  |  32<H>  ----------------------------<  110<H>  4.0   |  26  |  1.18    Ca    9.4      23 Oct 2017 08:16  Mg     2.2     10-23            RADIOLOGY & ADDITIONAL TESTS:

## 2017-10-23 NOTE — PROGRESS NOTE ADULT - PROBLEM SELECTOR PLAN 1
Pt meeting sepsis criteria with , WBC 14.2, and lactate 2.4 on admission likely 2/2 UTI. Given total of 2.5L NaCl bolus in ED and IV Zosyn 3.375g x1. UA positive due to chronic rodriguez; however urine remains the most likely source of infection  - c/w IV zosyn for complicated UTI in setting of recent hospitalization, [facility], and chronic rodriguez  - Urine cx positive for S. Aureus and Pseudomonas  - C/w Zosyn (day 6/14)   - Blood cx NGTD

## 2017-10-23 NOTE — PROGRESS NOTE ADULT - ASSESSMENT
ASSESSMENT/PLAN    1)  Urinary tract infection  2)  Cardiomyopathy  3)  CLL with history pulmonary embolus      Bronchodilators:  Atrovent/ albuterol q 4 – 6 hours as needed  ID/Antibiotics:  Zosyn  Cardiac/HTN: furosemide  GI: Rx/ prophylaxis c PPI/H2B  Heme: Rx/VT c AC  Discuss with medical team
76yo M with chronic rodriguez , dCHF, CLL, presenting with blood-tinged urine, met SIRS criteria and septic likely 2/2 to UTI..
78yo M with chronic rodriguez , dCHF, CLL, presenting with blood-tinged urine, met SIRS criteria and septic likely 2/2 to UTI.
78yo M with chronic rodriguez , dCHF, CLL, presenting with blood-tinged urine, met SIRS criteria and septic likely 2/2 to UTI.
ASSESSMENT/PLAN    1)  Urinary tract infection  2)  Cardiomyopathy  3)  CLL with history pulmonary embolus      Bronchodilators:  Atrovent/ albuterol q 4 – 6 hours as needed  ID/Antibiotics:  Zosyn  Cardiac/HTN: furosemide  GI: Rx/ prophylaxis c PPI/H2B  Heme: Rx/VT c AC  Discuss with medical team
78yo M with chronic rodriguez , dCHF, CLL, presenting with blood-tinged urine, met SIRS criteria and septic likely 2/2 to UTI. Admitted for S. Aureus and Pseudomonas UTI currently on Zosyn.
Assessment and Plan:     76yo M with chronic rodriguez , dCHF, CLL, presenting with blood-tinged urine, met SIRS criteria and septic likely 2/2 to UTI..     Problem/Plan - 1:  ·  Problem: Sepsis due to urinary tract infection.  Plan: Pt meeting sepsis criteria with , WBC 14.2, and lactate 2.4 on admission likely 2/2 UTI. Given total of 2.5L NaCl bolus in ED and IV Zosyn 3.375g x1. UA positive due to chronic rodriguez; however urine remains the most likely source of infection  - c/w IV zosyn for complicated UTI in setting of recent hospitalization, [facility], and chronic rodriguez  - f/u Urine culture  - f/u blood culture x2  - f/u rpt lactate  - f/u CXR as pt with chronic cough but unlikely in setting of clear lung exam  - Replace rodriguez with TOV at 6pm.   - Consult urology regarding need for rodriguez vs SBT if retaining.      Problem/Plan - 2:  ·  Problem: Foot ulcer.  Plan: Pt and [facility] wound care nurse examines b/l foot ulcers daily L worse than R. Nonerythematous and nontender on exam.   - wound care consult.      Problem/Plan - 3:  ·  Problem: PE (pulmonary thromboembolism).  Plan: Pt with h/o PE on eliquis 5mg daily  - c/w eliquis.      Problem/Plan - 4:  ·  Problem: CHF, left ventricular.  Plan: Chronic dCHF, currently not fluid overloaded or in acute exacerbation   -c/w home lasix dose  - maintain BP <140 given hx of CHF is possible.      Problem/Plan - 5:  ·  Problem: Insomnia.  Plan: - c/w ambien 5mg prn but try to avoid if possible, use melatonin  - Avoid benzos and narcotics per PMD given addictive personality.      Problem/Plan - 6:  Problem: CLL (chronic lymphocytic leukemia). Plan: Pt with h/o CLL, stable and not on any medications.     Problem/Plan - 7:  ·  Problem: Nutrition, metabolism, and development symptoms.  Plan: F: IVF bolus prn  E: replete prn for K<4, Mg<2  N: regular diet.      Problem/Plan - 8:  ·  Problem: Prophylactic measure.  Plan: - on eliquis 5mg BID

## 2017-10-24 LAB
ANION GAP SERPL CALC-SCNC: 13 MMOL/L — SIGNIFICANT CHANGE UP (ref 5–17)
BUN SERPL-MCNC: 27 MG/DL — HIGH (ref 7–23)
CALCIUM SERPL-MCNC: 9.2 MG/DL — SIGNIFICANT CHANGE UP (ref 8.4–10.5)
CHLORIDE SERPL-SCNC: 97 MMOL/L — SIGNIFICANT CHANGE UP (ref 96–108)
CO2 SERPL-SCNC: 26 MMOL/L — SIGNIFICANT CHANGE UP (ref 22–31)
CREAT SERPL-MCNC: 1.1 MG/DL — SIGNIFICANT CHANGE UP (ref 0.5–1.3)
GLUCOSE SERPL-MCNC: 96 MG/DL — SIGNIFICANT CHANGE UP (ref 70–99)
HCT VFR BLD CALC: 37 % — LOW (ref 39–50)
HGB BLD-MCNC: 12 G/DL — LOW (ref 13–17)
MAGNESIUM SERPL-MCNC: 2.1 MG/DL — SIGNIFICANT CHANGE UP (ref 1.6–2.6)
MCHC RBC-ENTMCNC: 28.7 PG — SIGNIFICANT CHANGE UP (ref 27–34)
MCHC RBC-ENTMCNC: 32.4 G/DL — SIGNIFICANT CHANGE UP (ref 32–36)
MCV RBC AUTO: 88.5 FL — SIGNIFICANT CHANGE UP (ref 80–100)
PLATELET # BLD AUTO: 158 K/UL — SIGNIFICANT CHANGE UP (ref 150–400)
POTASSIUM SERPL-MCNC: 3.2 MMOL/L — LOW (ref 3.5–5.3)
POTASSIUM SERPL-SCNC: 3.2 MMOL/L — LOW (ref 3.5–5.3)
RBC # BLD: 4.18 M/UL — LOW (ref 4.2–5.8)
RBC # FLD: 15.6 % — SIGNIFICANT CHANGE UP (ref 10.3–16.9)
SODIUM SERPL-SCNC: 136 MMOL/L — SIGNIFICANT CHANGE UP (ref 135–145)
WBC # BLD: 18.1 K/UL — HIGH (ref 3.8–10.5)
WBC # FLD AUTO: 18.1 K/UL — HIGH (ref 3.8–10.5)

## 2017-10-24 PROCEDURE — 76937 US GUIDE VASCULAR ACCESS: CPT | Mod: 26

## 2017-10-24 PROCEDURE — 99232 SBSQ HOSP IP/OBS MODERATE 35: CPT

## 2017-10-24 PROCEDURE — 36569 INSJ PICC 5 YR+ W/O IMAGING: CPT

## 2017-10-24 RX ORDER — HYDROCORTISONE 1 %
1 OINTMENT (GRAM) TOPICAL
Qty: 0 | Refills: 0 | Status: DISCONTINUED | OUTPATIENT
Start: 2017-10-24 | End: 2017-10-25

## 2017-10-24 RX ORDER — SODIUM CHLORIDE 9 MG/ML
10 INJECTION INTRAMUSCULAR; INTRAVENOUS; SUBCUTANEOUS
Qty: 0 | Refills: 0 | Status: DISCONTINUED | OUTPATIENT
Start: 2017-10-24 | End: 2017-10-25

## 2017-10-24 RX ORDER — SODIUM CHLORIDE 9 MG/ML
10 INJECTION INTRAMUSCULAR; INTRAVENOUS; SUBCUTANEOUS EVERY 12 HOURS
Qty: 0 | Refills: 0 | Status: DISCONTINUED | OUTPATIENT
Start: 2017-10-24 | End: 2017-10-25

## 2017-10-24 RX ORDER — CALCIUM CARBONATE 500(1250)
1 TABLET ORAL ONCE
Qty: 0 | Refills: 0 | Status: COMPLETED | OUTPATIENT
Start: 2017-10-24 | End: 2017-10-24

## 2017-10-24 RX ORDER — POTASSIUM CHLORIDE 20 MEQ
40 PACKET (EA) ORAL EVERY 4 HOURS
Qty: 0 | Refills: 0 | Status: COMPLETED | OUTPATIENT
Start: 2017-10-24 | End: 2017-10-24

## 2017-10-24 RX ORDER — SODIUM CHLORIDE 9 MG/ML
20 INJECTION INTRAMUSCULAR; INTRAVENOUS; SUBCUTANEOUS ONCE
Qty: 0 | Refills: 0 | Status: DISCONTINUED | OUTPATIENT
Start: 2017-10-24 | End: 2017-10-25

## 2017-10-24 RX ADMIN — PIPERACILLIN AND TAZOBACTAM 200 GRAM(S): 4; .5 INJECTION, POWDER, LYOPHILIZED, FOR SOLUTION INTRAVENOUS at 07:17

## 2017-10-24 RX ADMIN — APIXABAN 5 MILLIGRAM(S): 2.5 TABLET, FILM COATED ORAL at 09:34

## 2017-10-24 RX ADMIN — Medication 1 APPLICATION(S): at 18:09

## 2017-10-24 RX ADMIN — Medication 30 MILLILITER(S): at 00:56

## 2017-10-24 RX ADMIN — PIPERACILLIN AND TAZOBACTAM 200 GRAM(S): 4; .5 INJECTION, POWDER, LYOPHILIZED, FOR SOLUTION INTRAVENOUS at 18:09

## 2017-10-24 RX ADMIN — Medication 1 TABLET(S): at 11:21

## 2017-10-24 RX ADMIN — Medication 40 MILLIEQUIVALENT(S): at 16:07

## 2017-10-24 RX ADMIN — APIXABAN 5 MILLIGRAM(S): 2.5 TABLET, FILM COATED ORAL at 22:22

## 2017-10-24 RX ADMIN — PIPERACILLIN AND TAZOBACTAM 200 GRAM(S): 4; .5 INJECTION, POWDER, LYOPHILIZED, FOR SOLUTION INTRAVENOUS at 01:04

## 2017-10-24 RX ADMIN — Medication 20 MILLIGRAM(S): at 07:17

## 2017-10-24 RX ADMIN — Medication 40 MILLIEQUIVALENT(S): at 09:34

## 2017-10-24 RX ADMIN — Medication 30 MILLILITER(S): at 13:22

## 2017-10-24 RX ADMIN — Medication 1 TABLET(S): at 17:47

## 2017-10-24 RX ADMIN — ZOLPIDEM TARTRATE 10 MILLIGRAM(S): 10 TABLET ORAL at 01:20

## 2017-10-24 RX ADMIN — PIPERACILLIN AND TAZOBACTAM 200 GRAM(S): 4; .5 INJECTION, POWDER, LYOPHILIZED, FOR SOLUTION INTRAVENOUS at 12:35

## 2017-10-24 RX ADMIN — SENNA PLUS 2 TABLET(S): 8.6 TABLET ORAL at 22:23

## 2017-10-24 NOTE — PROGRESS NOTE ADULT - PROBLEM SELECTOR PROBLEM 1
Sepsis due to urinary tract infection

## 2017-10-24 NOTE — PROGRESS NOTE ADULT - SUBJECTIVE AND OBJECTIVE BOX
CC/ HPI Patient is a 77 year old male with CLL, diastolic cardiomyopathy who was admitted with sepsis secondary to urinary tract infection, this morning without respiratory complaint.      PAST MEDICAL & SURGICAL HISTORY:  Hip fracture requiring operative repair, right, sequela  PE (pulmonary thromboembolism)  CLL (chronic lymphocytic leukemia)  S/P hip hemiarthroplasty    SOCHX:  - tobacco,  -  alcohol      FMHX: FA/MO  - contributory     ROS reviewed below with positive findings marked (+) :  GEN:  fever, chills ENT: tracheostomy,   epistaxis,  sinusitis COR: CAD, CHF,  HTN, dysrhythmia PUL: COPD, ILD, asthma, pneumonia GI: PEG, dysphagia, hemorrhage, other HIMA: kidney disease, electrolyte disorder HEM:  anemia, +thrombus, coagulopathy, cancer ENDO:  thyroid disease, diabetes mellitus CNS:  dementia, stroke, seizure, PSY:  depression, anxiety, other       MEDICATIONS  (STANDING):  apixaban 5 milliGRAM(s) Oral every 12 hours  furosemide    Tablet 20 milliGRAM(s) Oral daily  multivitamin 1 Tablet(s) Oral daily  piperacillin/tazobactam IVPB. 3.375 Gram(s) IV Intermittent every 6 hours  senna 2 Tablet(s) Oral at bedtime  sodium chloride 0.9% lock flush 20 milliLiter(s) IV Push once    MEDICATIONS  (PRN):  bismuth subsalicylate Liquid 30 milliLiter(s) Oral four times a day PRN Heartburn  hydrocortisone 0.5% Cream 1 Application(s) Topical two times a day PRN Rash and/or Itching  melatonin 3 milliGRAM(s) Oral at bedtime PRN Insomnia  sodium chloride 0.9% lock flush 10 milliLiter(s) IV Push every 1 hour PRN After each medication administration  sodium chloride 0.9% lock flush 10 milliLiter(s) IV Push every 12 hours PRN Lumen of catheter NOT used  zolpidem 10 milliGRAM(s) Oral at bedtime PRN Insomnia      Vital Signs Last 24 Hrs  T(C): 36.8 (24 Oct 2017 16:26), Max: 36.9 (23 Oct 2017 23:17)  T(F): 98.3 (24 Oct 2017 16:26), Max: 98.5 (24 Oct 2017 04:59)  HR: 83 (24 Oct 2017 16:26) (81 - 98)  BP: 126/70 (24 Oct 2017 16:26) (123/78 - 160/83)  BP(mean): --  RR: 17 (24 Oct 2017 16:26) (17 - 18)  SpO2: 93% (24 Oct 2017 16:26) (93% - 97%)    GENERAL:         comfortable,  - distress.  HEENT:            - trauma,  - icterus,  - injection,  - nasal discharge.  NECK:              - jugular venous distention, - thyromegaly.  LYMPH:           - lymphadenopathy, - masses.  RESP:               + clear,   - rales,   - rhonchi,   - wheezes.   COR:                S1S2 RRR  - murmurs,  - gallops,  - rubs.  ABD:                bowel sounds,   soft, - tender, - distended, - organomegaly.  EXT/MSC:         - cyanosis,  - clubbing,  - edema.    NEURO:             alert,   responds to stimuli.                          12.0   18.1  )-----------( 158      ( 24 Oct 2017 07:37 )             37.0     10-24    136  |  97  |  27<H>  ----------------------------<  96  3.2<L>   |  26  |  1.10    Ca    9.2      24 Oct 2017 07:37  Mg     2.1     10-24          X ray Chest (08.19.17) No acute infiltrates        ASSESSMENT/PLAN    1)  Urinary tract infection  2)  Cardiomyopathy  3)  CLL   4)  Pulmonary embolus    ID/Antibiotics:  Zosyn  Cardiac/HTN: furosemide  GI: Rx/ prophylaxis c PPI/H2B  Heme: Rx/VT c AC  Discuss with medical team

## 2017-10-24 NOTE — PROGRESS NOTE ADULT - PROBLEM SELECTOR PLAN 6
Pt with h/o CLL, stable and not on any medications

## 2017-10-24 NOTE — PROGRESS NOTE ADULT - SUBJECTIVE AND OBJECTIVE BOX
Chief Complaint/Reason for Consult: chf  INTERVAL HPI: sob improved no palp no cp  	  MEDICATIONS:  furosemide    Tablet 20 milliGRAM(s) Oral daily    piperacillin/tazobactam IVPB. 3.375 Gram(s) IV Intermittent every 6 hours      melatonin 3 milliGRAM(s) Oral at bedtime PRN  zolpidem 10 milliGRAM(s) Oral at bedtime PRN    bismuth subsalicylate Liquid 30 milliLiter(s) Oral four times a day PRN  senna 2 Tablet(s) Oral at bedtime      apixaban 5 milliGRAM(s) Oral every 12 hours  hydrocortisone 0.5% Cream 1 Application(s) Topical two times a day PRN  multivitamin 1 Tablet(s) Oral daily  potassium chloride    Tablet ER 40 milliEquivalent(s) Oral every 4 hours      REVIEW OF SYSTEMS:  [x] As per HPI  CONSTITUTIONAL: No fever, weight loss, or fatigue  RESPIRATORY: No cough, wheezing, chills or hemoptysis; No Shortness of Breath  CARDIOVASCULAR: No chest pain, palpitations, dizziness, or leg swelling  GASTROINTESTINAL: No abdominal or epigastric pain. No nausea, vomiting, or hematemesis; No diarrhea or constipation. No melena or hematochezia.  MUSCULOSKELETAL: No joint pain or swelling; No muscle, back, or extremity pain  [x] All others negative	  [ ] Unable to obtain    PHYSICAL EXAM:  T(C): 36.9 (10-24-17 @ 09:26), Max: 36.9 (10-23-17 @ 23:17)  HR: 98 (10-24-17 @ 09:26) (79 - 98)  BP: 123/78 (10-24-17 @ 09:26) (123/78 - 160/83)  RR: 17 (10-24-17 @ 09:26) (17 - 18)  SpO2: 97% (10-24-17 @ 09:26) (94% - 97%)  Wt(kg): --  I&O's Summary    23 Oct 2017 07:01  -  24 Oct 2017 07:00  --------------------------------------------------------  IN: 100 mL / OUT: 1802 mL / NET: -1702 mL    24 Oct 2017 07:01  -  24 Oct 2017 14:50  --------------------------------------------------------  IN: 100 mL / OUT: 0 mL / NET: 100 mL          Appearance: Normal	  HEENT:   Normal oral mucosa  Cardiovascular: Normal S1 S2, No JVD, No murmurs, No edema  Respiratory: Lungs clear to auscultation	  Gastrointestinal:  Soft, Non-tender, + BS	  Extremities: Normal range of motion, No clubbing, cyanosis or edema  Vascular: Peripheral pulses palpable 2+ bilaterally    TELEMETRY: 	    ECG:   	  RADIOLOGY:   CXR:  CT:  US:    CARDIAC TESTING:  Echocardiogram:  Catheterization:  Stress Test:      LABS:	 	    CARDIAC MARKERS:                                  12.0   18.1  )-----------( 158      ( 24 Oct 2017 07:37 )             37.0     10-24    136  |  97  |  27<H>  ----------------------------<  96  3.2<L>   |  26  |  1.10    Ca    9.2      24 Oct 2017 07:37  Mg     2.1     10-24      proBNP:   Lipid Profile:   HgA1c:   TSH:     ASSESSMENT/PLAN: 	    #Chronic diastolic CHF - continue home Lasix  po ome dose BNP normal CXR clear    CAD - no cp no sob    #HTN - goals SBP<140mmHg as pth as diastolic heart disease    #CV Prevention  q3mo Fasting Lipid Profile, Goal LDL<100, statin as tolerated  q6week TSH  q3mo 25-OH Vitamin D Level, Goal 50, supplement as tolerated

## 2017-10-24 NOTE — PROGRESS NOTE ADULT - PROBLEM SELECTOR PLAN 8
- on eliquis 5mg BID    FULL CODE  Dispo: [facility]

## 2017-10-24 NOTE — PROGRESS NOTE ADULT - PROBLEM SELECTOR PLAN 7
F: IVF bolus prn  E: replete prn for K<4, Mg<2  N: regular diet

## 2017-10-24 NOTE — PROGRESS NOTE ADULT - PROBLEM SELECTOR PLAN 3
on rx
c/w marlee
c/w marlee
Pt with h/o PE on eliquis 5mg daily  - c/w eliquis
c/w marlee
on rx

## 2017-10-24 NOTE — PROGRESS NOTE ADULT - PROBLEM SELECTOR PLAN 9
Pt is medically cleared for discharge, however will require 8 more days of IV abx. Pt needs placement to [facility] for Zosyn q6 admission. Depending on placement, pt may need PICC line to continue as outpatient.
Pt is medically cleared for discharge, however will require 8 more days of IV abx. Pt needs placement to [facility] for Zosyn q6 admission. Depending on placement, pt may need PICC line to continue as outpatient.
Pt is medically cleared for discharge, however will require 8 more days of IV abx. Pt needs placement to [facility] for Zosyn q6 admission. Depending on placement, pt may need PICC line to continue as outpatient.

## 2017-10-24 NOTE — PROGRESS NOTE ADULT - PROBLEM SELECTOR PROBLEM 6
CLL (chronic lymphocytic leukemia)

## 2017-10-24 NOTE — PROGRESS NOTE ADULT - PROBLEM SELECTOR PLAN 4
Chronic dCHF, currently not fluid overloaded or in acute exacerbation   -c/w home lasix dose  - maintain BP <140 given hx of CHF is possible
stable no s/s of CHF

## 2017-10-24 NOTE — PROGRESS NOTE ADULT - PROBLEM SELECTOR PROBLEM 4
CHF, left ventricular

## 2017-10-24 NOTE — PROGRESS NOTE ADULT - SUBJECTIVE AND OBJECTIVE BOX
INTERVAL HPI/OVERNIGHT EVENTS:  , patient resting comfortably. Pt states that he wants to go home and wants more sleep aid to help him fall asleep here. Patient denies: fever, chills, dizziness, weakness, HA, Changes in vision, CP, palpitations, SOB, cough, N/V/D/C, dysuria, changes in bowel movements, LE edema.  OOB, cont AB  VITAL SIGNS:  T(F): 97.5 (10-23-17 @ 09:24)  HR: 98 (10-23-17 @ 09:24)  BP: 132/75 (10-23-17 @ 09:24)  RR: 17 (10-23-17 @ 09:24)  SpO2: 93% (10-23-17 @ 09:24)  Wt(kg): --    PHYSICAL EXAM:    Constitutional: WDWN, NAD  Eyes: PERRL, EOMI, sclera non-icteric  Neck: supple, trachea midline, no masses, no JVD  Respiratory: CTA b/l, good air entry b/l, no wheezing, no rhonchi, no rales, without accessory muscle use and no intercostal retractions  Cardiovascular: RRR, normal S1S2, no M/R/G  Gastrointestinal: soft, NTND, no masses palpable, BS normal  : Rodriguez in place, draining clear yellow urine  Extremities: Warm, well perfused, pulses equal bilateral upper and lower extremities, no edema, no clubbing  Neurological: AAOx3, CN Grossly intact  Skin: Normal temperature, warm, dry, patchy, erythematous patch on back that is chronic in nature, no excoriations present     MEDICATIONS  (STANDING):  apixaban 5 milliGRAM(s) Oral every 12 hours  furosemide    Tablet 20 milliGRAM(s) Oral daily  multivitamin 1 Tablet(s) Oral daily  piperacillin/tazobactam IVPB. 3.375 Gram(s) IV Intermittent every 6 hours  senna 2 Tablet(s) Oral at bedtime    MEDICATIONS  (PRN):  melatonin 3 milliGRAM(s) Oral at bedtime PRN Insomnia      Allergies    No Known Allergies    Intolerances        LABS:                        12.4   16.4  )-----------( 161      ( 23 Oct 2017 08:16 )             37.9     10-23    137  |  97  |  32<H>  ----------------------------<  110<H>  4.0   |  26  |  1.18    Ca    9.4      23 Oct 2017 08:16  Mg     2.2     10-23            RADIOLOGY & ADDITIONAL TESTS:

## 2017-10-24 NOTE — PROGRESS NOTE ADULT - PROBLEM SELECTOR PROBLEM 3
PE (pulmonary thromboembolism)

## 2017-10-24 NOTE — PROCEDURE NOTE - NSPROCDETAILS_GEN_ALL_CORE
ultrasound assessment/ultrasound guidance/sterile technique, catheter placed/location identified, draped/prepped, sterile technique used/sterile dressing applied/supine position

## 2017-10-24 NOTE — PROGRESS NOTE ADULT - PROBLEM SELECTOR PLAN 5
- c/w ambien 5mg prn but try to avoid if possible, use melatonin  - Avoid benzos and narcotics per PMD given addictive personality
NO MAURICIOIEN
NO MAURICIOIEN
- c/w ambien 5mg prn but try to avoid if possible, use melatonin  - Avoid benzos and narcotics per PMD given addictive personality
d/c ambien

## 2017-10-24 NOTE — PROGRESS NOTE ADULT - ATTENDING COMMENTS
Urosepsis  CLL  FTT  h/o fracture hip  IV ab  void trial  if fails-- will need SP tube  cont a/c
cont ab as per AB  insomnia--NO AMBIEN  OOB  keep rodriguez  OOB  LE-- wound LWC
cont ab as per AB  insomnia--NO AMBIEN  OOB  keep rodriguez  OOB  LE-- wound LWC
Sepsis--  cont zosyn  f/u u/a  NO AMBIEN  PICC for IV ab times 2 weeks
above reviewed  Sepsis--  cont zosyn  f/u u/a  NO AMBIEN

## 2017-10-25 VITALS
DIASTOLIC BLOOD PRESSURE: 71 MMHG | HEART RATE: 88 BPM | RESPIRATION RATE: 16 BRPM | TEMPERATURE: 98 F | OXYGEN SATURATION: 91 % | SYSTOLIC BLOOD PRESSURE: 110 MMHG

## 2017-10-25 LAB
ANION GAP SERPL CALC-SCNC: 12 MMOL/L — SIGNIFICANT CHANGE UP (ref 5–17)
BUN SERPL-MCNC: 27 MG/DL — HIGH (ref 7–23)
CALCIUM SERPL-MCNC: 9.2 MG/DL — SIGNIFICANT CHANGE UP (ref 8.4–10.5)
CHLORIDE SERPL-SCNC: 97 MMOL/L — SIGNIFICANT CHANGE UP (ref 96–108)
CO2 SERPL-SCNC: 26 MMOL/L — SIGNIFICANT CHANGE UP (ref 22–31)
CREAT SERPL-MCNC: 1.22 MG/DL — SIGNIFICANT CHANGE UP (ref 0.5–1.3)
GLUCOSE SERPL-MCNC: 88 MG/DL — SIGNIFICANT CHANGE UP (ref 70–99)
HCT VFR BLD CALC: 38.5 % — LOW (ref 39–50)
HGB BLD-MCNC: 12.2 G/DL — LOW (ref 13–17)
MAGNESIUM SERPL-MCNC: 2.4 MG/DL — SIGNIFICANT CHANGE UP (ref 1.6–2.6)
MCHC RBC-ENTMCNC: 28.4 PG — SIGNIFICANT CHANGE UP (ref 27–34)
MCHC RBC-ENTMCNC: 31.7 G/DL — LOW (ref 32–36)
MCV RBC AUTO: 89.7 FL — SIGNIFICANT CHANGE UP (ref 80–100)
PLATELET # BLD AUTO: 162 K/UL — SIGNIFICANT CHANGE UP (ref 150–400)
POTASSIUM SERPL-MCNC: 4 MMOL/L — SIGNIFICANT CHANGE UP (ref 3.5–5.3)
POTASSIUM SERPL-SCNC: 4 MMOL/L — SIGNIFICANT CHANGE UP (ref 3.5–5.3)
RBC # BLD: 4.29 M/UL — SIGNIFICANT CHANGE UP (ref 4.2–5.8)
RBC # FLD: 15.8 % — SIGNIFICANT CHANGE UP (ref 10.3–16.9)
SODIUM SERPL-SCNC: 135 MMOL/L — SIGNIFICANT CHANGE UP (ref 135–145)
WBC # BLD: 15.5 K/UL — HIGH (ref 3.8–10.5)
WBC # FLD AUTO: 15.5 K/UL — HIGH (ref 3.8–10.5)

## 2017-10-25 PROCEDURE — 99232 SBSQ HOSP IP/OBS MODERATE 35: CPT

## 2017-10-25 RX ORDER — SODIUM CHLORIDE 9 MG/ML
500 INJECTION INTRAMUSCULAR; INTRAVENOUS; SUBCUTANEOUS ONCE
Qty: 0 | Refills: 0 | Status: COMPLETED | OUTPATIENT
Start: 2017-10-25 | End: 2017-10-25

## 2017-10-25 RX ORDER — PIPERACILLIN AND TAZOBACTAM 4; .5 G/20ML; G/20ML
3.38 INJECTION, POWDER, LYOPHILIZED, FOR SOLUTION INTRAVENOUS
Qty: 0 | Refills: 0 | COMMUNITY
Start: 2017-10-25 | End: 2017-10-31

## 2017-10-25 RX ORDER — PIPERACILLIN AND TAZOBACTAM 4; .5 G/20ML; G/20ML
3.25 INJECTION, POWDER, LYOPHILIZED, FOR SOLUTION INTRAVENOUS
Qty: 0 | Refills: 0 | COMMUNITY
Start: 2017-10-25 | End: 2017-10-31

## 2017-10-25 RX ADMIN — APIXABAN 5 MILLIGRAM(S): 2.5 TABLET, FILM COATED ORAL at 09:45

## 2017-10-25 RX ADMIN — Medication 30 MILLILITER(S): at 00:26

## 2017-10-25 RX ADMIN — SODIUM CHLORIDE 100 MILLILITER(S): 9 INJECTION INTRAMUSCULAR; INTRAVENOUS; SUBCUTANEOUS at 10:55

## 2017-10-25 RX ADMIN — Medication 20 MILLIGRAM(S): at 06:45

## 2017-10-25 RX ADMIN — PIPERACILLIN AND TAZOBACTAM 200 GRAM(S): 4; .5 INJECTION, POWDER, LYOPHILIZED, FOR SOLUTION INTRAVENOUS at 06:45

## 2017-10-25 RX ADMIN — Medication 1 APPLICATION(S): at 00:28

## 2017-10-25 RX ADMIN — Medication 1 TABLET(S): at 11:30

## 2017-10-25 RX ADMIN — PIPERACILLIN AND TAZOBACTAM 200 GRAM(S): 4; .5 INJECTION, POWDER, LYOPHILIZED, FOR SOLUTION INTRAVENOUS at 12:58

## 2017-10-25 RX ADMIN — ZOLPIDEM TARTRATE 10 MILLIGRAM(S): 10 TABLET ORAL at 01:33

## 2017-10-25 RX ADMIN — PIPERACILLIN AND TAZOBACTAM 200 GRAM(S): 4; .5 INJECTION, POWDER, LYOPHILIZED, FOR SOLUTION INTRAVENOUS at 00:22

## 2017-10-25 NOTE — PROGRESS NOTE ADULT - SUBJECTIVE AND OBJECTIVE BOX
CC/ HPI Patient is a 77 year old male with CLL, diastolic cardiomyopathy, history of pulmonary embolus, admitted with sepsis secondary to urinary tract infection, today without respiratory complaint.      PAST MEDICAL & SURGICAL HISTORY:  Hip fracture requiring operative repair, right, sequela  PE (pulmonary thromboembolism)  CLL (chronic lymphocytic leukemia)  S/P hip hemiarthroplasty    SOCHX:  - tobacco,  -  alcohol      FMHX: FA/MO  - contributory     ROS reviewed below with positive findings marked (+) :  GEN:  fever, chills ENT: tracheostomy,   epistaxis,  sinusitis COR: CAD, CHF,  HTN, dysrhythmia PUL: COPD, ILD, asthma, pneumonia GI: PEG, dysphagia, hemorrhage, other HIMA: kidney disease, electrolyte disorder HEM:  anemia, +thrombus, coagulopathy, cancer ENDO:  thyroid disease, diabetes mellitus CNS:  dementia, stroke, seizure, PSY:  depression, anxiety, other  MEDICATIONS  (STANDING):  apixaban 5 milliGRAM(s) Oral every 12 hours  furosemide    Tablet 20 milliGRAM(s) Oral daily  multivitamin 1 Tablet(s) Oral daily  piperacillin/tazobactam IVPB. 3.375 Gram(s) IV Intermittent every 6 hours  senna 2 Tablet(s) Oral at bedtime  sodium chloride 0.9% lock flush 20 milliLiter(s) IV Push once    MEDICATIONS  (PRN):  bismuth subsalicylate Liquid 30 milliLiter(s) Oral four times a day PRN Heartburn  hydrocortisone 0.5% Cream 1 Application(s) Topical two times a day PRN Rash and/or Itching  melatonin 3 milliGRAM(s) Oral at bedtime PRN Insomnia  sodium chloride 0.9% lock flush 10 milliLiter(s) IV Push every 1 hour PRN After each medication administration  sodium chloride 0.9% lock flush 10 milliLiter(s) IV Push every 12 hours PRN Lumen of catheter NOT used      Vital Signs Last 24 Hrs  T(C): 36.8 (25 Oct 2017 09:48), Max: 37.1 (25 Oct 2017 05:07)  T(F): 98.3 (25 Oct 2017 09:48), Max: 98.8 (25 Oct 2017 05:07)  HR: 88 (25 Oct 2017 09:48) (71 - 88)  BP: 110/71 (25 Oct 2017 09:48) (110/71 - 138/78)  BP(mean): --  RR: 16 (25 Oct 2017 09:48) (16 - 18)  SpO2: 91% (25 Oct 2017 09:48) (91% - 95%)    GENERAL:         comfortable,  - distress.  HEENT:            - trauma,  - icterus,  - injection,  - nasal discharge.  NECK:              - jugular venous distention, - thyromegaly.  LYMPH:           - lymphadenopathy, - masses.  RESP:               + clear,   - rales,   - rhonchi,   - wheezes.   COR:                S1S2 RRR  - murmurs,  - gallops,  - rubs.  ABD:                bowel sounds,   soft, - tender, - distended, - organomegaly.  EXT/MSC:         - cyanosis,  - clubbing,  - edema.    NEURO:             alert,   responds to stimuli.                          12.2   15.5  )-----------( 162      ( 25 Oct 2017 09:02 )             38.5     10-25    135  |  97  |  27<H>  ----------------------------<  88  4.0   |  26  |  1.22    Ca    9.2      25 Oct 2017 09:02  Mg     2.4     10-25          X ray Chest (08.19.17) No acute infiltrates        ASSESSMENT/PLAN    1)  Urinary tract infection  2)  Cardiomyopathy  3)  CLL   4)  Pulmonary embolus    ID/Antibiotics:  Zosyn  Cardiac/HTN: furosemide  GI: Rx/ prophylaxis c PPI/H2B  Heme: Rx/VT c AC  Discussed with Dr. Pérez

## 2017-10-25 NOTE — PROGRESS NOTE ADULT - PROVIDER SPECIALTY LIST ADULT
Cardiology
Internal Medicine
Pulmonology
Internal Medicine
Pulmonology
Internal Medicine
Internal Medicine

## 2017-10-25 NOTE — PROGRESS NOTE ADULT - SUBJECTIVE AND OBJECTIVE BOX
Chief Complaint/Reason for Consult: chf  INTERVAL HPI: sob improved no palp no cp  	  MEDICATIONS:  furosemide    Tablet 20 milliGRAM(s) Oral daily    piperacillin/tazobactam IVPB. 3.375 Gram(s) IV Intermittent every 6 hours      melatonin 3 milliGRAM(s) Oral at bedtime PRN    bismuth subsalicylate Liquid 30 milliLiter(s) Oral four times a day PRN  senna 2 Tablet(s) Oral at bedtime      apixaban 5 milliGRAM(s) Oral every 12 hours  hydrocortisone 0.5% Cream 1 Application(s) Topical two times a day PRN  multivitamin 1 Tablet(s) Oral daily  sodium chloride 0.9% Bolus 500 milliLiter(s) IV Bolus once      REVIEW OF SYSTEMS:  [x] As per HPI  CONSTITUTIONAL: No fever, weight loss, or fatigue  RESPIRATORY: No cough, wheezing, chills or hemoptysis; No Shortness of Breath  CARDIOVASCULAR: No chest pain, palpitations, dizziness, or leg swelling  GASTROINTESTINAL: No abdominal or epigastric pain. No nausea, vomiting, or hematemesis; No diarrhea or constipation. No melena or hematochezia.  MUSCULOSKELETAL: No joint pain or swelling; No muscle, back, or extremity pain  [x] All others negative	  [ ] Unable to obtain    PHYSICAL EXAM:  T(C): 36.8 (10-25-17 @ 09:48), Max: 37.1 (10-25-17 @ 05:07)  HR: 88 (10-25-17 @ 09:48) (71 - 88)  BP: 110/71 (10-25-17 @ 09:48) (110/71 - 138/78)  RR: 16 (10-25-17 @ 09:48) (16 - 18)  SpO2: 91% (10-25-17 @ 09:48) (91% - 95%)  Wt(kg): --  I&O's Summary    24 Oct 2017 07:01  -  25 Oct 2017 07:00  --------------------------------------------------------  IN: 300 mL / OUT: 2401 mL / NET: -2101 mL          Appearance: Normal	  HEENT:   Normal oral mucosa  Cardiovascular: Normal S1 S2, No JVD, No murmurs, No edema  Respiratory: Lungs clear to auscultation	  Gastrointestinal:  Soft, Non-tender, + BS	  Extremities: Normal range of motion, No clubbing, cyanosis or edema  Vascular: Peripheral pulses palpable 2+ bilaterally    TELEMETRY: 	    ECG:   	  RADIOLOGY:   CXR:  CT:  US:    CARDIAC TESTING:  Echocardiogram:  Catheterization:  Stress Test:      LABS:	 	    CARDIAC MARKERS:                                  12.2   15.5  )-----------( 162      ( 25 Oct 2017 09:02 )             38.5     10-25    135  |  97  |  27<H>  ----------------------------<  88  4.0   |  26  |  1.22    Ca    9.2      25 Oct 2017 09:02  Mg     2.4     10-25      proBNP:   Lipid Profile:   HgA1c:   TSH:     ASSESSMENT/PLAN: 	    #Chronic diastolic CHF - continue home Lasix  po ome dose BNP normal CXR clear    CAD - no cp no sob    #HTN - goals SBP<140mmHg as pth as diastolic heart disease    #CV Prevention  q3mo Fasting Lipid Profile, Goal LDL<100, statin as tolerated  q6week TSH  q3mo 25-OH Vitamin D Level, Goal 50, supplement as tolerated

## 2017-10-29 DIAGNOSIS — Z86.711 PERSONAL HISTORY OF PULMONARY EMBOLISM: ICD-10-CM

## 2017-10-29 DIAGNOSIS — G47.00 INSOMNIA, UNSPECIFIED: ICD-10-CM

## 2017-10-29 DIAGNOSIS — N13.9 OBSTRUCTIVE AND REFLUX UROPATHY, UNSPECIFIED: ICD-10-CM

## 2017-10-29 DIAGNOSIS — A41.9 SEPSIS, UNSPECIFIED ORGANISM: ICD-10-CM

## 2017-10-29 DIAGNOSIS — I25.10 ATHEROSCLEROTIC HEART DISEASE OF NATIVE CORONARY ARTERY WITHOUT ANGINA PECTORIS: ICD-10-CM

## 2017-10-29 DIAGNOSIS — B96.5 PSEUDOMONAS (AERUGINOSA) (MALLEI) (PSEUDOMALLEI) AS THE CAUSE OF DISEASES CLASSIFIED ELSEWHERE: ICD-10-CM

## 2017-10-29 DIAGNOSIS — Z79.01 LONG TERM (CURRENT) USE OF ANTICOAGULANTS: ICD-10-CM

## 2017-10-29 DIAGNOSIS — L89.610 PRESSURE ULCER OF RIGHT HEEL, UNSTAGEABLE: ICD-10-CM

## 2017-10-29 DIAGNOSIS — I50.32 CHRONIC DIASTOLIC (CONGESTIVE) HEART FAILURE: ICD-10-CM

## 2017-10-29 DIAGNOSIS — L89.629 PRESSURE ULCER OF LEFT HEEL, UNSPECIFIED STAGE: ICD-10-CM

## 2017-10-29 DIAGNOSIS — Z96.641 PRESENCE OF RIGHT ARTIFICIAL HIP JOINT: ICD-10-CM

## 2017-10-29 DIAGNOSIS — I42.9 CARDIOMYOPATHY, UNSPECIFIED: ICD-10-CM

## 2017-10-29 DIAGNOSIS — I11.0 HYPERTENSIVE HEART DISEASE WITH HEART FAILURE: ICD-10-CM

## 2017-10-29 DIAGNOSIS — R31.9 HEMATURIA, UNSPECIFIED: ICD-10-CM
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
## 2017-10-29 DIAGNOSIS — R62.7 ADULT FAILURE TO THRIVE: ICD-10-CM

## 2017-10-29 DIAGNOSIS — T83.518A INFECTION AND INFLAMMATORY REACTION DUE TO OTHER URINARY CATHETER, INITIAL ENCOUNTER: ICD-10-CM

## 2017-10-29 DIAGNOSIS — C91.10 CHRONIC LYMPHOCYTIC LEUKEMIA OF B-CELL TYPE NOT HAVING ACHIEVED REMISSION: ICD-10-CM

## 2017-10-29 DIAGNOSIS — B95.61 METHICILLIN SUSCEPTIBLE STAPHYLOCOCCUS AUREUS INFECTION AS THE CAUSE OF DISEASES CLASSIFIED ELSEWHERE: ICD-10-CM

## 2017-10-29 DIAGNOSIS — N40.0 BENIGN PROSTATIC HYPERPLASIA WITHOUT LOWER URINARY TRACT SYMPTOMS: ICD-10-CM

## 2017-10-29 DIAGNOSIS — K59.00 CONSTIPATION, UNSPECIFIED: ICD-10-CM

## 2017-10-29 DIAGNOSIS — N39.0 URINARY TRACT INFECTION, SITE NOT SPECIFIED: ICD-10-CM

## 2017-11-07 NOTE — BEHAVIORAL HEALTH ASSESSMENT NOTE - NSBHCONSULTMEDS_PSY_A_CORE FT
Patient is calling. She would like to talk with a nurse. Patient states she had right knee surgery on 11/2. She states she was told by her PT that her blood pressure is very high at 170/100 and would like to know what to do. Please give Cate a call back to discuss at [phone].   See above  Librium taper: 50mg po q12hr x 1 day -> 50mg po qhs x 1 day -> D/c  Trazodone 50mg po qhs - for insomnia

## 2017-11-09 ENCOUNTER — EMERGENCY (EMERGENCY)
Facility: HOSPITAL | Age: 77
LOS: 1 days | Discharge: HOME | End: 2017-11-09
Attending: EMERGENCY MEDICINE | Admitting: EMERGENCY MEDICINE
Payer: MEDICARE

## 2017-11-09 VITALS
OXYGEN SATURATION: 97 % | TEMPERATURE: 98 F | HEART RATE: 86 BPM | DIASTOLIC BLOOD PRESSURE: 71 MMHG | RESPIRATION RATE: 18 BRPM | SYSTOLIC BLOOD PRESSURE: 164 MMHG

## 2017-11-09 VITALS
WEIGHT: 173.06 LBS | HEIGHT: 70 IN | OXYGEN SATURATION: 95 % | SYSTOLIC BLOOD PRESSURE: 209 MMHG | DIASTOLIC BLOOD PRESSURE: 84 MMHG | HEART RATE: 115 BPM | TEMPERATURE: 98 F | RESPIRATION RATE: 18 BRPM

## 2017-11-09 DIAGNOSIS — Z79.2 LONG TERM (CURRENT) USE OF ANTIBIOTICS: ICD-10-CM

## 2017-11-09 DIAGNOSIS — R33.9 RETENTION OF URINE, UNSPECIFIED: ICD-10-CM

## 2017-11-09 DIAGNOSIS — R31.9 HEMATURIA, UNSPECIFIED: ICD-10-CM

## 2017-11-09 DIAGNOSIS — Z79.899 OTHER LONG TERM (CURRENT) DRUG THERAPY: ICD-10-CM

## 2017-11-09 DIAGNOSIS — Z96.649 PRESENCE OF UNSPECIFIED ARTIFICIAL HIP JOINT: Chronic | ICD-10-CM

## 2017-11-09 LAB
ALBUMIN SERPL ELPH-MCNC: 3.4 G/DL — SIGNIFICANT CHANGE UP (ref 3.3–5)
ALP SERPL-CCNC: 82 U/L — SIGNIFICANT CHANGE UP (ref 40–120)
ALT FLD-CCNC: 26 U/L — SIGNIFICANT CHANGE UP (ref 10–45)
ANION GAP SERPL CALC-SCNC: 15 MMOL/L — SIGNIFICANT CHANGE UP (ref 5–17)
APPEARANCE UR: (no result)
AST SERPL-CCNC: 22 U/L — SIGNIFICANT CHANGE UP (ref 10–40)
BASOPHILS NFR BLD AUTO: 0.2 % — SIGNIFICANT CHANGE UP (ref 0–2)
BILIRUB SERPL-MCNC: <0.2 MG/DL — SIGNIFICANT CHANGE UP (ref 0.2–1.2)
BILIRUB UR-MCNC: NEGATIVE — SIGNIFICANT CHANGE UP
BUN SERPL-MCNC: 24 MG/DL — HIGH (ref 7–23)
CALCIUM SERPL-MCNC: 9.6 MG/DL — SIGNIFICANT CHANGE UP (ref 8.4–10.5)
CHLORIDE SERPL-SCNC: 99 MMOL/L — SIGNIFICANT CHANGE UP (ref 96–108)
CO2 SERPL-SCNC: 25 MMOL/L — SIGNIFICANT CHANGE UP (ref 22–31)
COLOR SPEC: (no result)
CREAT SERPL-MCNC: 1.01 MG/DL — SIGNIFICANT CHANGE UP (ref 0.5–1.3)
DIFF PNL FLD: (no result)
EOSINOPHIL NFR BLD AUTO: 1.5 % — SIGNIFICANT CHANGE UP (ref 0–6)
GLUCOSE SERPL-MCNC: 193 MG/DL — HIGH (ref 70–99)
GLUCOSE UR QL: NEGATIVE — SIGNIFICANT CHANGE UP
HCT VFR BLD CALC: 39.5 % — SIGNIFICANT CHANGE UP (ref 39–50)
HGB BLD-MCNC: 12.7 G/DL — LOW (ref 13–17)
KETONES UR-MCNC: NEGATIVE — SIGNIFICANT CHANGE UP
LACTATE SERPL-SCNC: 2 MMOL/L — SIGNIFICANT CHANGE UP (ref 0.5–2)
LACTATE SERPL-SCNC: 2.9 MMOL/L — HIGH (ref 0.5–2)
LEUKOCYTE ESTERASE UR-ACNC: (no result)
LYMPHOCYTES # BLD AUTO: 45.8 % — HIGH (ref 13–44)
MCHC RBC-ENTMCNC: 28.9 PG — SIGNIFICANT CHANGE UP (ref 27–34)
MCHC RBC-ENTMCNC: 32.2 G/DL — SIGNIFICANT CHANGE UP (ref 32–36)
MCV RBC AUTO: 90 FL — SIGNIFICANT CHANGE UP (ref 80–100)
MONOCYTES NFR BLD AUTO: 3.6 % — SIGNIFICANT CHANGE UP (ref 2–14)
NEUTROPHILS NFR BLD AUTO: 48.9 % — SIGNIFICANT CHANGE UP (ref 43–77)
NITRITE UR-MCNC: NEGATIVE — SIGNIFICANT CHANGE UP
PH UR: 6 — SIGNIFICANT CHANGE UP (ref 5–8)
PLATELET # BLD AUTO: 163 K/UL — SIGNIFICANT CHANGE UP (ref 150–400)
POTASSIUM SERPL-MCNC: 3.8 MMOL/L — SIGNIFICANT CHANGE UP (ref 3.5–5.3)
POTASSIUM SERPL-SCNC: 3.8 MMOL/L — SIGNIFICANT CHANGE UP (ref 3.5–5.3)
PROT SERPL-MCNC: 6.6 G/DL — SIGNIFICANT CHANGE UP (ref 6–8.3)
PROT UR-MCNC: 100 MG/DL
RBC # BLD: 4.39 M/UL — SIGNIFICANT CHANGE UP (ref 4.2–5.8)
RBC # FLD: 14.9 % — SIGNIFICANT CHANGE UP (ref 10.3–16.9)
SODIUM SERPL-SCNC: 139 MMOL/L — SIGNIFICANT CHANGE UP (ref 135–145)
SP GR SPEC: 1.02 — SIGNIFICANT CHANGE UP (ref 1–1.03)
UROBILINOGEN FLD QL: 0.2 E.U./DL — SIGNIFICANT CHANGE UP
WBC # BLD: 17.9 K/UL — HIGH (ref 3.8–10.5)
WBC # FLD AUTO: 17.9 K/UL — HIGH (ref 3.8–10.5)

## 2017-11-09 PROCEDURE — 99284 EMERGENCY DEPT VISIT MOD MDM: CPT | Mod: 25

## 2017-11-09 PROCEDURE — 80053 COMPREHEN METABOLIC PANEL: CPT

## 2017-11-09 PROCEDURE — 87040 BLOOD CULTURE FOR BACTERIA: CPT

## 2017-11-09 PROCEDURE — 87086 URINE CULTURE/COLONY COUNT: CPT

## 2017-11-09 PROCEDURE — 36415 COLL VENOUS BLD VENIPUNCTURE: CPT

## 2017-11-09 PROCEDURE — 99284 EMERGENCY DEPT VISIT MOD MDM: CPT

## 2017-11-09 PROCEDURE — 81001 URINALYSIS AUTO W/SCOPE: CPT

## 2017-11-09 PROCEDURE — 87186 SC STD MICRODIL/AGAR DIL: CPT

## 2017-11-09 PROCEDURE — 96374 THER/PROPH/DIAG INJ IV PUSH: CPT | Mod: XU

## 2017-11-09 PROCEDURE — 85025 COMPLETE CBC W/AUTO DIFF WBC: CPT

## 2017-11-09 PROCEDURE — 51702 INSERT TEMP BLADDER CATH: CPT

## 2017-11-09 PROCEDURE — 83605 ASSAY OF LACTIC ACID: CPT

## 2017-11-09 RX ORDER — CEFTRIAXONE 500 MG/1
1 INJECTION, POWDER, FOR SOLUTION INTRAMUSCULAR; INTRAVENOUS ONCE
Qty: 0 | Refills: 0 | Status: COMPLETED | OUTPATIENT
Start: 2017-11-09 | End: 2017-11-09

## 2017-11-09 RX ORDER — CEPHALEXIN 500 MG
1 CAPSULE ORAL
Qty: 14 | Refills: 0 | OUTPATIENT
Start: 2017-11-09 | End: 2017-11-16

## 2017-11-09 RX ORDER — SODIUM CHLORIDE 9 MG/ML
1000 INJECTION INTRAMUSCULAR; INTRAVENOUS; SUBCUTANEOUS ONCE
Qty: 0 | Refills: 0 | Status: COMPLETED | OUTPATIENT
Start: 2017-11-09 | End: 2017-11-09

## 2017-11-09 RX ADMIN — SODIUM CHLORIDE 500 MILLILITER(S): 9 INJECTION INTRAMUSCULAR; INTRAVENOUS; SUBCUTANEOUS at 19:42

## 2017-11-09 RX ADMIN — CEFTRIAXONE 100 GRAM(S): 500 INJECTION, POWDER, FOR SOLUTION INTRAMUSCULAR; INTRAVENOUS at 19:42

## 2017-11-09 RX ADMIN — SODIUM CHLORIDE 500 MILLILITER(S): 9 INJECTION INTRAMUSCULAR; INTRAVENOUS; SUBCUTANEOUS at 18:46

## 2017-11-09 NOTE — ED PROVIDER NOTE - PHYSICAL EXAMINATION
CON: ao x 3, HENMT: clear oropharynx, soft neck, HEAD: atraumatic, GI: +BS, soft, nontender, no rebound, no guarding, : no cvat, SKIN: no rash, MSK: no deformities, NEURO: ao x 3, following commands, conversing appropriately

## 2017-11-09 NOTE — ED ADULT NURSE NOTE - FALL HARM RISK CONCLUSION
TIME RECORD    Date: 07/25/2017    Start Time:  1400  Stop Time:  1545    PROCEDURES:    TIMED  Procedure Time Min.   Gait training Start:1435  Stop:1545 70    Start:  Stop:     Start:  Stop:     Start:  Stop:          UNTIMED  Procedure Time Min.   reassessment Start:1400  Stop:1434  34 No charge    Start:  Stop:      Total Timed Minutes:  70  Total Timed Units:  4  Total Untimed Units:  1  Charges Billed/# of units:  4    PHYSICAL THERAPY UPDATED PLAN OF TREATMENT    Patient name: Alberto Dangelo  Onset Date:  07/30/2010  SOC Date:  08/24/2016  Primary Diagnosis:    1. 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 Home equipment includes B AFO's, shower chair, ramped entrance w support bars to swimming pool, FES bike, free weights, plyobox system.     Updated Assessment:  Pt presents with increased mm tone in B LE's, decreased joint ROM, and LE weakness R more than L.Pt is very motivated to participate in PT/wellness program. He attends PT at [facility] in [facility] 2 x /week w focus being on Bioness fitting and application w gait training.     Knee   Right     Left       AROM PROM MMT AROM PROM MMT   Flexion X 140* 1/5 X  142* 1/5   Extension -36* 0* 2/5 0* 0* 2+/5      Ankle   Right     Left       AROM PROM MMT AROM PROM MMT   Plantarflexion                Dorsiflexion w knee extended X -8* 0/5 X -2* 1/5     L-FORCE TEST           LEFT                                              RIGHT                     FLEX                  EXT                      FLEX                     EXT        HIP     1.82 Nm           24.61 Nm                 6.22 Nm             5.64 Nm     KNEE    6.86 Nm           28.4 Nm                    2.3  Nm              22.67 Nm    Previous Short Term Goals Status:    1) Initiate gait training/neuromuscular reeducation using Bioness L300 system  2) Pt will demonstrate improved toe clearance during gait using Bioness system  3) Improve passive ankle DF to > 5* B  4) Pt will stand with minimal UE support and less genu recurvatum B  New Short term goals:  1) Improve B ankle DF to at least 5* passively to facilitate improved foot clearance during gait   2) Improve LE flexibility to min to mod limitation in all areas,   3) Assess stand pivot transfers and gait with assistive device.  4) Pt will amb 60 ft w RW, SBA in less than  30 sec  Long Term Goal Status:  modified from initial POC:  1) Increase strength in B  LE as indicated by improved L-force testing   2) Pt will safely ambulate > 200' with supervision using appropriate assistive device/ Bioness units.- pt ambulates up to 30 ft with Bioness L300 on left and L300 Plus  3) Pt will consistently perform safe stand pivot transfers, Not yet assessed.   4) Pt will demonstrate improved postural stability. Met.   5) Pt will demonstrate competency in application of Bioness device and use of training mode.-ongoing- pt able to apply 3/4 pieces without cueing from PT, PT placed sensors in shoes (6/9/17)  6) Pt will demonstrate improved bilateral hip flexion strength to 2/5 to improve ability to perform swing phase/ foot clearance during gait with Bioness L300 Plus.    Reasons for Recertification of Therapy:   Pt is progressing well with Lokomat gait training, demonstrating improved muscle force generated based on L force testing confirming LLE strength greater than R. He demonstrates improved LE flexibility, supine and sititng. Muscle tone continues to affect gait, posture, joint ROM and positioning. He is seen at two separate [facility] facilities due to specialized equipment available, LE Bioness system at [facility] and Lokomat robotic assisted locomotor training device at [place]  Fall with Harm Risk location.  Pt rehab potential is Good. Pt will benefit from continuing skilled outpatient physical therapy to address the deficits listed below in the problem list, provide pt/family education and to maximize pt's level of independence in the home and community environment.     Medical necessity is demonstrated by the following IMPAIRMENTS/PROMBLEM LIST:   1. Fall Risk - impaired balance   2. Weakness   3. Impaired muscle tone  4. Gait deviations   5. Decreased ambulation   6. Decreased activity tolerance   7. Continued inability to participate in vocational pursuits   8. Requires skilled supervision to complete and progress HEP   Pt may be seen by PTA to carry out plan of care.     Certification Period: 7/25/17 to 10/31/17  Recommended Treatment Plan: 3 times per week for 12 weeks: Gait Training, Locomotor Training, Manual Therapy, Neuromuscular Re-ed, Patient Education, Therapeutic Activites and Therapeutic Exercise  Other Recommendations: Pt to attend 1 x/week for Bioness and 2x/week Lokomat locomotor training. Therapy sessions to include stretching, strengthening, postural control, core and LE strengthening along w pt education for carryover into HEP.     Therapist's Name: Hilda Souza, PT   Date: 07/25/2017    I CERTIFY THE NEED FOR THESE SERVICES FURNISHED UNDER THIS PLAN OF TREATMENT AND WHILE UNDER MY CARE    Physician's comments: ________________________________________________________________________________________________________________________________________________      Physician's Name: ___________________________________

## 2017-11-09 NOTE — ED PROVIDER NOTE - OBJECTIVE STATEMENT
77 yom pw suprapubic pain, BPH, chronic indwelling rodriguez, states sx for several days.  reports less drainage from rodriguez. 77 yom pw suprapubic pain, BPH, chronic indwelling rodriguez, states sx for several days.  reports less drainage from rodriguez in the last few days.  feels urinary frequency and hesitancy.  no fc, no back pain, no nvd, no other complaints.

## 2017-11-09 NOTE — ED ADULT NURSE REASSESSMENT NOTE - NURSING GU BLADDER
arrived with urine cath that was not draining all of urine - cath chaged per order andf septic workup follow because of presenting VS - clot was at end of original cathter/distended

## 2017-11-09 NOTE — ED PROVIDER NOTE - PROGRESS NOTE DETAILS
bedside US done, noted retention despite rodriguez in place, will replace rodriguez and reassess pt hx of CLL, noted leukocytosis w/o shift, unchanged from baseline, lactate improved, pt rectally afebrile, nontender abd, no vomiting, no guarding, no CVAT, rodriguez changed, UA reviewed, will place on keflex for 7 days, follow up with PMD and urology as needed for chronic indwelling rodriguez  repeat abd exam still nontender, soft, pt remains well appearing bedside US done, noted retention despite rodriguez in place, will replace rodriguez and reassess  no CVAT, no abd tenderness, low suspicion for renal abscess or colitis on exam

## 2017-11-09 NOTE — ED ADULT TRIAGE NOTE - CHIEF COMPLAINT QUOTE
Pt CO Urinary retention and Dysuria x1 week.  Pt states "When I can get some out its only a little and it hurts."  PT denies Falls, Trauma, Dizziness, N/V/D, SOB, Fevers

## 2017-11-09 NOTE — ED ADULT NURSE REASSESSMENT NOTE - NS ED NURSE REASSESS COMMENT FT1
Pt received form ER GENI Jett, pt A&Ox3 at this time. Pt denies pain, cp or sob at this time. Pt has rodriguez draining at bedside.  Pthas abx and fluids running, instructed to repeat lactate once abx and fluids are finished. Pt stable and will continue to monitor.

## 2017-11-09 NOTE — ED PROVIDER NOTE - MEDICAL DECISION MAKING DETAILS
suprapubic distension and discomfort w/ urinary sx, w/ chronic indwelling rodriguez, no cvat, no fc, no nv, soft abd, nontender on exam, no peritoneal sign, noted retention despite rodriguez, will attempt to replace rodriguez and reassess, can consider advanced imaging/urology consult if continues to be in retention after rodriguez replacement

## 2017-11-09 NOTE — ED ADULT NURSE NOTE - OBJECTIVE STATEMENT
has indwelling urine catheter that is draining to bedside urine bag; states has lower abdominal pain iintermittently with urge to void ; bedide bag changed and urine draining more quickly

## 2017-11-09 NOTE — ED ADULT NURSE REASSESSMENT NOTE - NS ED NURSE REASSESS COMMENT FT1
patient appears comfortable with home health aide at bedside; informed of plan of care; patient reports full pain relief

## 2017-11-12 LAB
-  AZTREONAM: SIGNIFICANT CHANGE UP
-  CEFTAZIDIME: SIGNIFICANT CHANGE UP
-  CIPROFLOXACIN: SIGNIFICANT CHANGE UP
-  GENTAMICIN: SIGNIFICANT CHANGE UP
-  IMIPENEM: SIGNIFICANT CHANGE UP
-  PIPERACILLIN/TAZOBACTAM: SIGNIFICANT CHANGE UP
-  TOBRAMYCIN: SIGNIFICANT CHANGE UP
CULTURE RESULTS: SIGNIFICANT CHANGE UP
METHOD TYPE: SIGNIFICANT CHANGE UP
ORGANISM # SPEC MICROSCOPIC CNT: SIGNIFICANT CHANGE UP
ORGANISM # SPEC MICROSCOPIC CNT: SIGNIFICANT CHANGE UP
SPECIMEN SOURCE: SIGNIFICANT CHANGE UP

## 2017-11-13 RX ORDER — MOXIFLOXACIN HYDROCHLORIDE TABLETS, 400 MG 400 MG/1
1 TABLET, FILM COATED ORAL
Qty: 14 | Refills: 0 | OUTPATIENT
Start: 2017-11-13 | End: 2017-11-20

## 2017-11-17 ENCOUNTER — EMERGENCY (EMERGENCY)
Facility: HOSPITAL | Age: 77
LOS: 1 days | Discharge: HOME | End: 2017-11-17
Attending: EMERGENCY MEDICINE | Admitting: EMERGENCY MEDICINE
Payer: MEDICARE

## 2017-11-17 VITALS
HEART RATE: 110 BPM | DIASTOLIC BLOOD PRESSURE: 51 MMHG | SYSTOLIC BLOOD PRESSURE: 135 MMHG | HEIGHT: 71 IN | WEIGHT: 175.05 LBS | TEMPERATURE: 97 F | RESPIRATION RATE: 16 BRPM | OXYGEN SATURATION: 96 %

## 2017-11-17 VITALS
HEART RATE: 93 BPM | DIASTOLIC BLOOD PRESSURE: 75 MMHG | RESPIRATION RATE: 16 BRPM | OXYGEN SATURATION: 95 % | TEMPERATURE: 98 F | SYSTOLIC BLOOD PRESSURE: 121 MMHG
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
## 2017-11-17 DIAGNOSIS — Z79.2 LONG TERM (CURRENT) USE OF ANTIBIOTICS: ICD-10-CM

## 2017-11-17 DIAGNOSIS — E78.5 HYPERLIPIDEMIA, UNSPECIFIED: ICD-10-CM

## 2017-11-17 DIAGNOSIS — N39.0 URINARY TRACT INFECTION, SITE NOT SPECIFIED: ICD-10-CM

## 2017-11-17 DIAGNOSIS — I25.10 ATHEROSCLEROTIC HEART DISEASE OF NATIVE CORONARY ARTERY WITHOUT ANGINA PECTORIS: ICD-10-CM

## 2017-11-17 DIAGNOSIS — Z96.649 PRESENCE OF UNSPECIFIED ARTIFICIAL HIP JOINT: Chronic | ICD-10-CM
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
## 2017-11-17 DIAGNOSIS — R31.0 GROSS HEMATURIA: ICD-10-CM

## 2017-11-17 DIAGNOSIS — I10 ESSENTIAL (PRIMARY) HYPERTENSION: ICD-10-CM
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
## 2017-11-17 DIAGNOSIS — Z79.899 OTHER LONG TERM (CURRENT) DRUG THERAPY: ICD-10-CM

## 2017-11-17 DIAGNOSIS — T83.098A OTHER MECHANICAL COMPLICATION OF OTHER URINARY CATHETER, INITIAL ENCOUNTER: ICD-10-CM

## 2017-11-17 LAB
ALBUMIN SERPL ELPH-MCNC: 4.3 G/DL — SIGNIFICANT CHANGE UP (ref 3.3–5)
ALP SERPL-CCNC: 81 U/L — SIGNIFICANT CHANGE UP (ref 40–120)
ALT FLD-CCNC: 16 U/L — SIGNIFICANT CHANGE UP (ref 10–45)
ANION GAP SERPL CALC-SCNC: 18 MMOL/L — HIGH (ref 5–17)
APPEARANCE UR: (no result)
AST SERPL-CCNC: 20 U/L — SIGNIFICANT CHANGE UP (ref 10–40)
BASOPHILS NFR BLD AUTO: 0.2 % — SIGNIFICANT CHANGE UP (ref 0–2)
BILIRUB SERPL-MCNC: 0.3 MG/DL — SIGNIFICANT CHANGE UP (ref 0.2–1.2)
BILIRUB UR-MCNC: NEGATIVE — SIGNIFICANT CHANGE UP
BUN SERPL-MCNC: 22 MG/DL — SIGNIFICANT CHANGE UP (ref 7–23)
CALCIUM SERPL-MCNC: 10 MG/DL — SIGNIFICANT CHANGE UP (ref 8.4–10.5)
CHLORIDE SERPL-SCNC: 96 MMOL/L — SIGNIFICANT CHANGE UP (ref 96–108)
CO2 SERPL-SCNC: 27 MMOL/L — SIGNIFICANT CHANGE UP (ref 22–31)
COLOR SPEC: YELLOW — SIGNIFICANT CHANGE UP
CREAT SERPL-MCNC: 1.28 MG/DL — SIGNIFICANT CHANGE UP (ref 0.5–1.3)
DIFF PNL FLD: (no result)
EOSINOPHIL NFR BLD AUTO: 3.1 % — SIGNIFICANT CHANGE UP (ref 0–6)
EXTRA BLUE TOP TUBE: SIGNIFICANT CHANGE UP
GLUCOSE SERPL-MCNC: 149 MG/DL — HIGH (ref 70–99)
GLUCOSE UR QL: NEGATIVE — SIGNIFICANT CHANGE UP
HCT VFR BLD CALC: 42 % — SIGNIFICANT CHANGE UP (ref 39–50)
HGB BLD-MCNC: 14.2 G/DL — SIGNIFICANT CHANGE UP (ref 13–17)
KETONES UR-MCNC: NEGATIVE — SIGNIFICANT CHANGE UP
LEUKOCYTE ESTERASE UR-ACNC: (no result)
LYMPHOCYTES # BLD AUTO: 49.9 % — HIGH (ref 13–44)
MCHC RBC-ENTMCNC: 30.1 PG — SIGNIFICANT CHANGE UP (ref 27–34)
MCHC RBC-ENTMCNC: 33.8 G/DL — SIGNIFICANT CHANGE UP (ref 32–36)
MCV RBC AUTO: 89 FL — SIGNIFICANT CHANGE UP (ref 80–100)
MONOCYTES NFR BLD AUTO: 4.4 % — SIGNIFICANT CHANGE UP (ref 2–14)
NEUTROPHILS NFR BLD AUTO: 42.4 % — LOW (ref 43–77)
NITRITE UR-MCNC: NEGATIVE — SIGNIFICANT CHANGE UP
PH UR: 6 — SIGNIFICANT CHANGE UP (ref 5–8)
PLATELET # BLD AUTO: 171 K/UL — SIGNIFICANT CHANGE UP (ref 150–400)
POTASSIUM SERPL-MCNC: 3.3 MMOL/L — LOW (ref 3.5–5.3)
POTASSIUM SERPL-SCNC: 3.3 MMOL/L — LOW (ref 3.5–5.3)
PROT SERPL-MCNC: 7.4 G/DL — SIGNIFICANT CHANGE UP (ref 6–8.3)
PROT UR-MCNC: NEGATIVE MG/DL — SIGNIFICANT CHANGE UP
RBC # BLD: 4.72 M/UL — SIGNIFICANT CHANGE UP (ref 4.2–5.8)
RBC # FLD: 15.1 % — SIGNIFICANT CHANGE UP (ref 10.3–16.9)
SODIUM SERPL-SCNC: 141 MMOL/L — SIGNIFICANT CHANGE UP (ref 135–145)
SP GR SPEC: 1.01 — SIGNIFICANT CHANGE UP (ref 1–1.03)
UROBILINOGEN FLD QL: 0.2 E.U./DL — SIGNIFICANT CHANGE UP
WBC # BLD: 16.3 K/UL — HIGH (ref 3.8–10.5)
WBC # FLD AUTO: 16.3 K/UL — HIGH (ref 3.8–10.5)

## 2017-11-17 PROCEDURE — 87106 FUNGI IDENTIFICATION YEAST: CPT

## 2017-11-17 PROCEDURE — 81001 URINALYSIS AUTO W/SCOPE: CPT

## 2017-11-17 PROCEDURE — 87086 URINE CULTURE/COLONY COUNT: CPT

## 2017-11-17 PROCEDURE — 96374 THER/PROPH/DIAG INJ IV PUSH: CPT

## 2017-11-17 PROCEDURE — 99284 EMERGENCY DEPT VISIT MOD MDM: CPT | Mod: 25

## 2017-11-17 PROCEDURE — 80053 COMPREHEN METABOLIC PANEL: CPT

## 2017-11-17 PROCEDURE — 99284 EMERGENCY DEPT VISIT MOD MDM: CPT

## 2017-11-17 PROCEDURE — 85025 COMPLETE CBC W/AUTO DIFF WBC: CPT

## 2017-11-17 RX ORDER — GENTAMICIN SULFATE 40 MG/ML
120 VIAL (ML) INJECTION ONCE
Qty: 0 | Refills: 0 | Status: COMPLETED | OUTPATIENT
Start: 2017-11-17 | End: 2017-11-17

## 2017-11-17 RX ORDER — MOXIFLOXACIN HYDROCHLORIDE TABLETS, 400 MG 400 MG/1
1 TABLET, FILM COATED ORAL
Qty: 14 | Refills: 0 | OUTPATIENT
Start: 2017-11-17 | End: 2017-11-24

## 2017-11-17 RX ADMIN — Medication 200 MILLIGRAM(S): at 14:13

## 2017-11-17 NOTE — ED PROVIDER NOTE - PHYSICAL EXAMINATION
VITAL SIGNS: I have reviewed nursing notes and confirm.  CONSTITUTIONAL: Well-developed; well-nourished male comfortable in stretcher moving all ext speaking clearly in complete sentences; in no acute distress.  SKIN: Agree with RN documentation regarding decubitus evaluation. Remainder of skin exam is warm and dry, no acute rash.  HEAD: Normocephalic; atraumatic.  EYES: PERRL, EOM intact; conjunctiva and sclera clear.  ENT: No nasal discharge; airway clear.  NECK: Supple; non tender.  CARD: S1, S2 normal; no murmurs, gallops, or rubs. RRR  RESP: CTA w/good excursion, no inc wob  ABD: Normal bowel sounds; soft; non-distended; non-tender, negative quarles's, no mcburney's pt ttp.  : No CVA TTP b/l, rodriguez catheter in place w/no bleeding at meatus, + bloody urine in leg bag  EXT: Normal ROM. No clubbing, cyanosis or edema.  LYMPH: No acute cervical adenopathy.  NEURO: Alert, oriented. Grossly unremarkable.  PSYCH: Cooperative, appropriate.

## 2017-11-17 NOTE — ED PROVIDER NOTE - OBJECTIVE STATEMENT
76 y/o M w/CLL, HTN, HLD, BPH, CAD w/stents on Eliquis, chronically indwelling rodriguez w/recent pseudomonal UTI sensitive to cipro treated for past 1 week w/cipro, no fever/chills or abd/flank pain. Now p/w 12hrs of hematuria and urge to void 78 y/o M w/CLL, HTN, HLD, BPH, CAD w/stents on Eliquis, chronically indwelling rodriguez w/recent pseudomonal UTI sensitive to cipro treated for past 1 week w/cipro, no fever/chills or abd/flank pain. Now p/w 12hrs of hematuria and urge to void, concerned for retention. No trauma. No abd pain or fever.

## 2017-11-17 NOTE — ED ADULT NURSE REASSESSMENT NOTE - NS ED NURSE REASSESS COMMENT FT1
Per MD Hung, pt has clot in bladder via US.  Will wait for Uro to irrigate bladder.  Pt denies any pain.

## 2017-11-17 NOTE — ED PROVIDER NOTE - SECONDARY DIAGNOSIS.
Rodriguez catheter problem, initial encounter Urinary tract infection with hematuria, site unspecified

## 2017-11-17 NOTE — ED ADULT NURSE NOTE - OBJECTIVE STATEMENT
PT came to ED with Home Health Aide complaining of hematuria. Per HHA, hematuria started last night. Pt has Rodriguez in place draining to leg bag. Pt has approx 200ml red colored urine. Pt report chronic  issues, Rodriguez was placed 2 weeks ago. Pt denies any pain, chest pain, SOB, D/N/V, fevers or chills.  Pt is alert and oriented x3.

## 2017-11-17 NOTE — ED ADULT TRIAGE NOTE - OTHER COMPLAINTS
c.o bright red urine since last night. urinary cath in place x 4 months, draining adequately as per aide.  denies abd pain. denies fever/chills. no n/v/d.

## 2017-11-17 NOTE — CONSULT NOTE ADULT - SUBJECTIVE AND OBJECTIVE BOX
Patient is a 77y old  Male who presents with a chief complaint of     HPI: Called to see patient for hematuria/ retention, bedside US, shows full bladder, balloon not visualized. Per patient everything was draining fine, his aid emptied the urine and then shortly after it was dark. Patient also c/o some suprapubic pain and urge to urinate. Patient denies any fever or chills. Denies any sob or CP      Vital Signs Last 24 Hrs  T(C): 36.4 (17 Nov 2017 14:05), Max: 36.8 (17 Nov 2017 12:22)  T(F): 97.6 (17 Nov 2017 14:05), Max: 98.3 (17 Nov 2017 12:22)  HR: 93 (17 Nov 2017 14:05) (93 - 110)  BP: 126/78 (17 Nov 2017 14:05) (126/78 - 145/85)  BP(mean): --  RR: 16 (17 Nov 2017 14:05) (16 - 16)  SpO2: 96% (17 Nov 2017 14:05) (95% - 96%)  I&O's Summary      PE:  Gen: NAD  Abd: soft, + mild SP TTP, ND, No cva TTP  : rodriguez draining clear, tea colored urine in leg bag    LABS:                        14.2   16.3  )-----------( 171      ( 17 Nov 2017 12:52 )             42.0     11-17    141  |  96  |  22  ----------------------------<  149<H>  3.3<L>   |  27  |  1.28    Ca    10.0      17 Nov 2017 12:52    TPro  7.4  /  Alb  4.3  /  TBili  0.3  /  DBili  x   /  AST  20  /  ALT  16  /  AlkPhos  81  11-17      Cultures      A/P 78 yo m with traumatic rodriguez  1) patient seen for similar complaint in the past by [facility] urology team  2) balloon deflated at this time, catheter advanced, ~200cc clear urine drained immediately  3) catheter irrigated with NS, flushes easily, no clots  4) f/u UA, if positive, dc with leg bag and abx  5) patient instructed to not pull on catheter or let aid pull on catheter  6) discussed with gu team

## 2017-11-17 NOTE — ED ADULT NURSE NOTE - CHPI ED SYMPTOMS NEG
no burning urination/no diarrhea/no abdominal distension/no fever/no blood in stool/no vomiting/no chills/no nausea/no dysuria

## 2017-11-19 LAB — GRAM STN FLD: SIGNIFICANT CHANGE UP

## 2017-11-20 LAB
CULTURE RESULTS: SIGNIFICANT CHANGE UP
SPECIMEN SOURCE: SIGNIFICANT CHANGE UP

## 2017-11-21 LAB
CULTURE RESULTS: SIGNIFICANT CHANGE UP
SPECIMEN SOURCE: SIGNIFICANT CHANGE UP

## 2017-11-22 RX ORDER — FLUCONAZOLE 150 MG/1
1 TABLET ORAL
Qty: 14 | Refills: 0 | OUTPATIENT
Start: 2017-11-22 | End: 2017-12-06

## 2017-12-19 PROCEDURE — 97110 THERAPEUTIC EXERCISES: CPT

## 2017-12-19 PROCEDURE — 85025 COMPLETE CBC W/AUTO DIFF WBC: CPT

## 2017-12-19 PROCEDURE — 85027 COMPLETE CBC AUTOMATED: CPT

## 2017-12-19 PROCEDURE — 83605 ASSAY OF LACTIC ACID: CPT

## 2017-12-19 PROCEDURE — 83880 ASSAY OF NATRIURETIC PEPTIDE: CPT

## 2017-12-19 PROCEDURE — 36415 COLL VENOUS BLD VENIPUNCTURE: CPT

## 2017-12-19 PROCEDURE — 71045 X-RAY EXAM CHEST 1 VIEW: CPT

## 2017-12-19 PROCEDURE — 87040 BLOOD CULTURE FOR BACTERIA: CPT

## 2017-12-19 PROCEDURE — 97116 GAIT TRAINING THERAPY: CPT

## 2017-12-19 PROCEDURE — 83735 ASSAY OF MAGNESIUM: CPT

## 2017-12-19 PROCEDURE — 76937 US GUIDE VASCULAR ACCESS: CPT

## 2017-12-19 PROCEDURE — 36569 INSJ PICC 5 YR+ W/O IMAGING: CPT

## 2017-12-19 PROCEDURE — 87186 SC STD MICRODIL/AGAR DIL: CPT

## 2017-12-19 PROCEDURE — 93005 ELECTROCARDIOGRAM TRACING: CPT

## 2017-12-19 PROCEDURE — 96374 THER/PROPH/DIAG INJ IV PUSH: CPT

## 2017-12-19 PROCEDURE — 80053 COMPREHEN METABOLIC PANEL: CPT

## 2017-12-19 PROCEDURE — 87086 URINE CULTURE/COLONY COUNT: CPT

## 2017-12-19 PROCEDURE — 99285 EMERGENCY DEPT VISIT HI MDM: CPT | Mod: 25

## 2017-12-19 PROCEDURE — 80048 BASIC METABOLIC PNL TOTAL CA: CPT

## 2017-12-19 PROCEDURE — 81001 URINALYSIS AUTO W/SCOPE: CPT

## 2018-01-10 NOTE — PATIENT PROFILE ADULT. - FUNCTIONAL SCREEN CURRENT LEVEL: TOILETING, MLM
Progress Note


Date of Service


Roby 10, 2018.





Progress Note


Post-procedure note








The patient was assessed s/p closed reduction of R STEPHANIE, comfortable, pain 

improved.  





RLE NVSI +EHL/FHL/TA/GS SILT grossly, CR< 2 seconds, compartments soft NT, KI 

applied to RLE








A/P 





S/P Closed reduction R STEPHANIE


-WBAT 


-Posterior hip precautions, explained in detail


-Knee immobilizer to be worn at all times, will likely need to be fitted for 

abduction brace as OP


-Pain control


-Call the office to schedule follow up appointment to see Dr Jessica in the am





Patient tolerated the procedure well





post reduction films demonstrate well aligned, well fixed R STEPHANIE without 

fracture or dislocation. (4) completely dependent

## 2018-02-04 ENCOUNTER — INPATIENT (INPATIENT)
Facility: HOSPITAL | Age: 78
LOS: 0 days | Discharge: OTHER INSTITUTION | DRG: 183 | End: 2018-02-05
Attending: INTERNAL MEDICINE | Admitting: INTERNAL MEDICINE
Payer: MEDICARE

## 2018-02-04 VITALS
RESPIRATION RATE: 18 BRPM | TEMPERATURE: 98 F | DIASTOLIC BLOOD PRESSURE: 96 MMHG | SYSTOLIC BLOOD PRESSURE: 164 MMHG | HEART RATE: 112 BPM | OXYGEN SATURATION: 97 %

## 2018-02-04 VITALS
RESPIRATION RATE: 17 BRPM | DIASTOLIC BLOOD PRESSURE: 72 MMHG | SYSTOLIC BLOOD PRESSURE: 160 MMHG | HEART RATE: 110 BPM | OXYGEN SATURATION: 96 %

## 2018-02-04 DIAGNOSIS — Z96.649 PRESENCE OF UNSPECIFIED ARTIFICIAL HIP JOINT: Chronic | ICD-10-CM

## 2018-02-04 LAB
ALBUMIN SERPL ELPH-MCNC: 4.2 G/DL — SIGNIFICANT CHANGE UP (ref 3.3–5)
ALP SERPL-CCNC: 69 U/L — SIGNIFICANT CHANGE UP (ref 40–120)
ALT FLD-CCNC: 24 U/L — SIGNIFICANT CHANGE UP (ref 10–45)
ANION GAP SERPL CALC-SCNC: 14 MMOL/L — SIGNIFICANT CHANGE UP (ref 5–17)
APTT BLD: 28.4 SEC — SIGNIFICANT CHANGE UP (ref 27.5–37.4)
AST SERPL-CCNC: 31 U/L — SIGNIFICANT CHANGE UP (ref 10–40)
BILIRUB SERPL-MCNC: 0.6 MG/DL — SIGNIFICANT CHANGE UP (ref 0.2–1.2)
BUN SERPL-MCNC: 41 MG/DL — HIGH (ref 7–23)
CALCIUM SERPL-MCNC: 9.6 MG/DL — SIGNIFICANT CHANGE UP (ref 8.4–10.5)
CHLORIDE SERPL-SCNC: 94 MMOL/L — LOW (ref 96–108)
CO2 SERPL-SCNC: 33 MMOL/L — HIGH (ref 22–31)
CREAT SERPL-MCNC: 1.86 MG/DL — HIGH (ref 0.5–1.3)
GLUCOSE SERPL-MCNC: 135 MG/DL — HIGH (ref 70–99)
HCT VFR BLD CALC: 33.8 % — LOW (ref 39–50)
HGB BLD-MCNC: 10.9 G/DL — LOW (ref 13–17)
INR BLD: 1.68 — HIGH (ref 0.88–1.16)
LYMPHOCYTES # BLD AUTO: 60 % — HIGH (ref 13–44)
MAGNESIUM SERPL-MCNC: 2.2 MG/DL — SIGNIFICANT CHANGE UP (ref 1.6–2.6)
MCHC RBC-ENTMCNC: 29.5 PG — SIGNIFICANT CHANGE UP (ref 27–34)
MCHC RBC-ENTMCNC: 32.2 G/DL — SIGNIFICANT CHANGE UP (ref 32–36)
MCV RBC AUTO: 91.4 FL — SIGNIFICANT CHANGE UP (ref 80–100)
MONOCYTES NFR BLD AUTO: 3 % — SIGNIFICANT CHANGE UP (ref 2–14)
NEUTROPHILS NFR BLD AUTO: 37 % — LOW (ref 43–77)
PLATELET # BLD AUTO: 169 K/UL — SIGNIFICANT CHANGE UP (ref 150–400)
POTASSIUM SERPL-MCNC: 4.2 MMOL/L — SIGNIFICANT CHANGE UP (ref 3.5–5.3)
POTASSIUM SERPL-SCNC: 4.2 MMOL/L — SIGNIFICANT CHANGE UP (ref 3.5–5.3)
PROT SERPL-MCNC: 7.4 G/DL — SIGNIFICANT CHANGE UP (ref 6–8.3)
PROTHROM AB SERPL-ACNC: 18.9 SEC — HIGH (ref 9.8–12.7)
RBC # BLD: 3.7 M/UL — LOW (ref 4.2–5.8)
RBC # FLD: 15.6 % — SIGNIFICANT CHANGE UP (ref 10.3–16.9)
SODIUM SERPL-SCNC: 141 MMOL/L — SIGNIFICANT CHANGE UP (ref 135–145)
TROPONIN T SERPL-MCNC: 0.04 NG/ML — HIGH (ref 0–0.01)
WBC # BLD: 22.8 K/UL — HIGH (ref 3.8–10.5)
WBC # FLD AUTO: 22.8 K/UL — HIGH (ref 3.8–10.5)

## 2018-02-04 PROCEDURE — 93010 ELECTROCARDIOGRAM REPORT: CPT | Mod: 76

## 2018-02-04 PROCEDURE — 99223 1ST HOSP IP/OBS HIGH 75: CPT

## 2018-02-04 PROCEDURE — 71250 CT THORAX DX C-: CPT | Mod: 26

## 2018-02-04 PROCEDURE — 71045 X-RAY EXAM CHEST 1 VIEW: CPT | Mod: 26

## 2018-02-04 PROCEDURE — 99291 CRITICAL CARE FIRST HOUR: CPT | Mod: 25

## 2018-02-04 PROCEDURE — 70450 CT HEAD/BRAIN W/O DYE: CPT | Mod: 26

## 2018-02-04 PROCEDURE — 72100 X-RAY EXAM L-S SPINE 2/3 VWS: CPT | Mod: 26

## 2018-02-04 PROCEDURE — 72125 CT NECK SPINE W/O DYE: CPT | Mod: 26

## 2018-02-04 PROCEDURE — 72128 CT CHEST SPINE W/O DYE: CPT | Mod: 26

## 2018-02-04 PROCEDURE — 74176 CT ABD & PELVIS W/O CONTRAST: CPT | Mod: 26

## 2018-02-04 RX ORDER — APIXABAN 2.5 MG/1
1 TABLET, FILM COATED ORAL
Qty: 0 | Refills: 0 | COMMUNITY

## 2018-02-04 RX ORDER — DEXLANSOPRAZOLE 30 MG/1
1 CAPSULE, DELAYED RELEASE ORAL
Qty: 0 | Refills: 0 | COMMUNITY

## 2018-02-04 RX ORDER — TETANUS TOXOID, REDUCED DIPHTHERIA TOXOID AND ACELLULAR PERTUSSIS VACCINE, ADSORBED 5; 2.5; 8; 8; 2.5 [IU]/.5ML; [IU]/.5ML; UG/.5ML; UG/.5ML; UG/.5ML
0.5 SUSPENSION INTRAMUSCULAR ONCE
Qty: 0 | Refills: 0 | Status: COMPLETED | OUTPATIENT
Start: 2018-02-04 | End: 2018-02-04

## 2018-02-04 RX ORDER — ZOLPIDEM TARTRATE 10 MG/1
0 TABLET ORAL
Qty: 0 | Refills: 0 | COMMUNITY

## 2018-02-04 RX ORDER — SODIUM CHLORIDE 9 MG/ML
1000 INJECTION INTRAMUSCULAR; INTRAVENOUS; SUBCUTANEOUS
Qty: 0 | Refills: 0 | Status: DISCONTINUED | OUTPATIENT
Start: 2018-02-04 | End: 2018-02-05

## 2018-02-04 RX ORDER — TAMSULOSIN HYDROCHLORIDE 0.4 MG/1
1 CAPSULE ORAL
Qty: 0 | Refills: 0 | COMMUNITY

## 2018-02-04 RX ORDER — TETANUS TOXOID, REDUCED DIPHTHERIA TOXOID AND ACELLULAR PERTUSSIS VACCINE, ADSORBED 5; 2.5; 8; 8; 2.5 [IU]/.5ML; [IU]/.5ML; UG/.5ML; UG/.5ML; UG/.5ML
0.5 SUSPENSION INTRAMUSCULAR ONCE
Qty: 0 | Refills: 0 | Status: DISCONTINUED | OUTPATIENT
Start: 2018-02-04 | End: 2018-02-04

## 2018-02-04 RX ADMIN — SODIUM CHLORIDE 100 MILLILITER(S): 9 INJECTION INTRAMUSCULAR; INTRAVENOUS; SUBCUTANEOUS at 19:58

## 2018-02-04 RX ADMIN — TETANUS TOXOID, REDUCED DIPHTHERIA TOXOID AND ACELLULAR PERTUSSIS VACCINE, ADSORBED 0.5 MILLILITER(S): 5; 2.5; 8; 8; 2.5 SUSPENSION INTRAMUSCULAR at 22:09

## 2018-02-04 NOTE — ED ADULT NURSE REASSESSMENT NOTE - NS ED NURSE REASSESS COMMENT FT1
Rec'd pt calm, alert, able to follow commands. Pt denies pain and discomfort at this time. Pt incontinent. Pt change and made comfortable. HL 16 G RAC noted, patent, with NS infusing. No infiltration noted. Awaiting further eval and dispo. Close monitoring continues.

## 2018-02-04 NOTE — ED PROVIDER NOTE - OBJECTIVE STATEMENT
Pt w/ PMHx CLL, PE on Eliquis, CHF, BPH, urosepsis, PSHx R ORIF p/w frequent falls. Pt reports he has fallen several times in the past several months. Pt reports today he was attempting to get OOB when he fell. Pt uncertain how he landed, but pt c/o diffuse lower back pain. Pt was unable to get up and spent unknown amount of time on the ground. Pt denies head injury, LOC. Pt has difficulty stating what has happened in the past, causing his fall. Pt denies f/c, URI, GI, or  sx. Pt denies CP, SOB, abd pain. Denies HA, dizziness, paresthesias, weakness. Unknown last TDaP

## 2018-02-04 NOTE — CONSULT NOTE ADULT - ASSESSMENT
1) s/p fall with multiple rib fractures, trace right apical pneumothorax, mild-mod right hemothorax, and acute compression fx of L1  -case discussed with Dr. Syed of CT-surgery who recommended transfer to Trauma center  -elevated ck, mb, and tn - Cardiology Fellow to check tte now while transfer pending  -abnormal ecg - check 3rd tn  -continuous telemetry  -acute renal failure - consider rhabdo - rec renal consult    2) chf per er note  -nl ef 6-2017 tte  -repeat tte     3) personal history of pulmonary embolus on Eliquis  -trauma to manage anticoagulation    4) cll - leukocytosis noted; rec Hem-Onc eval    5) bph - home rx as tolerated    6) maintain K>4, Mg>2    6) 1) s/p fall with multiple rib fractures, trace right apical pneumothorax, mild-mod right hemothorax, and acute compression fx of L1  -case discussed with Dr. Syed of CT-surgery who recommended transfer to Trauma center  -elevated ck, mb, and tn - Cardiology Fellow to check tte now while transfer pending  -abnormal ecg - check 3rd tn  -continuous telemetry  -acute renal failure - consider rhabdo - rec renal consult    2) chronic diastolic chf  -nl ef 6-2017 tte  -repeat tte   -check bnp    3) personal history of pulmonary embolus on Eliquis  -trauma to manage anticoagulation    4) cll - leukocytosis noted; rec Hem-Onc eval    5) bph - home rx as tolerated    6) maintain K>4, Mg>2  case discussed with Dr. Sanders (ER Attending) and Cardiology Fellow

## 2018-02-04 NOTE — CONSULT NOTE ADULT - SUBJECTIVE AND OBJECTIVE BOX
Cardiology for [facility]  CC: Initial Cardiology evaluation and management s/p fall  HPI: 77 yom - h/o CHF (per er Attending note), on Eliquis with personal history of pulmonary embolus, CLL, BPH, urosepsis, s/p ORIF - pt reported multiple recent falls - now with fall earlier today.  Exact events unclear from patient's history - he reported fall possibly as leaving bed and looking for his glasses.  Denied loc or syncope.  Denied chest pain, acute dyspnea, or palpitations.   Pt reported on floor for 3-3.5 hrs after fall.  Multiple recent falls without syncope per patient.      PAST MEDICAL & SURGICAL HISTORY:  Hip fracture requiring operative repair, right, sequela  PE (pulmonary thromboembolism)  CLL (chronic lymphocytic leukemia)  S/P hip hemiarthroplasty  chf - per er Attending note    Home medications per er note:  · 	furosemide 20 mg oral tablet: Last Dose Taken:  , 1 tab(s) orally once a day  · 	Multiple Vitamins oral tablet: Last Dose Taken:  , 1 tab(s) orally once a day  · 	senna oral tablet: Last Dose Taken:  , 2 tab(s) orally once a day (at bedtime)  · 	tamsulosin 0.4 mg oral capsule: Last Dose Taken:  , 1 cap(s) orally once a day  · 	Dexilant 30 mg oral delayed release capsule: Last Dose Taken:  , 1 cap(s) orally once a day  · 	Ambien: Last Dose Taken:  , 5- 30 mg , takes 5- 20 mg at night to sleep  · 	Eliquis 5 mg oral tablet: Last Dose Taken:  , 1 tab(s) orally 2 times a day    current hospital MEDICATIONS  (STANDING):  sodium chloride 0.9%. 1000 milliLiter(s) (100 mL/Hr) IV Continuous <Continuous>    FAMILY HISTORY:  acutely noncontributory; father with mi and death in his 70's    SOCIAL HISTORY: denied recent tobacco, drug, or etoh use; lives alone    REVIEW OF SYSTEMS  General: denied fever	    Skin - denied head trauma; knee scrapes/bleeding due to fall    Ophthalmologic: no acute vision change  	  ENMT:	no nasal congestion    Respiratory and Thorax: no acute dyspnea  	  Cardiovascular:	denied cp    Gastrointestinal:	no abd pain; nausea, vomitting    Genitourinary:	no dysuria    Musculoskeletal:	 moves 4 ext; s/p orif    Neurological:	no acute headache    Psychiatric:	no acute agitation    Hematology/Lymphatics:	 leukocytosis and anemia    Endocrine:	no dm or thyroid dz    Allergic/Immunologic:	nkda    Vital Signs Last 24 Hrs  T(C): 36.8 (2018 20:18), Max: 36.9 (2018 19:04)  T(F): 98.3 (2018 20:18), Max: 98.4 (2018 19:04)  HR: 111 (2018 20:18) (109 - 112)  BP: 159/73 (2018 20:18) (154/83 - 164/96)  BP(mean): --  RR: 18 (2018 20:18) (18 - 18)  SpO2: 96% (2018 20:18) (96% - 97%)    PHYSICAL EXAM:  Constitutional: nad    Eyes: anicteric    ENMT: dry blood at neck; mmm    Neck: no carotid bruit    Respiratory: decreased breath sounds    Cardiovascular: rr; s1/s2 present; no rub    Gastrointestinal: soft abd; nt    Extremities: trace-1+ le edema; knee abraisions present    Vascular: le pulses present    Neurological: conversant    Skin: warm    Musculoskeletal: moves ext    Psychiatric: no acute agitation    INTERPRETATION OF TELEMETRY:    EC17, 2-4-2018 x2 ecg's reviewed    LABS:                        10.9   22.8  )-----------( 169      ( 2018 18:14 )             33.8     02-04    141  |  94<L>  |  41<H>  ----------------------------<  135<H>  4.2   |  33<H>  |  1.86<H>    Ca    9.6      2018 18:14  Mg     2.2     02-04    TPro  7.4  /  Alb  4.2  /  TBili  0.6  /  DBili  x   /  AST  31  /  ALT  24  /  AlkPhos  69  02-04    CARDIAC MARKERS ( 2018 22:26 )  x     / 0.04 ng/mL / x     / x     / x      CARDIAC MARKERS ( 2018 18:14 )  x     / 0.05 ng/mL / 889 U/L / x     / 8.7 ng/mL    Creatine Kinase, Serum (18 @ 18:14)    Creatine Kinase, Serum: 889 U/L    CKMB Mass Assay (18 @ 18:14)    CKMB Units: 8.7 ng/mL    PT/INR - ( 2018 18:14 )   PT: 18.9 sec;   INR: 1.68          PTT - ( 2018 18:14 )  PTT:28.4 sec      BNP  RADIOLOGY & ADDITIONAL STUDIES:  < from: Echocardiogram (17 @ 16:18) >    EXAM:  ECHOCARDIOGRAM (CARDIOL)                          PROCEDURE DATE:  2017                        INTERPRETATION:  Patient Height: 178.0 cm  Patient Weight: 89.0 kg  Heart Rate: 110 bpm  Systolic Pressure: 137 mmHg  Diastolic Pressure: 67mmHg  BSA: 2.1 m^2  Interpretation Summary  A complete two-dimensional transthoracic echocardiogram was performed (2D,   M-mode, spectral and color flow doppler). Study Quality: Fair.  Normal   left   ventricular size and wall thickness. The left ventricular wall motion is   normal.  The left ventricular ejection fraction is normal. The left   ventricular ejection fraction is 65%.  The left atrial size is normal.   Right   atrium not well visualized.The right ventricle is not well visualized.   Probably normal right ventricular size and function.  No evidence for any   hemodynamically significant valvular disease.There is no   echocardiographic   evidence for pulmonary hypertension. The pulmonary artery systolic   pressure is   estimated to be 20 mmHg.  The inferior vena cava is normal in size (<2.1   cm)   with normal inspiratory collapse (>50%) consistent with normal right   atrial   pressure.  No aortic root dilatation.Left pleural effusion noted. There   is no   pericardial effusion.    < end of copied text >        < from: CT Thoracic Spine No Cont (18 @ 19:46) >  EXAM:  CT THORACIC SPINE                          PROCEDURE DATE:  2018                     INTERPRETATION:    PROCEDURE: CT thoracic spine without contrast    INDICATION: Fall. Lower thoracic pain.    TECHNIQUE: Multiple axial sections were obtained through the thoracic   spine. Sagittal and coronal reformats were obtained from the axial data   set. The images were reviewed in soft tissue and bone windows.    COMPARISON: Chest CTA 2017. Abdomen/pelvis CT 2017.    FINDINGS: The CT exam demonstrates an acute mild compression fracture   through the L1 vertebral body with mild osseous retropulsion resulting in   mild central canal narrowing. The fracture extends through the anterior   and posterior cortices but does not appear to involve the posterior   elements. Thoracic alignment is intact. The intervertebral disc spaces   are preserved. Diffuse osteopenia is demonstrated.    Multiple acute right rib fractures are noted. The right lateral 7th rib   fracture is displaced byapproximately one shaft width and overriding   (only seen on coronal images). The right posterior 8th and 9th rib   fractures are comminuted and moderately displaced by approximately one   half shaft width. The right 10th rib demonstrates two fractures; the   posterior fracture is nondisplaced and the posterolateral fracture (only   seen on coronal images) is mildly displaced. There are small hematomas   surrounding several of these rib fractures. A small to moderate right   pleural effusion withassociated passive atelectasis is identified. The   effusion is mildly hyperdense (25 HU) suggesting hemorrhagic components.   There is also a trace right apical pneumothorax.    Fullness of the bilateral renal collecting systems are partially imaged  and was also present to some degree on the 2017 CT.    IMPRESSION:  1.  Mild acute compression fracture of L1 with osseous retropulsion   resulting in mild canal stenosis.  2.  Fractures of right 7th through 10th ribs as described above.   3.  Trace right apical pneumothorax. Mild to moderate right hemothorax.    The above findings were discussed with Dr. Sanders of the ER on 18 at   9:28 PM.             "Thank you for the opportunity to participate in the care of this   patient."    LUÍS DAVEPNORT M.D., RADIOLOGY RESIDENT    < end of copied text >  < from: CT Cervical Spine No Cont (18 @ 19:46) >  EXAM:  CT CERVICAL SPINE                          PROCEDURE DATE:  2018                     INTERPRETATION:    PROCEDURE: CT Cervical spine without contrast    INDICATION: Fall.    TECHNIQUE: Multiple axial sections were obtained from the midorbits to   the sternoclavicular joint. Sagittal and coronal reformats were obtained   from the axial data set. The images were reviewed in soft tissue and bone   windows.    COMPARISON: None.    FINDINGS: The CT exam demonstrates no acute fracture or   spondylolisthesis. The bones are osteopenic. The vertebral body heights   are preserved. There is multilevel degenerative loss of disc height, most   severe at C6-7. Multilevel osteophytosis is present. The prevertebral   soft tissues are within normal limits.     At C3-4 , there is central disc bulge with moderate to severe spinal   canal stenosis. There is severe left neural foramen narrowing related to   uncovertebral and facet arthrosis.  At C4-5, there is posterior discussed by complexes resulting in moderate   canal stenosis. There is moderate to severe left neural foraminal   stenosis.     Incidentally noted is a right pleural effusion.    IMPRESSION:   1.  No evidence of acute osseous injury to the cervical spine.  2.  Multilevel degenerative changes as above.  3.  Partially imaged small right pleural effusion.            "Thank you for the opportunity to participate in the care of this   patient."    LUÍS DAVENPORT M.D., RADIOLOGY RESIDENT  This document has been electronically signed.  JAKE MONTES M.D., ATTENDING RADIOLOGIST  This document has been electronically signed. 2018  9:11PM    < end of copied text >  < from: CT Head No Cont (18 @ 19:46) >  EXAM:  CT BRAIN                          PROCEDURE DATE:  2018                     INTERPRETATION:  PROCEDURE: CT brain without intravenous contrast    INDICATIONS: Status post fall on Eliquis.    TECHNIQUE:  Serial axial images were obtainedfrom the skull base to the   vertex without the use of intravenous contrast. Imaging is performed   using helical low-dose technique, and sagittal and coronal reformations   are provided.     COMPARISON EXAMINATION: 17    FINDINGS:    VENTRICLESAND SULCI: Age-appropriate generalized parenchymal volume. No   hydrocephalus.  INTRA-AXIAL: No acute intracranial hemorrhage. The gray-white matter   differentiation appears within normal limits, without evidence of acute   transcortical infarct. Nomidline shift present. Confluent   periventricular lucency is noted, suggestive of moderate microangiopathic   ischemic disease.   EXTRA-AXIAL: No extra-axial fluid collection is present.   VISUALIZED SINUSES: No air-fluid levels are identified.   VISUALIZED MASTOIDS:  Clear.  CALVARIUM: No fracture.  MISCELLANEOUS:  None.    IMPRESSION:  No acute intracranial hemorrhage or calvarial fracture.            "Thank you for the opportunity to participate in the care of this   patient."    COCA FRANCISCO J CASALDUC M.D., RADIOLOGY RESIDENT  This document has been electronically signed.  JAKE MONTES M.D., ATTENDING RADIOLOGIST  This document has been electronically signed. 2018  8:34PM    < end of copied text > Cardiology for [facility]  CC: Initial Cardiology evaluation and management chf now s/p fall  HPI: 77 yom - h/o diastolic chf, on Eliquis with personal history of pulmonary embolus, CLL, BPH, urosepsis, s/p ORIF - pt reported multiple recent falls - now with fall earlier today.  Exact events unclear from patient's history - he reported fall possibly as leaving bed and looking for his glasses.  Denied loc or syncope.  Denied chest pain, acute dyspnea, or palpitations.   Pt reported on floor for 3-3.5 hrs after fall.  Multiple recent falls without syncope per patient.      PAST MEDICAL & SURGICAL HISTORY:  Hip fracture requiring operative repair, right, sequela  PE (pulmonary thromboembolism)  CLL (chronic lymphocytic leukemia)  S/P hip hemiarthroplasty  diastolic congestive heart failure    Home medications per er note:  · 	furosemide 20 mg oral tablet: Last Dose Taken:  , 1 tab(s) orally once a day  · 	Multiple Vitamins oral tablet: Last Dose Taken:  , 1 tab(s) orally once a day  · 	senna oral tablet: Last Dose Taken:  , 2 tab(s) orally once a day (at bedtime)  · 	tamsulosin 0.4 mg oral capsule: Last Dose Taken:  , 1 cap(s) orally once a day  · 	Dexilant 30 mg oral delayed release capsule: Last Dose Taken:  , 1 cap(s) orally once a day  · 	Ambien: Last Dose Taken:  , 5- 30 mg , takes 5- 20 mg at night to sleep  · 	Eliquis 5 mg oral tablet: Last Dose Taken:  , 1 tab(s) orally 2 times a day    current hospital MEDICATIONS  (STANDING):  sodium chloride 0.9%. 1000 milliLiter(s) (100 mL/Hr) IV Continuous <Continuous>    FAMILY HISTORY:  acutely noncontributory; father with mi and death in his 70's    SOCIAL HISTORY: denied recent tobacco, drug, or etoh use; lives alone    REVIEW OF SYSTEMS  General: denied fever	    Skin - denied head trauma; knee scrapes/bleeding due to fall    Ophthalmologic: no acute vision change  	  ENMT:	no nasal congestion    Respiratory and Thorax: no acute dyspnea  	  Cardiovascular:	denied cp    Gastrointestinal:	no abd pain; nausea, vomitting    Genitourinary:	no dysuria    Musculoskeletal:	 moves 4 ext; s/p orif    Neurological:	no acute headache    Psychiatric:	no acute agitation    Hematology/Lymphatics:	 leukocytosis and anemia    Endocrine:	no dm or thyroid dz    Allergic/Immunologic:	nkda    Vital Signs Last 24 Hrs  T(C): 36.8 (2018 20:18), Max: 36.9 (2018 19:04)  T(F): 98.3 (2018 20:18), Max: 98.4 (2018 19:04)  HR: 111 (2018 20:18) (109 - 112)  BP: 159/73 (2018 20:18) (154/83 - 164/96)  BP(mean): --  RR: 18 (2018 20:18) (18 - 18)  SpO2: 96% (2018 20:18) (96% - 97%)    PHYSICAL EXAM:  Constitutional: nad    Eyes: anicteric    ENMT: dry blood at neck; mmm    Neck: no carotid bruit    Respiratory: decreased breath sounds    Cardiovascular: rr; s1/s2 present; no rub    Gastrointestinal: soft abd; nt    Extremities: trace-1+ le edema; knee abraisions present    Vascular: le pulses present    Neurological: conversant    Skin: warm    Musculoskeletal: moves ext    Psychiatric: no acute agitation    INTERPRETATION OF TELEMETRY:    EC17, 2-4-2018 x2 ecg's reviewed    LABS:                        10.9   22.8  )-----------( 169      ( 2018 18:14 )             33.8     02-04    141  |  94<L>  |  41<H>  ----------------------------<  135<H>  4.2   |  33<H>  |  1.86<H>    Ca    9.6      2018 18:14  Mg     2.2     02-04    TPro  7.4  /  Alb  4.2  /  TBili  0.6  /  DBili  x   /  AST  31  /  ALT  24  /  AlkPhos  69  02-04    CARDIAC MARKERS ( 2018 22:26 )  x     / 0.04 ng/mL / x     / x     / x      CARDIAC MARKERS ( 2018 18:14 )  x     / 0.05 ng/mL / 889 U/L / x     / 8.7 ng/mL    Creatine Kinase, Serum (18 @ 18:14)    Creatine Kinase, Serum: 889 U/L    CKMB Mass Assay (18 @ 18:14)    CKMB Units: 8.7 ng/mL    PT/INR - ( 2018 18:14 )   PT: 18.9 sec;   INR: 1.68          PTT - ( 2018 18:14 )  PTT:28.4 sec      BNP  RADIOLOGY & ADDITIONAL STUDIES:  < from: Echocardiogram (17 @ 16:18) >    EXAM:  ECHOCARDIOGRAM (CARDIOL)                          PROCEDURE DATE:  2017                        INTERPRETATION:  Patient Height: 178.0 cm  Patient Weight: 89.0 kg  Heart Rate: 110 bpm  Systolic Pressure: 137 mmHg  Diastolic Pressure: 67mmHg  BSA: 2.1 m^2  Interpretation Summary  A complete two-dimensional transthoracic echocardiogram was performed (2D,   M-mode, spectral and color flow doppler). Study Quality: Fair.  Normal   left   ventricular size and wall thickness. The left ventricular wall motion is   normal.  The left ventricular ejection fraction is normal. The left   ventricular ejection fraction is 65%.  The left atrial size is normal.   Right   atrium not well visualized.The right ventricle is not well visualized.   Probably normal right ventricular size and function.  No evidence for any   hemodynamically significant valvular disease.There is no   echocardiographic   evidence for pulmonary hypertension. The pulmonary artery systolic   pressure is   estimated to be 20 mmHg.  The inferior vena cava is normal in size (<2.1   cm)   with normal inspiratory collapse (>50%) consistent with normal right   atrial   pressure.  No aortic root dilatation.Left pleural effusion noted. There   is no   pericardial effusion.    < end of copied text >        < from: CT Thoracic Spine No Cont (18 @ 19:46) >  EXAM:  CT THORACIC SPINE                          PROCEDURE DATE:  2018                     INTERPRETATION:    PROCEDURE: CT thoracic spine without contrast    INDICATION: Fall. Lower thoracic pain.    TECHNIQUE: Multiple axial sections were obtained through the thoracic   spine. Sagittal and coronal reformats were obtained from the axial data   set. The images were reviewed in soft tissue and bone windows.    COMPARISON: Chest CTA 2017. Abdomen/pelvis CT 2017.    FINDINGS: The CT exam demonstrates an acute mild compression fracture   through the L1 vertebral body with mild osseous retropulsion resulting in   mild central canal narrowing. The fracture extends through the anterior   and posterior cortices but does not appear to involve the posterior   elements. Thoracic alignment is intact. The intervertebral disc spaces   are preserved. Diffuse osteopenia is demonstrated.    Multiple acute right rib fractures are noted. The right lateral 7th rib   fracture is displaced byapproximately one shaft width and overriding   (only seen on coronal images). The right posterior 8th and 9th rib   fractures are comminuted and moderately displaced by approximately one   half shaft width. The right 10th rib demonstrates two fractures; the   posterior fracture is nondisplaced and the posterolateral fracture (only   seen on coronal images) is mildly displaced. There are small hematomas   surrounding several of these rib fractures. A small to moderate right   pleural effusion withassociated passive atelectasis is identified. The   effusion is mildly hyperdense (25 HU) suggesting hemorrhagic components.   There is also a trace right apical pneumothorax.    Fullness of the bilateral renal collecting systems are partially imaged  and was also present to some degree on the 2017 CT.    IMPRESSION:  1.  Mild acute compression fracture of L1 with osseous retropulsion   resulting in mild canal stenosis.  2.  Fractures of right 7th through 10th ribs as described above.   3.  Trace right apical pneumothorax. Mild to moderate right hemothorax.    The above findings were discussed with Dr. Sanders of the ER on 18 at   9:28 PM.             "Thank you for the opportunity to participate in the care of this   patient."    LUÍS DAVENPORT M.D., RADIOLOGY RESIDENT    < end of copied text >  < from: CT Cervical Spine No Cont (18 @ 19:46) >  EXAM:  CT CERVICAL SPINE                          PROCEDURE DATE:  2018                     INTERPRETATION:    PROCEDURE: CT Cervical spine without contrast    INDICATION: Fall.    TECHNIQUE: Multiple axial sections were obtained from the midorbits to   the sternoclavicular joint. Sagittal and coronal reformats were obtained   from the axial data set. The images were reviewed in soft tissue and bone   windows.    COMPARISON: None.    FINDINGS: The CT exam demonstrates no acute fracture or   spondylolisthesis. The bones are osteopenic. The vertebral body heights   are preserved. There is multilevel degenerative loss of disc height, most   severe at C6-7. Multilevel osteophytosis is present. The prevertebral   soft tissues are within normal limits.     At C3-4 , there is central disc bulge with moderate to severe spinal   canal stenosis. There is severe left neural foramen narrowing related to   uncovertebral and facet arthrosis.  At C4-5, there is posterior discussed by complexes resulting in moderate   canal stenosis. There is moderate to severe left neural foraminal   stenosis.     Incidentally noted is a right pleural effusion.    IMPRESSION:   1.  No evidence of acute osseous injury to the cervical spine.  2.  Multilevel degenerative changes as above.  3.  Partially imaged small right pleural effusion.            "Thank you for the opportunity to participate in the care of this   patient."    LUÍS DAVENPORT M.D., RADIOLOGY RESIDENT  This document has been electronically signed.  JAKE MONTES M.D., ATTENDING RADIOLOGIST  This document has been electronically signed. 2018  9:11PM    < end of copied text >  < from: CT Head No Cont (18 @ 19:46) >  EXAM:  CT BRAIN                          PROCEDURE DATE:  2018                     INTERPRETATION:  PROCEDURE: CT brain without intravenous contrast    INDICATIONS: Status post fall on Eliquis.    TECHNIQUE:  Serial axial images were obtainedfrom the skull base to the   vertex without the use of intravenous contrast. Imaging is performed   using helical low-dose technique, and sagittal and coronal reformations   are provided.     COMPARISON EXAMINATION: 17    FINDINGS:    VENTRICLESAND SULCI: Age-appropriate generalized parenchymal volume. No   hydrocephalus.  INTRA-AXIAL: No acute intracranial hemorrhage. The gray-white matter   differentiation appears within normal limits, without evidence of acute   transcortical infarct. Nomidline shift present. Confluent   periventricular lucency is noted, suggestive of moderate microangiopathic   ischemic disease.   EXTRA-AXIAL: No extra-axial fluid collection is present.   VISUALIZED SINUSES: No air-fluid levels are identified.   VISUALIZED MASTOIDS:  Clear.  CALVARIUM: No fracture.  MISCELLANEOUS:  None.    IMPRESSION:  No acute intracranial hemorrhage or calvarial fracture.            "Thank you for the opportunity to participate in the care of this   patient."    COCA FRANCISCO J CASALDUC M.D., RADIOLOGY RESIDENT  This document has been electronically signed.  JAKE MONTES M.D., ATTENDING RADIOLOGIST  This document has been electronically signed. 2018  8:34PM    < end of copied text >

## 2018-02-04 NOTE — ED PROVIDER NOTE - ENMT, MLM
Airway patent, Nasal mucosa clear. Dry MM. Mild L infra-orbital ecchymosis with mild ttp. no crepitus, step offs or instability. remainder of facial bones unremarkable

## 2018-02-04 NOTE — ED PROVIDER NOTE - MEDICAL DECISION MAKING DETAILS
Pt presents s/p fall. Pt w/ multiple falls. Pt appears deconditioned, dehydrated. Poor hx. In addition to fx / dislocation, consider dehydration, electrolyte imbalance, metabolic disturbance, rhabdo, other pathology. Check labs, EKG, CXR, UA, rectal temp, CT / XR. Likely admit. Dispo pending w/u and clinical status

## 2018-02-04 NOTE — ED ADULT NURSE NOTE - OBJECTIVE STATEMENT
patient BIBA from home. he states that he was getting up last night to find his glasses and fell down. no LOC but hit his face and arms. denies headache, dizziness, vision change, fever, chills. patient states that he lives alone but has people checking in on him from his building and speaks to his niece regularly. denies chest pain, sob. dry mucous membranes. labs drawn and sent, pending MD field

## 2018-02-04 NOTE — ED ADULT NURSE REASSESSMENT NOTE - NS ED NURSE REASSESS COMMENT FT1
Pt reports discomfort to rt side when he coughs. Pt instructed on how splint when  coughing. Pt positioned for comfort. Per MD pt has rib fracture on right side and possible small pneumothorax. Safety precautions in place. Close monitoring continues.

## 2018-02-04 NOTE — CONSULT NOTE ADULT - CONSULT REASON
Initial Cardiology evaluation and management s/p fall Initial Cardiology evaluation and management chf now s/p fall

## 2018-02-04 NOTE — ED PROVIDER NOTE - DIAGNOSTIC INTERPRETATION
ER Physician: Anel Sanders DO  CHEST XRAY INTERPRETATION: lungs clear, heart shadow normal, bony structures intact. no sig interval change  ER Physician: Anel Sanders DO  INTERPRETATION:  compression deformity L1; no soft tissue swelling noted; normal bony alignment.

## 2018-02-04 NOTE — ED PROVIDER NOTE - MUSCULOSKELETAL, MLM
+ ecchymosis w/ overlying superficial skin avulsion to midline lower back (lower thoracic, upper lumbar) w/ mild associated swelling & ttp. Mildly dec ROM. Remainder of spine unremarkable and non tender. FROM all joints x 4 ext w/o signs of trauma, dec ROM, or pain.

## 2018-02-04 NOTE — ED PROVIDER NOTE - RESPIRATORY, MLM
Fine bibasilar crackles. No increased WOB, tachypnea, hypoxia, or accessory mm use. Pt speaks in full sentences.

## 2018-02-04 NOTE — ED ADULT TRIAGE NOTE - OTHER COMPLAINTS
As per EMS "we spoke to his niece and she said he's been falling lately." Patient denies any dizziness or LOC. Noted to have "dry blood" below right eye. Patient is on Eliquis.

## 2018-02-04 NOTE — ED PROVIDER NOTE - PROGRESS NOTE DETAILS
Elevated trop / CKMB / CPK w/ JARED, in the setting of recent fall, ? on ground for long time. Rhabdo vs NSTEMI. No EKG changes. Pt reports compliance w/ Eliquis. No clinical si / sx DVT. No CP, SOB, or hypoxia. Low suspicion PE. Pt a/w elevated WBC, hx CLL, also w/ new compression deformity, likely 2/2 to these. Afebrile, no infectious sx. D/w Dr Chester pt's PCP / cardiologist- low suspicion NSTEMI. Requesting pt be admitted to Dr Sierra. D/w Dr Sierra, admit to his service, tele. He has requesting the cardiology fellow to see the pt Radiology over- read 3 rib fx, tiny CTS consulted and will see the pt Transfer center @ [facility] Called' pt's jessica [facility] [phone] Radiology over- read 3 rib fx, tiny apical ptx. Will cancel admission, consult CTS. Dr Subramanian made aware Pt seen by CTS, requesting trasnfer to trauma center. Transfer center @ [facility]. D/w trauma surgeon and ED attending, will get dedicated CT chest while awaiting EMS, they will arrange Pt transferred to [facility]. EMS left prior to images uploaded on CT. Only received CT TS images. Called' pt's niece Gifty [phone] to update on pt's dx, transfer, and spoke with her. She will call [facility] for further info

## 2018-02-04 NOTE — ED PROVIDER NOTE - SKIN, MLM
Skin normal color for race, warm, dry and intact. Superficial skin avulsion to midline lower back, R superior nasolabial fold

## 2018-02-04 NOTE — ED PROVIDER NOTE - GASTROINTESTINAL, MLM
Abd soft, NT, ND, NABS. No guarding, rebound, or rigidity. No pulsatile abdominal masses. No organomegaly appreciated. Abd soft, NT, ND, NABS. No guarding, rebound, or rigidity. No pulsatile abdominal masses. No organomegaly appreciated. old-appearing ecchymosis (yellow, faint) to LLQ, non tender

## 2018-02-04 NOTE — ED PROVIDER NOTE - CARE PLAN
Principal Discharge DX:	Fall, initial encounter  Secondary Diagnosis:	Compression deformity of vertebra  Secondary Diagnosis:	Abnormal cardiac enzyme level Principal Discharge DX:	Closed fracture of multiple ribs of right side, initial encounter  Secondary Diagnosis:	Compression deformity of vertebra  Secondary Diagnosis:	Abnormal cardiac enzyme level

## 2018-02-05 NOTE — PATIENT PROFILE ADULT. - AGENT'S NAME
Pt tolerating popsicle with no n/v.   Pt discharged home with parent/guardian. Pt acting age appropriately, respirations regular and unlabored, cap refill less than two seconds. Skin pink, dry and warm. Lungs clear bilaterally. No further complaints at this time. Parent/guardian verbalized understanding of discharge paperwork and has no further questions at this time. Education provided about continuation of care, follow up care and medication administration. Parent/guardian able to provided teach back about discharge instructions.
Jonathan Dahl/Judith Claudio

## 2018-02-05 NOTE — CONSULT NOTE ADULT - CONSULT REASON
pneumothorax/hemothorax after a mechanical fall  This is a 77year old male with a past medical history of CLL dx 16yrs ago, obstructive uropathy,  right hip fracture (5/2017), pneumonia, h/o PE on eliquis, diastolic CHF, BIBA to [facility] ED after attempting to get out of bed to look for his glasses and falling hitting right side of face.  Patient reports he's had multiple falls despite using a walker.  Denies LOC, dizziness, chest pain, palpitation, sob or any h/o syncope.  Patient was unable to give time he was lying on the floor of his home. Thoracic service was consulted to evaluate patient after the CT Thoracic spine showed multiple right rib fractures 7-10, mild to moderate right hemothorax. pneumothorax/hemothorax after a mechanical fall

## 2018-02-05 NOTE — CONSULT NOTE ADULT - ASSESSMENT
Assessement  s/p fall with multiple rib fractures, trace right apical pneumothorax, mild-mod right hemothorax, and acute compression fx of L1    Recommendations  Hold Anticoagulation  Send type and screen  serial H/H   Needs ICU for observation   Needs to be evaluated by a trauma team (if possible consider transfer to a trauma center)  This service will be accept patient if stable for 24 hours  Dr. Syed discussed recommendations with Dr Sanders
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
## 2018-02-08 DIAGNOSIS — Z87.81 PERSONAL HISTORY OF (HEALED) TRAUMATIC FRACTURE: ICD-10-CM

## 2018-02-08 DIAGNOSIS — E86.0 DEHYDRATION: ICD-10-CM

## 2018-02-08 DIAGNOSIS — G89.11 ACUTE PAIN DUE TO TRAUMA: ICD-10-CM

## 2018-02-08 DIAGNOSIS — S27.0XXA TRAUMATIC PNEUMOTHORAX, INITIAL ENCOUNTER: ICD-10-CM

## 2018-02-08 DIAGNOSIS — S22.41XA MULTIPLE FRACTURES OF RIBS, RIGHT SIDE, INITIAL ENCOUNTER FOR CLOSED FRACTURE: ICD-10-CM

## 2018-02-08 DIAGNOSIS — D72.829 ELEVATED WHITE BLOOD CELL COUNT, UNSPECIFIED: ICD-10-CM

## 2018-02-08 DIAGNOSIS — M54.5 LOW BACK PAIN: ICD-10-CM

## 2018-02-08 DIAGNOSIS — S27.1XXA TRAUMATIC HEMOTHORAX, INITIAL ENCOUNTER: ICD-10-CM

## 2018-02-08 DIAGNOSIS — I50.32 CHRONIC DIASTOLIC (CONGESTIVE) HEART FAILURE: ICD-10-CM

## 2018-02-08 DIAGNOSIS — Z98.890 OTHER SPECIFIED POSTPROCEDURAL STATES: ICD-10-CM

## 2018-02-08 DIAGNOSIS — D64.9 ANEMIA, UNSPECIFIED: ICD-10-CM

## 2018-02-08 DIAGNOSIS — S32.019A UNSPECIFIED FRACTURE OF FIRST LUMBAR VERTEBRA, INITIAL ENCOUNTER FOR CLOSED FRACTURE: ICD-10-CM

## 2018-02-08 DIAGNOSIS — Z79.899 OTHER LONG TERM (CURRENT) DRUG THERAPY: ICD-10-CM

## 2018-02-08 DIAGNOSIS — C91.10 CHRONIC LYMPHOCYTIC LEUKEMIA OF B-CELL TYPE NOT HAVING ACHIEVED REMISSION: ICD-10-CM
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
## 2018-02-08 DIAGNOSIS — N17.9 ACUTE KIDNEY FAILURE, UNSPECIFIED: ICD-10-CM

## 2018-02-08 DIAGNOSIS — Z86.711 PERSONAL HISTORY OF PULMONARY EMBOLISM: ICD-10-CM

## 2018-02-08 DIAGNOSIS — Z23 ENCOUNTER FOR IMMUNIZATION: ICD-10-CM

## 2018-02-08 DIAGNOSIS — N40.0 BENIGN PROSTATIC HYPERPLASIA WITHOUT LOWER URINARY TRACT SYMPTOMS: ICD-10-CM
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
## 2018-02-08 DIAGNOSIS — R29.6 REPEATED FALLS: ICD-10-CM

## 2018-02-08 DIAGNOSIS — Z82.49 FAMILY HISTORY OF ISCHEMIC HEART DISEASE AND OTHER DISEASES OF THE CIRCULATORY SYSTEM: ICD-10-CM

## 2018-02-08 DIAGNOSIS — Z79.01 LONG TERM (CURRENT) USE OF ANTICOAGULANTS: ICD-10-CM

## 2018-02-28 ENCOUNTER — INPATIENT (INPATIENT)
Facility: HOSPITAL | Age: 78
LOS: 0 days | DRG: 872 | End: 2018-02-28
Attending: INTERNAL MEDICINE | Admitting: INTERNAL MEDICINE
Payer: MEDICARE

## 2018-02-28 ENCOUNTER — TRANSCRIPTION ENCOUNTER (OUTPATIENT)
Age: 78
End: 2018-02-28

## 2018-02-28 VITALS
SYSTOLIC BLOOD PRESSURE: 74 MMHG | RESPIRATION RATE: 26 BRPM | DIASTOLIC BLOOD PRESSURE: 45 MMHG | HEART RATE: 111 BPM | OXYGEN SATURATION: 98 %

## 2018-02-28 VITALS — HEART RATE: 123 BPM | DIASTOLIC BLOOD PRESSURE: 163 MMHG | SYSTOLIC BLOOD PRESSURE: 218 MMHG | RESPIRATION RATE: 14 BRPM

## 2018-02-28 DIAGNOSIS — A41.9 SEPSIS, UNSPECIFIED ORGANISM: ICD-10-CM

## 2018-02-28 DIAGNOSIS — Z96.649 PRESENCE OF UNSPECIFIED ARTIFICIAL HIP JOINT: Chronic | ICD-10-CM

## 2018-02-28 DIAGNOSIS — N39.0 URINARY TRACT INFECTION, SITE NOT SPECIFIED: ICD-10-CM

## 2018-02-28 DIAGNOSIS — I46.9 CARDIAC ARREST, CAUSE UNSPECIFIED: ICD-10-CM

## 2018-02-28 LAB
ALBUMIN SERPL ELPH-MCNC: 3.1 G/DL — LOW (ref 3.3–5)
ALP SERPL-CCNC: 188 U/L — HIGH (ref 40–120)
ALT FLD-CCNC: 1023 U/L — HIGH (ref 10–45)
ANION GAP SERPL CALC-SCNC: 37 MMOL/L — HIGH (ref 5–17)
ANISOCYTOSIS BLD QL: SLIGHT — SIGNIFICANT CHANGE UP
APTT BLD: 55.6 SEC — HIGH (ref 27.5–37.4)
AST SERPL-CCNC: 1025 U/L — HIGH (ref 10–40)
BASE EXCESS BLDV CALC-SCNC: -20 MMOL/L — SIGNIFICANT CHANGE UP
BASOPHILS NFR BLD AUTO: 0 % — SIGNIFICANT CHANGE UP (ref 0–2)
BILIRUB SERPL-MCNC: 0.6 MG/DL — SIGNIFICANT CHANGE UP (ref 0.2–1.2)
BLD GP AB SCN SERPL QL: NEGATIVE — SIGNIFICANT CHANGE UP
BUN SERPL-MCNC: 29 MG/DL — HIGH (ref 7–23)
BURR CELLS BLD QL SMEAR: SIGNIFICANT CHANGE UP
BURR CELLS BLD QL SMEAR: SIGNIFICANT CHANGE UP
CA-I SERPL-SCNC: 1.42 MMOL/L — HIGH (ref 1.12–1.3)
CALCIUM SERPL-MCNC: 11.7 MG/DL — HIGH (ref 8.4–10.5)
CHLORIDE SERPL-SCNC: 94 MMOL/L — LOW (ref 96–108)
CK MB CFR SERPL CALC: 11.9 NG/ML — HIGH (ref 0–6.7)
CK SERPL-CCNC: 308 U/L — HIGH (ref 30–200)
CO2 SERPL-SCNC: 15 MMOL/L — LOW (ref 22–31)
CREAT SERPL-MCNC: 2.47 MG/DL — HIGH (ref 0.5–1.3)
DACRYOCYTES BLD QL SMEAR: SLIGHT — SIGNIFICANT CHANGE UP
EOSINOPHIL NFR BLD AUTO: 0 % — SIGNIFICANT CHANGE UP (ref 0–6)
GAS PNL BLDV: 144 MMOL/L — SIGNIFICANT CHANGE UP (ref 138–146)
GAS PNL BLDV: SIGNIFICANT CHANGE UP
GAS PNL BLDV: SIGNIFICANT CHANGE UP
GIANT PLATELETS BLD QL SMEAR: PRESENT — SIGNIFICANT CHANGE UP
GLUCOSE BLDC GLUCOMTR-MCNC: 230 MG/DL — HIGH (ref 70–99)
GLUCOSE SERPL-MCNC: 249 MG/DL — HIGH (ref 70–99)
HCO3 BLDV-SCNC: 16 MMOL/L — LOW (ref 20–27)
HCT VFR BLD CALC: 39.5 % — SIGNIFICANT CHANGE UP (ref 39–50)
HGB BLD-MCNC: 11.3 G/DL — LOW (ref 13–17)
INR BLD: 1.44 — HIGH (ref 0.88–1.16)
LACTATE SERPL-SCNC: 21 MMOL/L — CRITICAL HIGH (ref 0.5–2)
LG PLATELETS BLD QL AUTO: PRESENT — SIGNIFICANT CHANGE UP
LYMPHOCYTES # BLD AUTO: 80 % — HIGH (ref 13–44)
MACROCYTES BLD QL: SLIGHT — SIGNIFICANT CHANGE UP
MANUAL DIF COMMENT BLD-IMP: SIGNIFICANT CHANGE UP
MANUAL SMEAR VERIFICATION: SIGNIFICANT CHANGE UP
MCHC RBC-ENTMCNC: 28.6 G/DL — LOW (ref 32–36)
MCHC RBC-ENTMCNC: 29.1 PG — SIGNIFICANT CHANGE UP (ref 27–34)
MCV RBC AUTO: 101.8 FL — HIGH (ref 80–100)
MICROCYTES BLD QL: SLIGHT — SIGNIFICANT CHANGE UP
MONOCYTES NFR BLD AUTO: 3 % — SIGNIFICANT CHANGE UP (ref 2–14)
NEUTROPHILS NFR BLD AUTO: 10 % — LOW (ref 43–77)
NEUTS BAND # BLD: 7 % — SIGNIFICANT CHANGE UP
NT-PROBNP SERPL-SCNC: 871 PG/ML — HIGH (ref 0–300)
OVALOCYTES BLD QL SMEAR: SLIGHT — SIGNIFICANT CHANGE UP
PCO2 BLDV: 106 MMHG — HIGH (ref 41–51)
PH BLDV: 6.79 — CRITICAL LOW (ref 7.32–7.43)
PLAT MORPH BLD: (no result)
PLATELET # BLD AUTO: 213 K/UL — SIGNIFICANT CHANGE UP (ref 150–400)
PLATELET CLUMP BLD QL SMEAR: PRESENT
PO2 BLDV: 25 MMHG — SIGNIFICANT CHANGE UP
POIKILOCYTOSIS BLD QL AUTO: SLIGHT — SIGNIFICANT CHANGE UP
POLYCHROMASIA BLD QL SMEAR: SLIGHT — SIGNIFICANT CHANGE UP
POTASSIUM BLDV-SCNC: 6 MMOL/L — HIGH (ref 3.5–4.9)
POTASSIUM SERPL-MCNC: 6.1 MMOL/L — HIGH (ref 3.5–5.3)
POTASSIUM SERPL-SCNC: 6.1 MMOL/L — HIGH (ref 3.5–5.3)
PROT SERPL-MCNC: 5.7 G/DL — LOW (ref 6–8.3)
PROTHROM AB SERPL-ACNC: 16.1 SEC — HIGH (ref 9.8–12.7)
RBC # BLD: 3.88 M/UL — LOW (ref 4.2–5.8)
RBC # FLD: 17.2 % — HIGH (ref 10.3–16.9)
RBC BLD AUTO: (no result)
RH IG SCN BLD-IMP: POSITIVE — SIGNIFICANT CHANGE UP
SAO2 % BLDV: 52 % — SIGNIFICANT CHANGE UP
SMUDGE CELLS # BLD: SIGNIFICANT CHANGE UP
SODIUM SERPL-SCNC: 146 MMOL/L — HIGH (ref 135–145)
TROPONIN T SERPL-MCNC: 0.12 NG/ML — CRITICAL HIGH (ref 0–0.01)
WBC # BLD: 30.8 K/UL — HIGH (ref 3.8–10.5)
WBC # FLD AUTO: 30.8 K/UL — HIGH (ref 3.8–10.5)

## 2018-02-28 PROCEDURE — 72100 X-RAY EXAM L-S SPINE 2/3 VWS: CPT

## 2018-02-28 PROCEDURE — 70450 CT HEAD/BRAIN W/O DYE: CPT

## 2018-02-28 PROCEDURE — 93010 ELECTROCARDIOGRAM REPORT: CPT

## 2018-02-28 PROCEDURE — 82553 CREATINE MB FRACTION: CPT

## 2018-02-28 PROCEDURE — 72128 CT CHEST SPINE W/O DYE: CPT

## 2018-02-28 PROCEDURE — 36415 COLL VENOUS BLD VENIPUNCTURE: CPT

## 2018-02-28 PROCEDURE — 71045 X-RAY EXAM CHEST 1 VIEW: CPT

## 2018-02-28 PROCEDURE — 82550 ASSAY OF CK (CPK): CPT

## 2018-02-28 PROCEDURE — 71045 X-RAY EXAM CHEST 1 VIEW: CPT | Mod: 26

## 2018-02-28 PROCEDURE — 82962 GLUCOSE BLOOD TEST: CPT

## 2018-02-28 PROCEDURE — 85610 PROTHROMBIN TIME: CPT

## 2018-02-28 PROCEDURE — 99291 CRITICAL CARE FIRST HOUR: CPT | Mod: 25

## 2018-02-28 PROCEDURE — 83735 ASSAY OF MAGNESIUM: CPT

## 2018-02-28 PROCEDURE — 99285 EMERGENCY DEPT VISIT HI MDM: CPT | Mod: 25

## 2018-02-28 PROCEDURE — 85730 THROMBOPLASTIN TIME PARTIAL: CPT

## 2018-02-28 PROCEDURE — 85025 COMPLETE CBC W/AUTO DIFF WBC: CPT

## 2018-02-28 PROCEDURE — 90715 TDAP VACCINE 7 YRS/> IM: CPT

## 2018-02-28 PROCEDURE — 80053 COMPREHEN METABOLIC PANEL: CPT

## 2018-02-28 PROCEDURE — 71250 CT THORAX DX C-: CPT

## 2018-02-28 PROCEDURE — 72125 CT NECK SPINE W/O DYE: CPT

## 2018-02-28 PROCEDURE — 74176 CT ABD & PELVIS W/O CONTRAST: CPT

## 2018-02-28 PROCEDURE — 93005 ELECTROCARDIOGRAM TRACING: CPT | Mod: 76

## 2018-02-28 PROCEDURE — 84484 ASSAY OF TROPONIN QUANT: CPT

## 2018-02-28 RX ORDER — SODIUM CHLORIDE 9 MG/ML
1000 INJECTION INTRAMUSCULAR; INTRAVENOUS; SUBCUTANEOUS ONCE
Qty: 0 | Refills: 0 | Status: COMPLETED | OUTPATIENT
Start: 2018-02-28 | End: 2018-02-28

## 2018-02-28 RX ORDER — MORPHINE SULFATE 50 MG/1
5 CAPSULE, EXTENDED RELEASE ORAL
Qty: 100 | Refills: 0 | Status: DISCONTINUED | OUTPATIENT
Start: 2018-02-28 | End: 2018-02-28

## 2018-02-28 RX ORDER — HYDROMORPHONE HYDROCHLORIDE 2 MG/ML
1 INJECTION INTRAMUSCULAR; INTRAVENOUS; SUBCUTANEOUS ONCE
Qty: 0 | Refills: 0 | Status: DISCONTINUED | OUTPATIENT
Start: 2018-02-28 | End: 2018-02-28

## 2018-02-28 RX ORDER — NOREPINEPHRINE BITARTRATE/D5W 8 MG/250ML
0.01 PLASTIC BAG, INJECTION (ML) INTRAVENOUS
Qty: 8 | Refills: 0 | Status: DISCONTINUED | OUTPATIENT
Start: 2018-02-28 | End: 2018-02-28

## 2018-02-28 RX ORDER — VANCOMYCIN HCL 1 G
1250 VIAL (EA) INTRAVENOUS ONCE
Qty: 0 | Refills: 0 | Status: COMPLETED | OUTPATIENT
Start: 2018-02-28 | End: 2018-02-28

## 2018-02-28 RX ORDER — HYDROMORPHONE HYDROCHLORIDE 2 MG/ML
0.2 INJECTION INTRAMUSCULAR; INTRAVENOUS; SUBCUTANEOUS
Qty: 100 | Refills: 0 | Status: DISCONTINUED | OUTPATIENT
Start: 2018-02-28 | End: 2018-02-28

## 2018-02-28 RX ORDER — SODIUM BICARBONATE 1 MEQ/ML
150 SYRINGE (ML) INTRAVENOUS ONCE
Qty: 0 | Refills: 0 | Status: COMPLETED | OUTPATIENT
Start: 2018-02-28 | End: 2018-02-28

## 2018-02-28 RX ORDER — PIPERACILLIN AND TAZOBACTAM 4; .5 G/20ML; G/20ML
3.38 INJECTION, POWDER, LYOPHILIZED, FOR SOLUTION INTRAVENOUS ONCE
Qty: 0 | Refills: 0 | Status: COMPLETED | OUTPATIENT
Start: 2018-02-28 | End: 2018-02-28

## 2018-02-28 RX ORDER — DOPAMINE HYDROCHLORIDE 40 MG/ML
15 INJECTION, SOLUTION, CONCENTRATE INTRAVENOUS
Qty: 400 | Refills: 0 | Status: DISCONTINUED | OUTPATIENT
Start: 2018-02-28 | End: 2018-02-28

## 2018-02-28 RX ORDER — SODIUM CHLORIDE 9 MG/ML
3 INJECTION INTRAMUSCULAR; INTRAVENOUS; SUBCUTANEOUS ONCE
Qty: 0 | Refills: 0 | Status: COMPLETED | OUTPATIENT
Start: 2018-02-28 | End: 2018-02-28

## 2018-02-28 RX ORDER — CALCIUM GLUCONATE 100 MG/ML
1 VIAL (ML) INTRAVENOUS ONCE
Qty: 0 | Refills: 0 | Status: COMPLETED | OUTPATIENT
Start: 2018-02-28 | End: 2018-02-28

## 2018-02-28 RX ADMIN — SODIUM CHLORIDE 2000 MILLILITER(S): 9 INJECTION INTRAMUSCULAR; INTRAVENOUS; SUBCUTANEOUS at 13:27

## 2018-02-28 RX ADMIN — Medication 166.67 MILLIGRAM(S): at 14:02

## 2018-02-28 RX ADMIN — HYDROMORPHONE HYDROCHLORIDE 1 MILLIGRAM(S): 2 INJECTION INTRAMUSCULAR; INTRAVENOUS; SUBCUTANEOUS at 14:57

## 2018-02-28 RX ADMIN — Medication 200 GRAM(S): at 13:51

## 2018-02-28 RX ADMIN — SODIUM CHLORIDE 3 MILLILITER(S): 9 INJECTION INTRAMUSCULAR; INTRAVENOUS; SUBCUTANEOUS at 12:55

## 2018-02-28 RX ADMIN — Medication 150 MILLIEQUIVALENT(S): at 13:46

## 2018-02-28 RX ADMIN — Medication 1.5 MICROGRAM(S)/KG/MIN: at 14:36

## 2018-02-28 RX ADMIN — SODIUM CHLORIDE 2000 MILLILITER(S): 9 INJECTION INTRAMUSCULAR; INTRAVENOUS; SUBCUTANEOUS at 14:36

## 2018-02-28 RX ADMIN — SODIUM CHLORIDE 1000 MILLILITER(S): 9 INJECTION INTRAMUSCULAR; INTRAVENOUS; SUBCUTANEOUS at 12:55

## 2018-02-28 RX ADMIN — PIPERACILLIN AND TAZOBACTAM 200 GRAM(S): 4; .5 INJECTION, POWDER, LYOPHILIZED, FOR SOLUTION INTRAVENOUS at 13:34

## 2018-02-28 NOTE — ED PROVIDER NOTE - DIAGNOSTIC INTERPRETATION
CXR: ett in place above tim, no focal consolidation, normal bones, normal cardiac contour.  read by Dr. Beaver

## 2018-02-28 NOTE — DISCHARGE NOTE ADULT - PATIENT PORTAL LINK FT
You can access the [facility], offered by [facility], by registering with the following website: [email or web address]

## 2018-02-28 NOTE — H&P ADULT - PROBLEM SELECTOR PLAN 1
s/p PEA arrest at home, achieved ROSC after 30 minutes, however without brainstem reflexes, pH 6.7, lactate 21. Patient's HCP confirmed from signed paperwork on prior admission as his nephew and nephew's wife, with the same contact number. Nephew's wife reached and informed of patient's status and poor prognosis for recovery of neurologic function. She spoke with patient's nephew, and agreed to make patient DNR/DNI and for comfort measures only.   - Patient's HHA at bedside per family request. Will plan for terminal extubation and  comfort measures   - Dilaudid 1mg IV prior to extubation.

## 2018-02-28 NOTE — ED PROVIDER NOTE - MEDICAL DECISION MAKING DETAILS
cardiac arrest with rosc.  suspect sepsis from uti source given urine in rodriguez bag.  labs with severe lactic acidosis.  started volume resuscitation with ns, vanc/zosyn for sepsis, peripheral dopamine for low bp.  icu consulted along with cardiology.  per cards, medical management of presumed sepsis.  icu requested bicarb and additional calcium.  initial plan to ct head r/o bleed and chest r/o pe but icu requested scans be held pending improvement in clinical condition as currently very guarded

## 2018-02-28 NOTE — CONSULT NOTE ADULT - PROBLEM SELECTOR RECOMMENDATION 2
elevated WBC, hypothermia, and tachycardia, along with purulent urine secondary to UTI.   - s/p Vancomycin and Zosyn, patient will be full comfort measures only

## 2018-02-28 NOTE — ED ADULT NURSE NOTE - OBJECTIVE STATEMENT
pt arrived to ED intubated and post arrest. Pt was at home with HHA. HHA stated that pt went to the bathroom independently and she then heard a thud and went to the bathroom and found him unresponsive and called EMS. EMS stated that pt was in asystole on arrival and it took 40min for ROSC. Pt currently with palpable carotid pulse. Pt is unresponsive and pupils are fixed and non reactive. Pt does not respond to pain. Pt was intubated by EMS in field with 7.0 ETT that is 24 at lip. R tibia IO placed by EMS in field

## 2018-02-28 NOTE — H&P ADULT - PROBLEM SELECTOR PLAN 4
Discussion held with patient's HCP, Gifty. Patient has no sign of brainstem function, and has very poor prognosis for recovery. Gifty and patient's 2nd HCP decided to make him DNR/DNI, full comfort measures only.

## 2018-02-28 NOTE — DISCHARGE NOTE FOR THE EXPIRED PATIENT - HOSPITAL COURSE
Patient is a 76yo M w/ PMH of CLL, PE on Eliquis, CHF, BPH s/p chronic rodriguez, urosepsis, PSHx R ORIF p/w frequent falls presenting after a cardiac arrest at home. The patient's HHA is at bedside, and provides the history for the patient. He was recently seen at [facility] ED after a fall, found to have multiple rib fractures, transferred to [place] and later to rehab. He had been home for one week. Per the HHA he did not appear much different from his baseline outside of appearing short of breath when he walked with PT. She was walking him to the bathroom earlier this morning when he became short of breath. She sat him down in a chair, when he slumped over. She did not feel a pulse, so she called 911 and began chest compressions herself. Per EMS records, the patient was in asystole on arrival, and was coded for an estimated 30 minutes, with 3 rounds of epinephrine before ROSC was achieved. The patient was intubated in the field and brought to [facility] ED.     MICU was consulted on arrival to [facility] ED. Patient was found to be comatose with GCS of 3. Pupils fixed, and dilated, without brainstem reflexes. Patient was septic and acidotic to 6.7, therefore not eligible for hypothermia protocol. Lactate elevated to 21. Patient without spontaneous respirations above ventilator settings. 3 amps sodium bicarbonate and 1G calcium gluconate given. Norepinephrine drip added to dopamine for blood pressure support. Family notified of poor prognosis, and decided to make patient DNR/DNI and full comfort measures. Patient's HHA at bedside per family's request and and patient terminally extubated at around 3:15pm, with subsequent cessation of spontaneous respirations immediately upon extubation and cessation of palpable pulse at 3:18pm. Patient pronounced at 3:18 based on cardiopulmonary criteria. Patient's HCP, Gifty notified by telephone.

## 2018-02-28 NOTE — H&P ADULT - NSHPPHYSICALEXAM_GEN_ALL_CORE
Vital Signs Last 24 Hrs  T(C): 37.1 (28 Feb 2018 12:52), Max: 37.1 (28 Feb 2018 12:52)  T(F): 98.7 (28 Feb 2018 12:52), Max: 98.7 (28 Feb 2018 12:52)  HR: 111 (28 Feb 2018 13:40) (111 - 123)  BP: 92/55 (28 Feb 2018 13:40) (57/31 - 218/163)  BP(mean): --  RR: 16 (28 Feb 2018 13:40) (14 - 16)  SpO2: 100% (28 Feb 2018 13:40) (95% - 100%)  I&O's Detail    CAPILLARY BLOOD GLUCOSE      POCT Blood Glucose.: 230 mg/dL (28 Feb 2018 12:54)    General: elderly male lying in bed intubated, nonresponsive to voice, painful stimuli, or sternal rub  HEENT: Pupils fixed and dilated; nonicteric; dry mucous membranes  Neck: supple  Heart: tachycardic, regular rhythm, no M/G/R  Lungs: CTAB, no wheezes/rales/rhonchi; breathing at set ventilatory rate of 16  Abdomen: soft, nondistended, decreased BS  Genitourinary: rodriguez placed draining purulent, opaque urine  Extremities: cool, dry  Neurological: GCS 3; pupils fixed and dilated, no doll's eye reflex, no corneal reflex, no gag reflex, no response to painful stimuli, decreased reflexes throughout  Skin: dry, intact

## 2018-02-28 NOTE — H&P ADULT - HISTORY OF PRESENT ILLNESS
Patient is a 78yo M w/ PMH of CLL, PE on Eliquis, CHF, BPH s/p chronic rodriguez, urosepsis, PSHx R ORIF p/w frequent falls presenting after a cardiac arrest at home. The patient's HHA is at bedside, and provides the history for the patient. He was recently seen at [facility] ED after a fall, found to have multiple rib fractures, transferred to [place] and later to rehab. He had been home for one week. Per the HHA he did not appear much different from his baseline outside of appearing short of breath when he walked with PT. She was walking him to the bathroom earlier this morning when he became short of breath. She sat him down in a chair, when he slumped over. She did not feel a pulse, so she called 911 and began chest compressions herself. Per EMS records, the patient was in asystole on arrival, and was coded for an estimated 30 minutes, with 3 rounds of epinephrine before ROSC was achieved. The patient was intubated in the field and brought to [facility] ED.     MICU was consulted on arrival to [facility] ED.

## 2018-02-28 NOTE — ED ADULT TRIAGE NOTE - CHIEF COMPLAINT QUOTE
pt was brought in s/p cardiac arrest. pt was found on the toilet at home unresponsive by home health aide. medics arrived pt was asystole, pt had ROSC. 3 epis, 1 CaCL, 2 Bicarbs were given, pt also started on Dopamine.

## 2018-02-28 NOTE — H&P ADULT - NSHPLABSRESULTS_GEN_ALL_CORE
Labs/Imaging:       CARDIAC MARKERS ( 28 Feb 2018 13:12 )  x     / 0.12 ng/mL / 308 U/L / x     / 11.9 ng/mL      CBC Full  -  ( 28 Feb 2018 13:12 )  WBC Count : 30.8 K/uL  Hemoglobin : 11.3 g/dL  Hematocrit : 39.5 %  Platelet Count - Automated : 213 K/uL  Mean Cell Volume : 101.8 fL  Mean Cell Hemoglobin : 29.1 pg  Mean Cell Hemoglobin Concentration : 28.6 g/dL  Auto Neutrophil # : x  Auto Lymphocyte # : x  Auto Monocyte # : x  Auto Eosinophil # : x  Auto Basophil # : x  Auto Neutrophil % : x  Auto Lymphocyte % : x  Auto Monocyte % : x  Auto Eosinophil % : x  Auto Basophil % : x    02-28    146<H>  |  94<L>  |  29<H>  ----------------------------<  249<H>  6.1<H>   |  15<L>  |  2.47<H>    Ca    11.7<H>      28 Feb 2018 13:12    TPro  5.7<L>  /  Alb  3.1<L>  /  TBili  0.6  /  DBili  x   /  AST  1025<H>  /  ALT  1023<H>  /  AlkPhos  188<H>  02-28    LIVER FUNCTIONS - ( 28 Feb 2018 13:12 )  Alb: 3.1 g/dL / Pro: 5.7 g/dL / ALK PHOS: 188 U/L / ALT: 1023 U/L / AST: 1025 U/L / GGT: x           PT/INR - ( 28 Feb 2018 13:12 )   PT: 16.1 sec;   INR: 1.44          PTT - ( 28 Feb 2018 13:12 )  PTT:55.6 sec        Lactate, Blood (02.28.18 @ 13:11)    Lactate, Blood: 21.0: TYPE:(C=Critical, N=Notification, A=Abnormal) C  TESTS: _LACT  DATE/TIME CALLED: _02/28/18 13:16  CALLED TO: MARQUISE RN  READ BACK (2 Patient Identifiers)(Y/N): _Y  READ BACK VALUES (Y/N): _Y  CALLED BY: _AWITKO mmoL/L    Blood Gas Profile - Venous (02.28.18 @ 13:02)    pH, Venous: 6.79: Critical value reported to and RBB Chelle Carpenter 02/28/18 13:05    pCO2, Venous: 106 mmHg    pO2, Venous: 25 mmHg    HCO3, Venous: 16 mmol/L    Base Excess, Venous: -20.0 mmol/L    Oxygen Saturation, Venous: 52 %    Blood Gas Source Venous: BLDA

## 2018-02-28 NOTE — ED PROVIDER NOTE - OBJECTIVE STATEMENT
brought in by ems post cardiac arrest.  Reportedly was on toilet when home attendant found him slumped over unresponsive.  She called 911 and started cpr.  Per ems, had about 30 min downtime prior to rosc.  Found by ems in asystole, then pea, then pulse regained.  Given epi x3, calcium 1 amp by ems.  Home attendant notes that for past few days HR has been fast and he has had difficulty with his breath when walking

## 2018-02-28 NOTE — ED PROVIDER NOTE - CARE PLAN
Principal Discharge DX:	Sepsis  Secondary Diagnosis:	Cardiac arrest Principal Discharge DX:	Sepsis  Secondary Diagnosis:	Cardiac arrest  Secondary Diagnosis:	UTI (urinary tract infection)

## 2018-02-28 NOTE — CONSULT NOTE ADULT - ASSESSMENT
78yo M w/ PMH of CLL, PE on Eliquis, CHF, BPH s/p chronic rodriguez, urosepsis, PSHx R ORIF p/w frequent falls presenting without brainstem reflexes, GCS 3 after a cardiac arrest.

## 2018-02-28 NOTE — CONSULT NOTE ADULT - PROBLEM SELECTOR RECOMMENDATION 9
s/p PEA arrest at home, achieved ROSC after 30 minutes, however without brainstem reflexes, pH 6.7, lactate 21. Patient's HCP confirmed from signed paperwork on prior admission as his nephew and nephew's wife, with the same contact number. Nephew's wife reached and informed of patient's status and poor prognosis for recovery of neurologic function. She spoke with patient's nephew, and agreed to make patient DNR/DNI and for comfort measures only.   - Patient's HHA at bedside per family request. Will plan for terminal extubation and  comfort measures   - Dilaudid 1mg IV prior to extubation

## 2018-02-28 NOTE — CONSULT NOTE ADULT - SUBJECTIVE AND OBJECTIVE BOX
ICU Consult Medicine Resident Note    Patient is a _____ _____ with past medical history of _____ presenting with       Past Medical History:  Past Surgical History:  Medications:  Allergies:  Social History:  Family History:    Physical Examination:   Vital Signs Last 24 Hrs  T(C): 37.1 (28 Feb 2018 12:52), Max: 37.1 (28 Feb 2018 12:52)  T(F): 98.7 (28 Feb 2018 12:52), Max: 98.7 (28 Feb 2018 12:52)  HR: 111 (28 Feb 2018 13:40) (111 - 123)  BP: 92/55 (28 Feb 2018 13:40) (57/31 - 218/163)  BP(mean): --  RR: 16 (28 Feb 2018 13:40) (14 - 16)  SpO2: 100% (28 Feb 2018 13:40) (95% - 100%)  I&O's Detail    CAPILLARY BLOOD GLUCOSE      POCT Blood Glucose.: 230 mg/dL (28 Feb 2018 12:54)    General:  HEENT:  Neck:  Heart:  Lungs:  Abdomen:  Rectal:  Back:  Genitourinary:  Extremities:  Neurological:  Skin:     Labs/Imaging:       CARDIAC MARKERS ( 28 Feb 2018 13:12 )  x     / 0.12 ng/mL / 308 U/L / x     / 11.9 ng/mL      CBC Full  -  ( 28 Feb 2018 13:12 )  WBC Count : 30.8 K/uL  Hemoglobin : 11.3 g/dL  Hematocrit : 39.5 %  Platelet Count - Automated : 213 K/uL  Mean Cell Volume : 101.8 fL  Mean Cell Hemoglobin : 29.1 pg  Mean Cell Hemoglobin Concentration : 28.6 g/dL  Auto Neutrophil # : x  Auto Lymphocyte # : x  Auto Monocyte # : x  Auto Eosinophil # : x  Auto Basophil # : x  Auto Neutrophil % : x  Auto Lymphocyte % : x  Auto Monocyte % : x  Auto Eosinophil % : x  Auto Basophil % : x    02-28    146<H>  |  94<L>  |  29<H>  ----------------------------<  249<H>  6.1<H>   |  15<L>  |  2.47<H>    Ca    11.7<H>      28 Feb 2018 13:12    TPro  5.7<L>  /  Alb  3.1<L>  /  TBili  0.6  /  DBili  x   /  AST  1025<H>  /  ALT  1023<H>  /  AlkPhos  188<H>  02-28    LIVER FUNCTIONS - ( 28 Feb 2018 13:12 )  Alb: 3.1 g/dL / Pro: 5.7 g/dL / ALK PHOS: 188 U/L / ALT: 1023 U/L / AST: 1025 U/L / GGT: x           PT/INR - ( 28 Feb 2018 13:12 )   PT: 16.1 sec;   INR: 1.44          PTT - ( 28 Feb 2018 13:12 )  PTT:55.6 sec ICU Consult Medicine Resident Note    Patient is a 78yo M w/ PMH of CLL, PE on Eliquis, CHF, BPH s/p chronic rodriguez, urosepsis, PSHx R ORIF p/w frequent falls presenting after a cardiac arrest at home. The patient's HHA is at bedside, and provides the history for the patient. He was recently seen at [facility] ED after a fall, found to have multiple rib fractures, transferred to [place] and later to rehab. He had been home for one week. Per the HHA he did not appear much different from his baseline outside of appearing short of breath when he walked with PT. She was walking him to the bathroom earlier this morning when he became short of breath. She sat him down in a chair, when he slumped over. She did not feel a pulse, so she called 911 and began chest compressions herself. Per EMS records, the patient was in asystole on arrival, and was coded for an estimated 30 minutes, with 3 rounds of epinephrine before ROSC was achieved. The patient was intubated in the field and brought to [facility] ED.     MICU was consulted on arrival to [facility] ED.     PAST MEDICAL & SURGICAL HISTORY:  Hip fracture requiring operative repair, right, sequela  PE (pulmonary thromboembolism)  CLL (chronic lymphocytic leukemia)  S/P hip hemiarthroplasty    Home Medications:  Ambien: 5- 30 mg ; takes 5- 20 mg at night to sleep (04 Feb 2018 18:16)  Dexilant 30 mg oral delayed release capsule: 1 cap(s) orally once a day (04 Feb 2018 18:16)  Eliquis 5 mg oral tablet: 1 tab(s) orally 2 times a day (04 Feb 2018 18:16)  furosemide 20 mg oral tablet: 1 tab(s) orally once a day (04 Feb 2018 18:16)  Multiple Vitamins oral tablet: 1 tab(s) orally once a day (04 Feb 2018 18:16)  senna oral tablet: 2 tab(s) orally once a day (at bedtime) (04 Feb 2018 18:16)  tamsulosin 0.4 mg oral capsule: 1 cap(s) orally once a day (04 Feb 2018 18:16)    Allergies    No Known Allergies    Intolerances      Social History: unable to obtain  Family History: noncontributory    Physical Examination:   Vital Signs Last 24 Hrs  T(C): 37.1 (28 Feb 2018 12:52), Max: 37.1 (28 Feb 2018 12:52)  T(F): 98.7 (28 Feb 2018 12:52), Max: 98.7 (28 Feb 2018 12:52)  HR: 111 (28 Feb 2018 13:40) (111 - 123)  BP: 92/55 (28 Feb 2018 13:40) (57/31 - 218/163)  BP(mean): --  RR: 16 (28 Feb 2018 13:40) (14 - 16)  SpO2: 100% (28 Feb 2018 13:40) (95% - 100%)  I&O's Detail    CAPILLARY BLOOD GLUCOSE      POCT Blood Glucose.: 230 mg/dL (28 Feb 2018 12:54)    General: elderly male lying in bed intubated, nonresponsive to voice, painful stimuli, or sternal rub  HEENT: Pupils fixed and dilated; nonicteric; dry mucous membranes  Neck: supple  Heart: tachycardic, regular rhythm, no M/G/R  Lungs: CTAB, no wheezes/rales/rhonchi; breathing at set ventilatory rate of 16  Abdomen: soft, nondistended, decreased BS  Genitourinary: rodriguez placed draining purulent, opaque urine  Extremities: cool, dry  Neurological: GCS 3; pupils fixed and dilated, no doll's eye reflex, no corneal reflex, no gag reflex, no response to painful stimuli, decreased reflexes throughout  Skin: dry, intact    Labs/Imaging:       CARDIAC MARKERS ( 28 Feb 2018 13:12 )  x     / 0.12 ng/mL / 308 U/L / x     / 11.9 ng/mL      CBC Full  -  ( 28 Feb 2018 13:12 )  WBC Count : 30.8 K/uL  Hemoglobin : 11.3 g/dL  Hematocrit : 39.5 %  Platelet Count - Automated : 213 K/uL  Mean Cell Volume : 101.8 fL  Mean Cell Hemoglobin : 29.1 pg  Mean Cell Hemoglobin Concentration : 28.6 g/dL  Auto Neutrophil # : x  Auto Lymphocyte # : x  Auto Monocyte # : x  Auto Eosinophil # : x  Auto Basophil # : x  Auto Neutrophil % : x  Auto Lymphocyte % : x  Auto Monocyte % : x  Auto Eosinophil % : x  Auto Basophil % : x    02-28    146<H>  |  94<L>  |  29<H>  ----------------------------<  249<H>  6.1<H>   |  15<L>  |  2.47<H>    Ca    11.7<H>      28 Feb 2018 13:12    TPro  5.7<L>  /  Alb  3.1<L>  /  TBili  0.6  /  DBili  x   /  AST  1025<H>  /  ALT  1023<H>  /  AlkPhos  188<H>  02-28    LIVER FUNCTIONS - ( 28 Feb 2018 13:12 )  Alb: 3.1 g/dL / Pro: 5.7 g/dL / ALK PHOS: 188 U/L / ALT: 1023 U/L / AST: 1025 U/L / GGT: x           PT/INR - ( 28 Feb 2018 13:12 )   PT: 16.1 sec;   INR: 1.44          PTT - ( 28 Feb 2018 13:12 )  PTT:55.6 sec ICU Consult Medicine Resident Note    Patient is a 78yo M w/ PMH of CLL, PE on Eliquis, CHF, BPH s/p chronic rodriguez, urosepsis, PSHx R ORIF p/w frequent falls presenting after a cardiac arrest at home. The patient's HHA is at bedside, and provides the history for the patient. He was recently seen at [facility] ED after a fall, found to have multiple rib fractures, transferred to [place] and later to rehab. He had been home for one week. Per the HHA he did not appear much different from his baseline outside of appearing short of breath when he walked with PT. She was walking him to the bathroom earlier this morning when he became short of breath. She sat him down in a chair, when he slumped over. She did not feel a pulse, so she called 911 and began chest compressions herself. Per EMS records, the patient was in asystole on arrival, and was coded for an estimated 30 minutes, with 3 rounds of epinephrine before ROSC was achieved. The patient was intubated in the field and brought to [facility] ED.     MICU was consulted on arrival to [facility] ED.     PAST MEDICAL & SURGICAL HISTORY:  Hip fracture requiring operative repair, right, sequela  PE (pulmonary thromboembolism)  CLL (chronic lymphocytic leukemia)  S/P hip hemiarthroplasty    Home Medications:  Ambien: 5- 30 mg ; takes 5- 20 mg at night to sleep (04 Feb 2018 18:16)  Dexilant 30 mg oral delayed release capsule: 1 cap(s) orally once a day (04 Feb 2018 18:16)  Eliquis 5 mg oral tablet: 1 tab(s) orally 2 times a day (04 Feb 2018 18:16)  furosemide 20 mg oral tablet: 1 tab(s) orally once a day (04 Feb 2018 18:16)  Multiple Vitamins oral tablet: 1 tab(s) orally once a day (04 Feb 2018 18:16)  senna oral tablet: 2 tab(s) orally once a day (at bedtime) (04 Feb 2018 18:16)  tamsulosin 0.4 mg oral capsule: 1 cap(s) orally once a day (04 Feb 2018 18:16)    Allergies    No Known Allergies    Intolerances      Social History: unable to obtain  Family History: noncontributory    ROS: unable to obtain   Physical Examination:   Vital Signs Last 24 Hrs  T(C): 37.1 (28 Feb 2018 12:52), Max: 37.1 (28 Feb 2018 12:52)  T(F): 98.7 (28 Feb 2018 12:52), Max: 98.7 (28 Feb 2018 12:52)  HR: 111 (28 Feb 2018 13:40) (111 - 123)  BP: 92/55 (28 Feb 2018 13:40) (57/31 - 218/163)  BP(mean): --  RR: 16 (28 Feb 2018 13:40) (14 - 16)  SpO2: 100% (28 Feb 2018 13:40) (95% - 100%)  I&O's Detail    CAPILLARY BLOOD GLUCOSE      POCT Blood Glucose.: 230 mg/dL (28 Feb 2018 12:54)    General: elderly male lying in bed intubated, nonresponsive to voice, painful stimuli, or sternal rub  HEENT: Pupils fixed and dilated; nonicteric; dry mucous membranes  Neck: supple  Heart: tachycardic, regular rhythm, no M/G/R  Lungs: CTAB, no wheezes/rales/rhonchi; breathing at set ventilatory rate of 16  Abdomen: soft, nondistended, decreased BS  Genitourinary: rodriguez placed draining purulent, opaque urine  Extremities: cool, dry  Neurological: GCS 3; pupils fixed and dilated, no doll's eye reflex, no corneal reflex, no gag reflex, no response to painful stimuli, decreased reflexes throughout  Skin: dry, intact    Labs/Imaging:       CARDIAC MARKERS ( 28 Feb 2018 13:12 )  x     / 0.12 ng/mL / 308 U/L / x     / 11.9 ng/mL      CBC Full  -  ( 28 Feb 2018 13:12 )  WBC Count : 30.8 K/uL  Hemoglobin : 11.3 g/dL  Hematocrit : 39.5 %  Platelet Count - Automated : 213 K/uL  Mean Cell Volume : 101.8 fL  Mean Cell Hemoglobin : 29.1 pg  Mean Cell Hemoglobin Concentration : 28.6 g/dL  Auto Neutrophil # : x  Auto Lymphocyte # : x  Auto Monocyte # : x  Auto Eosinophil # : x  Auto Basophil # : x  Auto Neutrophil % : x  Auto Lymphocyte % : x  Auto Monocyte % : x  Auto Eosinophil % : x  Auto Basophil % : x    02-28    146<H>  |  94<L>  |  29<H>  ----------------------------<  249<H>  6.1<H>   |  15<L>  |  2.47<H>    Ca    11.7<H>      28 Feb 2018 13:12    TPro  5.7<L>  /  Alb  3.1<L>  /  TBili  0.6  /  DBili  x   /  AST  1025<H>  /  ALT  1023<H>  /  AlkPhos  188<H>  02-28    LIVER FUNCTIONS - ( 28 Feb 2018 13:12 )  Alb: 3.1 g/dL / Pro: 5.7 g/dL / ALK PHOS: 188 U/L / ALT: 1023 U/L / AST: 1025 U/L / GGT: x           PT/INR - ( 28 Feb 2018 13:12 )   PT: 16.1 sec;   INR: 1.44          PTT - ( 28 Feb 2018 13:12 )  PTT:55.6 sec

## 2018-02-28 NOTE — H&P ADULT - PROBLEM SELECTOR PLAN 2
elevated WBC, hypothermia, and tachycardia, along with purulent urine secondary to UTI.   - s/p Vancomycin and Zosyn, patient will be full comfort measures only.

## 2018-03-01 DIAGNOSIS — Z71.89 OTHER SPECIFIED COUNSELING: ICD-10-CM

## 2018-03-01 LAB
-  STREPTOCOCCUS SP. (NOT GRP A, B OR S PNEUMONIAE): SIGNIFICANT CHANGE UP
GRAM STN FLD: SIGNIFICANT CHANGE UP
GRAM STN FLD: SIGNIFICANT CHANGE UP
METHOD TYPE: SIGNIFICANT CHANGE UP

## 2018-03-01 PROCEDURE — 86850 RBC ANTIBODY SCREEN: CPT

## 2018-03-01 PROCEDURE — 82553 CREATINE MB FRACTION: CPT

## 2018-03-01 PROCEDURE — 84295 ASSAY OF SERUM SODIUM: CPT

## 2018-03-01 PROCEDURE — 85730 THROMBOPLASTIN TIME PARTIAL: CPT

## 2018-03-01 PROCEDURE — 84484 ASSAY OF TROPONIN QUANT: CPT

## 2018-03-01 PROCEDURE — 82330 ASSAY OF CALCIUM: CPT

## 2018-03-01 PROCEDURE — 87184 SC STD DISK METHOD PER PLATE: CPT

## 2018-03-01 PROCEDURE — 84132 ASSAY OF SERUM POTASSIUM: CPT

## 2018-03-01 PROCEDURE — 80053 COMPREHEN METABOLIC PANEL: CPT

## 2018-03-01 PROCEDURE — 87150 DNA/RNA AMPLIFIED PROBE: CPT

## 2018-03-01 PROCEDURE — 83880 ASSAY OF NATRIURETIC PEPTIDE: CPT

## 2018-03-01 PROCEDURE — 82550 ASSAY OF CK (CPK): CPT

## 2018-03-01 PROCEDURE — 71045 X-RAY EXAM CHEST 1 VIEW: CPT

## 2018-03-01 PROCEDURE — 86900 BLOOD TYPING SEROLOGIC ABO: CPT

## 2018-03-01 PROCEDURE — 96374 THER/PROPH/DIAG INJ IV PUSH: CPT

## 2018-03-01 PROCEDURE — 99291 CRITICAL CARE FIRST HOUR: CPT | Mod: 25

## 2018-03-01 PROCEDURE — 82803 BLOOD GASES ANY COMBINATION: CPT

## 2018-03-01 PROCEDURE — 83605 ASSAY OF LACTIC ACID: CPT

## 2018-03-01 PROCEDURE — 86901 BLOOD TYPING SEROLOGIC RH(D): CPT

## 2018-03-01 PROCEDURE — 85025 COMPLETE CBC W/AUTO DIFF WBC: CPT

## 2018-03-01 PROCEDURE — 85610 PROTHROMBIN TIME: CPT

## 2018-03-01 PROCEDURE — 93005 ELECTROCARDIOGRAM TRACING: CPT

## 2018-03-01 PROCEDURE — 87040 BLOOD CULTURE FOR BACTERIA: CPT

## 2018-03-04 LAB
-  CEFTRIAXONE: SIGNIFICANT CHANGE UP
-  CEFTRIAXONE: SIGNIFICANT CHANGE UP
-  CLINDAMYCIN: SIGNIFICANT CHANGE UP
-  CLINDAMYCIN: SIGNIFICANT CHANGE UP
-  ERYTHROMYCIN: SIGNIFICANT CHANGE UP
-  ERYTHROMYCIN: SIGNIFICANT CHANGE UP
-  PENICILLIN: SIGNIFICANT CHANGE UP
-  PENICILLIN: SIGNIFICANT CHANGE UP
-  VANCOMYCIN: SIGNIFICANT CHANGE UP
-  VANCOMYCIN: SIGNIFICANT CHANGE UP
CULTURE RESULTS: SIGNIFICANT CHANGE UP
CULTURE RESULTS: SIGNIFICANT CHANGE UP
METHOD TYPE: SIGNIFICANT CHANGE UP
ORGANISM # SPEC MICROSCOPIC CNT: SIGNIFICANT CHANGE UP
SPECIMEN SOURCE: SIGNIFICANT CHANGE UP
SPECIMEN SOURCE: SIGNIFICANT CHANGE UP

## 2018-03-05 DIAGNOSIS — S22.49XD MULTIPLE FRACTURES OF RIBS, UNSPECIFIED SIDE, SUBSEQUENT ENCOUNTER FOR FRACTURE WITH ROUTINE HEALING: ICD-10-CM

## 2018-03-05 DIAGNOSIS — Z96.0 PRESENCE OF UROGENITAL IMPLANTS: ICD-10-CM

## 2018-03-05 DIAGNOSIS — A41.9 SEPSIS, UNSPECIFIED ORGANISM: ICD-10-CM

## 2018-03-05 DIAGNOSIS — Z86.711 PERSONAL HISTORY OF PULMONARY EMBOLISM: ICD-10-CM

## 2018-03-05 DIAGNOSIS — N40.0 BENIGN PROSTATIC HYPERPLASIA WITHOUT LOWER URINARY TRACT SYMPTOMS: ICD-10-CM

## 2018-03-05 DIAGNOSIS — I46.9 CARDIAC ARREST, CAUSE UNSPECIFIED: ICD-10-CM

## 2018-03-05 DIAGNOSIS — Z66 DO NOT RESUSCITATE: ICD-10-CM

## 2018-03-05 DIAGNOSIS — I50.9 HEART FAILURE, UNSPECIFIED: ICD-10-CM
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
## 2018-03-05 DIAGNOSIS — C91.10 CHRONIC LYMPHOCYTIC LEUKEMIA OF B-CELL TYPE NOT HAVING ACHIEVED REMISSION: ICD-10-CM

## 2018-03-05 DIAGNOSIS — N39.0 URINARY TRACT INFECTION, SITE NOT SPECIFIED: ICD-10-CM

## 2018-03-05 DIAGNOSIS — Z91.81 HISTORY OF FALLING: ICD-10-CM

## 2018-03-05 DIAGNOSIS — R40.2431 GLASGOW COMA SCALE SCORE 3-8, IN THE FIELD [EMT OR AMBULANCE]: ICD-10-CM

## 2018-03-08 LAB
GRAM STN FLD: SIGNIFICANT CHANGE UP
ORGANISM # SPEC MICROSCOPIC CNT: SIGNIFICANT CHANGE UP

## 2018-06-07 NOTE — ED ADULT NURSE NOTE - NURSING SKIN WOUND AREA #1
Use albuterol inhaler every 4 hours scheduled for the next 2 days and then as needed  Continue Dymista and saline irrigation   Can take probiotic with antibiotics to help with GI side effects  
medial

## 2018-07-05 NOTE — ED ADULT NURSE NOTE - EXTERNAL GENITALIA
EXAM DATE: 18



PATIENT'S AGE: 53





Patient: DARIUS MAJANO



Facility: [place], [place]

Patient ID: [id number]

Site Patient ID: [id number].

Site Accession #: [id number].

: 1964

Study: US Abdomen [id number]/4/2018 8:29:23 AM

Ordering Physician: Adrianna Headley



Final Report: 

HISTORY:

Elevated LFTs.



TECHNIQUE:

Limited abdominal ultrasound.



COMPARISON:

No prior.



FINDINGS:

Examination is significantly limited by patient body habitus. The pancreas is 
poorly seen and therefore poorly evaluated. There is coarsening of the hepatic 
echotexture. There is likely fatty infiltration of the liver. No moderate or 
large liver mass. No intrahepatic or extrahepatic biliary ductal dilatation. 
The extrahepatic bile duct measures approximate 4 mm. Gallbladder is poorly 
seen and therefore not well evaluated. No upper abdominal ascites. Right kidney 
size within normal limits. No right-sided hydronephrosis.



IMPRESSION:

1. Examination is significantly limited by patient body habitus.

2. No biliary ductal dilatation.

3. Probable fatty infiltration of liver with coarsening of hepatic echotexture. 
No focal masses.

4. Gallbladder is poorly seen and therefore poorly evaluated.





Dictated by Kavin Rose MD @ 2018 12:20PM

(Electronic Signature)





Report Signed by Proxy.
FIDEL normal

## 2018-07-25 PROBLEM — R94.31 ABNORMAL ELECTROCARDIOGRAM: Status: ACTIVE | Noted: 2017-05-07

## 2019-03-04 PROBLEM — R41.3 MEMORY LOSS: Status: ACTIVE | Noted: 2017-08-02

## 2019-05-14 NOTE — ED ADULT NURSE NOTE - CHIEF COMPLAINT
The patient is a 77y Male complaining of cough. Verbal order for medication refill per Steve Bhatia MD.

## 2019-06-24 NOTE — DISCHARGE NOTE ADULT - NS DC ANGIO PCI YN
Last refill 5/28/2019 for #90 with 0 refills    Sent for 6 months. Marily agrees to continue medication in 5/13/2019 visit   no

## 2019-08-14 NOTE — DIETITIAN INITIAL EVALUATION ADULT. - NUTRITION DIAGNOSTIC TERMINOLOGY #1
PT WAS AT [facility] ER YESTERDAY/SHE PASSED OUT WHILE SHE WAS DRIVING/ SHE IS OK BUT NEEDS TO FOLLOW UP WITH DR PIRES/ THEY RULED OUT A TIA BUT THERE IS NO OTHER DIAGNOSIS FOR HER/ CAN SHE BE PUT INTO DR PIRES'S SCHEDULE/ PLEASE CALL AND ADVISE THANKS   [employer]

## 2019-10-20 NOTE — ED PROVIDER NOTE - CONSTITUTIONAL, MLM
Chronic blood loss anemia normal... Elderly frail, awake, alert, oriented to person, place, time/situation and in no apparent distress.

## 2019-11-04 NOTE — CONSULT NOTE ADULT - ATTENDING COMMENTS
Office Visit    Patient Name: Prema Phillips    : 1947  MRN: [id number]    Subjective:  Prema is a 72 y.o. female who presents today for:    Annual Exam    Prema Phillips presents today for annual wellness exam and monitoring of chronic conditions including hyperlipidemia, nonocclusive bilateral carotid artery disease, acquired hypothyroidism, postmenopausal osteopenia with use of hormone replacement therapy, migraine headaches, allergic rhinitis, chronic constipation managed by outside GI- [facility].      Seen by me 2019 for preoperative clearance for cataract extraction and labs including CBC/CMP/TSH normal at that time.    She is postmenopausal.    She has a gynecologist Dr. Sheppard but no longer needs paps due to history of TAYLER/BSO.   Saw cardiologist Dr Ash 19-- stable and F/U 1 year-- Carotid stenosis improved on US (Carotid US 18 20 - 39% Internal Carotid stenosis bilaterally).      They have been feeling overall well.  Constipation:  Doing okay with MiraLax which she often finds that when she takes this regularly enough to help her constipation it makes her stools too loose.  Physical therapy for Neck pain was helpful. MSK/Osteoarthritis stable and takes Meloxicam as needed and doing better with taking daily Prilosec to protect against GERD.       General lifestyle habits are as follows:  Diet is described as healthy-- fruits/veggies/some fish, exercise is described as fair-- walking sometimes for exercise and working in yard and some neck exercises, sleep is described as fair- Ativan most nights helps (RX's by Dr Daniels)-- interrupted but ultimately sleeps 7 hours nightly-- recently with more trouble and would like additional sleep aid. Weight is overall stable at BMI 21.     Immunizations: TDaP 2015, Pneumovax 23 completed  and Prevnar 13 2016 , Zostavax completed, YEARLY FLU COMPLETED 10/5/2019, SHINGRIX ADVISED     Screening Tests: last Mammogram 2019 per 
GYN--> repeat one year, DEXA-->11/1/2017--> stable osteopenia and repeat 2 years- ORDERED, Colonoscopy up to date per Dr. Watt with [facility] & most recently was 8/27/2019- 5 year repeat advised, EGD 8/27/2019- Hep C screening negative 8/16/2016     Eye/Dental: up to date, ophthalmologist-- macular degeneration-- seeing retina specialist-- [facility], dental U[facility]       Past Medical History  Past Medical History:   Diagnosis Date    Allergic rhinitis 5/31/2016    Carotid artery plaque 11/18/2013    History of total hysterectomy with bilateral salpingo-oophorectomy (BSO) 5/31/2016    Hyperlipidemia 7/17/2012    Hypothyroid     Insomnia     Macular degeneration, right eye 5/31/2016    Migraine headache     Osteopenia of the elderly 10/20/2015    Postmenopausal HRT (hormone replacement therapy)     RBBB 7/17/2012       Past Surgical History  Past Surgical History:   Procedure Laterality Date    APPENDECTOMY      Catatact surgery Right 06/19/2019    without any complications    HYSTERECTOMY      STAPEDES SURGERY  2006    TOTAL ABDOMINAL HYSTERECTOMY W/ BILATERAL SALPINGOOPHORECTOMY      TUBAL LIGATION         Family History  Family History   Problem Relation Age of Onset    Heart disease Father     COPD Father     Cancer Maternal Aunt         breast cancer    Cancer Maternal Grandmother         breast cancer    Cancer Cousin         colon cancer       Social History  Social History     Socioeconomic History    Marital status:      Spouse name: Not on file    Number of children: 2    Years of education: Not on file    Highest education level: Not on file   Occupational History    Not on file   Social Needs    Financial resource strain: Not on file    Food insecurity:     Worry: Not on file     Inability: Not on file    Transportation needs:     Medical: Not on file     Non-medical: Not on file   Tobacco Use    Smoking status: Never Smoker    Smokeless 
"tobacco: Never Used   Substance and Sexual Activity    Alcohol use: Yes     Comment: occasionally; once every 3-4 months    Drug use: No    Sexual activity: Not Currently     Partners: Male     Comment: 2 kids,     Lifestyle    Physical activity:     Days per week: Not on file     Minutes per session: Not on file    Stress: Not on file   Relationships    Social connections:     Talks on phone: Patient refused     Gets together: Patient refused     Attends [employer] service: Not on file     Active member of club or organization: Yes     Attends meetings of clubs or organizations: More than 4 times per year     Relationship status:    Other Topics Concern    Not on file   Social History Narrative    Not on file       Current Medications  Medications reviewed and updated.     Allergies   Review of patient's allergies indicates:   Allergen Reactions    Neomycin Other (See Comments) and Swelling     Other reaction(s): Unknown       Review of Systems (Pertinent positives)  Review of Systems   Constitutional: Negative for activity change and unexpected weight change.   HENT: Positive for hearing loss. Negative for rhinorrhea and trouble swallowing.    Eyes: Negative for discharge and visual disturbance.   Respiratory: Negative for chest tightness and wheezing.    Cardiovascular: Negative for chest pain and palpitations.   Gastrointestinal: Positive for constipation. Negative for blood in stool, diarrhea and vomiting.   Endocrine: Negative for polydipsia and polyuria.   Genitourinary: Negative for difficulty urinating, dysuria, hematuria and menstrual problem.   Musculoskeletal: Negative for arthralgias and joint swelling.   Neurological: Negative for weakness and headaches.   Psychiatric/Behavioral: Negative for confusion and dysphoric mood.       /62   Pulse 69   Temp 97.4 °F (36.3 °C)   Ht 5' 6" (1.676 m)   Wt 61.3 kg (135 lb 2.3 oz)   SpO2 99%   BMI 21.81 kg/m²     Physical Exam "
  Constitutional: She is oriented to person, place, and time. She appears well-developed and well-nourished. No distress.   HENT:   Head: Normocephalic and atraumatic.   Right Ear: Ear canal normal. Tympanic membrane is not erythematous and not bulging.   Left Ear: Ear canal normal. Tympanic membrane is not erythematous and not bulging.   Mouth/Throat: No oropharyngeal exudate.   Eyes: Conjunctivae are normal.   Neck: Carotid bruit is not present. No thyroid mass and no thyromegaly present.   Cardiovascular: Normal rate, regular rhythm and normal heart sounds.   No murmur heard.  Pulses:       Dorsalis pedis pulses are 2+ on the right side, and 2+ on the left side.   Pulmonary/Chest: Effort normal and breath sounds normal. No respiratory distress.   Abdominal: Soft. Bowel sounds are normal. She exhibits no distension and no mass. There is no hepatosplenomegaly. There is no tenderness.   Musculoskeletal: Normal range of motion.   Lymphadenopathy:     She has no cervical adenopathy.   Neurological: She is alert and oriented to person, place, and time.   Skin: Skin is warm and dry. No rash noted.   Psychiatric: She has a normal mood and affect.   Vitals reviewed.        Assessment/Plan:  Prema Phillips is a 72 y.o. female who presents today for :    Prema was seen today for annual exam.    Diagnoses and all orders for this visit:    Routine general medical examination at a health care facility  Comments:  HEALTH MAINTENANCE REVIEWED: SHINGRIX ADVISED/LABS ORDERED& OTHERWISE UP-TO-DATE. CSCOPE PER GI, MAMMO PER GYN. ADVISED ON DIET/EXERCISE/SLEEP, EYE/DENTAL EXAMS    BMI 21.0-21.9, adult    History of total hysterectomy with bilateral salpingo-oophorectomy (BSO)  Comments:  follows with GYN Dr Patel-- PAP/ Mammo up to date    Postmenopausal HRT (hormone replacement therapy)  Comments:  Prescribed Premarin 0.3 mg orally daily for treatment of hot flashes-prescribed by OBGYN.  Discussed risk given her mom's history of 
breast cancer    Family history of breast cancer in mother  Comments:  Ordered by Gynecology, next mammogram due January 2020    Osteopenia of the elderly  Comments:  Continue daily calcium, vitamin-D, vitamin D has been normal, recheck with labs and repeat DEXA scan ordered  Orders:  -     DXA Bone Density Spine And Hip; Future    Postmenopausal  -     DXA Bone Density Spine And Hip; Future    RBBB  Comments:  Stable as of most recent EKG 06/05/2019 for preop clearance.  EKG is otherwise unremarkable apart from incomplete right bundle-branch block    Atherosclerosis of both carotid arteries  Comments:  Follows yearly with Cardiology, most recent carotid ultrasound showed only mild plaque of bilateral carotid arteries, continue daily aspirin, statin    Long-term use of aspirin therapy    Hyperlipidemia, unspecified hyperlipidemia type  Comments:  Has been stable on Lipitor 20 mg daily, recheck with labs  Orders:  -     Comprehensive metabolic panel; Future  -     Lipid panel; Future    Acquired hypothyroidism  Comments:  TSH was normal in June for preop clearance, recheck level with annual labs, continue Synthroid 75 mcg daily pending lab results  Orders:  -     TSH; Future    Primary insomnia  Comments:  Continue trazodone 50 mg nightly as needed  Orders:  -     amitriptyline (ELAVIL) 25 MG tablet; 1/2-1 tab nightly for insomnia. Ok to take with the low dose of ativan    Seasonal allergic rhinitis, unspecified trigger  Comments:  Controlled with Flonase steroid nasal spray on Astelin antihistamine nasal spray.    Macular degeneration of right eye, unspecified type    Gastritis, presence of bleeding unspecified, unspecified chronicity, unspecified gastritis type  Comments:  Symptoms controlled on daily PPI, PPI labs ordered, DEXA ordered    Musculoskeletal neck pain  Comments:  Currently in physical therapy, takes meloxicam as needed for MSK pain    Chronic migraine without aura without status migrainosus, not 
>35min critical care time in MICU setting for mgmt coordination of care for SVT
intractable  Comments:  Recently stable, migraine frequency of recently less than 1/month, takes Relpax as needed  Orders:  -     amitriptyline (ELAVIL) 25 MG tablet; 1/2-1 tab nightly for insomnia. Ok to take with the low dose of ativan    Tinnitus of both ears  Comments:  associated with hearing loss, following with ENT and audiology, wearing hearing aids    Encounter for monitoring long-term proton pump inhibitor therapy  -     Comprehensive metabolic panel; Future  -     Vitamin D; Future  -     Magnesium; Future  -     Vitamin B12; Future    Chronic constipation  Comments:  Colonoscopy up-to-date most recently 08/27/2019 per outside GI. Advised on Citrucel daily +/- Miralax  Orders:  -     TSH; Future  -     Magnesium; Future    Need for shingles vaccine    Other orders  -     Cancel: Hemoglobin A1c; Future            ICD-10-CM ICD-9-CM    1. Routine general medical examination at a health care facility Z00.00 V70.0     HEALTH MAINTENANCE REVIEWED: SHINGRIX ADVISED/LABS ORDERED& OTHERWISE UP-TO-DATE. CSCOPE PER GI, MAMMO PER GYN. ADVISED ON DIET/EXERCISE/SLEEP, EYE/DENTAL EXAMS   2. BMI 21.0-21.9, adult Z68.21 V85.1    3. History of total hysterectomy with bilateral salpingo-oophorectomy (BSO) Z90.710 V15.29     Z90.722      Z90.79      follows with GYN Dr Patel-- PAP/ Mammo up to date   4. Postmenopausal HRT (hormone replacement therapy) Z79.890 V07.4     Prescribed Premarin 0.3 mg orally daily for treatment of hot flashes-prescribed by OBGYN.  Discussed risk given her mom's history of breast cancer   5. Family history of breast cancer in mother Z80.3 V16.3     Ordered by Gynecology, next mammogram due January 2020   6. Osteopenia of the elderly M85.80 733.90 DXA Bone Density Spine And Hip    Continue daily calcium, vitamin-D, vitamin D has been normal, recheck with labs and repeat DEXA scan ordered   7. Postmenopausal Z78.0 V49.81 DXA Bone Density Spine And Hip   8. RBBB I45.10 426.4     Stable as of most 
recent EKG 06/05/2019 for preop clearance.  EKG is otherwise unremarkable apart from incomplete right bundle-branch block   9. Atherosclerosis of both carotid arteries I65.23 433.10      433.30     Follows yearly with Cardiology, most recent carotid ultrasound showed only mild plaque of bilateral carotid arteries, continue daily aspirin, statin   10. Long-term use of aspirin therapy Z79.82 V58.66    11. Hyperlipidemia, unspecified hyperlipidemia type E78.5 272.4 Comprehensive metabolic panel      Lipid panel    Has been stable on Lipitor 20 mg daily, recheck with labs   12. Acquired hypothyroidism E03.9 244.9 TSH    TSH was normal in June for preop clearance, recheck level with annual labs, continue Synthroid 75 mcg daily pending lab results   13. Primary insomnia F51.01 307.42 amitriptyline (ELAVIL) 25 MG tablet    Continue trazodone 50 mg nightly as needed   14. Seasonal allergic rhinitis, unspecified trigger J30.2 477.9     Controlled with Flonase steroid nasal spray on Astelin antihistamine nasal spray.   15. Macular degeneration of right eye, unspecified type H35.30 362.50    16. Gastritis, presence of bleeding unspecified, unspecified chronicity, unspecified gastritis type K29.70 535.50     Symptoms controlled on daily PPI, PPI labs ordered, DEXA ordered   17. Musculoskeletal neck pain M54.2 723.1     Currently in physical therapy, takes meloxicam as needed for MSK pain   18. Chronic migraine without aura without status migrainosus, not intractable G43.709 346.70 amitriptyline (ELAVIL) 25 MG tablet    Recently stable, migraine frequency of recently less than 1/month, takes Relpax as needed   19. Tinnitus of both ears H93.13 388.30     associated with hearing loss, following with ENT and audiology, wearing hearing aids   20. Encounter for monitoring long-term proton pump inhibitor therapy Z51.81 V58.83 Comprehensive metabolic panel    Z79.899 V58.69 Vitamin D      Magnesium      Vitamin B12   21. Chronic 
CCM ATTENDING    Patient seen and discussed with House Officer/Resident  Chart and history reviewed  Exam, labs, and radiology as noted above   Assessment and Plans as outlined  Met encephalopathy ?sepsis? LLL ppneumonia vs uti, with hypovolemia and tachyarrhythmia ?PSVT responding to volume resuscitation and vasopressor support  Currently patient's mentation still with delerium/confusion  Protecting airway   Breathing is non-labored without increased work of breathing --- no intercostal accessory muscle use and no paradoxical abdominal motion evident   Ventilating and oxygenating adequately without increased work of breathing  Hemodynamically stable---clinically perfusing adequately  Aim to maintain MAP >= 65 mmHg, U/O >= 0.5 ml/kg/hr, pulse oximetry OxSat >= 92%   Metabolic demands along with any abnormal blood chemistries, and fluid requirements being addressed    Sedation and/or pain meds as needed to reduce metabolic demand and maintain comfort   GI/DVT prophylaxis  No need for CCM care/monitoring at this time.  Re-consult if clinical status changes, with clinical deterioration, to warrant re-evaluation to consider upgrading to higher level of care, otherwise, management as per primary medical teams.
constipation K59.09 564.00 TSH      Magnesium    Colonoscopy up-to-date most recently 08/27/2019 per outside GI. Advised on Citrucel daily +/- Miralax   22. Need for shingles vaccine Z23 V04.89        Patient Instructions   ADVISE LOOKING INTO NEW 2-PART, NON-LIVE SHINGRIX SHINGLES VACCINE THROUGH YOUR LOCAL PHARMACY.     CITRUCEL ORANGE POWDER (NOT SUGAR FREE AS ARTIFICIAL SWEETENERS CAN WORSEN GAS/BLOATING):  MIX 1 HEAPING TABLESPOON WITH 10 OZ OF COLD WATER AND DRINK NIGHTLY AFTER DINNER. ADD A SECOND DOSE AFTER BREAKFAST IF NEEDED.             Follow up in about 1 year (around 11/4/2020) for to follow up on lab results, return as needed for new concerns.

## 2019-12-23 NOTE — ED ADULT TRIAGE NOTE - ADDITIONAL SAFETY/BANDS...
----- Message from Ariel Washington MD sent at 12/22/2019 10:45 AM CST -----  Thyroid functions are back to normal.  Continue current dose of Tapazole.  A repeat TSH and CBC in 1 month   Additional Safety/Bands:

## 2020-02-01 NOTE — PROGRESS NOTE ADULT - PROBLEM/PLAN-6
Mother is to call clinic to confirm that she received message to have infant see Dr. Hartman for fu of right-sided aortic arch.     DISPLAY PLAN FREE TEXT

## 2020-02-03 NOTE — BEHAVIORAL HEALTH ASSESSMENT NOTE - PAST PSYCHOTROPIC MEDICATION
Danelle Forrester is a 42 year old female here for  Chief Complaint   Patient presents with   • Consultation     Reports latex allergy or sensitivity.    Medication verified, no changes.  PCP and Pharmacy verified.    Social History     Tobacco Use   Smoking Status Former Smoker   • Packs/day: 0.50   • Years: 10.00   • Pack years: 5.00   • Types: Cigarettes   Smokeless Tobacco Never Used     Advance Directives Filed: No    ECO - Fully active, able to carry on all predisease activities without restrictions.    Height: Yes, shoes off.  Ht Readings from Last 1 Encounters:   19 5' 5\" (1.651 m)     Weight:Yes, shoes off.  Wt Readings from Last 3 Encounters:   19 106.1 kg   19 106.1 kg   19 106.1 kg       BMI: There is no height or weight on file to calculate BMI.    REVIEW OF SYSTEMS  GENERAL:  Patient denies headache, fevers, chills, night sweats, excessive fatigue, change in appetite, weight loss, dizziness, but complains of: no appetite ,   ALLERGIC/IMMUNOLOGIC: Verified allergies: Yes  EYES:  Patient denies significant visual difficulties, double vision, blurred vision  ENT/MOUTH: Patient denies problems with hearing, sore throat, sinus drainage, mouth sores  ENDOCRINE:  Patient denies diabetes, thyroid disease, hormone replacement, hot flashes  HEMATOLOGIC/LYMPHATIC: Patient denies tender lymph nodes, swollen lymph nodes, but complains of: easy bruising and bleeding if skin is broken  BREASTS: Patient denies abnormal masses of breast, nipple discharge, pain  RESPIRATORY:  Patient denies lung pain with breathing, cough, coughing up blood, shortness of breath  CARDIOVASCULAR:  Patient denies anginal chest pain, palpitations, shortness of breath when lying flat, peripheral edema  GASTROINTESTINAL: Patient denies abdominal pain , nausea, vomiting, diarrhea, GI bleeding, constipation, change in bowel habits, heartburn, sensation of feeling full, difficulty swallowing  : Patient denies  abnormal genital masses, blood in the urine, frequency, urgency, burning with urination, hesitancy, incontinence, vaginal bleeding, discharge  MUSCULOSKELETAL:  Patient denies joint pain, bone pain, joint swelling, redness, decreased range of motion  SKIN:  Patient denies chronic rashes, inflammation, ulcerations, skin changes, itching  NEUROLOGIC:  Patient denies loss of balance, areas of focal weakness, abnormal gait, sensory problems, numbness, tingling  PSYCHIATRIC: Patient denies depression, anxiety, but complains of: insomnia   xanax, sleep aides - ambien, sonata

## 2020-06-16 NOTE — ED ADULT NURSE NOTE - NS ED NOTE ABUSE RESPONSE YN
no negative External ear normal/Nasal mucosa normal/Normal dentition/Normal oropharynx/PERRLA/Anicteric conjunctivae/No drainage/No oral lesions/Normal tympanic membranes

## 2020-06-25 NOTE — ED ADULT NURSE NOTE - DOES PATIENT HAVE ADVANCE DIRECTIVE
----- Message from Danielle Blanchard sent at 6/25/2020  8:04 AM CDT -----  Regarding: Patient Advice  Contact: patient  Would like to consult with nurse regarding a UTI that she thinks she has. Please call back at [phone]     No

## 2020-10-05 NOTE — DIETITIAN INITIAL EVALUATION ADULT. - NS FNS WEIGHT USED FOR CALC
Current auth info still valid     Echo Auth#   [id number]  7/15/20 -- 1/10/21  
Pt requesting an echo and office visit for same day. Pt can be reach at [phone]  
Sent to future files  
Spoke to pt.  [facility]    Please check precert information to ensure that this is OK.  
admission/77 kg ABW

## 2021-03-25 NOTE — ED ADULT TRIAGE NOTE - SOURCE OF INFORMATION
Patient Size Of Lesion In Cm (Optional): 0 Detail Level: Simple Detail Level: Detailed Size Of Lesion: 0.2 x 0.2 Size Of Lesion: 0.3 Body Location Override (Optional - Billing Will Still Be Based On Selected Body Map Location If Applicable): left thigh

## 2021-07-20 NOTE — ED ADULT NURSE NOTE - FINAL NURSING ELECTRONIC SIGNATURE
[facility]  July 21, 2021         Assessment and Plan:   Michele Altamirano is a 52 year old male with cystic fibrosis.    1. CF lung disease with mild obstruction: Michele reports little in the way of pulmonary symptomatology.  He does remain consistent doing physical activity as well as nebulized therapy.  He is not historically done vest therapy.  Michele's past sputum cultures have shown evidence of Pseudomonas.  He does show evidence of stability in his pulmonary function today.  At this time I recommended to Michele:  --He should continue to do his nebulized therapy.  We talked about bronchiectasis and how it is unlikely to resolve even on Trikafta.  --He should continue to remain physically active doing walks and other sports.    2. Pancreatic Insufficiency/GI:  Michele has no new symptoms consistent with worsening malabsorption.    - continue the present dose of pancreatic enzymes  - continue vitamin supplementation.    3.  CF TR modulator therapy: Michele is on Trikafta and tolerating well.  The plan was to get labs performed today.  It appears that that did not happen.  We will have to get them done at his local clinic.    4.  Possible restless leg syndrome: Michele and I I discussed possible restless leg syndrome at his last visit.  We discussed being seen by sleep medicine.  He reports improvement in his symptoms so he did not pursue this appointment.  He knows to contact me if his symptoms were to worsen.  We also discussed obtaining an iron and ferritin level.    5.  Psychosocial: Michele reports that work is very busy.  He has had a busy summer with travel.  He went to the [facility] and [place].  His father did pass away this summer as well.    Annual studies due: review with patient--needs to be done  Immunizations: UTD  Colonoscopy: review with patient    Siobhan Munoz MD MPH  Associate Professor of Medicine  Pulmonary, Allergy, Critical Care and Sleep Medicine       Interval History:     Michele reports that he is really no cough or sputum production.  He denies any chest congestion.  He does do hypertonic saline nebs 3-4 times per week.  He reports only rare chest congestion.         Review of Systems:     CONSTITUTIONAL: no fever, no chills, no sweats, increase in weight, no change in energy--really good, no change in appetite--too good    INTEGUMENTARY/SKIN: no rash    ENT/MOUTH: no sore throat, no new sinus pain, no new nasal drainage, no new nasal congestion, no ear ringing     RESPIRATORY: see interval history    CV: no chest pain, no palpitations, no peripheral edema    GI: no nausea, no vomiting, no change in stools, no fatty stools, no GERD    : negative    MUSCULOSKELETAL: hands sore with walks    ENDOCRINE: negative    NEURO:  No headache, no numbness, no tingling    SLEEP: no issues--better, did not see sleep clinic    PSYCHIATRIC: mood stable--great          Past Medical and Surgical History:     Past Medical History:   Diagnosis Date     CAP (community acquired pneumonia)     2011     CF (cystic fibrosis) (H)     diagnosed 1969, malnutrition, staph empyema     Chronic knee pain      Diverticular disease 2018    see on colonoscopy     GERD (gastroesophageal reflux disease)     egd in the past     Pancreatic insufficiency      Past Surgical History:   Procedure Laterality Date     ENDOSCOPIC SINUS SURGERY      removal of mucocele behind eye, 1992     OPTICAL TRACKING SYSTEM ENDOSCOPIC SINUS SURGERY Bilateral 11/28/2016    Procedure: OPTICAL TRACKING SYSTEM ENDOSCOPIC SINUS SURGERY;  Surgeon: Harriett Cosby MD;  Location:  OR           Family History:     Family History   Problem Relation Age of Onset     Lipids Father      Cardiovascular Father      Diabetes Maternal Grandmother         type 2            Social History:     Social History     Socioeconomic History     Marital status:      Spouse name: Not on file     Number of children: 1     Years of  education: Not on file     Highest education level: Not on file   Occupational History     Occupation:    Tobacco Use     Smoking status: Never Smoker     Smokeless tobacco: Never Used   Substance and Sexual Activity     Alcohol use: No     Alcohol/week: 0.0 standard drinks     Drug use: No     Sexual activity: Yes     Partners: Female     Birth control/protection: None   Other Topics Concern     Parent/sibling w/ CABG, MI or angioplasty before 65F 55M? Not Asked   Social History Narrative    8/18/2020 --Lives in [place] with his wife and 15 yo son (Raghu).  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     Social Determinants of Health     Financial Resource Strain:      Difficulty of Paying Living Expenses:    Food Insecurity:      Worried About Running Out of Food in the Last Year:      Ran Out of Food in the Last Year:    Transportation Needs:      Lack of Transportation (Medical):      Lack of Transportation (Non-Medical):    Physical Activity:      Days of Exercise per Week:      Minutes of Exercise per Session:    Stress:      Feeling of Stress :    Social Connections:      Frequency of Communication with Friends and Family:      Frequency of Social Gatherings with Friends and Family:      Attends Jainism Services:      Active Member of Clubs or Organizations:      Attends Club or Organization Meetings:      Marital Status:    Intimate Partner Violence:      Fear of Current or Ex-Partner:      Emotionally Abused:      Physically Abused:      Sexually Abused:             Medications:     Current Outpatient Medications   Medication     albuterol (PROAIR HFA/PROVENTIL HFA/VENTOLIN HFA) 108 (90 Base) MCG/ACT inhaler     amylase-lipase-protease (ZENPEP) 95144-35865 units CPEP     azithromycin (ZITHROMAX) 500 MG tablet     CAYSTON 75 MG SOLR     Cholecalciferol (VITAMIN D) 2000 UNITS CAPS     hydrochlorothiazide (MICROZIDE) 12.5 MG capsule      "multivitamin CF formula (AQUADEKS) chewable tablet     pantoprazole (PROTONIX) 40 MG EC tablet     EDU ALTERA NEBULIZER SYSTEM [facility] [place]     Respiratory Therapy Supplies (EDU ALTERA NEBULIZER HANDSET) List of [facility] in the [place]     Respiratory Therapy Supplies (EDU ALTERA NEBULIZER HANDSET) MISC     sodium chloride inhalant 7 % NEBU neb solution     TRIKAFTA 100-50-75 & 150 MG tablet pack     No current facility-administered medications for this visit.            Physical Exam:   Pulse 72   Resp 17   Ht 1.75 m (5' 8.9\")   Wt 75 kg (165 lb 5.5 oz)   SpO2 97%   BMI 24.49 kg/m      Constitutional:   Awake, alert and in no apparent distress     Eyes:   nonicteric     ENT:   Mask in place     Neck:   Supple without supraclavicular or cervical lymphadenopathy     Lungs:   Good air flow.  No crackles. No rhonchi.  No wheezes.     Cardiovascular:   Normal S1 and S2.  RRR.  No murmur, gallop or rub.     Abdomen:   NABS, soft, nontender, nondistended.      Musculoskeletal:   No edema, digital clubbing present     Neurologic:   Alert and conversant.     Skin:   Warm, dry.  No rash on limited exam.             Data:   All laboratory and imaging data reviewed.    Cystic Fibrosis Culture  Specimen Description   Date Value Ref Range Status   02/09/2018 Sputum  Final   12/22/2017 Sputum  Final   01/12/2016 Sputum  Final   01/12/2016 Sputum  Final   01/12/2016 Sputum  Final    Culture Micro   Date Value Ref Range Status   02/09/2018 Moderate growth  Normal tessa    Final   02/09/2018 (A)  Final    Moderate growth  Pseudomonas aeruginosa, mucoid strain     02/09/2018 Moderate growth  Pseudomonas aeruginosa   (A)  Final   02/09/2018 Light growth  Staphylococcus aureus   (A)  Final        Recent Results (from the past 168 hour(s))   General PFT Lab (Please always keep checked)    Collection Time: 07/21/21  8:06 AM   Result Value Ref Range    FVC-Pred 4.68 L    FVC-Pre 3.94 L    FVC-%Pred-Pre 84 %    FEV1-Pre 2.73 L    FEV1-%Pred-Pre 74 %    FEV1FVC-Pred " 79 %    FEV1FVC-Pre 69 %    FEFMax-Pred 9.37 L/sec    FEFMax-Pre 9.13 L/sec    FEFMax-%Pred-Pre 97 %    FEF2575-Pred 3.26 L/sec    FEF2575-Pre 1.57 L/sec    QJJ0438-%Pred-Pre 48 %    ExpTime-Pre 11.25 sec    FIFMax-Pre 7.84 L/sec    FEV1FEV6-Pred 80 %    FEV1FEV6-Pre 72 %   Cystic Fibrosis Culture Aerobic Bacterial    Collection Time: 07/21/21  8:33 AM    Specimen: Throat; Swab   Result Value Ref Range    Culture Culture in progress     Culture 2+ Normal tessa     Culture 1+ Gram negative bacilli (A)        PFT: Mild obstructive lung disease.  When compared to 4/17/2019, the FEV1 has increased.      Pulmonary exacerbation: absent    45 minutes spent on the date of the encounter doing chart review, history and exam, documentation and further activities per the note     04-Feb-2018 22:08 04-Feb-2018 23:36

## 2021-08-03 NOTE — ED ADULT NURSE NOTE - FALLEN IN THE PAST
Tatum Bustillos is a 77 y.o. male. HPI:  Had annual wellness visit with home health nurse  Results scanned into media  Include diabetic foot exam,  Urine micral, abdominal aortic aneurysm screening  Here for diabetic and med check      Due for fasting labs also   Had J&J vaccine    Meds, vitamins and allergies reviewed with pt    Wt Readings from Last 3 Encounters:   08/03/21 160 lb (72.6 kg)   08/19/20 160 lb (72.6 kg)   08/17/20 160 lb (72.6 kg)       REVIEW OF SYSTEMS:   CONSTITUTIONAL: See history of present illness,   Weight noted   HEENT: No new vision difficulties or ringing in the ears. RESPIRATORY: No new SOB, PND, orthopnea or cough. CARDIOVASCULAR: no CP, palpitations or SOB with exertion  GI: No nausea, vomiting, diarrhea, constipation, abdominal pain or changes in bowel habits. : No urinary frequency, urgency, incontinence hematuria or dysuria. SKIN: No cyanosis or skin lesions. MUSCULOSKELETAL: No new muscle or joint pain. NEUROLOGICAL: No syncope or TIA-like symptoms. PSYCHIATRIC: No anxiety, insomnia or depression     No Known Allergies    Prior to Visit Medications    Medication Sig Taking?  Authorizing Provider   FOLBIC 2.5-25-2 MG TABS TAKE 1 TABLET BY MOUTH EVERY DAY Yes Brittney Zhong MD   alendronate (FOSAMAX) 70 MG tablet TAKE 1 TABLET BY MOUTH ONE TIME PER WEEK Yes Brittney Zhong MD   atorvastatin (LIPITOR) 20 MG tablet TAKE 1 TABLET BY MOUTH NIGHTLY Yes Brittney Zhong MD   lisinopril (PRINIVIL;ZESTRIL) 20 MG tablet TAKE 1 TABLET BY MOUTH EVERY DAY Yes Brittney Zhong MD   BD PEN NEEDLE JUSTIN 2ND GEN 32G X 4 MM MISC USE AS DIRECTED 4 TIMES DAILY WITH INSULIN Yes Brittney Zhong MD   ACCU-CHEK JULES PLUS strip USE TO TEST BLOOD SUGAR 6 TIMES DAILY AS DIRECTED BY PHYSICIAN Yes Brittney Zhong MD   HUMALOG KWIKPEN 100 UNIT/ML SOPN INJECT 20 UNITS INTO THE SKIN 3 TIMES DAILY (BEFORE MEALS) Yes Brittney Zhong MD   insulin glargine (LANTUS SOLOSTAR) 100 UNIT/ML injection pen 30 units subcu nightly Yes Ivelisse Kendrick MD   Continuous Blood Gluc Sensor (FREESTYLE DOMINGA 14 DAY SENSOR) MISC 1 each by Does not apply route daily Yes Ivelisse Kendrick MD   Insulin Syringe-Needle U-100 31G X 5/16\" 0.5 ML MISC 1 each by Does not apply route daily Yes Ivelisse Kendrick MD   aspirin 81 MG tablet Take 81 mg by mouth daily Yes Historical Provider, MD   Calcium Citrate-Vitamin D (CITRACAL + D PO) Take  by mouth daily. Yes Historical Provider, MD   Omega-3 Fatty Acids (FISH OIL PO) Take  by mouth. Yes Historical Provider, MD   Coenzyme Q10 (CO Q 10) 100 MG CAPS Take 300 mg by mouth  Yes Historical Provider, MD       Past Medical History:   Diagnosis Date    Bilateral inguinal hernia     CAD (coronary artery disease)     per Dr Srinivasa Martinez MD.    [facility])     Hyperlipidemia     Hyperlipidemia     Type I (juvenile type) diabetes mellitus without mention of complication, uncontrolled        Social History     Tobacco Use    Smoking status: Former Smoker     Packs/day: 0.25     Years: 5.00     Pack years: 1.25     Types: Cigarettes     Quit date: 1990     Years since quittin.7    Smokeless tobacco: Never Used   Substance Use Topics    Alcohol use: Yes     Comment: occas       Family History   Problem Relation Age of Onset    Stroke Mother     Diabetes Father     Heart Disease Father     Diabetes Brother        OBJECTIVE:  /80   Pulse 63   Ht 5' 10\" (1.778 m)   Wt 160 lb (72.6 kg)   SpO2 97%   BMI 22.96 kg/m²   GEN:  in NAD, actinic changes diffusely recommend floppy hat and sunscreen  HEENT:  NCAT, TMs:normal and throat: Not examined due to Covid/mask  NECK:  Supple without adenopathy. CV:  Regular rate and rhythm, S1 and S2 normal, no murmurs, clicks  PULM:  Chest is clear, no wheezing ,  symmetric air entry throughout both lung fields.   ABD: Soft, NT no masses appreciated  EXT: No rash or edema  NEURO: Alert oriented ×3, nonfocal , no assistive device ASSESSMENT/PLAN:  1. Type 2 diabetes mellitus without complication, with long-term current use of insulin (HCC)  rec diabetic eye exam     Fasting labs today  Healthy diet, regular exercise, continue meds  Flu vaccine this fall      - CBC Auto Differential; Future  - Comprehensive Metabolic Panel, Fasting; Future  - Lipid Panel; Future  - Hemoglobin A1C; Future  - TSH with Reflex; Future  - PSA screening; Future    Follow-up at least yearly, every 6 months is best    2. Elevated coronary artery calcium score  Continue lipitor and baby aspirin in the evening    3. Essential hypertension  Stable , continue statin  - Comprehensive Metabolic Panel, Fasting; Future    4. Mixed hyperlipidemia  Stable, continue   - Comprehensive Metabolic Panel, Fasting; Future  - Lipid Panel; Future    5.  Urinary frequency  Check PSA      30 Total Minutes spent pre charting (reviewing problem list, meds, any test results,   consultant and hospital notes ) and  obtaining present visit history, performing appropriate medical exam/evaluation, counseling and educating the patient (and family), ordering medications ,tests, and procedures as needed, refilling medication(s), placing referral(s) when needed in addition to coordinating care for this patient and documenting in electronic health record yes

## 2022-03-30 NOTE — PROGRESS NOTE ADULT - ASSESSMENT
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 Admitted for R femoral neck fracture, now POD-2 of R hip igor-arthroplasty. Complicated by hypoxia 2/2 provoked acute PE. Modified Advancement Flap Text: The defect edges were debeveled with a #15 scalpel blade.  Given the location of the defect, shape of the defect and the proximity to free margins a modified advancement flap was deemed most appropriate.  Using a sterile surgical marker, an appropriate advancement flap was drawn incorporating the defect and placing the expected incisions within the relaxed skin tension lines where possible.    The area thus outlined was incised deep to adipose tissue with a #15 scalpel blade.  The skin margins were undermined to an appropriate distance in all directions utilizing iris scissors.

## 2022-04-22 NOTE — PHYSICAL THERAPY INITIAL EVALUATION ADULT - ASR WT BEARING STATUS EVAL
no weight-bearing restrictions/As per Dr. Valero, patient is WBAT bilateral lower extremity blood glucose testing/insulin therapy

## 2022-05-25 NOTE — CONSULT NOTE ADULT - PROBLEM SELECTOR RECOMMENDATION 3
PHONE RINGS - NO ANSWER OR VM - SENT MYCHART LETTER
EKG ordered and Echo ordered to assess LV EF
Pt euvolemic on exam and UA. Suspect evuolemic hypoantremia likely 2/2 pain, siadh.  --cont to monitor and trend BMP

## 2022-06-14 NOTE — PROGRESS NOTE ADULT - ATTENDING COMMENTS
Patient: Leoncio Maldonado  MRN: [id number] Age: 1 y.o. 10 m.o. YOB: 2020 Room/Bed: [address] [place] [address]  Admit Diagnosis: Bronchiolitis [J21.9] Principal Diagnosis: <principal problem not specified>  Goals: Start CPT, Neb Treatments, Obtain Xray  30 day readmission: no  Influenza screening completed:    VTE prophylaxis: Less than 15years old  Consults needed: RT and CM  Community resources needed: None  Specialists needed: None  Equipment needed: no   Testing due for patient today?: no  LOS: 3 Expected length of stay:3-5 days  Discharge plan: Home With Follow Up  Discharge appointment made:  Will Make Prior To DC  PCP: Shareen Peabody, MD  Additional concerns/needs: None  Days before discharge: two or more days before discharge   Discharge disposition: Zee Wilks RN  06/14/22 resp failue-- resolved  VT disease--A/C  off AB  needs rehab  CLL stable  d/c p

## 2022-07-13 NOTE — PROGRESS NOTE ADULT - PROBLEM SELECTOR PROBLEM 3
Acute renal failure, unspecified acute renal failure type
Modified Advancement Flap Text: The defect edges were debeveled with a #15 scalpel blade.  Given the location of the defect, shape of the defect and the proximity to free margins a modified advancement flap was deemed most appropriate.  Using a sterile surgical marker, an appropriate advancement flap was drawn incorporating the defect and placing the expected incisions within the relaxed skin tension lines where possible.    The area thus outlined was incised deep to adipose tissue with a #15 scalpel blade.  The skin margins were undermined to an appropriate distance in all directions utilizing iris scissors.

## 2022-10-18 NOTE — DISCHARGE NOTE ADULT - NS MD DC FALL RISK RISK
Gastroenterology Progress Note  ANDERSON Mendiola for Dr. Sunny Guerra    [id number] [facility], [address] [place] [address] [place]: [phone]       Ro Francis MD For information on Fall & Injury Prevention, visit [email or web address]

## 2022-12-01 NOTE — OCCUPATIONAL THERAPY INITIAL EVALUATION ADULT - ADAPTIVE EQUIPMENT NEEDED
From: Iliana Moody  To: Sha Lieberman  Sent: 11/30/2022 3:10 PM CST  Subject: Update...Dr. Lieberman/Snow Roe,  I had my appointment with Dr. Spencer Dykes today.  He said he would like to try and do an IOL implant at the same time as doing the corneal transplant, \"DSEK\" type. He also said he will try and shave part of the vitreous jelly during the surgery.  He stated he is not sure if he will be able to perform the vitreous shave and the implant if he is not able to see well enough d/t the cloudiness of my cornea.   A couple of questions, he asked me if you have looked at the back of my eye in the past year as he was not able to today?     Also, I was wondering if Dr. Lieberman would be willing to connect with Dr. Dykes to discuss the plan of care? I feel there is a disconnect with their office, Dr. Lieberman is my primary ophthalmologist... and I trust him. Also, since Dr. Dykes is not using [employer] there is a lot of hand-written notes. He was surprised that my left eye is my dominate eye.   Thank you so much. I hope you had a great Thanksgiving!  Sincerely,  Iliana Moody  [phone]   no

## 2023-08-14 NOTE — OCCUPATIONAL THERAPY INITIAL EVALUATION ADULT - LEVEL OF INDEPENDENCE, REHAB EVAL
Previous Accession (Optional): [id number] Previous Accession (Optional): [id number] moderate assist (50% patients effort)

## 2023-08-14 NOTE — PATIENT PROFILE ADULT. - ARE SIGNIFICANT INDICATORS COMPLETE.
No Results of labs/imaging, subspecialist recommendations and admission plan communicated at bedside with family member Luda Welch and Mu Garrett who verbalize understanding and agree to plan. Yes

## 2023-11-13 NOTE — PATIENT PROFILE ADULT. - NS TRANSFER DISPOSITION PATIENT BELONGINGS
Subjective   Patient ID: Shabbir Laurent is a 71 y.o. male who presents for Follow-up (6 mo fuv).  HPI  Patient with history of seronegative inflammatory polyarthropathy previously seen by Dr. Noonan.  Also has a history of fibromyalgia, osteoarthritis, neuropathy, spinal stenosis, coronary artery disease, hypothyroidism and previously has been seen by neurology and pain management.  Previuosly was on Plaqunil and ssz.    At his last visit on 5/15/2023 we had him continue on leflunomide.    Little bit over a week ago he had a block in his lumbar spine and it greatly improved his lower back pain and also the symptoms in his legs about 80%.  His knees are bad and he also has some pain in his hips.  He has been putting cold compresses on his hips both in the evening and morning.    He feels that his hands are much worse.    He has swelling, pain, stiffness.  Continues to be on leflunomide.    Denies any infections.  No falls.    His bilateral knees need to be replaced in his shoulder also needs to be replaced.  Review of Systems   Constitutional:  Positive for fatigue.   HENT:  Positive for hearing loss.    Respiratory:  Positive for apnea.    Cardiovascular:  Positive for leg swelling. Negative for chest pain.   Gastrointestinal:  Negative for abdominal pain.   Musculoskeletal:         Per HPI   Psychiatric/Behavioral:  Positive for sleep disturbance.        Objective   Physical Exam  GEN: NAD A&O x3 appropriate affect able to get onto the exam table  HENT:no enlarged glands or thyroid  EYES: no conjunctival redness, eyelids normal  LYMPH: no cervical lymphadenopathy  SKIN: no rashes, ulcers, or subcutaneous nodules  PULSES: +radials  TENDER POINTS: 0/18   MSK:  Swelling more in the MCPs on the right than the left  No tenderness in the DIP or PIPs  Swelling and fullness in the bilateral elbows more on the right than the left unable to fully extend  Decreased range of motion bilateral shoulders  Hypertrophic changes  in the bilateral knees  Mild edema in the lower extremities  Assessment/Plan     Seronegative inflammatory arthritis -previously has been on hydroxychloroquine, sulfasalazine, methotrexate   -Currently on leflunomide and has had increase in symptoms.  Has swelling in his MCPs on the right, right wrist, bilateral elbows.  He does have osteoarthritis in his bilateral knees and moderate in his hips.   -Discussed possible treatments if he does have increase in rheumatoid symptoms.  We will get blood work to look at his inflammatory markers.  Discussed possible Biologics.  Does not have a history of heart failure, emphysema/COPD.  Is not increased risk of infections.  Could consider medication such as Remicade or Orencia.   -We will get blood work and discuss    We will see him again in 4 months or as needed   with patient

## 2023-11-17 NOTE — ED ADULT NURSE REASSESSMENT NOTE - NS ED NURSE REASSESS COMMENT FT1
Vent settings changed by attending: Rate increased to 26
Vent settings: AC, R 16, , PEEP 5, FiO2 100%. ETT 7.0 & 24 @ lip
Pt . Death called at 3:18pm by Dr. Woo and Dr. Hutton. HHA was at bedside when pt . Dr. Hutton contacted his family in [place].
per Dr Lowery, DNR was obtained from pt's nephew and nephew's wife in [place] who is HCP. Nephew and wife wish to make pt comfort care. Dopamine & Levophed drip stopped and monitor shut off as per Dr Hutton. Pt removed from vent and extubated by RT.
Time-based billing (NON-critical care)
Time-based billing (NON-critical care)

## 2024-01-17 NOTE — ED PROVIDER NOTE - CPE EDP GASTRO NORM
- normal...

## 2024-01-31 NOTE — PROGRESS NOTE ADULT - PROBLEM SELECTOR PLAN 1
Airway  Urgency: elective    Date/Time: 1/31/2024 11:38 AM  Airway not difficult    General Information and Staff    Patient location during procedure: OR  Anesthesiologist: Min Kay MD CRNA/CAA: Melanie Desai CRNA    Indications and Patient Condition  Indications for airway management: airway protection    Preoxygenated: yes  Mask difficulty assessment: 0 - not attempted    Final Airway Details  Final airway type: supraglottic airway      Successful airway: classic  Size 4     Number of attempts at approach: 1  Assessment: lips, teeth, and gum same as pre-op    Additional Comments  LMA placed with no trauma noted. Patient tolerated well. Good seal. Secured.           -Bilateral heel pressure wound, left>right and good palpable pulses.  -Vascular following, recs appreciated.  -As per vascular, c/w antibiotics, no need for surgical intervention yet as he has good circulation  -No pain, no active discharge, no systemic signs  -Given severity of ulcer, will c/w vanc + zosyn  -Check esr, crp, and foot x-ray to r/o osteomyelitis.  -Consult wound care, f/u recs  -PT consulted, f/u recs  -leg elevation

## 2024-02-19 NOTE — PATIENT PROFILE ADULT. - FUNCTIONAL LEVEL PRIOR: TOILETING
High fiber diet - 25 grams per day. Emphasis on whole grain breads such as double fiber wheat bread and high fiber cereals and bars.   Drink 8 glasses of water per day 64 - 96 oz per day  Fiber supplements such as KONSYL, METAMUCIL can be taken 1-2 x per day  Regular exercise - 30 min brisk walking 5 days per week  Stool softener such as colace 1 tablet twice a day. If works too well then can take 1 tablet a day or every other day.  Miralax- take this and figure out how much you need to take on a daily basis to maintain 2 soft bowel movements daily     Magnesium citrate   (0) independent

## 2024-04-11 NOTE — PHYSICAL THERAPY INITIAL EVALUATION ADULT - TRANSFER SAFETY CONCERNS NOTED: SIT/STAND, REHAB EVAL
Bed: RWR 08  Expected date:   Expected time:   Means of arrival:   Comments:   losing balance/decreased sequencing ability/decreased weight-shifting ability

## 2024-11-18 NOTE — PATIENT PROFILE ADULT. - TEACHING/LEARNING FACTORS INFLUENCE READINESS TO LEARN

## 2024-12-04 NOTE — PHYSICAL THERAPY INITIAL EVALUATION ADULT - DISCHARGE DISPOSITION, PT EVAL
Subacute Rehab
This was a shared visit with the CASANDRA. I reviewed and verified the documentation.
